# Patient Record
Sex: FEMALE | Race: WHITE | NOT HISPANIC OR LATINO | URBAN - METROPOLITAN AREA
[De-identification: names, ages, dates, MRNs, and addresses within clinical notes are randomized per-mention and may not be internally consistent; named-entity substitution may affect disease eponyms.]

---

## 2023-08-24 PROBLEM — I25.10 CORONARY ARTERIOSCLEROSIS: Status: ACTIVE | Noted: 2023-08-24

## 2023-08-24 PROBLEM — I63.9 CEREBROVASCULAR ACCIDENT (MULTI): Status: ACTIVE | Noted: 2023-03-16

## 2023-08-24 PROBLEM — I46.9 CARDIAC ARREST (MULTI): Status: ACTIVE | Noted: 2023-08-24

## 2023-08-24 PROBLEM — I50.9 CONGESTIVE HEART FAILURE (MULTI): Status: ACTIVE | Noted: 2022-05-18

## 2023-08-24 PROBLEM — I21.9 MYOCARDIAL INFARCTION (MULTI): Status: ACTIVE | Noted: 2023-08-24

## 2023-08-24 PROBLEM — I50.31 ACUTE DIASTOLIC HEART FAILURE (MULTI): Status: ACTIVE | Noted: 2023-08-24

## 2023-08-24 PROBLEM — R09.89 ALTERED CARDIOPULMONARY TISSUE PERFUSION: Status: ACTIVE | Noted: 2023-08-24

## 2023-08-24 PROBLEM — E78.5 DYSLIPIDEMIA: Status: ACTIVE | Noted: 2023-08-24

## 2023-08-24 PROBLEM — R48.0: Status: ACTIVE | Noted: 2023-08-24

## 2023-08-24 PROBLEM — N18.30 CHRONIC KIDNEY DISEASE, STAGE 3 (MULTI): Status: ACTIVE | Noted: 2023-08-24

## 2023-08-24 PROBLEM — I21.3 ACUTE ST ELEVATION MYOCARDIAL INFARCTION (STEMI) (MULTI): Status: ACTIVE | Noted: 2023-08-24

## 2023-08-24 PROBLEM — E78.00 HYPERCHOLESTEROLEMIA: Status: ACTIVE | Noted: 2018-05-09

## 2023-08-24 PROBLEM — N17.9 ACUTE RENAL FAILURE SYNDROME (CMS-HCC): Status: ACTIVE | Noted: 2023-03-16

## 2023-08-24 PROBLEM — Z95.5 STENTED CORONARY ARTERY: Status: ACTIVE | Noted: 2023-08-24

## 2023-08-24 PROBLEM — N39.3 SUI (STRESS URINARY INCONTINENCE, FEMALE): Status: ACTIVE | Noted: 2023-03-16

## 2023-08-24 PROBLEM — K21.9 GASTROESOPHAGEAL REFLUX DISEASE: Status: ACTIVE | Noted: 2023-08-24

## 2023-08-24 PROBLEM — E11.9 TYPE 2 DIABETES MELLITUS (MULTI): Status: ACTIVE | Noted: 2018-05-09

## 2023-08-24 PROBLEM — R73.09 BLOOD GLUCOSE ABNORMAL: Status: ACTIVE | Noted: 2023-08-24

## 2023-08-24 RX ORDER — POTASSIUM CHLORIDE 750 MG/1
TABLET, FILM COATED, EXTENDED RELEASE ORAL
COMMUNITY
Start: 2022-10-24 | End: 2023-10-16 | Stop reason: HOSPADM

## 2023-08-24 RX ORDER — FAMOTIDINE 20 MG/1
1 TABLET, FILM COATED ORAL NIGHTLY
COMMUNITY
Start: 2022-04-23 | End: 2023-10-10

## 2023-08-24 RX ORDER — INSULIN ASPART 100 [IU]/ML
INJECTION, SOLUTION INTRAVENOUS; SUBCUTANEOUS
Status: ON HOLD | COMMUNITY
End: 2023-10-16 | Stop reason: ENTERED-IN-ERROR

## 2023-08-24 RX ORDER — PEN NEEDLE, DIABETIC 30 GX3/16"
NEEDLE, DISPOSABLE MISCELLANEOUS
Status: ON HOLD | COMMUNITY
Start: 2022-05-19 | End: 2024-01-15

## 2023-08-24 RX ORDER — ATORVASTATIN CALCIUM 20 MG/1
1 TABLET, FILM COATED ORAL DAILY
Status: ON HOLD | COMMUNITY
End: 2023-10-12 | Stop reason: ALTCHOICE

## 2023-08-24 RX ORDER — BLOOD-GLUCOSE METER
KIT MISCELLANEOUS
COMMUNITY
Start: 2022-11-15

## 2023-08-24 RX ORDER — MENTHOL AND ZINC OXIDE .44; 20.625 G/100G; G/100G
OINTMENT TOPICAL
COMMUNITY

## 2023-08-24 RX ORDER — PILOCARPINE HYDROCHLORIDE 5 MG/1
TABLET, FILM COATED ORAL
Status: ON HOLD | COMMUNITY
Start: 2023-05-04 | End: 2023-10-16 | Stop reason: SDUPTHER

## 2023-08-24 RX ORDER — FLASH GLUCOSE SCANNING READER
EACH MISCELLANEOUS
COMMUNITY
Start: 2022-10-24

## 2023-08-24 RX ORDER — AMLODIPINE BESYLATE 5 MG/1
TABLET ORAL
Status: ON HOLD | COMMUNITY
Start: 2022-04-23 | End: 2023-10-11

## 2023-08-24 RX ORDER — INSULIN DETEMIR 100 [IU]/ML
INJECTION, SOLUTION SUBCUTANEOUS
Status: ON HOLD | COMMUNITY
End: 2023-10-16 | Stop reason: ENTERED-IN-ERROR

## 2023-08-24 RX ORDER — TALC
1 POWDER (GRAM) TOPICAL NIGHTLY PRN
COMMUNITY

## 2023-08-24 RX ORDER — PANTOPRAZOLE SODIUM 40 MG/1
1 TABLET, DELAYED RELEASE ORAL DAILY
COMMUNITY
End: 2023-11-30 | Stop reason: ALTCHOICE

## 2023-08-24 RX ORDER — FESOTERODINE FUMARATE 8 MG/1
1 TABLET, FILM COATED, EXTENDED RELEASE ORAL DAILY
Status: ON HOLD | COMMUNITY
Start: 2022-08-08 | End: 2023-10-16 | Stop reason: ENTERED-IN-ERROR

## 2023-08-24 RX ORDER — METOPROLOL TARTRATE 50 MG/1
1 TABLET ORAL 2 TIMES DAILY
COMMUNITY
Start: 2022-10-24 | End: 2023-10-19 | Stop reason: SINTOL

## 2023-08-24 RX ORDER — ATORVASTATIN CALCIUM 20 MG/1
20 TABLET, FILM COATED ORAL DAILY
COMMUNITY
Start: 2022-04-23 | End: 2024-01-23 | Stop reason: HOSPADM

## 2023-08-24 RX ORDER — FLUOXETINE HYDROCHLORIDE 40 MG/1
1 CAPSULE ORAL DAILY
COMMUNITY
Start: 2022-04-23 | End: 2023-10-10

## 2023-08-24 RX ORDER — FUROSEMIDE 20 MG/1
1 TABLET ORAL DAILY
COMMUNITY
Start: 2022-10-24 | End: 2024-01-08

## 2023-08-24 RX ORDER — DAPAGLIFLOZIN 10 MG/1
1 TABLET, FILM COATED ORAL EVERY MORNING
COMMUNITY
End: 2023-12-04 | Stop reason: SDUPTHER

## 2023-08-24 RX ORDER — ASPIRIN 81 MG/1
1 TABLET ORAL DAILY
COMMUNITY

## 2023-08-24 RX ORDER — ACETAMINOPHEN 325 MG/1
TABLET ORAL
COMMUNITY
End: 2023-10-16 | Stop reason: HOSPADM

## 2023-08-24 RX ORDER — FLASH GLUCOSE SENSOR
KIT MISCELLANEOUS
COMMUNITY
Start: 2022-10-24

## 2023-10-03 ENCOUNTER — PHARMACY VISIT (OUTPATIENT)
Dept: PHARMACY | Facility: CLINIC | Age: 83
End: 2023-10-03
Payer: MEDICARE

## 2023-10-03 PROCEDURE — RXMED WILLOW AMBULATORY MEDICATION CHARGE

## 2023-10-10 DIAGNOSIS — K21.9 GASTROESOPHAGEAL REFLUX DISEASE WITHOUT ESOPHAGITIS: Primary | ICD-10-CM

## 2023-10-10 DIAGNOSIS — F41.9 ANXIETY: ICD-10-CM

## 2023-10-10 RX ORDER — FAMOTIDINE 20 MG/1
20 TABLET, FILM COATED ORAL DAILY
Qty: 90 TABLET | Refills: 3 | Status: SHIPPED | OUTPATIENT
Start: 2023-10-10

## 2023-10-10 RX ORDER — FLUOXETINE HYDROCHLORIDE 40 MG/1
40 CAPSULE ORAL DAILY
Qty: 90 CAPSULE | Refills: 3 | Status: SHIPPED | OUTPATIENT
Start: 2023-10-10

## 2023-10-11 ENCOUNTER — APPOINTMENT (OUTPATIENT)
Dept: RADIOLOGY | Facility: HOSPITAL | Age: 83
DRG: 571 | End: 2023-10-11
Payer: MEDICARE

## 2023-10-11 ENCOUNTER — HOSPITAL ENCOUNTER (INPATIENT)
Facility: HOSPITAL | Age: 83
LOS: 5 days | Discharge: NURSING, REHAB OR LONG-TERM CARE | DRG: 571 | End: 2023-10-16
Attending: EMERGENCY MEDICINE | Admitting: INTERNAL MEDICINE
Payer: MEDICARE

## 2023-10-11 DIAGNOSIS — L03.119 CELLULITIS OF FOOT: ICD-10-CM

## 2023-10-11 DIAGNOSIS — R05.9 COUGH, UNSPECIFIED TYPE: ICD-10-CM
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
## 2023-10-11 DIAGNOSIS — I50.9 CHRONIC CONGESTIVE HEART FAILURE, UNSPECIFIED HEART FAILURE TYPE (MULTI): ICD-10-CM

## 2023-10-11 LAB
ALBUMIN SERPL-MCNC: 3.7 G/DL (ref 3.5–5)
ALP BLD-CCNC: 90 U/L (ref 35–125)
ALT SERPL-CCNC: 18 U/L (ref 5–40)
ANION GAP SERPL CALC-SCNC: 14 MMOL/L
APPEARANCE UR: ABNORMAL
AST SERPL-CCNC: 34 U/L (ref 5–40)
BACTERIA #/AREA URNS AUTO: ABNORMAL /HPF
BASOPHILS # BLD AUTO: 0.04 X10*3/UL (ref 0–0.1)
BASOPHILS NFR BLD AUTO: 0.5 %
BILIRUB SERPL-MCNC: 1.5 MG/DL (ref 0.1–1.2)
BILIRUB UR STRIP.AUTO-MCNC: NEGATIVE MG/DL
BUN SERPL-MCNC: 40 MG/DL (ref 8–25)
CALCIUM SERPL-MCNC: 10 MG/DL (ref 8.5–10.4)
CHLORIDE SERPL-SCNC: 99 MMOL/L (ref 97–107)
CO2 SERPL-SCNC: 25 MMOL/L (ref 24–31)
COLOR UR: ABNORMAL
CREAT SERPL-MCNC: 1.3 MG/DL (ref 0.4–1.6)
CRP SERPL-MCNC: 8.9 MG/DL (ref 0–2)
EOSINOPHIL # BLD AUTO: 0.08 X10*3/UL (ref 0–0.4)
EOSINOPHIL NFR BLD AUTO: 1 %
ERYTHROCYTE [DISTWIDTH] IN BLOOD BY AUTOMATED COUNT: 12.7 % (ref 11.5–14.5)
ERYTHROCYTE [SEDIMENTATION RATE] IN BLOOD BY WESTERGREN METHOD: 24 MM/H (ref 0–30)
EST. AVERAGE GLUCOSE BLD GHB EST-MCNC: 232 MG/DL
GFR SERPL CREATININE-BSD FRML MDRD: 41 ML/MIN/1.73M*2
GLUCOSE BLD MANUAL STRIP-MCNC: 164 MG/DL (ref 74–99)
GLUCOSE BLD MANUAL STRIP-MCNC: 246 MG/DL (ref 74–99)
GLUCOSE BLD MANUAL STRIP-MCNC: 265 MG/DL (ref 74–99)
GLUCOSE SERPL-MCNC: 57 MG/DL (ref 65–99)
GLUCOSE UR STRIP.AUTO-MCNC: ABNORMAL MG/DL
HBA1C MFR BLD: 9.7 %
HCT VFR BLD AUTO: 45.1 % (ref 36–46)
HGB BLD-MCNC: 15 G/DL (ref 12–16)
HOLD SPECIMEN: NORMAL
IMM GRANULOCYTES # BLD AUTO: 0.02 X10*3/UL (ref 0–0.5)
IMM GRANULOCYTES NFR BLD AUTO: 0.2 % (ref 0–0.9)
KETONES UR STRIP.AUTO-MCNC: NEGATIVE MG/DL
LACTATE BLDV-SCNC: 1.1 MMOL/L (ref 0.4–2)
LACTATE BLDV-SCNC: 3.8 MMOL/L (ref 0.4–2)
LEUKOCYTE ESTERASE UR QL STRIP.AUTO: NEGATIVE
LYMPHOCYTES # BLD AUTO: 0.53 X10*3/UL (ref 0.8–3)
LYMPHOCYTES NFR BLD AUTO: 6.6 %
MCH RBC QN AUTO: 31.5 PG (ref 26–34)
MCHC RBC AUTO-ENTMCNC: 33.3 G/DL (ref 32–36)
MCV RBC AUTO: 95 FL (ref 80–100)
MONOCYTES # BLD AUTO: 0.73 X10*3/UL (ref 0.05–0.8)
MONOCYTES NFR BLD AUTO: 9 %
NEUTROPHILS # BLD AUTO: 6.68 X10*3/UL (ref 1.6–5.5)
NEUTROPHILS NFR BLD AUTO: 82.7 %
NITRITE UR QL STRIP.AUTO: NEGATIVE
NRBC BLD-RTO: 0 /100 WBCS (ref 0–0)
PH UR STRIP.AUTO: 5 [PH]
PLATELET # BLD AUTO: 173 X10*3/UL (ref 150–450)
PMV BLD AUTO: 11.4 FL (ref 7.5–11.5)
POTASSIUM SERPL-SCNC: 4.4 MMOL/L (ref 3.4–5.1)
PROT SERPL-MCNC: 7.6 G/DL (ref 5.9–7.9)
PROT UR STRIP.AUTO-MCNC: ABNORMAL MG/DL
RBC # BLD AUTO: 4.76 X10*6/UL (ref 4–5.2)
RBC # UR STRIP.AUTO: ABNORMAL /UL
RBC #/AREA URNS AUTO: ABNORMAL /HPF
SARS-COV-2 RNA RESP QL NAA+PROBE: NOT DETECTED
SODIUM SERPL-SCNC: 138 MMOL/L (ref 133–145)
SP GR UR STRIP.AUTO: 1.01
SQUAMOUS #/AREA URNS AUTO: ABNORMAL /HPF
UROBILINOGEN UR STRIP.AUTO-MCNC: NORMAL MG/DL
WBC # BLD AUTO: 8.1 X10*3/UL (ref 4.4–11.3)
WBC #/AREA URNS AUTO: ABNORMAL /HPF

## 2023-10-11 PROCEDURE — 71046 X-RAY EXAM CHEST 2 VIEWS: CPT

## 2023-10-11 PROCEDURE — 2500000004 HC RX 250 GENERAL PHARMACY W/ HCPCS (ALT 636 FOR OP/ED): Performed by: NURSE PRACTITIONER

## 2023-10-11 PROCEDURE — 73700 CT LOWER EXTREMITY W/O DYE: CPT | Mod: RT,MG

## 2023-10-11 PROCEDURE — 85652 RBC SED RATE AUTOMATED: CPT | Performed by: PHYSICIAN ASSISTANT

## 2023-10-11 PROCEDURE — 87040 BLOOD CULTURE FOR BACTERIA: CPT | Mod: TRILAB | Performed by: PHYSICIAN ASSISTANT

## 2023-10-11 PROCEDURE — 96372 THER/PROPH/DIAG INJ SC/IM: CPT | Performed by: NURSE PRACTITIONER

## 2023-10-11 PROCEDURE — 93010 ELECTROCARDIOGRAM REPORT: CPT | Performed by: INTERNAL MEDICINE

## 2023-10-11 PROCEDURE — 81001 URINALYSIS AUTO W/SCOPE: CPT | Performed by: PHYSICIAN ASSISTANT

## 2023-10-11 PROCEDURE — 1200000002 HC GENERAL ROOM WITH TELEMETRY DAILY

## 2023-10-11 PROCEDURE — 83605 ASSAY OF LACTIC ACID: CPT | Performed by: PHYSICIAN ASSISTANT

## 2023-10-11 PROCEDURE — 2500000001 HC RX 250 WO HCPCS SELF ADMINISTERED DRUGS (ALT 637 FOR MEDICARE OP): Performed by: NURSE PRACTITIONER

## 2023-10-11 PROCEDURE — 83036 HEMOGLOBIN GLYCOSYLATED A1C: CPT | Performed by: NURSE PRACTITIONER

## 2023-10-11 PROCEDURE — 85025 COMPLETE CBC W/AUTO DIFF WBC: CPT | Performed by: PHYSICIAN ASSISTANT

## 2023-10-11 PROCEDURE — 36415 COLL VENOUS BLD VENIPUNCTURE: CPT | Performed by: PHYSICIAN ASSISTANT

## 2023-10-11 PROCEDURE — 86140 C-REACTIVE PROTEIN: CPT | Performed by: PHYSICIAN ASSISTANT

## 2023-10-11 PROCEDURE — 93005 ELECTROCARDIOGRAM TRACING: CPT

## 2023-10-11 PROCEDURE — 87635 SARS-COV-2 COVID-19 AMP PRB: CPT | Performed by: PHYSICIAN ASSISTANT

## 2023-10-11 PROCEDURE — 82947 ASSAY GLUCOSE BLOOD QUANT: CPT

## 2023-10-11 PROCEDURE — 99285 EMERGENCY DEPT VISIT HI MDM: CPT | Performed by: EMERGENCY MEDICINE

## 2023-10-11 PROCEDURE — 87086 URINE CULTURE/COLONY COUNT: CPT | Mod: TRILAB | Performed by: PHYSICIAN ASSISTANT

## 2023-10-11 PROCEDURE — G1004 CDSM NDSC: HCPCS

## 2023-10-11 PROCEDURE — 73630 X-RAY EXAM OF FOOT: CPT | Mod: RT,FY

## 2023-10-11 PROCEDURE — 2500000004 HC RX 250 GENERAL PHARMACY W/ HCPCS (ALT 636 FOR OP/ED): Performed by: PHYSICIAN ASSISTANT

## 2023-10-11 PROCEDURE — 2500000002 HC RX 250 W HCPCS SELF ADMINISTERED DRUGS (ALT 637 FOR MEDICARE OP, ALT 636 FOR OP/ED): Performed by: NURSE PRACTITIONER

## 2023-10-11 PROCEDURE — 80053 COMPREHEN METABOLIC PANEL: CPT | Performed by: PHYSICIAN ASSISTANT

## 2023-10-11 PROCEDURE — 36415 COLL VENOUS BLD VENIPUNCTURE: CPT | Performed by: NURSE PRACTITIONER

## 2023-10-11 RX ORDER — INSULIN LISPRO 100 [IU]/ML
0-10 INJECTION, SOLUTION INTRAVENOUS; SUBCUTANEOUS
Status: DISCONTINUED | OUTPATIENT
Start: 2023-10-11 | End: 2023-10-16 | Stop reason: HOSPADM

## 2023-10-11 RX ORDER — METOPROLOL TARTRATE 50 MG/1
50 TABLET ORAL 2 TIMES DAILY
Status: DISCONTINUED | OUTPATIENT
Start: 2023-10-11 | End: 2023-10-16 | Stop reason: HOSPADM

## 2023-10-11 RX ORDER — VANCOMYCIN 1 G/200ML
1000 INJECTION, SOLUTION INTRAVENOUS ONCE
Status: COMPLETED | OUTPATIENT
Start: 2023-10-11 | End: 2023-10-11

## 2023-10-11 RX ORDER — ONDANSETRON HYDROCHLORIDE 2 MG/ML
4 INJECTION, SOLUTION INTRAVENOUS EVERY 8 HOURS PRN
Status: DISCONTINUED | OUTPATIENT
Start: 2023-10-11 | End: 2023-10-16 | Stop reason: HOSPADM

## 2023-10-11 RX ORDER — VANCOMYCIN 1 G/200ML
1 INJECTION, SOLUTION INTRAVENOUS EVERY 24 HOURS
Status: DISCONTINUED | OUTPATIENT
Start: 2023-10-12 | End: 2023-10-12

## 2023-10-11 RX ORDER — MIRABEGRON 25 MG/1
25 TABLET, FILM COATED, EXTENDED RELEASE ORAL DAILY
COMMUNITY
End: 2023-12-04 | Stop reason: SDUPTHER

## 2023-10-11 RX ORDER — ACETAMINOPHEN 325 MG/1
650 TABLET ORAL EVERY 4 HOURS PRN
Status: DISCONTINUED | OUTPATIENT
Start: 2023-10-11 | End: 2023-10-16 | Stop reason: HOSPADM

## 2023-10-11 RX ORDER — MEROPENEM 1 G/1
1 INJECTION, POWDER, FOR SOLUTION INTRAVENOUS EVERY 12 HOURS
Status: DISCONTINUED | OUTPATIENT
Start: 2023-10-11 | End: 2023-10-16

## 2023-10-11 RX ORDER — DEXTROSE 50 % IN WATER (D50W) INTRAVENOUS SYRINGE
25
Status: DISCONTINUED | OUTPATIENT
Start: 2023-10-11 | End: 2023-10-16 | Stop reason: HOSPADM

## 2023-10-11 RX ORDER — DEXTROSE MONOHYDRATE 100 MG/ML
0.3 INJECTION, SOLUTION INTRAVENOUS ONCE AS NEEDED
Status: DISCONTINUED | OUTPATIENT
Start: 2023-10-11 | End: 2023-10-16 | Stop reason: HOSPADM

## 2023-10-11 RX ORDER — POLYETHYLENE GLYCOL 3350 17 G/17G
17 POWDER, FOR SOLUTION ORAL DAILY
Status: DISCONTINUED | OUTPATIENT
Start: 2023-10-12 | End: 2023-10-16 | Stop reason: HOSPADM

## 2023-10-11 RX ORDER — PANTOPRAZOLE SODIUM 40 MG/1
40 TABLET, DELAYED RELEASE ORAL DAILY
Status: DISCONTINUED | OUTPATIENT
Start: 2023-10-12 | End: 2023-10-16 | Stop reason: HOSPADM

## 2023-10-11 RX ORDER — HEPARIN SODIUM 5000 [USP'U]/ML
5000 INJECTION, SOLUTION INTRAVENOUS; SUBCUTANEOUS EVERY 8 HOURS
Status: DISCONTINUED | OUTPATIENT
Start: 2023-10-11 | End: 2023-10-16 | Stop reason: HOSPADM

## 2023-10-11 RX ORDER — FLUOXETINE HYDROCHLORIDE 20 MG/1
40 CAPSULE ORAL DAILY
Status: DISCONTINUED | OUTPATIENT
Start: 2023-10-11 | End: 2023-10-16 | Stop reason: HOSPADM

## 2023-10-11 RX ADMIN — SODIUM CHLORIDE 1000 ML: 900 INJECTION, SOLUTION INTRAVENOUS at 13:11

## 2023-10-11 RX ADMIN — CEFEPIME 1 G: 1 INJECTION, POWDER, FOR SOLUTION INTRAMUSCULAR; INTRAVENOUS at 11:07

## 2023-10-11 RX ADMIN — HEPARIN SODIUM 5000 UNITS: 5000 INJECTION, SOLUTION INTRAVENOUS; SUBCUTANEOUS at 21:02

## 2023-10-11 RX ADMIN — MEROPENEM 1 G: 1 INJECTION, POWDER, FOR SOLUTION INTRAVENOUS at 22:00

## 2023-10-11 RX ADMIN — METOPROLOL TARTRATE 50 MG: 50 TABLET, FILM COATED ORAL at 21:02

## 2023-10-11 RX ADMIN — INSULIN LISPRO 4 UNITS: 100 INJECTION, SOLUTION INTRAVENOUS; SUBCUTANEOUS at 17:17

## 2023-10-11 RX ADMIN — VANCOMYCIN 1000 MG: 1 INJECTION, SOLUTION INTRAVENOUS at 10:57

## 2023-10-11 SDOH — ECONOMIC STABILITY: INCOME INSECURITY: IN THE LAST 12 MONTHS, WAS THERE A TIME WHEN YOU WERE NOT ABLE TO PAY THE MORTGAGE OR RENT ON TIME?: NO

## 2023-10-11 SDOH — SOCIAL STABILITY: SOCIAL INSECURITY: ARE THERE ANY APPARENT SIGNS OF INJURIES/BEHAVIORS THAT COULD BE RELATED TO ABUSE/NEGLECT?: NO

## 2023-10-11 SDOH — SOCIAL STABILITY: SOCIAL NETWORK
IN A TYPICAL WEEK, HOW MANY TIMES DO YOU TALK ON THE PHONE WITH FAMILY, FRIENDS, OR NEIGHBORS?: MORE THAN THREE TIMES A WEEK

## 2023-10-11 SDOH — ECONOMIC STABILITY: FOOD INSECURITY: WITHIN THE PAST 12 MONTHS, THE FOOD YOU BOUGHT JUST DIDN'T LAST AND YOU DIDN'T HAVE MONEY TO GET MORE.: NEVER TRUE

## 2023-10-11 SDOH — ECONOMIC STABILITY: TRANSPORTATION INSECURITY
IN THE PAST 12 MONTHS, HAS LACK OF TRANSPORTATION KEPT YOU FROM MEETINGS, WORK, OR FROM GETTING THINGS NEEDED FOR DAILY LIVING?: NO

## 2023-10-11 SDOH — SOCIAL STABILITY: SOCIAL INSECURITY: ARE YOU OR HAVE YOU BEEN THREATENED OR ABUSED PHYSICALLY, EMOTIONALLY, OR SEXUALLY BY ANYONE?: NO

## 2023-10-11 SDOH — HEALTH STABILITY: MENTAL HEALTH
STRESS IS WHEN SOMEONE FEELS TENSE, NERVOUS, ANXIOUS, OR CAN'T SLEEP AT NIGHT BECAUSE THEIR MIND IS TROUBLED. HOW STRESSED ARE YOU?: NOT AT ALL

## 2023-10-11 SDOH — ECONOMIC STABILITY: INCOME INSECURITY: HOW HARD IS IT FOR YOU TO PAY FOR THE VERY BASICS LIKE FOOD, HOUSING, MEDICAL CARE, AND HEATING?: NOT HARD AT ALL

## 2023-10-11 SDOH — SOCIAL STABILITY: SOCIAL INSECURITY: DO YOU FEEL UNSAFE GOING BACK TO THE PLACE WHERE YOU ARE LIVING?: NO

## 2023-10-11 SDOH — SOCIAL STABILITY: SOCIAL INSECURITY: ABUSE: ADULT

## 2023-10-11 SDOH — ECONOMIC STABILITY: FOOD INSECURITY: WITHIN THE PAST 12 MONTHS, YOU WORRIED THAT YOUR FOOD WOULD RUN OUT BEFORE YOU GOT MONEY TO BUY MORE.: NEVER TRUE

## 2023-10-11 SDOH — SOCIAL STABILITY: SOCIAL INSECURITY: WERE YOU ABLE TO COMPLETE ALL THE BEHAVIORAL HEALTH SCREENINGS?: YES

## 2023-10-11 SDOH — ECONOMIC STABILITY: HOUSING INSECURITY
IN THE LAST 12 MONTHS, WAS THERE A TIME WHEN YOU DID NOT HAVE A STEADY PLACE TO SLEEP OR SLEPT IN A SHELTER (INCLUDING NOW)?: NO

## 2023-10-11 SDOH — SOCIAL STABILITY: SOCIAL INSECURITY: HAS ANYONE EVER THREATENED TO HURT YOUR FAMILY OR YOUR PETS?: NO

## 2023-10-11 SDOH — SOCIAL STABILITY: SOCIAL INSECURITY: DO YOU FEEL ANYONE HAS EXPLOITED OR TAKEN ADVANTAGE OF YOU FINANCIALLY OR OF YOUR PERSONAL PROPERTY?: NO

## 2023-10-11 SDOH — SOCIAL STABILITY: SOCIAL INSECURITY: DOES ANYONE TRY TO KEEP YOU FROM HAVING/CONTACTING OTHER FRIENDS OR DOING THINGS OUTSIDE YOUR HOME?: NO

## 2023-10-11 SDOH — SOCIAL STABILITY: SOCIAL INSECURITY: HAVE YOU HAD THOUGHTS OF HARMING ANYONE ELSE?: NO

## 2023-10-11 SDOH — ECONOMIC STABILITY: TRANSPORTATION INSECURITY
IN THE PAST 12 MONTHS, HAS THE LACK OF TRANSPORTATION KEPT YOU FROM MEDICAL APPOINTMENTS OR FROM GETTING MEDICATIONS?: NO

## 2023-10-11 ASSESSMENT — COGNITIVE AND FUNCTIONAL STATUS - GENERAL
TURNING FROM BACK TO SIDE WHILE IN FLAT BAD: A LITTLE
DRESSING REGULAR LOWER BODY CLOTHING: A LOT
STANDING UP FROM CHAIR USING ARMS: A LITTLE
MOBILITY SCORE: 14
PERSONAL GROOMING: A LOT
STANDING UP FROM CHAIR USING ARMS: A LITTLE
MOBILITY SCORE: 19
DAILY ACTIVITIY SCORE: 14
WALKING IN HOSPITAL ROOM: A LITTLE
STANDING UP FROM CHAIR USING ARMS: A LOT
DAILY ACTIVITIY SCORE: 24
TURNING FROM BACK TO SIDE WHILE IN FLAT BAD: A LITTLE
WALKING IN HOSPITAL ROOM: A LITTLE
MOVING TO AND FROM BED TO CHAIR: A LITTLE
HELP NEEDED FOR BATHING: A LOT
MOVING TO AND FROM BED TO CHAIR: A LOT
DRESSING REGULAR UPPER BODY CLOTHING: A LOT
CLIMB 3 TO 5 STEPS WITH RAILING: A LOT
MOBILITY SCORE: 19
MOVING FROM LYING ON BACK TO SITTING ON SIDE OF FLAT BED WITH BEDRAILS: A LITTLE
CLIMB 3 TO 5 STEPS WITH RAILING: A LITTLE
TOILETING: A LOT
TURNING FROM BACK TO SIDE WHILE IN FLAT BAD: A LITTLE
DAILY ACTIVITIY SCORE: 24
MOVING TO AND FROM BED TO CHAIR: A LITTLE
CLIMB 3 TO 5 STEPS WITH RAILING: A LITTLE
PATIENT BASELINE BEDBOUND: NO
WALKING IN HOSPITAL ROOM: A LOT

## 2023-10-11 ASSESSMENT — ENCOUNTER SYMPTOMS
APPETITE CHANGE: 1
GASTROINTESTINAL NEGATIVE: 1
FATIGUE: 1
RESPIRATORY NEGATIVE: 1
CARDIOVASCULAR NEGATIVE: 1
ACTIVITY CHANGE: 1

## 2023-10-11 ASSESSMENT — ACTIVITIES OF DAILY LIVING (ADL)
TOILETING: INDEPENDENT
HEARING - RIGHT EAR: HEARING AID
HEARING - LEFT EAR: HEARING AID
GROOMING: INDEPENDENT
BATHING: INDEPENDENT
ASSISTIVE_DEVICE: HEARING AID - RIGHT;HEARING AID - LEFT;WALKER;EYEGLASSES
PATIENT'S MEMORY ADEQUATE TO SAFELY COMPLETE DAILY ACTIVITIES?: YES
DRESSING YOURSELF: INDEPENDENT
ADEQUATE_TO_COMPLETE_ADL: YES
LACK_OF_TRANSPORTATION: NO
FEEDING YOURSELF: INDEPENDENT
JUDGMENT_ADEQUATE_SAFELY_COMPLETE_DAILY_ACTIVITIES: YES
WALKS IN HOME: INDEPENDENT
LACK_OF_TRANSPORTATION: NO

## 2023-10-11 ASSESSMENT — LIFESTYLE VARIABLES
PRESCIPTION_ABUSE_PAST_12_MONTHS: NO
HOW OFTEN DO YOU HAVE 6 OR MORE DRINKS ON ONE OCCASION: NEVER
AUDIT-C TOTAL SCORE: 0
HOW MANY STANDARD DRINKS CONTAINING ALCOHOL DO YOU HAVE ON A TYPICAL DAY: PATIENT DOES NOT DRINK
HOW OFTEN DO YOU HAVE A DRINK CONTAINING ALCOHOL: NEVER
AUDIT-C TOTAL SCORE: 0
SUBSTANCE_ABUSE_PAST_12_MONTHS: NO
SKIP TO QUESTIONS 9-10: 1

## 2023-10-11 ASSESSMENT — PAIN - FUNCTIONAL ASSESSMENT
PAIN_FUNCTIONAL_ASSESSMENT: 0-10
PAIN_FUNCTIONAL_ASSESSMENT: 0-10

## 2023-10-11 ASSESSMENT — PATIENT HEALTH QUESTIONNAIRE - PHQ9
SUM OF ALL RESPONSES TO PHQ9 QUESTIONS 1 & 2: 0
2. FEELING DOWN, DEPRESSED OR HOPELESS: NOT AT ALL
1. LITTLE INTEREST OR PLEASURE IN DOING THINGS: NOT AT ALL

## 2023-10-11 ASSESSMENT — PAIN SCALES - GENERAL
PAINLEVEL_OUTOF10: 0 - NO PAIN

## 2023-10-11 NOTE — ED PROVIDER NOTES
History and Exam by me shows:    82-year-old female presented with a week of generalized weakness and falls.  No headache or head injury.  No loss of consciousness.  No syncope.  No neck pain.  No chest pain.  No nausea vomiting diarrhea.  No abdominal pain.    Patient gets weak like this with infections.  Has had a lower extremity infections in the past.  Has diabetes.  History from patient and son-in-law.    Reviewed vital signs.  Constitutional: Well appearing and hydrated. Awake & alert. No acute distress.  Head: AtraumaticNo raccoon eyes or hernandez sign.  Eyes: Sclerae are anicteric and not injected.  ENT: Airway is patent.  Neck: Neck is supple and non-tender. No stridor.  Cardiovascular: Regular rate.  Pulmonary/Chest: Clear to auscultation bilaterally . No distress  GI: Soft and non-distended. There is no discomfort with palpation.   Extremities: Full range of motion in all extremities and no deformity.Patient has swelling and ecchymosis of the right foot to just above the ankle.  No crepitus.  No as black eschar.  Neurological: Alert, awake.  Moving all extremities  Skin: Skin is warm and dry.Erythema of the right foot.  Psychiatric: Cooperative.    EKG:  interpreted by me, Emergency Department Physician    1.  Normal sinus rhythm 85, no ST elevations, no prior      MDM:    Patient admitted for generalized weakness with frequent falls and failure to thrive.  Likely secondary to age and current UTI and lower extremity infection.  No signs of sepsis.  Patient given antibiotics appropriate for UTI and diabetic skin infection.    Clinical Impression:      1.  Lower extremity cellulitis  #2 UTI  #3 failure to thrive    Attending Emergency Physician     Asaf Chou MD  10/11/23 0279

## 2023-10-11 NOTE — PROGRESS NOTES
Physical Therapy                 Therapy Communication Note    Patient Name: Carrie Rosario  MRN: [id number]  Today's Date: 10/11/2023     Discipline: Physical Therapy    Missed Visit Reason: Missed Visit Reason: Cancel (Acute OM. Pending podiatry consult.)    Missed Time: Cancel    Comment:

## 2023-10-11 NOTE — ED PROVIDER NOTES
"HPI   Chief Complaint   Patient presents with    Fall     Multiple falls since Sunday, no reported injury.  Poor historian, daughter at bedside with better history.  Also C/O feeling \"congested in chest\".  Chronic wound to bilateral feet, has home healthcare, right foot currently concerning for cellulitis       82-year-old female presented emergency department with several complaints.  She first is complaining of frequent falls that are new with over the last 2 weeks.  She additionally states she is dealing with an infection of her right foot.  Was previously on doxycycline but not has not been on antibiotic over the last 7 days.  She denies any head injury or trauma with her fall.  She denies anticoagulant.  She does complain of a new cough over the last 1 to 2 days.                          Rickman Coma Scale Score: 14                  Patient History   No past medical history on file.  Past Surgical History:   Procedure Laterality Date    MR HEAD ANGIO WO IV CONTRAST  12/28/2021    MR HEAD ANGIO WO IV CONTRAST [facility]    MR NECK ANGIO WO IV CONTRAST  12/28/2021    MR NECK ANGIO WO IV CONTRAST [facility]    OTHER SURGICAL HISTORY  04/25/2022    Cataract surgery    OTHER SURGICAL HISTORY  04/25/2022    YAG laser capsulotomy    OTHER SURGICAL HISTORY  04/25/2022    Foot surgery    OTHER SURGICAL HISTORY  04/25/2022    Back surgery    OTHER SURGICAL HISTORY  04/25/2022    Arterial stent placement     Family History   Problem Relation Name Age of Onset    No Known Problems Daughter      No Known Problems Daughter       Social History     Tobacco Use    Smoking status: Not on file    Smokeless tobacco: Not on file   Substance Use Topics    Alcohol use: Not on file    Drug use: Not on file       Physical Exam   ED Triage Vitals [10/11/23 0907]   Temp Heart Rate Resp BP   36.6 °C (97.9 °F) 77 16 132/74      SpO2 Temp Source Heart Rate Source Patient Position   97 % Oral Monitor Sitting      BP " Location FiO2 (%)     Right arm --       Physical Exam  Vitals and nursing note reviewed.   Constitutional:       Appearance: Normal appearance. She is normal weight.   HENT:      Head: Normocephalic and atraumatic.      Nose: Nose normal.   Cardiovascular:      Rate and Rhythm: Normal rate and regular rhythm.   Pulmonary:      Effort: Pulmonary effort is normal.   Abdominal:      General: Abdomen is flat.   Musculoskeletal:         General: No signs of injury. Normal range of motion.      Cervical back: Normal range of motion.   Skin:     General: Skin is warm.      Comments: Right foot is diffusely erythematous, warm to touch, there is a foul odor, there is no ulceration there is a bunion on her right medial aspect of distal lower extremity   Neurological:      General: No focal deficit present.   Psychiatric:         Mood and Affect: Mood normal.         ED Course & MDM   Diagnoses as of 10/11/23 1410   Fall, initial encounter   Cellulitis of foot   Cough, unspecified type       Medical Decision Making  Labs Reviewed  CBC WITH AUTO DIFFERENTIAL - Abnormal     WBC                           8.1                    nRBC                          0.0                    RBC                           4.76                   Hemoglobin                    15.0                   Hematocrit                    45.1                   MCV                           95                     MCH                           31.5                   MCHC                          33.3                   RDW                           12.7                   Platelets                     173                    MPV                           11.4                   Neutrophils %                 82.7                   Immature Granulocytes %, Automated   0.2                    Lymphocytes %                 6.6                    Monocytes %                   9.0                    Eosinophils %                 1.0                    Basophils %                    0.5                    Neutrophils Absolute          6.68 (*)               Immature Granulocytes Absolute, Au*   0.02                   Lymphocytes Absolute          0.53 (*)               Monocytes Absolute            0.73                   Eosinophils Absolute          0.08                   Basophils Absolute            0.04                COMPREHENSIVE METABOLIC PANEL - Abnormal     Glucose                       57 (*)                 Sodium                        138                    Potassium                     4.4                    Chloride                      99                     Bicarbonate                   25                     Urea Nitrogen                 40 (*)                 Creatinine                    1.30                   eGFR                          41 (*)                 Calcium                       10.0                   Albumin                       3.7                    Alkaline Phosphatase          90                     Total Protein                 7.6                    AST                           34                     Bilirubin, Total              1.5 (*)                ALT                           18                     Anion Gap                     14                  BLOOD GAS LACTIC ACID, VENOUS - Abnormal     POCT Lactate, Venous          3.8 (*)             C-REACTIVE PROTEIN - Abnormal     C-Reactive Protein            8.90 (*)            URINALYSIS WITH REFLEX MICROSCOPIC AND CULTURE - Abnormal     Color, Urine                    (*)                  Appearance, Urine             Turbid (*)               Specific Gravity, Urine       1.011                  pH, Urine                     5.0                    Protein, Urine                50 (1+) (*)               Glucose, Urine                OVER (4+) (*)               Blood, Urine                  OVER (3+) (*)               Ketones, Urine                NEGATIVE                Bilirubin, Urine               NEGATIVE                Urobilinogen, Urine           Normal                 Nitrite, Urine                NEGATIVE                Leukocyte Esterase, Urine     NEGATIVE             URINALYSIS MICROSCOPIC WITH REFLEX CULTURE - Abnormal     WBC, Urine                    6-10 (*)               RBC, Urine                    11-20 (*)               Squamous Epithelial Cells, Urine                          Bacteria, Urine               1+ (*)              SEDIMENTATION RATE, AUTOMATED - Normal     Sedimentation Rate            24                  SARS-COV-2 PCR, SCREEN ASYMPTOMATIC - Normal     Coronavirus 2019, PCR                                    Narrative: This assay has received FDA Emergency Use Authorization (EUA) and is only authorized for the duration of time that circumstances exist to justify the authorization of the emergency use of in vitro diagnostic tests for the detection of SARS-CoV-2 virus and/or diagnosis of COVID-19 infection under section 564(b)(1) of the Act, 21 U.S.C. 360bbb-3(b)(1). This assay is an in vitro diagnostic nucleic acid amplification test for the qualitative detection of SARS-CoV-2 from nasopharyngeal specimens and has been validated for use at [facility]. Negative results do not preclude COVID-19 infections and should not be used as the sole basis for diagnosis, treatment, or other management decisions.                    BLOOD GAS LACTIC ACID, VENOUS - Normal     POCT Lactate, Venous          1.1                 BLOOD CULTURE  URINE CULTURE  URINALYSIS WITH REFLEX MICROSCOPIC AND CULTURE  CT head wo IV contrast   Final Result    No acute intracranial findings.          MACRO:    None          Signed by: Pushpa Bain 10/11/2023 12:26 PM    Dictation workstation:   [id number]     XR foot right 3+ views   Final Result    There areindistinctive medial cortical margins to the 1st distal    metatarsal and 1st proximal phalanx. Osteomyelitis involving  this    region can not be excluded.                MACRO:    None          Signed by: Pushpa Bain 10/11/2023 12:45 PM    Dictation workstation:   [id number]     XR chest 2 views   Final Result    No evidence of acute cardiopulmonary process.                MACRO:    None          Signed by: Pushpa Humairawagner 10/11/2023 12:38 PM    Dictation workstation:   [id number]     I have seen and evaluated this patient.  The attending physician has also seen and evaluated this patient.  Vital signs, laboratory testing and diagnostic images if applicable have been reviewed.  All laboratory and imaging is interpreted by myself unless otherwise stated.  Radiology studies are also formally interpreted by radiologist.    Patient without significant leukocytosis, lactic acid is 3.8, no significant anemia metabolic panel without anitra renal impairment electrolyte abnormality, sedimentation rate is 24, CRP elevated 8.9, on x-ray there is evidence of cortical demargination concerning for osteomyelitis.  Broad-spectrum antibiotics are initiated.  Patient does have palpable distal pulses.  Admitted for further treatment and management.  Was given 1 L of fluid.  Does not require 30 cc/kg bolus due to lactate being less than 4 without evidence of severe sepsis in addition I think that would be detrimental to the patient's overall condition because she is slightly volume overloaded.        Procedure  Procedures     Brian Deras PA-C  10/11/23 1300

## 2023-10-11 NOTE — H&P
History Of Present Illness  Carrie Rosario is a 82 y.o. female presenting with right foot pain, multiple falls, and weakness.  Patient is an 82-year-old female patient with past medical history of STEMI, cardiac arrest, CVA, type 2 diabetes, hypercholesteremia, hypertension, GERD, and multiple foot surgeries; presented at [facility] with progressively worsening right foot pain, weakness, multiple falls, poor appetite, and nausea.  She has a long history of right foot surgeries, follows with podiatry as outpatient on a regular basis, recently finished a course of doxycycline about 10-14 days ago.  Patient denies any chest pain or shortness of breath, denies any fevers or chills, denies any abdominal pain, reports some nausea, and very poor appetite and increased weakness.  Patient noted with right foot swelling, erythema, foul smell ,and pain.   ED course: right foot x-ray with indistinctive medial cortical margins to the 1st distal   metatarsal and 1st proximal phalanx. Osteomyelitis involving this   region can not be excluded.   Labs: CRP 8.9, lactic acid 3.9 -> 1.1 after IV fluid administration.  Patient is afebrile, no leukocytosis noted, blood pressure stable 132/74, heart rate 77.    Patient will be admitted for further evaluation for right foot osteomyelitis, will have infectious disease and podiatry consultation placed.     Past Medical History  Past Medical History:   Diagnosis Date    Arthritis     Cardiac arrest (CMS/[facility])     CVA (cerebral vascular accident) (CMS/[facility])     Diabetes mellitus (CMS/[facility])     GERD (gastroesophageal reflux disease)     Heart disease     Hypercholesterolemia     Hypertension     Right foot drop     STEMI (ST elevation myocardial infarction) (CMS/[facility])        Surgical History  Past Surgical History:   Procedure Laterality Date    CORONARY ANGIOPLASTY WITH STENT PLACEMENT  12/25/2021    FOOT SURGERY      multiple    MR HEAD ANGIO WO IV CONTRAST  12/28/2021    MR HEAD ANGIO WO IV  CONTRAST [facility]    MR NECK ANGIO WO IV CONTRAST  12/28/2021    MR NECK ANGIO WO IV CONTRAST [facility]    OTHER SURGICAL HISTORY  04/25/2022    Cataract surgery    OTHER SURGICAL HISTORY  04/25/2022    YAG laser capsulotomy    OTHER SURGICAL HISTORY  04/25/2022    Foot surgery    OTHER SURGICAL HISTORY  04/25/2022    Back surgery    OTHER SURGICAL HISTORY  04/25/2022    Arterial stent placement        Social History  She reports that she has never smoked. She has never used smokeless tobacco. She reports that she does not drink alcohol and does not use drugs.    Family History  Family History   Problem Relation Name Age of Onset    Diabetes Mother      Diabetes Father      No Known Problems Daughter      No Known Problems Daughter          Allergies  Nickel and Sulfa (sulfonamide antibiotics)    Review of Systems   Constitutional:  Positive for activity change, appetite change and fatigue.   HENT: Negative.     Respiratory: Negative.     Cardiovascular: Negative.    Gastrointestinal: Negative.    Musculoskeletal:         Right foot pain   All other systems reviewed and are negative.       Physical Exam  Constitutional:       Appearance: She is obese.   HENT:      Head: Normocephalic and atraumatic.   Eyes:      Extraocular Movements: Extraocular movements intact.   Cardiovascular:      Rate and Rhythm: Regular rhythm.   Pulmonary:      Effort: Pulmonary effort is normal.      Breath sounds: Normal breath sounds.   Abdominal:      General: Bowel sounds are normal.      Comments: Obese     Musculoskeletal:         General: Deformity present.      Cervical back: Neck supple.      Right lower leg: Edema present.   Skin:     General: Skin is warm and dry.   Neurological:      Mental Status: She is alert and oriented to person, place, and time.   Psychiatric:         Mood and Affect: Mood normal.          Last Recorded Vitals  Blood pressure 107/58, pulse 83, temperature 36.4 °C (97.5 °F),  "temperature source Oral, resp. rate 16, height 1.575 m (5' 2\"), weight 74.2 kg (163 lb 9.3 oz), SpO2 95 %.    Relevant Results    XR foot right 3+ views    Result Date: 10/11/2023  Interpreted By:  Pushpa Bain, STUDY: XR FOOT RIGHT 3+ VIEWS; ;  10/11/2023 12:09 pm   INDICATION: Signs/Symptoms:infxn.   COMPARISON: 08/18/2021.   ACCESSION NUMBER(S): [id number]   ORDERING CLINICIAN: THIERNO MARTINI   FINDINGS: Interval resection of distal 5th metatarsal. Postsurgical changes involving the 1st toe. There are indistinctive  medial cortical margins to the 1st distal metatarsal and 1st proximal phalanx. Diffuse bony demineralization. No acute fractures.       There areindistinctive medial cortical margins to the 1st distal metatarsal and 1st proximal phalanx. Osteomyelitis involving this region can not be excluded.     MACRO: None   Signed by: Pushpa Bain 10/11/2023 12:45 PM Dictation workstation:   [id number]    XR chest 2 views    Result Date: 10/11/2023  Interpreted By:  Pushpa Bain, STUDY: XR CHEST 2 VIEWS;  10/11/2023 12:07 pm   INDICATION: Signs/Symptoms:cough.   COMPARISON: 02/07/2023.   ACCESSION NUMBER(S): [id number]   ORDERING CLINICIAN: THIERNO MARTINI   FINDINGS:         CARDIOMEDIASTINAL SILHOUETTE: Cardiomediastinal silhouette is normal in size and configuration.   LUNGS: Lungs are clear.   ABDOMEN: No remarkable upper abdominal findings.   BONES: No acute osseous changes.       No evidence of acute cardiopulmonary process.     MACRO: None   Signed by: Pushpa Bain 10/11/2023 12:38 PM Dictation workstation:   [id number]    CT head wo IV contrast    Result Date: 10/11/2023  Interpreted By:  Pushpa Bain, STUDY: CT HEAD WO IV CONTRAST;  10/11/2023 12:20 pm   INDICATION: Signs/Symptoms:fall.   COMPARISON: 12/26/2021.   ACCESSION NUMBER(S): [id number]   ORDERING CLINICIAN: THIERNO MARTINI   TECHNIQUE: Noncontrast axial CT scan of head was performed. Angled reformats in brain and bone windows were generated. The images were " reviewed in bone, brain, blood and soft tissue windows.   FINDINGS: CSF Spaces: Ex vacuo dilation of the ventricles. The sulci and basal cisterns are within normal limits. There is no extraaxial fluid collection.   Parenchyma: Chronic bilateral white matter microvascular disease. There is no mass effect or midline shift.  There is no intracranial hemorrhage.   Calvarium: The calvarium is unremarkable.   Paranasal sinuses and mastoids: Visualized paranasal sinuses and mastoids are clear.       No acute intracranial findings.   MACRO: None   Signed by: Pushpa Bain 10/11/2023 12:26 PM Dictation workstation:   [id number]           Assessment/Plan   Principal Problem:    Fall, initial encounter  - CT head negative for acute change  -Denies any dizziness or lightheadedness, denies any chest pain or shortness of breath, fall with mechanical nature.  -Patient with poor p.o. intake for the past couple of days  -PT /OT/fall precaution    Osteomyelitis of right foot  -Consult podiatry and ID  -Meropenem and vancomycin ordered  -CT right foot ordered, without contrast due to chronic kidney disease  -Follow cultures    Diabetes, type II  -Hypoglycemic protocol  -ordered sliding scale, will hold Lantus as BGM was 57. Monitor and resume Lantus when appropriate.  -DM diet    Hypertension  -Blood pressure is stable, resume beta-blocker and monitor    GERD  -PPI    HX of cardiac stent and STEMI  -denies CP or SOB  -Patient follows with Dr. Gannon as outpatient  -ECHO with 50-54% on 3/22/2022    Hyperlipidemia  -We will hold home medication for now    DVT risk  - heparin subcutaneous  -SCD's    Discharge plan  -PT/OT eval,   -ID and podiatry c/s  -discharge once cleared by consultants, howevere may need longterm IV ATB.          I spent 45 minutes in the professional and overall care of this patient.      Marilee Hdz, APRN-CNP

## 2023-10-11 NOTE — PROGRESS NOTES
"Vancomycin Dosing by Pharmacy- INITIAL    Carrie Rosario is a 82 y.o. year old female who Pharmacy has been consulted for vancomycin dosing for osteomyelitis/septic arthritis. Based on the patient's indication and renal status this patient will be dosed based on a goal AUC of 500-600.     Renal function is currently stable.    Visit Vitals  /58 (BP Location: Right arm, Patient Position: Sitting)   Pulse 83   Temp 36.4 °C (97.5 °F) (Oral)   Resp 16        Lab Results   Component Value Date    CREATININE 1.30 10/11/2023    CREATININE 1.3 08/02/2023    CREATININE 1.1 04/27/2023    CREATININE 0.9 02/06/2023    CREATININE 1.1 10/17/2022        Patient weight is 74.2kg    No results found for: \"CULTURE\"     No intake/output data recorded.  [unfilled]    Lab Results   Component Value Date    PATIENTTEMP CANCELLED BY MD 12/25/2021    PATIENTTEMP 37.0 12/25/2021    PATIENTTEMP 37.0 12/25/2021          Assessment/Plan     Patient will not be given a loading dose. Patient did receive 1000 mg dose in ED 10/11/23 at 1057.  Will initiate vancomycin maintenance,  1000 mg every 24 hours.    This dosing regimen is predicted by [employer] to result in the following pharmacokinetic parameters:  <<<<<[employer] DATA >>>>>    Loading dose: N/A  Regimen: 1000 mg IV every 24 hours.  Start time: 22:57 on 10/11/2023  Exposure target: AUC24 (range)400-600 mg/L.hr   AUC24,ss: 521 mg/L.hr  Probability of AUC24 > 400: 78 %  Ctrough,ss: 16.8 mg/L  Probability of Ctrough,ss > 20: 33 %  Probability of nephrotoxicity (Lodise JOIE 2009): 12 %    Follow-up level will be ordered on 10/12/23 at 0500 with AM labs unless clinically indicated sooner.  Will continue to monitor renal function daily while on vancomycin and order serum creatinine at least every 48 hours if not already ordered.  Follow for continued vancomycin needs, clinical response, and signs/symptoms of toxicity.       MIGUEL GRUBBS, PharmD       "

## 2023-10-11 NOTE — NURSING NOTE
Received Patient alert and oriented, wounds on right planter area and buttock left and right, pictures taken see chart. Went down for CT

## 2023-10-11 NOTE — PROGRESS NOTES
"Vancomycin Dosing by Pharmacy- INITIAL    Carrie Rosario is a 82 y.o. year old female who Pharmacy has been consulted for vancomycin dosing (one time dose in ER)    Visit Vitals  /74 (BP Location: Right arm, Patient Position: Sitting)   Pulse 77   Temp 36.6 °C (97.9 °F) (Oral)   Resp 16        Lab Results   Component Value Date    CREATININE 1.3 08/02/2023    CREATININE 1.1 04/27/2023    CREATININE 0.9 02/06/2023    CREATININE 1.1 10/17/2022        Patient weight is No results found for: \"PTWEIGHT\"    No results found for: \"CULTURE\"     No intake/output data recorded.  [unfilled]    Lab Results   Component Value Date    PATIENTTEMP CANCELLED BY MD 12/25/2021    PATIENTTEMP 37.0 12/25/2021    PATIENTTEMP 37.0 12/25/2021          Assessment/Plan     Patient will be given a loading dose of 1000 mg.        Deidre Meléndez, PharmD       "

## 2023-10-12 LAB
ALBUMIN SERPL-MCNC: 3.1 G/DL (ref 3.5–5)
ALP BLD-CCNC: 79 U/L (ref 35–125)
ALT SERPL-CCNC: 15 U/L (ref 5–40)
ANION GAP SERPL CALC-SCNC: 12 MMOL/L
AST SERPL-CCNC: 27 U/L (ref 5–40)
BASOPHILS # BLD AUTO: 0.06 X10*3/UL (ref 0–0.1)
BASOPHILS NFR BLD AUTO: 0.9 %
BILIRUB SERPL-MCNC: 1 MG/DL (ref 0.1–1.2)
BUN SERPL-MCNC: 36 MG/DL (ref 8–25)
CALCIUM SERPL-MCNC: 9.3 MG/DL (ref 8.5–10.4)
CHLORIDE SERPL-SCNC: 104 MMOL/L (ref 97–107)
CO2 SERPL-SCNC: 21 MMOL/L (ref 24–31)
CREAT SERPL-MCNC: 1.1 MG/DL (ref 0.4–1.6)
EOSINOPHIL # BLD AUTO: 0.22 X10*3/UL (ref 0–0.4)
EOSINOPHIL NFR BLD AUTO: 3.4 %
ERYTHROCYTE [DISTWIDTH] IN BLOOD BY AUTOMATED COUNT: 12.7 % (ref 11.5–14.5)
GFR SERPL CREATININE-BSD FRML MDRD: 51 ML/MIN/1.73M*2
GLUCOSE BLD MANUAL STRIP-MCNC: 130 MG/DL (ref 74–99)
GLUCOSE BLD MANUAL STRIP-MCNC: 208 MG/DL (ref 74–99)
GLUCOSE BLD MANUAL STRIP-MCNC: 213 MG/DL (ref 74–99)
GLUCOSE SERPL-MCNC: 191 MG/DL (ref 65–99)
HCT VFR BLD AUTO: 43.3 % (ref 36–46)
HGB BLD-MCNC: 14 G/DL (ref 12–16)
IMM GRANULOCYTES # BLD AUTO: 0.02 X10*3/UL (ref 0–0.5)
IMM GRANULOCYTES NFR BLD AUTO: 0.3 % (ref 0–0.9)
LYMPHOCYTES # BLD AUTO: 1.15 X10*3/UL (ref 0.8–3)
LYMPHOCYTES NFR BLD AUTO: 17.8 %
MCH RBC QN AUTO: 31.7 PG (ref 26–34)
MCHC RBC AUTO-ENTMCNC: 32.3 G/DL (ref 32–36)
MCV RBC AUTO: 98 FL (ref 80–100)
MONOCYTES # BLD AUTO: 0.77 X10*3/UL (ref 0.05–0.8)
MONOCYTES NFR BLD AUTO: 11.9 %
NEUTROPHILS # BLD AUTO: 4.25 X10*3/UL (ref 1.6–5.5)
NEUTROPHILS NFR BLD AUTO: 65.7 %
NRBC BLD-RTO: 0 /100 WBCS (ref 0–0)
PLATELET # BLD AUTO: 186 X10*3/UL (ref 150–450)
PMV BLD AUTO: 10.6 FL (ref 7.5–11.5)
POTASSIUM SERPL-SCNC: 4.4 MMOL/L (ref 3.4–5.1)
PROT SERPL-MCNC: 6.9 G/DL (ref 5.9–7.9)
RBC # BLD AUTO: 4.42 X10*6/UL (ref 4–5.2)
SODIUM SERPL-SCNC: 137 MMOL/L (ref 133–145)
VANCOMYCIN SERPL-MCNC: 49 UG/ML (ref 10–20)
WBC # BLD AUTO: 6.5 X10*3/UL (ref 4.4–11.3)

## 2023-10-12 PROCEDURE — 0JBQ0ZZ EXCISION OF RIGHT FOOT SUBCUTANEOUS TISSUE AND FASCIA, OPEN APPROACH: ICD-10-PCS | Performed by: PODIATRIST

## 2023-10-12 PROCEDURE — 2500000001 HC RX 250 WO HCPCS SELF ADMINISTERED DRUGS (ALT 637 FOR MEDICARE OP): Performed by: NURSE PRACTITIONER

## 2023-10-12 PROCEDURE — 82947 ASSAY GLUCOSE BLOOD QUANT: CPT

## 2023-10-12 PROCEDURE — 1200000002 HC GENERAL ROOM WITH TELEMETRY DAILY

## 2023-10-12 PROCEDURE — 87075 CULTR BACTERIA EXCEPT BLOOD: CPT | Mod: CMCLAB,TRILAB | Performed by: NURSE PRACTITIONER

## 2023-10-12 PROCEDURE — 96372 THER/PROPH/DIAG INJ SC/IM: CPT | Performed by: NURSE PRACTITIONER

## 2023-10-12 PROCEDURE — 80202 ASSAY OF VANCOMYCIN: CPT | Performed by: NURSE PRACTITIONER

## 2023-10-12 PROCEDURE — 2500000002 HC RX 250 W HCPCS SELF ADMINISTERED DRUGS (ALT 637 FOR MEDICARE OP, ALT 636 FOR OP/ED): Performed by: NURSE PRACTITIONER

## 2023-10-12 PROCEDURE — 80053 COMPREHEN METABOLIC PANEL: CPT | Performed by: NURSE PRACTITIONER

## 2023-10-12 PROCEDURE — 2500000004 HC RX 250 GENERAL PHARMACY W/ HCPCS (ALT 636 FOR OP/ED): Performed by: NURSE PRACTITIONER

## 2023-10-12 PROCEDURE — 36415 COLL VENOUS BLD VENIPUNCTURE: CPT | Performed by: NURSE PRACTITIONER

## 2023-10-12 PROCEDURE — 87070 CULTURE OTHR SPECIMN AEROBIC: CPT | Mod: CMCLAB,TRILAB | Performed by: NURSE PRACTITIONER

## 2023-10-12 PROCEDURE — 85025 COMPLETE CBC W/AUTO DIFF WBC: CPT | Performed by: NURSE PRACTITIONER

## 2023-10-12 RX ORDER — DAPAGLIFLOZIN 10 MG/1
10 TABLET, FILM COATED ORAL EVERY MORNING
Status: DISCONTINUED | OUTPATIENT
Start: 2023-10-12 | End: 2023-10-16 | Stop reason: HOSPADM

## 2023-10-12 RX ORDER — POTASSIUM CHLORIDE 750 MG/1
10 TABLET, FILM COATED, EXTENDED RELEASE ORAL
Status: DISCONTINUED | OUTPATIENT
Start: 2023-10-12 | End: 2023-10-16 | Stop reason: HOSPADM

## 2023-10-12 RX ORDER — ATORVASTATIN CALCIUM 40 MG/1
40 TABLET, FILM COATED ORAL NIGHTLY
Status: DISCONTINUED | OUTPATIENT
Start: 2023-10-12 | End: 2023-10-16 | Stop reason: HOSPADM

## 2023-10-12 RX ORDER — OXYBUTYNIN CHLORIDE 5 MG/1
5 TABLET ORAL 3 TIMES DAILY
Status: DISCONTINUED | OUTPATIENT
Start: 2023-10-12 | End: 2023-10-16 | Stop reason: HOSPADM

## 2023-10-12 RX ORDER — TALC
3 POWDER (GRAM) TOPICAL NIGHTLY PRN
Status: DISCONTINUED | OUTPATIENT
Start: 2023-10-12 | End: 2023-10-16 | Stop reason: HOSPADM

## 2023-10-12 RX ORDER — FESOTERODINE FUMARATE 8 MG/1
1 TABLET, FILM COATED, EXTENDED RELEASE ORAL NIGHTLY
Status: DISCONTINUED | OUTPATIENT
Start: 2023-10-12 | End: 2023-10-12

## 2023-10-12 RX ORDER — FUROSEMIDE 20 MG/1
20 TABLET ORAL DAILY
Status: DISCONTINUED | OUTPATIENT
Start: 2023-10-12 | End: 2023-10-16 | Stop reason: HOSPADM

## 2023-10-12 RX ADMIN — METOPROLOL TARTRATE 50 MG: 50 TABLET, FILM COATED ORAL at 09:20

## 2023-10-12 RX ADMIN — POTASSIUM CHLORIDE 10 MEQ: 750 TABLET, EXTENDED RELEASE ORAL at 10:59

## 2023-10-12 RX ADMIN — PANTOPRAZOLE SODIUM 40 MG: 40 TABLET, DELAYED RELEASE ORAL at 09:20

## 2023-10-12 RX ADMIN — VANCOMYCIN 1 G: 1 INJECTION, SOLUTION INTRAVENOUS at 11:11

## 2023-10-12 RX ADMIN — HEPARIN SODIUM 5000 UNITS: 5000 INJECTION, SOLUTION INTRAVENOUS; SUBCUTANEOUS at 15:03

## 2023-10-12 RX ADMIN — OXYBUTYNIN CHLORIDE 5 MG: 5 TABLET ORAL at 15:03

## 2023-10-12 RX ADMIN — ATORVASTATIN CALCIUM 40 MG: 40 TABLET, FILM COATED ORAL at 21:02

## 2023-10-12 RX ADMIN — DAPAGLIFLOZIN 10 MG: 10 TABLET, FILM COATED ORAL at 10:59

## 2023-10-12 RX ADMIN — HEPARIN SODIUM 5000 UNITS: 5000 INJECTION, SOLUTION INTRAVENOUS; SUBCUTANEOUS at 05:43

## 2023-10-12 RX ADMIN — OXYBUTYNIN CHLORIDE 5 MG: 5 TABLET ORAL at 21:02

## 2023-10-12 RX ADMIN — FLUOXETINE 40 MG: 20 CAPSULE ORAL at 09:20

## 2023-10-12 RX ADMIN — METOPROLOL TARTRATE 50 MG: 50 TABLET, FILM COATED ORAL at 21:02

## 2023-10-12 RX ADMIN — HEPARIN SODIUM 5000 UNITS: 5000 INJECTION, SOLUTION INTRAVENOUS; SUBCUTANEOUS at 21:02

## 2023-10-12 RX ADMIN — FUROSEMIDE 20 MG: 20 TABLET ORAL at 10:59

## 2023-10-12 RX ADMIN — MEROPENEM 1 G: 1 INJECTION, POWDER, FOR SOLUTION INTRAVENOUS at 21:02

## 2023-10-12 RX ADMIN — INSULIN LISPRO 4 UNITS: 100 INJECTION, SOLUTION INTRAVENOUS; SUBCUTANEOUS at 12:19

## 2023-10-12 RX ADMIN — Medication 3 MG: at 21:02

## 2023-10-12 RX ADMIN — MEROPENEM 1 G: 1 INJECTION, POWDER, FOR SOLUTION INTRAVENOUS at 09:21

## 2023-10-12 ASSESSMENT — COGNITIVE AND FUNCTIONAL STATUS - GENERAL
STANDING UP FROM CHAIR USING ARMS: A LITTLE
MOBILITY SCORE: 19
MOBILITY SCORE: 19
MOVING TO AND FROM BED TO CHAIR: A LITTLE
CLIMB 3 TO 5 STEPS WITH RAILING: A LITTLE
DAILY ACTIVITIY SCORE: 24
MOVING TO AND FROM BED TO CHAIR: A LITTLE
DAILY ACTIVITIY SCORE: 24
CLIMB 3 TO 5 STEPS WITH RAILING: A LITTLE
TURNING FROM BACK TO SIDE WHILE IN FLAT BAD: A LITTLE
TURNING FROM BACK TO SIDE WHILE IN FLAT BAD: A LITTLE
WALKING IN HOSPITAL ROOM: A LITTLE
STANDING UP FROM CHAIR USING ARMS: A LITTLE
WALKING IN HOSPITAL ROOM: A LITTLE

## 2023-10-12 ASSESSMENT — ENCOUNTER SYMPTOMS
ACTIVITY CHANGE: 1
CARDIOVASCULAR NEGATIVE: 1
ENDOCRINE NEGATIVE: 1
PSYCHIATRIC NEGATIVE: 1
NEUROLOGICAL NEGATIVE: 1
FATIGUE: 1
RESPIRATORY NEGATIVE: 1
EYES NEGATIVE: 1
GASTROINTESTINAL NEGATIVE: 1
MUSCULOSKELETAL NEGATIVE: 1

## 2023-10-12 ASSESSMENT — PAIN SCALES - GENERAL
PAINLEVEL_OUTOF10: 0 - NO PAIN

## 2023-10-12 ASSESSMENT — PAIN - FUNCTIONAL ASSESSMENT
PAIN_FUNCTIONAL_ASSESSMENT: 0-10

## 2023-10-12 NOTE — PROGRESS NOTES
Physical Therapy                 Therapy Communication Note    Patient Name: Carrie Rosario  MRN: [id number]  Today's Date: 10/12/2023     Discipline: Physical Therapy    Missed Visit Reason: Missed Visit Reason: Cancel (Acute OM----pending podiatry consult)    Missed Time: Cancel    Comment: AM Cancel

## 2023-10-12 NOTE — CONSULTS
Wound Care Consult     Visit Date: 10/12/2023      Patient Name: Carrie Rosario         MRN: [id number]           YOB: 1940    A 82 y.o. female patient admitted for   Cellulitis of foot [L03.119]  Fall, initial encounter [W19.XXXA]  Cough, unspecified type [R05.9]     Podiatry is consulted to manage right foot osteomyelitis    Past Medical History:   Diagnosis Date    Arthritis     Cardiac arrest (CMS/[facility])     CVA (cerebral vascular accident) (CMS/[facility])     Diabetes mellitus (CMS/[facility])     GERD (gastroesophageal reflux disease)     Heart disease     Hypercholesterolemia     Hypertension     Right foot drop     STEMI (ST elevation myocardial infarction) (CMS/[facility])       Past Surgical History:   Procedure Laterality Date    CORONARY ANGIOPLASTY WITH STENT PLACEMENT  12/25/2021    FOOT SURGERY      multiple    MR HEAD ANGIO WO IV CONTRAST  12/28/2021    MR HEAD ANGIO WO IV CONTRAST [facility]    MR NECK ANGIO WO IV CONTRAST  12/28/2021    MR NECK ANGIO WO IV CONTRAST [facility]    OTHER SURGICAL HISTORY  04/25/2022    Cataract surgery    OTHER SURGICAL HISTORY  04/25/2022    YAG laser capsulotomy    OTHER SURGICAL HISTORY  04/25/2022    Foot surgery    OTHER SURGICAL HISTORY  04/25/2022    Back surgery    OTHER SURGICAL HISTORY  04/25/2022    Arterial stent placement      Scheduled medications  atorvastatin, 40 mg, oral, Nightly  dapagliflozin propanediol, 10 mg, oral, q AM  FLUoxetine, 40 mg, oral, Daily  furosemide, 20 mg, oral, Daily  heparin (porcine), 5,000 Units, subcutaneous, q8h  insulin lispro, 0-10 Units, subcutaneous, TID with meals  meropenem, 1 g, intravenous, q12h  metoprolol tartrate, 50 mg, oral, BID  oxybutynin, 5 mg, oral, TID  pantoprazole, 40 mg, oral, Daily  polyethylene glycol, 17 g, oral, Daily  potassium chloride CR, 10 mEq, oral, Daily with breakfast         PRN medications  PRN medications: acetaminophen, dextrose 10 % in water (D10W), dextrose, glucagon,  melatonin, ondansetron     Allergies   Allergen Reactions    Nickel Rash    Sulfa (Sulfonamide Antibiotics) Rash and Unknown                Wound History:      Pertinent Labs:   [place], Urine   Date Value Ref Range Status   04/27/2023 31 (H) 0 - 23 MG/L Final     Albumin   Date Value Ref Range Status   10/12/2023 3.1 (L) 3.5 - 5.0 g/dL Final       Wound Assessment:  Wound 10/11/23 Other (comment) Dorsal foot;Right (Active)   Wound Image   10/11/23 1548   Site Assessment Red 10/11/23 0927   Dressing Status Removed;Other (Comment) 10/11/23 0927       Wound 10/11/23 Buttocks Left (Active)   Wound Image   10/11/23 1550       Wound 10/11/23 Buttocks Right (Active)   Wound Image   10/11/23 1550       Wound 10/11/23 Heel Right (Active)   Wound Image   10/11/23 1549     Patient assisted to bedside commode with bedside nurse walker and one assist. Incontinent of urine in brief, brief removed while in bed, patient educated on pressure and moisture associated skin damage. Multiple falls were reported prior to admission. Bilateral 0.5 x 0.5 cm intact purple non blanchable areas on gluteal ridge noted, nursing photographed above, redness in photos above no longer present, and all areas covered with sacral border. Deferred foot to podiatry, (notified by floor of consult but no note as of this visit). Bilateral heels are not red and intact, offloading TruVue boots placed by nursing.     Patient is on a [facility] bed frame with [employer] Mattress, provided waffle cushion for bedside chair for therapy.  Dottie Stone RN, bedside nurse updated, continue pressure injury prevention interventions and nursing to continue to follow provider orders. Re consult wound RN PRN    Wound Team Summary Assessment: continue incontinence care, keep sacral border in place, use waffle cushion for bedside chair when sitting.     Jaye FERRARIN RN [facility] OMS  10/12/2023  3:16 PM

## 2023-10-12 NOTE — PROGRESS NOTES
Physical Therapy                 Therapy Communication Note    Patient Name: Carrie Rosario  MRN: [id number]  Today's Date: 10/12/2023     Discipline: Physical Therapy    Missed Visit Reason: Missed Visit Reason: Other (Comment) (still pending podiatry consult)    Missed Time: Cancel    Comment: Still pending podiatry consult for possible OM. Cancel eval for today

## 2023-10-12 NOTE — CONSULTS
Inpatient consult to Infectious Diseases  Consult performed by: CHELSEA Vera-CNP  Consult ordered by: CHELSEA Wilcox-CNP  Reason for consult: Right foot possible osteomyelitis          Primary MD: No Assigned PCP Generic Provider, MD      History Of Present Illness  Carrie Rosario is a 82 y.o. female past medical history of diabetes mellitus.  She presented to the emergency room with setting right foot swelling, erythema, pain.  She reports chronic ongoing right foot issues and multiple surgeries.  She reported progressive worsening of symptoms.  She is afebrile.  She denies fever, did have some chills related to change of weather.  She denies shortness of breath cough or chest pain.  Labs with no leukocytosis.   CT scan of right foot without IV contrast showed sever soft tissue swelling first digit, periarticular erosions, cannot exclude osteomyelitis.  Upon examination of right foot erythema extending to ankle, swelling, malodor. Plantar aspect with callus.  She is on empiric vancomycin, meropenem.      Past Medical History  She has a past medical history of Arthritis, Cardiac arrest (CMS/[facility]), CVA (cerebral vascular accident) (CMS/[facility]), Diabetes mellitus (CMS/[facility]), GERD (gastroesophageal reflux disease), Heart disease, Hypercholesterolemia, Hypertension, Right foot drop, and STEMI (ST elevation myocardial infarction) (CMS/[facility]).    Surgical History  She has a past surgical history that includes Other surgical history (04/25/2022); Other surgical history (04/25/2022); Other surgical history (04/25/2022); Other surgical history (04/25/2022); Other surgical history (04/25/2022); MR angio head wo IV contrast (12/28/2021); MR angio neck wo IV contrast (12/28/2021); Coronary angioplasty with stent (12/25/2021); and Foot surgery.     Social History     Occupational History    Not on file   Tobacco Use    Smoking status: Never    Smokeless tobacco: Never   Substance and Sexual Activity    Alcohol use:  Never    Drug use: Never    Sexual activity: Not on file     Travel History   Travel since 09/12/23    No documented travel since 09/12/23          Family History  Family History   Problem Relation Name Age of Onset    Diabetes Mother      Diabetes Father      No Known Problems Daughter      No Known Problems Daughter       Allergies  Nickel and Sulfa (sulfonamide antibiotics)     Immunization History   Administered Date(s) Administered    Flu vaccine, quadrivalent, high-dose, preservative free, age 65y+ (FLUZONE) 10/01/2020    Influenza, High Dose Seasonal, Preservative Free 09/29/2016, 10/04/2017, 09/30/2019    Influenza, Seasonal, Quadrivalent, Adjuvanted 11/04/2021    Influenza, Unspecified 10/24/2018, 10/10/2022    Influenza, seasonal, injectable 11/10/2015    Moderna SARS-CoV-2 Vaccination 02/01/2021, 03/01/2021, 11/04/2021    Pfizer COVID-19 vaccine, bivalent, age 12 years and older (30 mcg/0.3 mL) 10/10/2022    Pneumococcal conjugate vaccine, 13-valent (PREVNAR 13) 11/10/2015    Pneumococcal polysaccharide vaccine, 23-valent, age 2 years and older (PNEUMOVAX 23) 11/14/2006, 11/29/2018    Tdap vaccine, age 7 year and older (BOOSTRIX) 08/02/2013    Zoster, live 09/29/2016     Medications  Home medications:  Medications Prior to Admission   Medication Sig Dispense Refill Last Dose    acetaminophen (Tylenol) 325 mg tablet Take by mouth.   More than a month    aspirin 81 mg EC tablet Take 1 tablet (81 mg) by mouth once daily.   10/10/2023    atorvastatin (Lipitor) 40 mg tablet Take 1 tablet (40 mg) by mouth once daily.       cholecalciferol, vitamin D3, (VITAMIN D3 ORAL) daily, 0 Refill(s), Type: Maintenance   10/10/2023    dapagliflozin propanediol (Farxiga) 10 mg Take 1 tablet (10 mg) by mouth once daily in the morning.       famotidine (Pepcid) 20 mg tablet take 1 tablet by mouth once daily 90 tablet 3 10/10/2023    fesoterodine (Toviaz) 8 mg tablet extended release 24 hr Take 1 tablet (8 mg) by mouth once  daily.   10/10/2023    fesoterodine 8 mg tablet extended release 24 hr TAKE 1 TABLET BY MOUTH EVERY DAY AT BEDTIME 90 tablet 3     FLUoxetine (PROzac) 40 mg capsule take 1 capsule by mouth once daily 90 capsule 3 10/10/2023    FreeStyle Brandon reader (FreeStyle Brandon 2 [place]) [facility] FREE STYLE BRANDON METER, See Instructions, Instructions: USE AS DIRECTED TO CHECK BLOOD SUGAR  E11.9, Supply, # 1 EA, 0 Refill(s), Type: Maintenance, Pharmacy: [facility] #[id number], USE AS DIRECTED TO CHECK BLOOD SUGAR; E11.9, 61, in, 10/24/22 12:59:00 EDT, Height Measured, 147, lb, 10/24/22 12:59:00 EDT, Weight Measured   10/10/2023    FreeStyle Brandon sensor system (FreeStyle Brandon 2 Sensor) kit FREE STYLE BRANDON SENSORS, See Instructions, Instructions: USE AS DIRECTED TO CHECK BLOOD SUGARS RELPACE EVERY 14 DAYS  E11.9, Supply, # 2 EA, 11 Refill(s), Type: Maintenance, Pharmacy: [facility] #[id number], USE AS DIRECTED TO CHECK BLOOD SUGARS RELPACE EVERY 14 DAYS; E11.9, 61, in, 10/24/22 12:59:00 EDT, Height Measured, 147, lb, 10/24/22 12:59:00 EDT, Weight Measured   10/10/2023    FreeStyle Lite Strips strip free style brandon test strips, See Instructions, Instructions: use as directed to check blood sugar.  e11.9, Supply, # 100 EA, 1 Refill(s), Type: Maintenance, Pharmacy: [facility] #[id number], use as directed to check blood sugar. e11.9, 61, in, 10/24/22 12:59:00 EDT, Height Measured, 147, lb, 10/24/22 12:59:00 EDT, Weight Measured   10/10/2023    furosemide (Lasix) 20 mg tablet Take 1 tablet (20 mg) by mouth once daily.   10/10/2023    insulin aspart (NovoLOG FlexPen U-100 Insulin) 100 unit/mL (3 mL) pen Inject under the skin.       insulin aspart (NovoLOG) 100 unit/mL (3 mL) pen INJECT SUBCUTANEOUSLY 10 UNITS TWO TIMES A DAY BEFORE LUNCH AND DINNER 15 mL 3 10/10/2023    insulin degludec (Tresiba FlexTouch) 100 unit/mL (3 mL) injection INJECT 20 UNITS SUBCUTANEOUSLY ONE TIME DAILY AS DIRECTED. 15 mL 3 10/10/2023    insulin detemir (Levemir U-100 Insulin) 100  "unit/mL injection Inject under the skin.       melatonin 3 mg tablet Take 1 tablet (3 mg) by mouth as needed at bedtime.   10/10/2023    menthol-zinc oxide (Calmoseptine) 0.44-20.6 % ointment Calmoseptine 0.44-20.6 % External Ointment   Refills: 0       Active       metoprolol tartrate (Lopressor) 50 mg tablet Take 1 tablet by mouth 2 times a day.   10/10/2023    mirabegron (Myrbetriq) 25 mg tablet extended release 24 hr 24 hr tablet Take 1 tablet (25 mg) by mouth once daily.   10/10/2023 at 1800    pantoprazole (ProtoNix) 40 mg EC tablet Take 1 tablet (40 mg) by mouth once daily.       pen needle, diabetic 31 gauge x 5/16\" needle BD UF Pen needles short 31x8, See Instructions, Instructions: Use TID, Supply, # 100 EA, 3 Refill(s), Type: Maintenance, Pharmacy: [address], Use TID, 62, in, 05/18/22 15:18:00 EDT, Height Measured, 140, lb, 05/18/22 15:18:00 EDT, Weight Measured   10/10/2023    pilocarpine (Salagen) 5 mg tablet 0 Refill(s), Type: Maintenance   10/10/2023    potassium chloride CR 10 mEq ER tablet Take by mouth.   10/10/2023     Current medications:  Scheduled medications  atorvastatin, 40 mg, oral, Nightly  dapagliflozin propanediol, 10 mg, oral, q AM  fesoterodine, 1 tablet, oral, Nightly  FLUoxetine, 40 mg, oral, Daily  furosemide, 20 mg, oral, Daily  heparin (porcine), 5,000 Units, subcutaneous, q8h  insulin lispro, 0-10 Units, subcutaneous, TID with meals  meropenem, 1 g, intravenous, q12h  metoprolol tartrate, 50 mg, oral, BID  pantoprazole, 40 mg, oral, Daily  polyethylene glycol, 17 g, oral, Daily  potassium chloride CR, 10 mEq, oral, Daily with breakfast  vancomycin-diluent combo no.1, 1 g, intravenous, q24h      Continuous medications     PRN medications  PRN medications: acetaminophen, dextrose 10 % in water (D10W), dextrose, glucagon, melatonin, ondansetron    Review of Systems   Constitutional:  Positive for activity change and fatigue.   HENT: Negative.     Eyes: Negative.  "   Respiratory: Negative.     Cardiovascular: Negative.    Gastrointestinal: Negative.    Endocrine: Negative.    Genitourinary: Negative.    Musculoskeletal: Negative.         Right foot swelling, pain   Skin:         Right Foot erythema   Neurological: Negative.    Psychiatric/Behavioral: Negative.          Objective  Range of Vitals (last 24 hours)  Heart Rate:  [72-86]   Temp:  [36.3 °C (97.3 °F)-36.7 °C (98.1 °F)]   Resp:  [16-20]   BP: (105-138)/(50-86)   SpO2:  [92 %-100 %]   Daily Weight  10/11/23 : 74.2 kg (163 lb 9.3 oz)    Body mass index is 29.92 kg/m².     Physical Exam  Constitutional:       Appearance: Normal appearance.   HENT:      Head: Normocephalic and atraumatic.      Nose: Nose normal.      Mouth/Throat:      Mouth: Mucous membranes are moist.      Pharynx: Oropharynx is clear.   Eyes:      Extraocular Movements: Extraocular movements intact.      Conjunctiva/sclera: Conjunctivae normal.   Cardiovascular:      Rate and Rhythm: Normal rate and regular rhythm.   Pulmonary:      Effort: Pulmonary effort is normal.      Breath sounds: Normal breath sounds.   Abdominal:      General: Bowel sounds are normal.      Palpations: Abdomen is soft.   Musculoskeletal:      Cervical back: Normal range of motion and neck supple.      Comments: Right foot swelling, pain   Skin:     Comments: Right foot erythema extending to ankle,  malodor, right plantar aspect callus   Neurological:      General: No focal deficit present.      Mental Status: She is alert and oriented to person, place, and time. Mental status is at baseline.   Psychiatric:         Mood and Affect: Mood normal.         Behavior: Behavior normal.          Relevant Results  Outside Hospital Results  No  Labs  Results from last 72 hours   Lab Units 10/11/23  1057   WBC AUTO x10*3/uL 8.1   HEMOGLOBIN g/dL 15.0   HEMATOCRIT % 45.1   PLATELETS AUTO x10*3/uL 173   NEUTROS PCT AUTO % 82.7   LYMPHS PCT AUTO % 6.6   MONOS PCT AUTO % 9.0   EOS PCT AUTO %  "1.0     Results from last 72 hours   Lab Units 10/11/23  1057   SODIUM mmol/L 138   POTASSIUM mmol/L 4.4   CHLORIDE mmol/L 99   CO2 mmol/L 25   BUN mg/dL 40*   CREATININE mg/dL 1.30   GLUCOSE mg/dL 57*   CALCIUM mg/dL 10.0   ANION GAP mmol/L 14   EGFR mL/min/1.73m*2 41*     Results from last 72 hours   Lab Units 10/11/23  1057   ALK PHOS U/L 90   BILIRUBIN TOTAL mg/dL 1.5*   PROTEIN TOTAL g/dL 7.6   ALT U/L 18   AST U/L 34   ALBUMIN g/dL 3.7     Estimated Creatinine Clearance: 32 mL/min (by C-G formula based on SCr of 1.3 mg/dL).  C-Reactive Protein   Date Value Ref Range Status   10/11/2023 8.90 (H) 0.00 - 2.00 mg/dL Final     CRP   Date Value Ref Range Status   08/18/2021 3.1 (H) 0 - 2.0 MG/DL Final     Comment:     Performed at [address] [address]     Sedimentation Rate   Date Value Ref Range Status   10/11/2023 24 0 - 30 mm/h Final     No results found for: \"HIV1X2\", \"HIVCONF\", \"JQKKKD6CS\"  No results found for: \"HEPCABINIT\", \"HEPCAB\", \"HCVPCRQUANT\"  Microbiology  Urine culture pending  Blood culture pending    Imaging  CT foot right wo IV contrast    Result Date: 10/11/2023  Interpreted By:  Thanh Doll, STUDY: CT FOOT RIGHT WO IV CONTRAST; 10/11/2023 4:34 pm   INDICATION: Signs/Symptoms:Osteomyelitis; /wounds right plantar area/diabetes   COMPARISON: X-ray foot same day   ACCESSION NUMBER(S): [id number]   ORDERING CLINICIAN: CRISTY KURTZ   TECHNIQUE: Contiguous axial images through the right foot were obtained.   FINDINGS: Medial subluxation about the MTPs can be seen. There is a screw identified within the 1st proximal phalanx as well as the 1st metatarsal. Surgical resection of the 5th metatarsal head is noted.   Midfoot osteoarthritic or neuropathic changes are seen.   Severe 1st IP and 1st MTP osteoarthritic changes are noted.   Severe soft tissue swelling is noted about the 1st digit. Periarticular erosions involving the 1st IP and 1st MTP cannot be excluded. No definite " periosteal reaction is identified. Degenerative changes are seen within the sesamoids.   Diffuse atrophy of the forefoot is identified.       1. Soft tissue swelling of the 1st digit. This may be compatible with cellulitis. Erosions of the 1st IP and MTP cannot be excluded. If there is further concern for osteomyelitis, consider MRI for further evaluation. 2. Other chronic changes as above.       Signed by: Thanh Doll 10/11/2023 4:41 PM Dictation workstation:   [id number]    XR foot right 3+ views    Result Date: 10/11/2023  Interpreted By:  Pushpa Bain, STUDY: XR FOOT RIGHT 3+ VIEWS; ;  10/11/2023 12:09 pm   INDICATION: Signs/Symptoms:infxn.   COMPARISON: 08/18/2021.   ACCESSION NUMBER(S): [id number]   ORDERING CLINICIAN: THIERNO MARTINI   FINDINGS: Interval resection of distal 5th metatarsal. Postsurgical changes involving the 1st toe. There are indistinctive  medial cortical margins to the 1st distal metatarsal and 1st proximal phalanx. Diffuse bony demineralization. No acute fractures.       There areindistinctive medial cortical margins to the 1st distal metatarsal and 1st proximal phalanx. Osteomyelitis involving this region can not be excluded.     MACRO: None   Signed by: Pushpa Bain 10/11/2023 12:45 PM Dictation workstation:   [id number]    XR chest 2 views    Result Date: 10/11/2023  Interpreted By:  Pushpa Bain, STUDY: XR CHEST 2 VIEWS;  10/11/2023 12:07 pm   INDICATION: Signs/Symptoms:cough.   COMPARISON: 02/07/2023.   ACCESSION NUMBER(S): [id number]   ORDERING CLINICIAN: THIERNO MARTINI   FINDINGS:         CARDIOMEDIASTINAL SILHOUETTE: Cardiomediastinal silhouette is normal in size and configuration.   LUNGS: Lungs are clear.   ABDOMEN: No remarkable upper abdominal findings.   BONES: No acute osseous changes.       No evidence of acute cardiopulmonary process.     MACRO: None   Signed by: Pushpa Bain 10/11/2023 12:38 PM Dictation workstation:   [id number]    CT head wo IV contrast    Result Date:  10/11/2023  Interpreted By:  Pushpa Bain, STUDY: CT HEAD WO IV CONTRAST;  10/11/2023 12:20 pm   INDICATION: Signs/Symptoms:fall.   COMPARISON: 12/26/2021.   ACCESSION NUMBER(S): [id number]   ORDERING CLINICIAN: THIERNO MARTINI   TECHNIQUE: Noncontrast axial CT scan of head was performed. Angled reformats in brain and bone windows were generated. The images were reviewed in bone, brain, blood and soft tissue windows.   FINDINGS: CSF Spaces: Ex vacuo dilation of the ventricles. The sulci and basal cisterns are within normal limits. There is no extraaxial fluid collection.   Parenchyma: Chronic bilateral white matter microvascular disease. There is no mass effect or midline shift.  There is no intracranial hemorrhage.   Calvarium: The calvarium is unremarkable.   Paranasal sinuses and mastoids: Visualized paranasal sinuses and mastoids are clear.       No acute intracranial findings.   MACRO: None   Signed by: Pushpa Bain 10/11/2023 12:26 PM Dictation workstation:   [id number]      Assessment/Plan   Right foot cellulitis  Rule out right foot osteomyelitis  Retained hardware right foot  Diabetes mellitus      IV meropenem  IV vancomycin-management per pharmacy  Monitor temperature WBC  Follow-up urine culture  Follow blood culture  Right foot elevation  Glycemic control per primary  Monitor response to therapy  Podiatry consult  Defer further imaging to Podiatry for evaluation osteomyelitis  Further recommendations based on work-up    CHELSEA Vera-CNP

## 2023-10-12 NOTE — CONSULTS
Consults    Reason For Consult  Right foot ulceration    History Of Present Illness  Carrie Rosario is a 82 y.o. female presenting after multiple falls at home. She is well known to me for right foot ulcerations. She has a complicated history with her right foot undergoing multiple surgeries with infection/osteomyelitis in the past with Dr. Karimi. She has a chronic right plantar foot ulceration and there was concern for cellulitis and osteomyelitis.     Past Medical History  She has a past medical history of Arthritis, Cardiac arrest (CMS/[facility]), CVA (cerebral vascular accident) (CMS/[facility]), Diabetes mellitus (CMS/[facility]), GERD (gastroesophageal reflux disease), Heart disease, Hypercholesterolemia, Hypertension, Right foot drop, and STEMI (ST elevation myocardial infarction) (CMS/[facility]).    Surgical History  She has a past surgical history that includes Other surgical history (04/25/2022); Other surgical history (04/25/2022); Other surgical history (04/25/2022); Other surgical history (04/25/2022); Other surgical history (04/25/2022); MR angio head wo IV contrast (12/28/2021); MR angio neck wo IV contrast (12/28/2021); Coronary angioplasty with stent (12/25/2021); and Foot surgery.     Social History  She reports that she has never smoked. She has never used smokeless tobacco. She reports that she does not drink alcohol and does not use drugs.    Family History  Family History   Problem Relation Name Age of Onset    Diabetes Mother      Diabetes Father      No Known Problems Daughter      No Known Problems Daughter          Allergies  Nickel and Sulfa (sulfonamide antibiotics)    Review of Systems    REVIEW OF SYSTEMS  GENERAL:  Negative for malaise, significant weight loss, fever  HEENT:  No changes in hearing or vision, no nose bleeds or other nasal problems and Negative for frequent or significant headaches  NECK:  Negative for lumps, goiter, pain and significant neck swelling  RESPIRATORY:  Negative for cough, wheezing  "and shortness of breath  CARDIOVASCULAR:  Negative for chest pain, leg swelling and palpitations  GI:  Negative for abdominal discomfort, blood in stools or black stools and change in bowel habits  :  Negative for dysuria, frequency and incontinence  MUSCULOSKELETAL:  Negative for joint pain or swelling, back pain, and muscle pain.  SKIN:  Negative for lesions, rash, and itching  PSYCH:  Negative for sleep disturbance, mood disorder and recent psychosocial stressors  HEMATOLOGY/LYMPHOLOGY:  Negative for prolonged bleeding, bruising easily, and swollen nodes.  ENDOCRINE:  Negative for cold or heat intolerance, polyuria, polydipsia and goiter  NEURO: Negative, denies any burning, tingling or numbness     Objective:   Vasc: DP and PT pulses are palpable bilateral.  CFT is less than 3 seconds bilateral.  Skin temperature is warm to cool proximal to distal bilateral.      Neuro:  Light touch is intact to the foot bilateral.  Protective sensation is absent to the foot when tested with the 5.07 SWM bilateral.  There is no clonus noted.  The hallux is downgoing bilateral.      Derm: Nails 1-5 bilateral are intact.  Skin is supple with normal texture and turgor noted.  Webspaces are clean, dry and intact bilateral.  Full thickness ulceration with surrounding hyperkeratotic tissue.  There is no probe to bone or purulence noted.  There is slight erythema noted to the medial aspect of the foot.  Dorsally where there was a prior ulceration at the first intermetatarsal space this area is healed.    Ortho: Muscle strength is 5/5 for all pedal groups tested.  Ankle joint, subtalar joint, 1st MPJ and lesser MPJ ROM is full and without pain or crepitus.  The foot type is rectus bilateral off weight bearing.  There are no structural deformities noted.          Last Recorded Vitals  Blood pressure 119/62, pulse 71, temperature 36.6 °C (97.9 °F), temperature source Oral, resp. rate 17, height 1.575 m (5' 2\"), weight 74.2 kg (163 lb " 9.3 oz), SpO2 96 %.    Relevant Results  CT and lab imaging reviewed.     Assessment/Plan     Patient was examined and evaluated and all findings were discussed with the patient patient has a chronic right foot ulceration that I see her as an outpatient for.  The ulceration is stable.  I did debride the ulcer today.  Her imaging looks similar to prior imaging that I have taken in the office.  There is a slight erythema concerning for cellulitis to the foot.  I do think the bone is largely unchanged compared to prior evaluation.  I am not concerned for worsening infection or ulceration to the foot.  I am not concerned for osteomyelitis.  I do not think any surgical intervention is warranted at this time.  She needs to continue to have wound care and antibiotics.  -The right plantar foot ulceration was debrided excisionally with a #10 blade without incident.  All nonviable and hyperkeratotic tissue was removed revealing a full-thickness ulceration.  A dressing of Betadine with a dry was applied.  -Antibiotics per infectious disease-I believe she would need a 2-week course  -Weightbearing as tolerated  - Dressing changes to be done daily by nursing-Betadine wet-to-dry to right plantar foot ulceration  -Patient can follow-up in my office 1 week after discharge.  She is aware of this.  At this point time podiatry will sign off.  Please contact me with any questions or concerns  I spent 40 minutes in the professional and overall care of this patient.

## 2023-10-12 NOTE — NURSING NOTE
BSSR received  Patient needed assistance to use BSC, pivots from bed to BSC with 1 assist.   Denies any pain or discomfort  Call light within reach

## 2023-10-12 NOTE — PROGRESS NOTES
Carrie Rosario is a 82 y.o. female on day 1 of admission presenting with fall and right great toe infection.       Subjective   No overnight issues. Tolerating IV Abx. Awaiting Podiatry. She is afebrile.        Objective     Last Recorded Vitals  /86 (BP Location: Left arm, Patient Position: Lying)   Pulse 72   Temp 36.3 °C (97.3 °F) (Oral)   Resp 20   Wt 74.2 kg (163 lb 9.3 oz)   SpO2 99%   Intake/Output last 3 Shifts:    Intake/Output Summary (Last 24 hours) at 10/12/2023 0923  Last data filed at 10/12/2023 0920  Gross per 24 hour   Intake 100 ml   Output --   Net 100 ml       Results for orders placed or performed during the hospital encounter of 10/11/23 (from the past 24 hour(s))   CBC and Auto Differential   Result Value Ref Range    WBC 8.1 4.4 - 11.3 x10*3/uL    nRBC 0.0 0.0 - 0.0 /100 WBCs    RBC 4.76 4.00 - 5.20 x10*6/uL    Hemoglobin 15.0 12.0 - 16.0 g/dL    Hematocrit 45.1 36.0 - 46.0 %    MCV 95 80 - 100 fL    MCH 31.5 26.0 - 34.0 pg    MCHC 33.3 32.0 - 36.0 g/dL    RDW 12.7 11.5 - 14.5 %    Platelets 173 150 - 450 x10*3/uL    MPV 11.4 7.5 - 11.5 fL    Neutrophils % 82.7 40.0 - 80.0 %    Immature Granulocytes %, Automated 0.2 0.0 - 0.9 %    Lymphocytes % 6.6 13.0 - 44.0 %    Monocytes % 9.0 2.0 - 10.0 %    Eosinophils % 1.0 0.0 - 6.0 %    Basophils % 0.5 0.0 - 2.0 %    Neutrophils Absolute 6.68 (H) 1.60 - 5.50 x10*3/uL    Immature Granulocytes Absolute, Automated 0.02 0.00 - 0.50 x10*3/uL    Lymphocytes Absolute 0.53 (L) 0.80 - 3.00 x10*3/uL    Monocytes Absolute 0.73 0.05 - 0.80 x10*3/uL    Eosinophils Absolute 0.08 0.00 - 0.40 x10*3/uL    Basophils Absolute 0.04 0.00 - 0.10 x10*3/uL   Comprehensive Metabolic Panel   Result Value Ref Range    Glucose 57 (L) 65 - 99 mg/dL    Sodium 138 133 - 145 mmol/L    Potassium 4.4 3.4 - 5.1 mmol/L    Chloride 99 97 - 107 mmol/L    Bicarbonate 25 24 - 31 mmol/L    Urea Nitrogen 40 (H) 8 - 25 mg/dL    Creatinine 1.30 0.40 - 1.60 mg/dL    eGFR 41 (L) >60  mL/min/1.73m*2    Calcium 10.0 8.5 - 10.4 mg/dL    Albumin 3.7 3.5 - 5.0 g/dL    Alkaline Phosphatase 90 35 - 125 U/L    Total Protein 7.6 5.9 - 7.9 g/dL    AST 34 5 - 40 U/L    Bilirubin, Total 1.5 (H) 0.1 - 1.2 mg/dL    ALT 18 5 - 40 U/L    Anion Gap 14 <=19 mmol/L   Blood Culture    Specimen: Peripheral Venipuncture; Blood culture   Result Value Ref Range    Blood Culture Loaded on Instrument - Culture in progress    Blood Gas Lactic Acid, Venous   Result Value Ref Range    POCT Lactate, Venous 3.8 (H) 0.4 - 2.0 mmol/L   Sedimentation rate, automated   Result Value Ref Range    Sedimentation Rate 24 0 - 30 mm/h   C-reactive protein   Result Value Ref Range    C-Reactive Protein 8.90 (H) 0.00 - 2.00 mg/dL   Sars-CoV-2 PCR, Screen Asymptomatic   Result Value Ref Range    Coronavirus 2019, PCR Not Detected Not Detected   Urinalysis with Reflex Microscopic and Culture   Result Value Ref Range    Color, Urine Light-Orange (N) Light-Yellow, Yellow, Dark-Yellow    Appearance, Urine Turbid (N) Clear    Specific Gravity, Urine 1.011 1.005 - 1.035    pH, Urine 5.0 5.0, 5.5, 6.0, 6.5, 7.0, 7.5, 8.0    Protein, Urine 50 (1+) (A) NEGATIVE, 10 (TRACE), 20 (TRACE) mg/dL    Glucose, Urine OVER (4+) (A) Normal mg/dL    Blood, Urine OVER (3+) (A) NEGATIVE    Ketones, Urine NEGATIVE NEGATIVE mg/dL    Bilirubin, Urine NEGATIVE NEGATIVE    Urobilinogen, Urine Normal Normal mg/dL    Nitrite, Urine NEGATIVE NEGATIVE    Leukocyte Esterase, Urine NEGATIVE NEGATIVE   Urinalysis Microscopic   Result Value Ref Range    WBC, Urine 6-10 (A) 1-5, NONE /HPF    RBC, Urine 11-20 (A) NONE, 1-2, 3-5 /HPF    Squamous Epithelial Cells, Urine 1-9 (SPARSE) Reference range not established. /HPF    Bacteria, Urine 1+ (A) NONE SEEN /HPF   PST Top   Result Value Ref Range    Extra Tube Hold for add-ons.    Blood Gas Lactic Acid, Venous   Result Value Ref Range    POCT Lactate, Venous 1.1 0.4 - 2.0 mmol/L   POCT GLUCOSE   Result Value Ref Range    POCT  Glucose 265 (H) 74 - 99 mg/dL   POCT GLUCOSE   Result Value Ref Range    POCT Glucose 246 (H) 74 - 99 mg/dL   Hemoglobin A1C   Result Value Ref Range    Hemoglobin A1C 9.7 (H) See below %    Estimated Average Glucose 232 Not Established mg/dL   POCT GLUCOSE   Result Value Ref Range    POCT Glucose 164 (H) 74 - 99 mg/dL                 Physical Exam  HENT:      Head: Normocephalic and atraumatic.      Nose: Nose normal.      Mouth/Throat:      Mouth: Mucous membranes are moist.      Pharynx: Oropharynx is clear.   Eyes:      Extraocular Movements: Extraocular movements intact.      Pupils: Pupils are equal, round, and reactive to light.   Cardiovascular:      Rate and Rhythm: Normal rate and regular rhythm.      Pulses: Normal pulses.   Pulmonary:      Effort: Pulmonary effort is normal.      Breath sounds: Normal breath sounds.   Abdominal:      General: Abdomen is flat. Bowel sounds are normal.      Palpations: Abdomen is soft.   Musculoskeletal:         General: Normal range of motion.   Skin:     General: Skin is warm and dry.      Capillary Refill: Capillary refill takes less than 2 seconds.      Comments: Right great toe with erythema and edema. Wound noted in crease of great toe. Purulent drainage noted. Also with closed wound bottom of foot     Neurological:      General: No focal deficit present.      Mental Status: She is oriented to person, place, and time.   Psychiatric:         Mood and Affect: Mood normal.             Assessment/Plan     Fall, initial encounter  -CT head negative for acute change  -No apparent injuries and denies complaints  -Fall precautions  -PT/OT     Right great toes infection, r/o OM  -CT shows soft tissue swelling and cellulitis. Erosion not excluded however no mention of OM  -Continue IV Abx  -Wound culture  -Follow blood cultures  -Podiatry to evaluate  -ID to evaluate     Diabetes, type II  -Insulin SS  -Monitor blood glucose  -Hgb A1C 9.7     Hypertension  -Continue BB  -BP  stable, monitor      GERD  -PPI     HX of cardiac stent and STEMI  -Continue statin. Holding ASA for possible surgery    Hyperlipidemia  -Statin     DVT risk  -Heparin subcutaneous  -SCD's     Plan  Likely to SNF on discharge. May need long term IV Abx on discharge    Alessia Tellez, APRN-CNP

## 2023-10-12 NOTE — PROGRESS NOTES
"Carrie Rosario is a 82 y.o. female on day 1 of admission presenting with Fall, initial encounter.    Subjective   During nursing rounds, RN informed TCSW pt has consults with podiatry, and ID. Pt has PTOT orders. Pt has been unable to participate with PT, as podiatry must see pt first.         Objective     Physical Exam    Last Recorded Vitals  Blood pressure 122/54, pulse 74, temperature 36.5 °C (97.7 °F), temperature source Oral, resp. rate 19, height 1.575 m (5' 2\"), weight 74.2 kg (163 lb 9.3 oz), SpO2 97 %.  Intake/Output last 3 Shifts:  No intake/output data recorded.    Relevant Results           This patient currently has cardiac telemetry ordered; if you would like to modify or discontinue the telemetry order, click here to go to the orders activity to modify/discontinue the order.                 Assessment/Plan   Principal Problem:    Fall, initial encounter               Kenny Ramirez, LCSW      "

## 2023-10-12 NOTE — CARE PLAN
The patient's goals for the shift include  feel better   Problem: Diabetes  Goal: Achieve decreasing blood glucose levels by end of shift  10/12/2023 1438 by Dottie Stone RN  Outcome: Progressing  10/12/2023 1437 by Dottie Stone RN  Outcome: Progressing  Goal: Increase stability of blood glucose readings by end of shift  10/12/2023 1438 by Dottie Stone RN  Outcome: Progressing  10/12/2023 1437 by Dottie Stone RN  Outcome: Progressing  Goal: Decrease in ketones present in urine by end of shift  10/12/2023 1438 by Dottie Stone RN  Outcome: Progressing  10/12/2023 1437 by Dottie Stone RN  Outcome: Progressing  Goal: Maintain electrolyte levels within acceptable range throughout shift  10/12/2023 1438 by Dottie Stone RN  Outcome: Progressing  10/12/2023 1437 by Dottie Stone RN  Outcome: Progressing  Goal: Maintain glucose levels >70mg/dl to <250mg/dl throughout shift  10/12/2023 1438 by Dottie Stone RN  Outcome: Progressing  10/12/2023 1437 by Dottie Stone RN  Outcome: Progressing  Goal: No changes in neurological exam by end of shift  10/12/2023 1438 by Dottie Stone RN  Outcome: Progressing  10/12/2023 1437 by Dottie Stone RN  Outcome: Progressing  Goal: Learn about and adhere to nutrition recommendations by end of shift  10/12/2023 1438 by Dottie Stone RN  Outcome: Progressing  10/12/2023 1437 by Dottie Stone RN  Outcome: Progressing  Goal: Vital signs within normal range for age by end of shift  10/12/2023 1438 by Dottie Stone RN  Outcome: Progressing  10/12/2023 1437 by Dottie Stone RN  Outcome: Progressing  Goal: Increase self care and/or family involovement by end of shift  10/12/2023 1438 by Dottie Stone RN  Outcome: Progressing  10/12/2023 1437 by Dottie Stone RN  Outcome: Progressing  Goal: Receive DSME education by end of shift  10/12/2023 1438 by Dottie Stone RN  Outcome: Progressing  10/12/2023 1437 by Dottie Stone RN  Outcome: Progressing     Problem: Skin  Goal:  Decreased wound size/increased tissue granulation at next dressing change  Outcome: Progressing  Goal: Participates in plan/prevention/treatment measures  Outcome: Progressing  Goal: Prevent/manage excess moisture  Outcome: Progressing  Goal: Prevent/minimize sheer/friction injuries  Outcome: Progressing  Goal: Promote/optimize nutrition  Outcome: Progressing  Goal: Promote skin healing  Outcome: Progressing       The clinical goals for the shift include infection free

## 2023-10-12 NOTE — PROGRESS NOTES
Occupational Therapy                 Therapy Communication Note    Patient Name: Carrie Rosario  MRN: [id number]  Today's Date: 10/12/2023     Discipline: Occupational Therapy    Missed Visit Reason: Missed Visit Reason: Other (Comment)    Missed Time: Cancel    Comment: Podiatry consult still pending at this time, pt is r/o acute OM.  OT eval deferred at this time.

## 2023-10-12 NOTE — CONSULTS
Vancomycin Dosing by Pharmacy- Cessation of Therapy    Consult to pharmacy for vancomycin dosing has been discontinued by the prescriber, pharmacy will sign off at this time.    Please call pharmacy if there are further questions or re-enter a consult if vancomycin is resumed.     SHANNON JEFFERSON, PharmD

## 2023-10-13 LAB
ANION GAP SERPL CALC-SCNC: 12 MMOL/L
BASOPHILS # BLD AUTO: 0.05 X10*3/UL (ref 0–0.1)
BASOPHILS NFR BLD AUTO: 0.8 %
BUN SERPL-MCNC: 34 MG/DL (ref 8–25)
CALCIUM SERPL-MCNC: 9.3 MG/DL (ref 8.5–10.4)
CHLORIDE SERPL-SCNC: 101 MMOL/L (ref 97–107)
CO2 SERPL-SCNC: 21 MMOL/L (ref 24–31)
CREAT SERPL-MCNC: 1.2 MG/DL (ref 0.4–1.6)
EOSINOPHIL # BLD AUTO: 0.35 X10*3/UL (ref 0–0.4)
EOSINOPHIL NFR BLD AUTO: 5.3 %
ERYTHROCYTE [DISTWIDTH] IN BLOOD BY AUTOMATED COUNT: 12.7 % (ref 11.5–14.5)
GFR SERPL CREATININE-BSD FRML MDRD: 46 ML/MIN/1.73M*2
GLUCOSE BLD MANUAL STRIP-MCNC: 176 MG/DL (ref 74–99)
GLUCOSE BLD MANUAL STRIP-MCNC: 187 MG/DL (ref 74–99)
GLUCOSE BLD MANUAL STRIP-MCNC: 247 MG/DL (ref 74–99)
GLUCOSE BLD MANUAL STRIP-MCNC: 338 MG/DL (ref 74–99)
GLUCOSE SERPL-MCNC: 195 MG/DL (ref 65–99)
HCT VFR BLD AUTO: 40.8 % (ref 36–46)
HGB BLD-MCNC: 13.3 G/DL (ref 12–16)
IMM GRANULOCYTES # BLD AUTO: 0.03 X10*3/UL (ref 0–0.5)
IMM GRANULOCYTES NFR BLD AUTO: 0.5 % (ref 0–0.9)
LYMPHOCYTES # BLD AUTO: 1.17 X10*3/UL (ref 0.8–3)
LYMPHOCYTES NFR BLD AUTO: 17.8 %
MCH RBC QN AUTO: 31.3 PG (ref 26–34)
MCHC RBC AUTO-ENTMCNC: 32.6 G/DL (ref 32–36)
MCV RBC AUTO: 96 FL (ref 80–100)
MONOCYTES # BLD AUTO: 0.74 X10*3/UL (ref 0.05–0.8)
MONOCYTES NFR BLD AUTO: 11.3 %
NEUTROPHILS # BLD AUTO: 4.23 X10*3/UL (ref 1.6–5.5)
NEUTROPHILS NFR BLD AUTO: 64.3 %
NRBC BLD-RTO: 0 /100 WBCS (ref 0–0)
PLATELET # BLD AUTO: 184 X10*3/UL (ref 150–450)
PMV BLD AUTO: 11.1 FL (ref 7.5–11.5)
POTASSIUM SERPL-SCNC: 4.4 MMOL/L (ref 3.4–5.1)
RBC # BLD AUTO: 4.25 X10*6/UL (ref 4–5.2)
SODIUM SERPL-SCNC: 134 MMOL/L (ref 133–145)
WBC # BLD AUTO: 6.6 X10*3/UL (ref 4.4–11.3)

## 2023-10-13 PROCEDURE — 2500000002 HC RX 250 W HCPCS SELF ADMINISTERED DRUGS (ALT 637 FOR MEDICARE OP, ALT 636 FOR OP/ED): Performed by: NURSE PRACTITIONER

## 2023-10-13 PROCEDURE — 2500000004 HC RX 250 GENERAL PHARMACY W/ HCPCS (ALT 636 FOR OP/ED): Performed by: NURSE PRACTITIONER

## 2023-10-13 PROCEDURE — 36415 COLL VENOUS BLD VENIPUNCTURE: CPT | Performed by: NURSE PRACTITIONER

## 2023-10-13 PROCEDURE — 96372 THER/PROPH/DIAG INJ SC/IM: CPT | Performed by: NURSE PRACTITIONER

## 2023-10-13 PROCEDURE — 82947 ASSAY GLUCOSE BLOOD QUANT: CPT

## 2023-10-13 PROCEDURE — 2500000001 HC RX 250 WO HCPCS SELF ADMINISTERED DRUGS (ALT 637 FOR MEDICARE OP): Performed by: NURSE PRACTITIONER

## 2023-10-13 PROCEDURE — 85025 COMPLETE CBC W/AUTO DIFF WBC: CPT | Performed by: NURSE PRACTITIONER

## 2023-10-13 PROCEDURE — 97162 PT EVAL MOD COMPLEX 30 MIN: CPT | Mod: GP

## 2023-10-13 PROCEDURE — 97116 GAIT TRAINING THERAPY: CPT | Mod: GP

## 2023-10-13 PROCEDURE — 97166 OT EVAL MOD COMPLEX 45 MIN: CPT | Mod: GO

## 2023-10-13 PROCEDURE — 1200000002 HC GENERAL ROOM WITH TELEMETRY DAILY

## 2023-10-13 PROCEDURE — 82374 ASSAY BLOOD CARBON DIOXIDE: CPT | Performed by: NURSE PRACTITIONER

## 2023-10-13 RX ORDER — NAPROXEN SODIUM 220 MG/1
81 TABLET, FILM COATED ORAL DAILY
Status: DISCONTINUED | OUTPATIENT
Start: 2023-10-13 | End: 2023-10-16 | Stop reason: HOSPADM

## 2023-10-13 RX ORDER — VANCOMYCIN 1 G/200ML
1 INJECTION, SOLUTION INTRAVENOUS EVERY 24 HOURS
Status: DISCONTINUED | OUTPATIENT
Start: 2023-10-13 | End: 2023-10-15 | Stop reason: DRUGHIGH

## 2023-10-13 RX ADMIN — FUROSEMIDE 20 MG: 20 TABLET ORAL at 08:52

## 2023-10-13 RX ADMIN — HEPARIN SODIUM 5000 UNITS: 5000 INJECTION, SOLUTION INTRAVENOUS; SUBCUTANEOUS at 05:39

## 2023-10-13 RX ADMIN — POLYETHYLENE GLYCOL 3350 17 G: 17 POWDER, FOR SOLUTION ORAL at 08:52

## 2023-10-13 RX ADMIN — POTASSIUM CHLORIDE 10 MEQ: 750 TABLET, EXTENDED RELEASE ORAL at 08:52

## 2023-10-13 RX ADMIN — OXYBUTYNIN CHLORIDE 5 MG: 5 TABLET ORAL at 15:10

## 2023-10-13 RX ADMIN — MEROPENEM 1 G: 1 INJECTION, POWDER, FOR SOLUTION INTRAVENOUS at 10:10

## 2023-10-13 RX ADMIN — OXYBUTYNIN CHLORIDE 5 MG: 5 TABLET ORAL at 08:52

## 2023-10-13 RX ADMIN — DAPAGLIFLOZIN 10 MG: 10 TABLET, FILM COATED ORAL at 08:52

## 2023-10-13 RX ADMIN — VANCOMYCIN 1 G: 1 INJECTION, SOLUTION INTRAVENOUS at 15:10

## 2023-10-13 RX ADMIN — METOPROLOL TARTRATE 50 MG: 50 TABLET, FILM COATED ORAL at 21:10

## 2023-10-13 RX ADMIN — PANTOPRAZOLE SODIUM 40 MG: 40 TABLET, DELAYED RELEASE ORAL at 08:54

## 2023-10-13 RX ADMIN — INSULIN LISPRO 8 UNITS: 100 INJECTION, SOLUTION INTRAVENOUS; SUBCUTANEOUS at 12:53

## 2023-10-13 RX ADMIN — MEROPENEM 1 G: 1 INJECTION, POWDER, FOR SOLUTION INTRAVENOUS at 21:14

## 2023-10-13 RX ADMIN — INSULIN LISPRO 2 UNITS: 100 INJECTION, SOLUTION INTRAVENOUS; SUBCUTANEOUS at 16:45

## 2023-10-13 RX ADMIN — METOPROLOL TARTRATE 50 MG: 50 TABLET, FILM COATED ORAL at 08:52

## 2023-10-13 RX ADMIN — HEPARIN SODIUM 5000 UNITS: 5000 INJECTION, SOLUTION INTRAVENOUS; SUBCUTANEOUS at 21:10

## 2023-10-13 RX ADMIN — ASPIRIN 81 MG CHEWABLE TABLET 81 MG: 81 TABLET CHEWABLE at 15:07

## 2023-10-13 RX ADMIN — INSULIN LISPRO 2 UNITS: 100 INJECTION, SOLUTION INTRAVENOUS; SUBCUTANEOUS at 08:50

## 2023-10-13 RX ADMIN — OXYBUTYNIN CHLORIDE 5 MG: 5 TABLET ORAL at 21:10

## 2023-10-13 RX ADMIN — ATORVASTATIN CALCIUM 40 MG: 40 TABLET, FILM COATED ORAL at 21:10

## 2023-10-13 RX ADMIN — HEPARIN SODIUM 5000 UNITS: 5000 INJECTION, SOLUTION INTRAVENOUS; SUBCUTANEOUS at 15:07

## 2023-10-13 ASSESSMENT — COGNITIVE AND FUNCTIONAL STATUS - GENERAL
WALKING IN HOSPITAL ROOM: A LITTLE
DAILY ACTIVITIY SCORE: 17
TOILETING: A LITTLE
DRESSING REGULAR LOWER BODY CLOTHING: A LOT
MOBILITY SCORE: 18
HELP NEEDED FOR BATHING: A LITTLE
DRESSING REGULAR LOWER BODY CLOTHING: A LOT
DAILY ACTIVITIY SCORE: 18
PERSONAL GROOMING: A LITTLE
TURNING FROM BACK TO SIDE WHILE IN FLAT BAD: A LITTLE
CLIMB 3 TO 5 STEPS WITH RAILING: A LOT
EATING MEALS: A LITTLE
PERSONAL GROOMING: A LITTLE
CLIMB 3 TO 5 STEPS WITH RAILING: A LITTLE
STANDING UP FROM CHAIR USING ARMS: A LITTLE
WALKING IN HOSPITAL ROOM: A LITTLE
MOBILITY SCORE: 17
MOVING TO AND FROM BED TO CHAIR: A LITTLE
MOVING TO AND FROM BED TO CHAIR: A LITTLE
MOVING FROM LYING ON BACK TO SITTING ON SIDE OF FLAT BED WITH BEDRAILS: A LITTLE
DRESSING REGULAR UPPER BODY CLOTHING: A LITTLE
STANDING UP FROM CHAIR USING ARMS: A LITTLE
TURNING FROM BACK TO SIDE WHILE IN FLAT BAD: A LITTLE
DRESSING REGULAR UPPER BODY CLOTHING: A LITTLE
MOVING FROM LYING ON BACK TO SITTING ON SIDE OF FLAT BED WITH BEDRAILS: A LITTLE
TOILETING: A LITTLE
HELP NEEDED FOR BATHING: A LITTLE

## 2023-10-13 ASSESSMENT — PAIN - FUNCTIONAL ASSESSMENT
PAIN_FUNCTIONAL_ASSESSMENT: 0-10

## 2023-10-13 ASSESSMENT — ACTIVITIES OF DAILY LIVING (ADL)
ADL_ASSISTANCE: INDEPENDENT
BATHING_ASSISTANCE: MINIMAL
ADL_ASSISTANCE: INDEPENDENT

## 2023-10-13 ASSESSMENT — PAIN SCALES - GENERAL
PAINLEVEL_OUTOF10: 0 - NO PAIN

## 2023-10-13 NOTE — PROGRESS NOTES
"Carrie Rosario is a 82 y.o. female on day 2 of admission presenting with Fall, initial encounter.    Subjective   During nursing rounds, RN informed TCSW pt will require SNF at discharge. TCSW and unit nurse manager, engaged with pt at bedside to discuss SNF post discharge. Pt is agreeable and requests TCSW discuss DC plans with her daughter.   TCSW engaged with Karoline (daughter), via TC. Karoline provides TCSW with [facility] as F[facility].   [facility] [place] [facility] and [facility]. [facility] is able to accept pt and anticipates her arrival on Monday, pending final wound culture results.     ADOD: Monday       Objective     Physical Exam    Last Recorded Vitals  Blood pressure 104/53, pulse 75, temperature 36.5 °C (97.7 °F), temperature source Oral, resp. rate 17, height 1.575 m (5' 2\"), weight 74.2 kg (163 lb 9.3 oz), SpO2 95 %.  Intake/Output last 3 Shifts:  I/O last 3 completed shifts:  In: 500 (6.7 mL/kg) [IV Piggyback:500]  Out: - (0 mL/kg)   Weight: 74.2 kg     Relevant Results           This patient currently has cardiac telemetry ordered; if you would like to modify or discontinue the telemetry order, click here to go to the orders activity to modify/discontinue the order.                 Assessment/Plan   Principal Problem:    Fall, initial encounter               Kenny Ramirez, [place]      "

## 2023-10-13 NOTE — PROGRESS NOTES
Carrie Rosario is a 82 y.o. female on day 2 of admission presenting with Fall, initial encounter.    Subjective   Interval History:   Yesterday Debridement per Podiatry -note reviewed  Afebrile, no chills  Reports feeling better  Reports right foot pain improving        Review of Systems    Objective   Range of Vitals (last 24 hours)  Heart Rate:  [69-75]   Temp:  [36.4 °C (97.5 °F)-36.8 °C (98.2 °F)]   Resp:  [17]   BP: (104-129)/(53-68)   SpO2:  [95 %-96 %]   Daily Weight  10/11/23 : 74.2 kg (163 lb 9.3 oz)    Body mass index is 29.92 kg/m².    Physical Exam  Constitutional:       Appearance: Normal appearance.   HENT:      Head: Normocephalic and atraumatic.      Nose: Nose normal.      Mouth/Throat:      Mouth: Mucous membranes are moist.      Pharynx: Oropharynx is clear.   Eyes:      Extraocular Movements: Extraocular movements intact.      Conjunctiva/sclera: Conjunctivae normal.   Cardiovascular:      Rate and Rhythm: Normal rate and regular rhythm.   Pulmonary:      Effort: Pulmonary effort is normal.      Breath sounds: Normal breath sounds.   Abdominal:      General: Bowel sounds are normal.      Palpations: Abdomen is soft.   Musculoskeletal:      Cervical back: Normal range of motion and neck supple.      Comments: Right foot swelling, pain -improving  Skin:     Comments: Right foot erythema improving, right foot dressing intact  Neurological:      General: No focal deficit present.      Mental Status: She is alert and oriented to person, place, and time. Mental status is at baseline.   Psychiatric:         Mood and Affect: Mood normal.         Behavior: Behavior normal.     Antibiotics  mirabegron (Mybetriq) 24 hr tablet  cefepime (Maxipime) 1 g in dextrose 5 % 50 mL IV  vancomycin-diluent combo no.1 (Xellia) IVPB 1,000 mg  sodium chloride 0.9 % bolus 1,000 mL  meropenem (Merrem) in sodium chloride 0.9 % 100 mL IV 1 g  vancomycin-diluent combo no.1 (Xellia) IVPB 1 g  pantoprazole (ProtoNix) EC tablet  40 mg  FLUoxetine (PROzac) capsule 40 mg  metoprolol tartrate (Lopressor) tablet 50 mg  heparin (porcine) injection 5,000 Units  acetaminophen (Tylenol) tablet 650 mg  ondansetron (Zofran) injection 4 mg  polyethylene glycol (Glycolax, Miralax) packet 17 g  dextrose 50 % injection 25 g  glucagon (Glucagen) injection 1 mg  dextrose 10 % in water (D10W) infusion  insulin lispro (HumaLOG) injection 0-10 Units  atorvastatin (Lipitor) tablet 40 mg  dapagliflozin propanediol (Farxiga) tablet 10 mg  fesoterodine tablet extended release 24 hr 8 mg  melatonin tablet 3 mg  furosemide (Lasix) tablet 20 mg  potassium chloride CR (Klor-Con) ER tablet 10 mEq  oxybutynin (Ditropan) tablet 5 mg      Relevant Results  Labs  Results from last 72 hours   Lab Units 10/13/23  0409 10/12/23  1218 10/11/23  1057   WBC AUTO x10*3/uL 6.6 6.5 8.1   HEMOGLOBIN g/dL 13.3 14.0 15.0   HEMATOCRIT % 40.8 43.3 45.1   PLATELETS AUTO x10*3/uL 184 186 173   NEUTROS PCT AUTO % 64.3 65.7 82.7   LYMPHS PCT AUTO % 17.8 17.8 6.6   MONOS PCT AUTO % 11.3 11.9 9.0   EOS PCT AUTO % 5.3 3.4 1.0     Results from last 72 hours   Lab Units 10/13/23  0409 10/12/23  1218 10/11/23  1057   SODIUM mmol/L 134 137 138   POTASSIUM mmol/L 4.4 4.4 4.4   CHLORIDE mmol/L 101 104 99   CO2 mmol/L 21* 21* 25   BUN mg/dL 34* 36* 40*   CREATININE mg/dL 1.20 1.10 1.30   GLUCOSE mg/dL 195* 191* 57*   CALCIUM mg/dL 9.3 9.3 10.0   ANION GAP mmol/L 12 12 14   EGFR mL/min/1.73m*2 46* 51* 41*     Results from last 72 hours   Lab Units 10/12/23  1218 10/11/23  1057   ALK PHOS U/L 79 90   BILIRUBIN TOTAL mg/dL 1.0 1.5*   PROTEIN TOTAL g/dL 6.9 7.6   ALT U/L 15 18   AST U/L 27 34   ALBUMIN g/dL 3.1* 3.7     Estimated Creatinine Clearance: 34.7 mL/min (by C-G formula based on SCr of 1.2 mg/dL).  C-Reactive Protein   Date Value Ref Range Status   10/11/2023 8.90 (H) 0.00 - 2.00 mg/dL Final     CRP   Date Value Ref Range Status   08/18/2021 3.1 (H) 0 - 2.0 MG/DL Final     Comment:      Performed at [address] [address]     Microbiology  Susceptibility data from last 14 days.  Collected Specimen Info Organism   10/11/23 Urine from Clean Catch/Voided Enterococcus faecalis     10/11- urine culture with E. Faecalis-pending susceptibility  10/11-blood culture pending  10/12-wound culture gram positive cocci, pending  Imaging  Assessment/Plan   Right foot cellulitis  Rule out right foot osteomyelitis  Right foot ulcer-debrided per podiatry 10/12/2023  Retained hardware right foot  Diabetes mellitus  Urinary tract infection-E. Faecalis-susceptibility pending        IV meropenem  IV vancomycin-management per pharmacy  Monitor temperature WBC  Follow-up urine culture  Follow blood culture  Right foot elevation  Glycemic control per primary  Monitor response to therapy  Podiatry follow up  Further recommendations based on culture results       Lora Bustillos, APRN-CNP

## 2023-10-13 NOTE — PROGRESS NOTES
Occupational Therapy    Evaluation    Patient Name: Carrie Rosario  MRN: [id number]  Today's Date: 10/13/2023  Time Calculation  Start Time: 0907  Stop Time: 0932  Time Calculation (min): 25 min    Assessment  IP OT Assessment  End of Session Communication: Bedside nurse  End of Session Patient Position: Up in chair, Alarm on  Plan:  Treatment Interventions: ADL retraining, Functional transfer training, UE strengthening/ROM, Cognitive reorientation, Endurance training, Patient/family training, Compensatory technique education  OT Frequency: 2 times per week  OT Discharge Recommendations: Moderate intensity level of continued care (patient is not cognitively/functionally safe to return home alone at this time)    Subjective   Current Problem:  1. Fall, initial encounter        2. Cellulitis of foot        3. Cough, unspecified type          General:  General  Reason for Referral: decline in strength, balance, activity tolerance, resulting in an increased need for assistance for ADL tasks  Referred By: Dr. Anderson  Past Medical History Relevant to Rehab: Patient is an 82 year old female who presented to the ED after a fall. She underwent an ulcer debridement with podiatry and is WBAT R LE. PMH: DB, HTN, HLD, CVA  Family/Caregiver Present: Yes (family member present)  Co-Treatment: OT  Co-Treatment Reason: Fall Risk  Prior to Session Communication: Bedside nurse  Patient Position Received: Bed, 3 rail up  Preferred Learning Style: verbal  Precautions:  Medical Precautions: Fall precautions    Pain:  Pain Assessment  Pain Assessment: 0-10  Pain Score: 0 - No pain    Objective   Cognition:  Overall Cognitive Status: Impaired (increased cues for safety)  Memory: Exceptions to WFL  Short-Term Memory: Impaired (patient continues to repeat herself througout eval)  Insight: Moderate  Impulsive: Moderately     Home Living:  Type of Home: House  Lives With: Alone  Home Adaptive Equipment: Walker rolling or standard  Home  Layout: Multi-level  Home Access: Stairs to enter with rails  Entrance Stairs-Rails: Right  Entrance Stairs-Number of Steps: 2  Bathroom Shower/Tub: Tub only  Bathroom Toilet: Standard  Bathroom Equipment: Grab bars in shower, Tub transfer bench, Shower chair without back  Home Living Comments: stair lift to go upstairs to bathroom. Bed on 1st floor   Prior Function:  Level of [place]: Independent with ADLs and functional transfers, Independent with homemaking with ambulation (Numerous falls leading up to admission. Increased difficulty with ADLs recently)  ADL Assistance: Independent (increased difficulty with ADLs recently per patient report)  Ambulatory Assistance: Independent (but several falls prior to admission. Furniture walks at home with PRN rollator use)  Hand Dominance: Right  IADL History:  Homemaking Responsibilities: Yes  IADL Comments: increased time  ADL:  Eating Assistance: Stand by  Eating Deficit: Setup (seated with items within reach)  Grooming Assistance: Stand by  Grooming Deficit: Steadying, Verbal cueing, Supervision/safety (wash hands at sink)  Bathing Assistance: Minimal  Bathing Deficit: Buttocks, Right lower leg including foot, Left lower leg including foot (per clinical judgement)  UE Dressing Assistance: Minimal  UE Dressing Deficit: Setup, Supervision/safety, Increased time to complete (per clinical judgement)  LE Dressing Assistance: Maximal  LE Dressing Deficit: Thread RLE into pants, Thread LLE into pants, Don/doff R sock, Don/doff L sock, Steadying, Verbal cueing, Supervision/safety (per clinical judgement)  Toileting Assistance with Device: Minimal  Toileting Deficit: Steadying, Verbal cueing, Supervison/safety, Increased time to complete, Clothing management up, Clothing management down  Activity Tolerance:  Endurance: Decreased tolerance for upright activites  Bed Mobility/Transfers: Bed Mobility  Bed Mobility: Yes  Bed Mobility 1  Bed Mobility 1: Supine to sitting  Level  of Assistance 1: Close supervision  Bed Mobility Comments 1: head of bed slightly elevated   and Transfers  Transfer: Yes  Transfer 1  Transfer From 1: Bed to  Transfer to 1: Stand  Technique 1: Sit to stand  Transfer Device 1: Walker  Transfer Level of Assistance 1: Close supervision  Trials/Comments 1: EOB to walker, cues for safety  Transfers 2  Transfer From 2: Toilet to  Transfer to 2: Stand  Technique 2: Sit to stand, Stand to sit  Transfer Device 2: Walker  Transfer Level of Assistance 2: Minimum assistance  Trials/Comments 2: x2 sitting down with moderate cuing for safety. Attempts to sit several times in space and no where near toilet/goal seated surface    IADL's:   Homemaking Responsibilities: Yes  IADL Comments: increased time    Sensation:  Light Touch: No apparent deficits  Strength:  Strength Comments: B UE 3+/5 grossly    Extremities: RUE   RUE : Within Functional Limits and LUE   LUE: Within Functional Limits    Outcome Measures: [facility] Daily Activity  Putting on and taking off regular lower body clothing: A lot  Bathing (including washing, rinsing, drying): A little  Putting on and taking off regular upper body clothing: A little  Toileting, which includes using toilet, bedpan or urinal: A little  Taking care of personal grooming such as brushing teeth: A little  Eating Meals: None  Daily Activity - Total Score: 18      Education Documentation  Precautions, taught by Jaqueline Farris OT at 10/13/2023 10:19 AM.  Learner: Patient  Readiness: Acceptance  Method: Explanation, Demonstration  Response: Needs Reinforcement    Home Exercise Program, taught by Jaqueline Farris OT at 10/13/2023 10:19 AM.  Learner: Patient  Readiness: Acceptance  Method: Explanation, Demonstration  Response: Needs Reinforcement    ADL Training, taught by Jaqueline Farris OT at 10/13/2023 10:19 AM.  Learner: Patient  Readiness: Acceptance  Method: Explanation, Demonstration  Response: Needs Reinforcement    Education Comments  No  comments found.      Goals:     Problem: OT Goals  Goal: ADLs  Description: Patient will complete ADL tasks with Mod I, using AE as needed, in order to increase patient's safety and independence with self-care tasks.  Outcome: Progressing  Goal: Functional Transfers   Description: Patient will complete functional transfers with Mod I in order to increase patient's safety and independence with ADL tasks.  Outcome: Progressing  Goal: B UE Strength  Description: Patient will increase B UE strength to 4+/5 for functional transfers.   Outcome: Progressing  Goal: IADLs  Description: Patient will demonstrate the ability to complete item retrieval and functional mobility in order to increase independence with self-care tasks.  Outcome: Progressing

## 2023-10-13 NOTE — CARE PLAN
Problem: OT Goals  Goal: ADLs  Description: Patient will complete ADL tasks with Mod I, using AE as needed, in order to increase patient's safety and independence with self-care tasks.  Outcome: Progressing  Goal: Functional Transfers   Description: Patient will complete functional transfers with Mod I in order to increase patient's safety and independence with ADL tasks.  Outcome: Progressing  Goal: B UE Strength  Description: Patient will increase B UE strength to 4+/5 for functional transfers.   Outcome: Progressing  Goal: IADLs  Description: Patient will demonstrate the ability to complete item retrieval and functional mobility in order to increase independence with self-care tasks.  Outcome: Progressing

## 2023-10-13 NOTE — PROGRESS NOTES
Physical Therapy    Physical Therapy Evaluation & Treatment    Patient Name: Carrie Rosario  MRN: [id number]  Today's Date: 10/13/2023   Time Calculation  Start Time: 0903  Stop Time: 0934  Time Calculation (min): 31 min    82 year old female admits with several falls prior to admission, R great toe infection which was r/o OM, DB, HTN, GERD, and HLD. Per Podiatry consult, Pt may be WBAT at this time. On evaluation, Pt demonstrates weakness and decreased safe functional mobility. While the patient ambulates 150' CS with RW, PT is very concerned in regards to fall risk. During bathroom movement for hygiene, Pt attempts several times to sit no where near the toilet and requires assist for transfers. After this, she again attempts to sit no where near her bedside chair requiring guidance assist and cuing for safety. Pt absolutely would have ended up on the ground if PT was not present to assist for these transfers.     Pt would need 24/7 care initially at home and have someone with her at all times for fall risk management at acute care NH. Pt lives alone. Son in law and patient both state family could not stay with her at NH. Moderate intensity rehab is recommended at this time for fall risk management.     Assessment/Plan   PT Assessment  PT Assessment Results: Decreased range of motion, Decreased strength, Decreased endurance, Impaired balance, Decreased mobility, Decreased cognition, Decreased safety awareness, Impaired judgement  Rehab Prognosis: Good  Evaluation/Treatment Tolerance: Patient tolerated treatment well  Medical Staff Made Aware: Yes  End of Session Communication: Care Coordinator  End of Session Patient Position: Up in chair, Alarm on  IP OR SWING BED PT PLAN  Inpatient or Swing Bed: Inpatient  PT Plan  Treatment/Interventions: Bed mobility, Transfer training, Gait training, Stair training, Balance training, Strengthening, Endurance training, Range of motion, Therapeutic activity, Therapeutic exercise,  Home exercise program  PT Plan: Skilled PT  PT Frequency: 3 times per week  PT Discharge Recommendations: Moderate intensity level of continued care, 24 hr supervision due to cognition  Equipment Recommended upon Discharge: Wheeled walker  PT Recommended Transfer Status: Assist x1, Assistive device      Subjective     General Visit Information:  General  Reason for Referral: Impaired Mobility  Referred By: Dr. Anderson  Past Medical History Relevant to Rehab: DB, HN, HLD  Missed Visit: Yes  Missed Visit Reason: Other (Comment) (still pending podiatry consult)  Family/Caregiver Present: Yes (Son in law)  Co-Treatment: OT (co eval)  Co-Treatment Reason: Fall Risk  Prior to Session Communication: Bedside nurse  Patient Position Received: Bed, 3 rail up  Preferred Learning Style: verbal  Home Living:  Home Living  Type of Home: House  Lives With: Alone  Home Adaptive Equipment: Walker rolling or standard  Home Layout: Multi-level  Home Access: Stairs to enter with rails  Entrance Stairs-Rails: Right  Entrance Stairs-Number of Steps: 2  Bathroom Shower/Tub: Tub only  Bathroom Toilet: Standard  Bathroom Equipment: Grab bars in shower, Tub transfer bench, Shower chair without back  Home Living Comments: stair lift to go upstairs to bathroom. Bed on 1st floor  Prior Level of Function:  Prior Function Per Pt/Caregiver Report  Level of [place]: Independent with ADLs and functional transfers, Independent with homemaking with ambulation (Numerous falls leading up to admission)  ADL Assistance: Independent (but reports while self completed LE dressing has become more difficult recently)  Ambulatory Assistance: Independent (but several falls prior to admission. Furniture walks at home with PRN rollator use)  Hand Dominance: Right    Objective   Pain:  Pain Assessment  Pain Assessment: 0-10  Pain Score: 0 - No pain  Cognition:  Cognition  Overall Cognitive Status: Impaired  Memory: Exceptions to WFL  Short-Term Memory:  "Impaired  Insight: Moderate  Impulsive: Moderately    General Assessments:  Activity Tolerance  Endurance: Decreased tolerance for upright activites    Sensation  Light Touch: No apparent deficits  Functional Assessments:  Bed Mobility  Bed Mobility: Yes  Bed Mobility 1  Bed Mobility 1: Supine to sitting  Level of Assistance 1: Close supervision    Transfers  Transfer: Yes  Transfer 1  Transfer From 1: Bed to  Transfer to 1: Stand  Technique 1: Sit to stand  Transfer Device 1: Walker  Transfer Level of Assistance 1: Close supervision  Trials/Comments 1: from EOB---cues on technique for safety  Transfers 2  Transfer From 2: Toilet to  Transfer to 2: Stand  Technique 2: Sit to stand, Stand to sit  Transfer Device 2: Walker  Transfer Level of Assistance 2: Minimum assistance  Trials/Comments 2: x2 sitting down with moderate cuing for safety. Attempts to sit several times in space and no where near toilet/goal seated surface    Ambulation/Gait Training  Ambulation/Gait Training Performed: Yes  Ambulation/Gait Training 1  Surface 1: Level tile  Device 1: Rolling walker  Gait Support Devices:  (R foot drop---stop using AFO at home per Pt \"a while ago\" Compensates by excessive R hip/knee flexion during swing phase)  Assistance 1: Close supervision  Quality of Gait 1:  (Very slow, kyphotic, with Min unsteadiness largely corrected with AD)  Comments/Distance (ft) 1: 150  Extremity/Trunk Assessments:  RLE   RLE : Exceptions to WFL  Strength RLE  RLE Overall Strength: Deficits  R Hip Flexion: 3+/5  R Knee Flexion: 4-/5  R Knee Extension: 4-/5  R Ankle Dorsiflexion: 1/5  R Ankle Plantar Flexion: 1/5  LLE   LLE : Exceptions to WFL  Strength LLE  LLE Overall Strength: Deficits  L Hip Flexion: 4/5  L Knee Flexion: 4-/5  L Knee Extension: 4-/5  L Ankle Dorsiflexion: 4/5  L Ankle Plantar Flexion: 4/5  Treatments:  Ambulation/Gait Training  Ambulation/Gait Training Performed: Yes  Ambulation/Gait Training 1  Surface 1: Level " "tile  Device 1: Rolling walker  Gait Support Devices:  (R foot drop---stop using AFO at home per Pt \"a while ago\" Compensates by excessive R hip/knee flexion during swing phase)  Assistance 1: Close supervision  Quality of Gait 1:  (Very slow, kyphotic, with Min unsteadiness largely corrected with AD)  Comments/Distance (ft) 1: 150  Outcome Measures:  [facility] Basic Mobility  Turning from your back to your side while in a flat bed without using bedrails: A little  Moving from lying on your back to sitting on the side of a flat bed without using bedrails: A little  Moving to and from bed to chair (including a wheelchair): A little  Standing up from a chair using your arms (e.g. wheelchair or bedside chair): A little  To walk in hospital room: A little  Climbing 3-5 steps with railing: A lot  Basic Mobility - Total Score: 17    Encounter Problems       Encounter Problems (Active)       Balance       Standing Balance (Progressing)       Start:  10/13/23    Expected End:  10/27/23       Pt will demonstrate good static standing balance to promote safe participation with out of bed activity, transfers, and mobility              Mobility       Ambulation (Progressing)       Start:  10/13/23    Expected End:  10/27/23       Pt will ambulate 50' modified independent assist with walker to promote safe home mobility           Stair Negotiation (Entry) (Progressing)       Start:  10/13/23    Expected End:  10/27/23       Pt will ascend/descend 2 stairs with rail(s) on R and modified independent assist to promote safe entry and exit in home environment                Safety       Safety (Progressing)       Start:  10/13/23    Expected End:  10/27/23       Pt will correctly identify and demonstrate safe mobility techniques to reduce their risks for falls during their acute care stay            Fall Risk (Progressing)       Start:  10/13/23    Expected End:  10/27/23       Pt will demonstrate 9 stands from chair in 30s to achieve " their [facility] Steadi Goal and Reduce Risk for Falls    1.) May NOT use arms for the transfer  2.) Must achieve full standing position    Chair Stand Below Average Scores  Age-------Men----Women  (60-64)----(<14)----(<12)  (65-69)----(<12)----(<11)  (70-74)----(<12)----(<10)  (75-79)----(<11)----(<10)  (80-84)----(<10)----(<9 )  (85-89)----(<8 )----(<8 )  (90-94)----(<7 )----(<4 )    A below average score indicates a risk for falls  www.cdc.gov/steadi              Transfers       Supine to sit (Progressing)       Start:  10/13/23    Expected End:  10/27/23       Pt will transfer supine to sitting at edge of bed with independent assist to promote acute care out of bed activity           Sit to Stand (Progressing)       Start:  10/13/23    Expected End:  10/27/23       Pt will transfer sit to standing position with modified independent assist and walker to promote safe out of bed activity                  Education Documentation  Precautions, taught by Matt Crump, PT at 10/13/2023  9:54 AM.  Learner: Family, Patient  Readiness: Acceptance  Method: Explanation  Response: Verbalizes Understanding, Needs Reinforcement  Comment: Safety, Fall Risk Management, correct AD use, transfer techniques, WBAT recommendations from podiatry    Mobility Training, taught by Matt Crump, PT at 10/13/2023  9:54 AM.  Learner: Family, Patient  Readiness: Acceptance  Method: Explanation  Response: Verbalizes Understanding, Needs Reinforcement  Comment: Safety, Fall Risk Management, correct AD use, transfer techniques, WBAT recommendations from podiatry    Education Comments  No comments found.

## 2023-10-13 NOTE — NURSING NOTE
Assumed care of patient. Patient in bed resting comfortably. NO signs of distress. Call light and belongings within reach.

## 2023-10-13 NOTE — NURSING NOTE
Patient in chair eating lunch. NO change in previous assessment. Call light and belongings within reach.

## 2023-10-13 NOTE — NURSING NOTE
Patient in chair eating dinner. No change in previous assessment. Call light and belongings within reach. Tabs alarm on.

## 2023-10-13 NOTE — CARE PLAN
Problem: Balance  Goal: Standing Balance  Description: Pt will demonstrate good static standing balance to promote safe participation with out of bed activity, transfers, and mobility    10/13/2023 0953 by Matt Crump, PT  Outcome: Progressing  10/13/2023 0953 by Matt Crump, PT  Outcome: Progressing     Problem: Mobility  Goal: Ambulation  Description: Pt will ambulate 50' modified independent assist with walker to promote safe home mobility    10/13/2023 0953 by Matt Crump, PT  Outcome: Progressing  10/13/2023 0953 by Matt Crump, PT  Outcome: Progressing  Goal: Stair Negotiation (Entry)  Description: Pt will ascend/descend 2 stairs with rail(s) on R and modified independent assist to promote safe entry and exit in home environment      10/13/2023 0953 by Matt Crump, PT  Outcome: Progressing  10/13/2023 0953 by Matt Crump, PT  Outcome: Progressing     Problem: Safety  Goal: Safety  Description: Pt will correctly identify and demonstrate safe mobility techniques to reduce their risks for falls during their acute care stay     10/13/2023 0953 by Matt Crump, PT  Outcome: Progressing  10/13/2023 0953 by Matt Crump, PT  Outcome: Progressing  Goal: Fall Risk  Description: Pt will demonstrate 9 stands from chair in 30s to achieve their CDC Steadi Goal and Reduce Risk for Falls    1.) May NOT use arms for the transfer  2.) Must achieve full standing position    Chair Stand Below Average Scores  Age-------Men----Women  (60-64)----(<14)----(<12)  (65-69)----(<12)----(<11)  (70-74)----(<12)----(<10)  (75-79)----(<11)----(<10)  (80-84)----(<10)----(<9 )  (85-89)----(<8 )----(<8 )  (90-94)----(<7 )----(<4 )    A below average score indicates a risk for falls  www.cdc.gov/steadi    10/13/2023 0953 by Matt Crump, PT  Outcome: Progressing  10/13/2023 0953 by Matt Crump, PT  Outcome: Progressing     Problem: Transfers  Goal: Supine to sit  Description:  Pt will transfer supine to sitting at edge of bed with independent assist to promote acute care out of bed activity    10/13/2023 0953 by Matt Crump, PT  Outcome: Progressing  10/13/2023 0953 by Matt Crump, PT  Outcome: Progressing  Goal: Sit to Stand  Description: Pt will transfer sit to standing position with modified independent assist and walker to promote safe out of bed activity    10/13/2023 0953 by Matt Crump, PT  Outcome: Progressing  10/13/2023 0953 by Matt Crump, PT  Outcome: Progressing

## 2023-10-13 NOTE — PROGRESS NOTES
Carrie Rosario is a 82 y.o. female on day 2 of admission presenting with fall and right great toe infection.       Subjective   Pt up in chair. Feels good. Tolerating IV Abx. Afebrile. Wound culture pending.         Objective     Last Recorded Vitals  /53 (BP Location: Left arm, Patient Position: Sitting)   Pulse 75   Temp 36.5 °C (97.7 °F) (Oral)   Resp 17   Wt 74.2 kg (163 lb 9.3 oz)   SpO2 95%   Intake/Output last 3 Shifts:    Intake/Output Summary (Last 24 hours) at 10/13/2023 1411  Last data filed at 10/13/2023 1107  Gross per 24 hour   Intake 200 ml   Output 300 ml   Net -100 ml       Results for orders placed or performed during the hospital encounter of 10/11/23 (from the past 24 hour(s))   POCT GLUCOSE   Result Value Ref Range    POCT Glucose 130 (H) 74 - 99 mg/dL   POCT GLUCOSE   Result Value Ref Range    POCT Glucose 208 (H) 74 - 99 mg/dL   CBC and Auto Differential   Result Value Ref Range    WBC 6.6 4.4 - 11.3 x10*3/uL    nRBC 0.0 0.0 - 0.0 /100 WBCs    RBC 4.25 4.00 - 5.20 x10*6/uL    Hemoglobin 13.3 12.0 - 16.0 g/dL    Hematocrit 40.8 36.0 - 46.0 %    MCV 96 80 - 100 fL    MCH 31.3 26.0 - 34.0 pg    MCHC 32.6 32.0 - 36.0 g/dL    RDW 12.7 11.5 - 14.5 %    Platelets 184 150 - 450 x10*3/uL    MPV 11.1 7.5 - 11.5 fL    Neutrophils % 64.3 40.0 - 80.0 %    Immature Granulocytes %, Automated 0.5 0.0 - 0.9 %    Lymphocytes % 17.8 13.0 - 44.0 %    Monocytes % 11.3 2.0 - 10.0 %    Eosinophils % 5.3 0.0 - 6.0 %    Basophils % 0.8 0.0 - 2.0 %    Neutrophils Absolute 4.23 1.60 - 5.50 x10*3/uL    Immature Granulocytes Absolute, Automated 0.03 0.00 - 0.50 x10*3/uL    Lymphocytes Absolute 1.17 0.80 - 3.00 x10*3/uL    Monocytes Absolute 0.74 0.05 - 0.80 x10*3/uL    Eosinophils Absolute 0.35 0.00 - 0.40 x10*3/uL    Basophils Absolute 0.05 0.00 - 0.10 x10*3/uL   Basic metabolic panel   Result Value Ref Range    Glucose 195 (H) 65 - 99 mg/dL    Sodium 134 133 - 145 mmol/L    Potassium 4.4 3.4 - 5.1 mmol/L     Chloride 101 97 - 107 mmol/L    Bicarbonate 21 (L) 24 - 31 mmol/L    Urea Nitrogen 34 (H) 8 - 25 mg/dL    Creatinine 1.20 0.40 - 1.60 mg/dL    eGFR 46 (L) >60 mL/min/1.73m*2    Calcium 9.3 8.5 - 10.4 mg/dL    Anion Gap 12 <=19 mmol/L   POCT GLUCOSE   Result Value Ref Range    POCT Glucose 176 (H) 74 - 99 mg/dL   POCT GLUCOSE   Result Value Ref Range    POCT Glucose 338 (H) 74 - 99 mg/dL                 Physical Exam  HENT:      Head: Normocephalic and atraumatic.      Nose: Nose normal.      Mouth/Throat:      Mouth: Mucous membranes are moist.      Pharynx: Oropharynx is clear.   Eyes:      Extraocular Movements: Extraocular movements intact.      Pupils: Pupils are equal, round, and reactive to light.   Cardiovascular:      Rate and Rhythm: Normal rate and regular rhythm.      Pulses: Normal pulses.   Pulmonary:      Effort: Pulmonary effort is normal.      Breath sounds: Normal breath sounds.   Abdominal:      General: Abdomen is flat. Bowel sounds are normal.      Palpations: Abdomen is soft.   Musculoskeletal:         General: Normal range of motion.   Skin:     General: Skin is warm and dry.      Capillary Refill: Capillary refill takes less than 2 seconds.      Comments: DSD to right toe intact   Neurological:      General: No focal deficit present.      Mental Status: She is oriented to person, place, and time.   Psychiatric:         Mood and Affect: Mood normal.             Assessment/Plan     Fall, initial encounter  -CT head negative for acute change  -No apparent injuries and denies complaints  -Fall precautions  -PT/OT     Right great toes infection, r/o OM  -CT shows soft tissue swelling and cellulitis. Erosion not excluded however no mention of OM  -Continue IV Abx  -Wound culture pending  -Blood cultures NGTD  -Podiatry saw and signed off. Recommend 2 weeks of antibiotics  -ID follows     Diabetes, type II  -Insulin SS  -Monitor blood glucose  -Hgb A1C 9.7     Hypertension  -Continue BB  -BP stable,  monitor      GERD  -PPI     HX of cardiac stent and STEMI  -Continue ASA/statin    Hyperlipidemia  -Statin     DVT risk  -Heparin subcutaneous  -SCD's     Plan  To SNF on discharge. SW to make arrangements. Anticipate discharge once cultures finalized.    CHELSEA Ramsay-CNP

## 2023-10-13 NOTE — NURSING NOTE
Assumed care of patient. Patient is normal sinus on monitor @ 74 bpm. Call light in reach. Patient is resting in bed on room air.

## 2023-10-13 NOTE — PROGRESS NOTES
"Vancomycin Dosing by Pharmacy- RESTART    Carrie Rosario is a 82 y.o. year old female who Pharmacy has been consulted for vancomycin dosing for other UTI . Based on the patient's indication and renal status this patient will be dosed based on a goal AUC of 400-600.     Renal function is currently stable.    Visit Vitals  /53 (BP Location: Left arm, Patient Position: Sitting)   Pulse 75   Temp 36.5 °C (97.7 °F) (Oral)   Resp 17        Lab Results   Component Value Date    CREATININE 1.20 10/13/2023    CREATININE 1.10 10/12/2023    CREATININE 1.30 10/11/2023    CREATININE 1.3 08/02/2023    CREATININE 1.1 04/27/2023    CREATININE 0.9 02/06/2023    CREATININE 1.1 10/17/2022        Patient weight is No results found for: \"PTWEIGHT\"    No results found for: \"CULTURE\"     I/O last 3 completed shifts:  In: 500 (6.7 mL/kg) [IV Piggyback:500]  Out: - (0 mL/kg)   Weight: 74.2 kg   [unfilled]    Lab Results   Component Value Date    PATIENTTEMP CANCELLED BY MD 12/25/2021    PATIENTTEMP 37.0 12/25/2021    PATIENTTEMP 37.0 12/25/2021          Assessment/Plan     Patient has already been given a loading dose of 1000 mg x 2 doses (10/11 and 10/12).  Will initiate vancomycin maintenance,  1000mg mg every 24 hours.    This dosing regimen is predicted by [employer] to result in the following pharmacokinetic parameters:    Loading dose: N/A  Regimen: 1000 mg IV every 24 hours.  Start time: 12:58 on 10/13/2023  Exposure target: AUC24 (range)400-600 mg/L.hr   AUC24,ss: 531 mg/L.hr  Probability of AUC24 > 400: 87 %  Ctrough,ss: 14.1 mg/L  Probability of Ctrough,ss > 20: 15 %      Follow-up level will be ordered on 10/14 at AM labs unless clinically indicated sooner.  Will continue to monitor renal function daily while on vancomycin and order serum creatinine at least every 48 hours if not already ordered.  Follow for continued vancomycin needs, clinical response, and signs/symptoms of toxicity.       Gaurang Arthur, PharmD "

## 2023-10-14 ENCOUNTER — PHARMACY VISIT (OUTPATIENT)
Dept: PHARMACY | Facility: CLINIC | Age: 83
End: 2023-10-14
Payer: MEDICARE

## 2023-10-14 LAB
ANION GAP SERPL CALC-SCNC: 11 MMOL/L
BACTERIA UR CULT: ABNORMAL
BUN SERPL-MCNC: 33 MG/DL (ref 8–25)
CALCIUM SERPL-MCNC: 9.2 MG/DL (ref 8.5–10.4)
CHLORIDE SERPL-SCNC: 103 MMOL/L (ref 97–107)
CO2 SERPL-SCNC: 23 MMOL/L (ref 24–31)
CREAT SERPL-MCNC: 1.2 MG/DL (ref 0.4–1.6)
ERYTHROCYTE [DISTWIDTH] IN BLOOD BY AUTOMATED COUNT: 12.6 % (ref 11.5–14.5)
GFR SERPL CREATININE-BSD FRML MDRD: 46 ML/MIN/1.73M*2
GLUCOSE BLD MANUAL STRIP-MCNC: 188 MG/DL (ref 74–99)
GLUCOSE BLD MANUAL STRIP-MCNC: 220 MG/DL (ref 74–99)
GLUCOSE BLD MANUAL STRIP-MCNC: 226 MG/DL (ref 74–99)
GLUCOSE BLD MANUAL STRIP-MCNC: 253 MG/DL (ref 74–99)
GLUCOSE SERPL-MCNC: 209 MG/DL (ref 65–99)
HCT VFR BLD AUTO: 40.9 % (ref 36–46)
HGB BLD-MCNC: 13.5 G/DL (ref 12–16)
MCH RBC QN AUTO: 31.3 PG (ref 26–34)
MCHC RBC AUTO-ENTMCNC: 33 G/DL (ref 32–36)
MCV RBC AUTO: 95 FL (ref 80–100)
NRBC BLD-RTO: 0 /100 WBCS (ref 0–0)
PLATELET # BLD AUTO: 194 X10*3/UL (ref 150–450)
PMV BLD AUTO: 10.5 FL (ref 7.5–11.5)
POTASSIUM SERPL-SCNC: 4.4 MMOL/L (ref 3.4–5.1)
RBC # BLD AUTO: 4.31 X10*6/UL (ref 4–5.2)
SODIUM SERPL-SCNC: 137 MMOL/L (ref 133–145)
VANCOMYCIN SERPL-MCNC: 19 UG/ML (ref 10–20)
WBC # BLD AUTO: 7.6 X10*3/UL (ref 4.4–11.3)

## 2023-10-14 PROCEDURE — 85027 COMPLETE CBC AUTOMATED: CPT | Performed by: NURSE PRACTITIONER

## 2023-10-14 PROCEDURE — 96372 THER/PROPH/DIAG INJ SC/IM: CPT | Performed by: NURSE PRACTITIONER

## 2023-10-14 PROCEDURE — 2500000001 HC RX 250 WO HCPCS SELF ADMINISTERED DRUGS (ALT 637 FOR MEDICARE OP): Performed by: NURSE PRACTITIONER

## 2023-10-14 PROCEDURE — 80048 BASIC METABOLIC PNL TOTAL CA: CPT | Performed by: NURSE PRACTITIONER

## 2023-10-14 PROCEDURE — 2500000004 HC RX 250 GENERAL PHARMACY W/ HCPCS (ALT 636 FOR OP/ED): Performed by: NURSE PRACTITIONER

## 2023-10-14 PROCEDURE — 36415 COLL VENOUS BLD VENIPUNCTURE: CPT | Performed by: NURSE PRACTITIONER

## 2023-10-14 PROCEDURE — 80202 ASSAY OF VANCOMYCIN: CPT | Performed by: NURSE PRACTITIONER

## 2023-10-14 PROCEDURE — 1200000002 HC GENERAL ROOM WITH TELEMETRY DAILY

## 2023-10-14 PROCEDURE — 2500000002 HC RX 250 W HCPCS SELF ADMINISTERED DRUGS (ALT 637 FOR MEDICARE OP, ALT 636 FOR OP/ED): Performed by: NURSE PRACTITIONER

## 2023-10-14 PROCEDURE — 82947 ASSAY GLUCOSE BLOOD QUANT: CPT

## 2023-10-14 RX ADMIN — VANCOMYCIN 1 G: 1 INJECTION, SOLUTION INTRAVENOUS at 14:39

## 2023-10-14 RX ADMIN — INSULIN LISPRO 4 UNITS: 100 INJECTION, SOLUTION INTRAVENOUS; SUBCUTANEOUS at 09:32

## 2023-10-14 RX ADMIN — METOPROLOL TARTRATE 50 MG: 50 TABLET, FILM COATED ORAL at 09:35

## 2023-10-14 RX ADMIN — HEPARIN SODIUM 5000 UNITS: 5000 INJECTION, SOLUTION INTRAVENOUS; SUBCUTANEOUS at 14:39

## 2023-10-14 RX ADMIN — FUROSEMIDE 20 MG: 20 TABLET ORAL at 09:35

## 2023-10-14 RX ADMIN — ATORVASTATIN CALCIUM 40 MG: 40 TABLET, FILM COATED ORAL at 22:02

## 2023-10-14 RX ADMIN — OXYBUTYNIN CHLORIDE 5 MG: 5 TABLET ORAL at 09:36

## 2023-10-14 RX ADMIN — DAPAGLIFLOZIN 10 MG: 10 TABLET, FILM COATED ORAL at 09:36

## 2023-10-14 RX ADMIN — ASPIRIN 81 MG CHEWABLE TABLET 81 MG: 81 TABLET CHEWABLE at 09:35

## 2023-10-14 RX ADMIN — PANTOPRAZOLE SODIUM 40 MG: 40 TABLET, DELAYED RELEASE ORAL at 09:35

## 2023-10-14 RX ADMIN — MEROPENEM 1 G: 1 INJECTION, POWDER, FOR SOLUTION INTRAVENOUS at 22:04

## 2023-10-14 RX ADMIN — MEROPENEM 1 G: 1 INJECTION, POWDER, FOR SOLUTION INTRAVENOUS at 10:35

## 2023-10-14 RX ADMIN — METOPROLOL TARTRATE 50 MG: 50 TABLET, FILM COATED ORAL at 22:02

## 2023-10-14 RX ADMIN — HEPARIN SODIUM 5000 UNITS: 5000 INJECTION, SOLUTION INTRAVENOUS; SUBCUTANEOUS at 22:02

## 2023-10-14 RX ADMIN — INSULIN LISPRO 2 UNITS: 100 INJECTION, SOLUTION INTRAVENOUS; SUBCUTANEOUS at 16:58

## 2023-10-14 RX ADMIN — HEPARIN SODIUM 5000 UNITS: 5000 INJECTION, SOLUTION INTRAVENOUS; SUBCUTANEOUS at 06:17

## 2023-10-14 RX ADMIN — INSULIN LISPRO 6 UNITS: 100 INJECTION, SOLUTION INTRAVENOUS; SUBCUTANEOUS at 12:55

## 2023-10-14 RX ADMIN — OXYBUTYNIN CHLORIDE 5 MG: 5 TABLET ORAL at 22:02

## 2023-10-14 RX ADMIN — OXYBUTYNIN CHLORIDE 5 MG: 5 TABLET ORAL at 14:39

## 2023-10-14 RX ADMIN — POTASSIUM CHLORIDE 10 MEQ: 750 TABLET, EXTENDED RELEASE ORAL at 09:35

## 2023-10-14 RX ADMIN — POLYETHYLENE GLYCOL 3350 17 G: 17 POWDER, FOR SOLUTION ORAL at 09:35

## 2023-10-14 ASSESSMENT — COGNITIVE AND FUNCTIONAL STATUS - GENERAL
STANDING UP FROM CHAIR USING ARMS: A LITTLE
CLIMB 3 TO 5 STEPS WITH RAILING: A LOT
MOVING TO AND FROM BED TO CHAIR: A LOT
TOILETING: A LITTLE
DRESSING REGULAR LOWER BODY CLOTHING: A LITTLE
MOVING TO AND FROM BED TO CHAIR: A LOT
MOBILITY SCORE: 15
HELP NEEDED FOR BATHING: A LITTLE
TURNING FROM BACK TO SIDE WHILE IN FLAT BAD: A LITTLE
WALKING IN HOSPITAL ROOM: A LOT
STANDING UP FROM CHAIR USING ARMS: A LITTLE
MOVING FROM LYING ON BACK TO SITTING ON SIDE OF FLAT BED WITH BEDRAILS: A LITTLE
WALKING IN HOSPITAL ROOM: A LOT
DRESSING REGULAR UPPER BODY CLOTHING: A LITTLE
DRESSING REGULAR UPPER BODY CLOTHING: A LITTLE
TURNING FROM BACK TO SIDE WHILE IN FLAT BAD: A LITTLE
CLIMB 3 TO 5 STEPS WITH RAILING: A LOT
PERSONAL GROOMING: A LITTLE
MOVING FROM LYING ON BACK TO SITTING ON SIDE OF FLAT BED WITH BEDRAILS: A LITTLE
PERSONAL GROOMING: A LITTLE
HELP NEEDED FOR BATHING: A LITTLE
TOILETING: A LITTLE
DAILY ACTIVITIY SCORE: 19
MOBILITY SCORE: 15
DAILY ACTIVITIY SCORE: 19
DRESSING REGULAR LOWER BODY CLOTHING: A LITTLE

## 2023-10-14 ASSESSMENT — PAIN SCALES - GENERAL
PAINLEVEL_OUTOF10: 0 - NO PAIN

## 2023-10-14 ASSESSMENT — PAIN - FUNCTIONAL ASSESSMENT
PAIN_FUNCTIONAL_ASSESSMENT: 0-10

## 2023-10-14 NOTE — NURSING NOTE
Patient in bed watching TV. No change in previous assessment. Call light and belongings within reach. Bed alarm on.

## 2023-10-14 NOTE — NURSING NOTE
Patient in bed talking on phone. NO change in previous assessment. Call light and belongings within reach.bed alarm on

## 2023-10-14 NOTE — PROGRESS NOTES
Carrie Rosario is a 82 y.o. female on day 3 of admission presenting with fall and right great toe infection.       Subjective   No acute issues. Pt eating breakfast. Appetite is good. Afebrile.          Objective     Last Recorded Vitals  /76 (BP Location: Right arm, Patient Position: Lying)   Pulse 76   Temp 36.5 °C (97.7 °F) (Oral)   Resp 16   Wt 74.2 kg (163 lb 9.3 oz)   SpO2 95%   Intake/Output last 3 Shifts:    Intake/Output Summary (Last 24 hours) at 10/14/2023 1009  Last data filed at 10/14/2023 0932  Gross per 24 hour   Intake 350 ml   Output 800 ml   Net -450 ml       Results for orders placed or performed during the hospital encounter of 10/11/23 (from the past 24 hour(s))   POCT GLUCOSE   Result Value Ref Range    POCT Glucose 338 (H) 74 - 99 mg/dL   POCT GLUCOSE   Result Value Ref Range    POCT Glucose 187 (H) 74 - 99 mg/dL   POCT GLUCOSE   Result Value Ref Range    POCT Glucose 247 (H) 74 - 99 mg/dL   Basic metabolic panel   Result Value Ref Range    Glucose 209 (H) 65 - 99 mg/dL    Sodium 137 133 - 145 mmol/L    Potassium 4.4 3.4 - 5.1 mmol/L    Chloride 103 97 - 107 mmol/L    Bicarbonate 23 (L) 24 - 31 mmol/L    Urea Nitrogen 33 (H) 8 - 25 mg/dL    Creatinine 1.20 0.40 - 1.60 mg/dL    eGFR 46 (L) >60 mL/min/1.73m*2    Calcium 9.2 8.5 - 10.4 mg/dL    Anion Gap 11 <=19 mmol/L   CBC   Result Value Ref Range    WBC 7.6 4.4 - 11.3 x10*3/uL    nRBC 0.0 0.0 - 0.0 /100 WBCs    RBC 4.31 4.00 - 5.20 x10*6/uL    Hemoglobin 13.5 12.0 - 16.0 g/dL    Hematocrit 40.9 36.0 - 46.0 %    MCV 95 80 - 100 fL    MCH 31.3 26.0 - 34.0 pg    MCHC 33.0 32.0 - 36.0 g/dL    RDW 12.6 11.5 - 14.5 %    Platelets 194 150 - 450 x10*3/uL    MPV 10.5 7.5 - 11.5 fL   Vancomycin   Result Value Ref Range    Vancomycin 19.0 10.0 - 20.0 ug/mL   POCT GLUCOSE   Result Value Ref Range    POCT Glucose 220 (H) 74 - 99 mg/dL                 Physical Exam  HENT:      Head: Normocephalic and atraumatic.      Nose: Nose normal.       Mouth/Throat:      Mouth: Mucous membranes are moist.      Pharynx: Oropharynx is clear.   Eyes:      Extraocular Movements: Extraocular movements intact.      Pupils: Pupils are equal, round, and reactive to light.   Cardiovascular:      Rate and Rhythm: Normal rate and regular rhythm.      Pulses: Normal pulses.   Pulmonary:      Effort: Pulmonary effort is normal.      Breath sounds: Normal breath sounds.   Abdominal:      General: Abdomen is flat. Bowel sounds are normal.      Palpations: Abdomen is soft.   Musculoskeletal:         General: Normal range of motion.   Skin:     General: Skin is warm and dry.      Capillary Refill: Capillary refill takes less than 2 seconds.      Comments: DSD to right toe intact   Neurological:      General: No focal deficit present.      Mental Status: She is oriented to person, place, and time.   Psychiatric:         Mood and Affect: Mood normal.             Assessment/Plan     Fall, initial encounter  -CT head negative for acute change  -No apparent injuries and denies complaints  -Fall precautions  -PT/OT     Right great toes infection, r/o OM  -CT shows soft tissue swelling and cellulitis. Erosion not excluded however no mention of OM  -Continue IV Abx  -Wound culture pending  -Blood cultures NGTD  -Podiatry saw and signed off. Recommend 2 weeks of antibiotics  -ID follows  -Daughter is requesting pt not be discharged on Augmentin as pt has not tolerated this well in the past    UTI  -Urine culture so far with Enterococcus Faecalis  -Continue IV Abx per ID     Diabetes, type II  -Insulin SS  -Monitor blood glucose  -Hgb A1C 9.7     Hypertension  -Continue BB  -BP stable, monitor      GERD  -PPI     HX of cardiac stent and STEMI  -Continue ASA/statin    Hyperlipidemia  -Statin     DVT risk  -Heparin subcutaneous  -SCD's     Plan  To [facility] on discharge. Anticipate discharge Monday when cultures finalized     Alessia Tellez, APRN-CNP

## 2023-10-14 NOTE — NURSING NOTE
Assumed care of patient. Patient in bed sleeping. No signs of distress or pain. Call light and belongings within reach. Bed alarm on.

## 2023-10-14 NOTE — PROGRESS NOTES
Carrie Rosario is a 82 y.o. female on day 3 of admission presenting with Fall, initial encounter.    Subjective   Interval History:  Afebrile  Resting comfortably        Review of Systems   Unable to perform ROS: Patient nonverbal       Objective   Range of Vitals (last 24 hours)  Heart Rate:  [69-76]   Temp:  [36.3 °C (97.3 °F)-36.5 °C (97.7 °F)]   Resp:  [16]   BP: (113-137)/(61-76)   SpO2:  [95 %-97 %]   Daily Weight  10/11/23 : 74.2 kg (163 lb 9.3 oz)    Body mass index is 29.92 kg/m².    Physical Exam  Constitutional:       Appearance: Normal appearance.   HENT:      Head: Normocephalic and atraumatic.      Nose: Nose normal.      Mouth/Throat:      Mouth: Mucous membranes are moist.      Pharynx: Oropharynx is clear.   Eyes:      Extraocular Movements: Extraocular movements intact.      Conjunctiva/sclera: Conjunctivae normal.   Cardiovascular:      Rate and Rhythm: Normal rate and regular rhythm.   Pulmonary:      Effort: Pulmonary effort is normal.      Breath sounds: Normal breath sounds.   Abdominal:      General: Bowel sounds are normal.      Palpations: Abdomen is soft.   Musculoskeletal:      Cervical back: Normal range of motion and neck supple.      Comments: Right foot swelling, pain -improving  Skin:     Comments: Right foot erythema improving, right foot dressing intact  Neurological:      General: No focal deficit present.      Mental Status: She is alert and oriented to person, place, and time. Mental status is at baseline.   Psychiatric:         Mood and Affect: Mood normal.         Behavior: Behavior normal.     Antibiotics  mirabegron (Mybetriq) 24 hr tablet  cefepime (Maxipime) 1 g in dextrose 5 % 50 mL IV  vancomycin-diluent combo no.1 (Xellia) IVPB 1,000 mg  sodium chloride 0.9 % bolus 1,000 mL  meropenem (Merrem) in sodium chloride 0.9 % 100 mL IV 1 g  vancomycin-diluent combo no.1 (Xellia) IVPB 1 g  pantoprazole (ProtoNix) EC tablet 40 mg  FLUoxetine (PROzac) capsule 40 mg  metoprolol  tartrate (Lopressor) tablet 50 mg  heparin (porcine) injection 5,000 Units  acetaminophen (Tylenol) tablet 650 mg  ondansetron (Zofran) injection 4 mg  polyethylene glycol (Glycolax, Miralax) packet 17 g  dextrose 50 % injection 25 g  glucagon (Glucagen) injection 1 mg  dextrose 10 % in water (D10W) infusion  insulin lispro (HumaLOG) injection 0-10 Units  atorvastatin (Lipitor) tablet 40 mg  dapagliflozin propanediol (Farxiga) tablet 10 mg  fesoterodine tablet extended release 24 hr 8 mg  melatonin tablet 3 mg  furosemide (Lasix) tablet 20 mg  potassium chloride CR (Klor-Con) ER tablet 10 mEq  oxybutynin (Ditropan) tablet 5 mg  vancomycin-diluent combo no.1 (Xellia) IVPB 1 g  aspirin chewable tablet 81 mg      Relevant Results  Labs  Results from last 72 hours   Lab Units 10/14/23  0441 10/13/23  0409 10/12/23  1218   WBC AUTO x10*3/uL 7.6 6.6 6.5   HEMOGLOBIN g/dL 13.5 13.3 14.0   HEMATOCRIT % 40.9 40.8 43.3   PLATELETS AUTO x10*3/uL 194 184 186   NEUTROS PCT AUTO %  --  64.3 65.7   LYMPHS PCT AUTO %  --  17.8 17.8   MONOS PCT AUTO %  --  11.3 11.9   EOS PCT AUTO %  --  5.3 3.4     Results from last 72 hours   Lab Units 10/14/23  0441 10/13/23  0409 10/12/23  1218   SODIUM mmol/L 137 134 137   POTASSIUM mmol/L 4.4 4.4 4.4   CHLORIDE mmol/L 103 101 104   CO2 mmol/L 23* 21* 21*   BUN mg/dL 33* 34* 36*   CREATININE mg/dL 1.20 1.20 1.10   GLUCOSE mg/dL 209* 195* 191*   CALCIUM mg/dL 9.2 9.3 9.3   ANION GAP mmol/L 11 12 12   EGFR mL/min/1.73m*2 46* 46* 51*     Results from last 72 hours   Lab Units 10/12/23  1218   ALK PHOS U/L 79   BILIRUBIN TOTAL mg/dL 1.0   PROTEIN TOTAL g/dL 6.9   ALT U/L 15   AST U/L 27   ALBUMIN g/dL 3.1*     Estimated Creatinine Clearance: 34.7 mL/min (by C-G formula based on SCr of 1.2 mg/dL).  C-Reactive Protein   Date Value Ref Range Status   10/11/2023 8.90 (H) 0.00 - 2.00 mg/dL Final     CRP   Date Value Ref Range Status   08/18/2021 3.1 (H) 0 - 2.0 MG/DL Final     Comment:     Performed at  [facility] [address] [facility] [id number]     Microbiology  Susceptibility data from last 14 days.  Collected Specimen Info Organism   10/11/23 Urine from Clean Catch/Voided Enterococcus faecalis     Imaging  Reviewed  Assessment/Plan     Right foot cellulitis  Right foot ulcer-debrided per podiatry 10/12/2023  Retained hardware right foot  Diabetes mellitus  Urinary tract infection-E. Faecalis-susceptibility pending        IV meropenem  IV vancomycin-management per pharmacy  Monitor temperature WBC  Follow-up urine culture  Follow blood culture  Right foot elevation  Glycemic control per primary  Monitor response to therapy  Podiatry follow up  Further recommendations based on culture results      Nav Conway MD

## 2023-10-14 NOTE — PROGRESS NOTES
Vancomycin Dosing by Pharmacy- FOLLOW UP    Carrie Rosario is a 82 y.o. year old female who Pharmacy has been consulted for vancomycin dosing for other UTI . Based on the patient's indication and renal status this patient is being dosed based on a goal AUC of 400-600.     Renal function is currently stable.    Current vancomycin dose: 1000 mg given every 24 hours    Estimated vancomycin AUC on current dose: 582 mg/L.hr     Visit Vitals  /76 (BP Location: Right arm, Patient Position: Lying)   Pulse 76   Temp 36.5 °C (97.7 °F) (Oral)   Resp 16        Lab Results   Component Value Date    CREATININE 1.20 10/14/2023    CREATININE 1.20 10/13/2023    CREATININE 1.10 10/12/2023    CREATININE 1.30 10/11/2023        Patient weight is 72.4 kg             Urine Culture  Collected: 10/11/23 1143  Preliminary result  Specimen: Urine from Clean Catch/Voided     Urine Culture 20,000 - 80,000 Enterococcus faecalis Abnormal  P           I/O last 3 completed shifts:  In: 550 (7.4 mL/kg) [P.O.:150; IV Piggyback:400]  Out: 550 (7.4 mL/kg) [Urine:550 (0.2 mL/kg/hr)]  Weight: 74.2 kg   [unfilled]    Lab Results   Component Value Date    PATIENTTEMP CANCELLED BY MD 12/25/2021    PATIENTTEMP 37.0 12/25/2021    PATIENTTEMP 37.0 12/25/2021        Assessment/Plan    Within goal AUC range. Continue current vancomycin regimen.    This dosing regimen is predicted by [employer] to result in the following pharmacokinetic parameters:  <<<<<[employer] DATA>>>>>      Recent measured serum creatinine values:   10/14/2023 04:41 1.2 mg/dL   10/13/2023 04:09 1.2 mg/dL   10/12/2023 12:18 1.1 mg/dL      Assessment:    Analysis of the most recent level(s) using [employer] gives the following patient-specific pharmacokinetic parameters:    CL: 1.69 L/hr   V: 23.7 L   T1/2: 13.8 hours    Using these values, the current regimen of Vancomycin 1000 mg IV every 24 hours is predicted to result in a steady-state trough of 15.9 mg/L and AUC24 of 582  mg/L.hr.  At this time we recommend a regimen of 1000 mg IV every 24 hours, which is predicted to result in a steady-state trough of 15.9 mg/L and AUC24 of 582 mg/L.hr.    The next level will be obtained on 10/16 at with AM labs. May be obtained sooner if clinically indicated.   Will continue to monitor renal function daily while on vancomycin and order serum creatinine at least every 48 hours if not already ordered.  Follow for continued vancomycin needs, clinical response, and signs/symptoms of toxicity.       MIGUEL GRUBBS, PharmD

## 2023-10-15 LAB
ANION GAP SERPL CALC-SCNC: 10 MMOL/L
BACTERIA BLD CULT: NORMAL
BACTERIA SPEC CULT: ABNORMAL
BUN SERPL-MCNC: 33 MG/DL (ref 8–25)
CALCIUM SERPL-MCNC: 9.3 MG/DL (ref 8.5–10.4)
CHLORIDE SERPL-SCNC: 104 MMOL/L (ref 97–107)
CO2 SERPL-SCNC: 24 MMOL/L (ref 24–31)
CREAT SERPL-MCNC: 1.4 MG/DL (ref 0.4–1.6)
GFR SERPL CREATININE-BSD FRML MDRD: 38 ML/MIN/1.73M*2
GLUCOSE BLD MANUAL STRIP-MCNC: 102 MG/DL (ref 74–99)
GLUCOSE BLD MANUAL STRIP-MCNC: 201 MG/DL (ref 74–99)
GLUCOSE BLD MANUAL STRIP-MCNC: 266 MG/DL (ref 74–99)
GLUCOSE BLD MANUAL STRIP-MCNC: 275 MG/DL (ref 74–99)
GLUCOSE SERPL-MCNC: 192 MG/DL (ref 65–99)
GRAM STN SPEC: ABNORMAL
GRAM STN SPEC: ABNORMAL
POTASSIUM SERPL-SCNC: 4.7 MMOL/L (ref 3.4–5.1)
SODIUM SERPL-SCNC: 138 MMOL/L (ref 133–145)
VANCOMYCIN SERPL-MCNC: 24 UG/ML (ref 10–20)

## 2023-10-15 PROCEDURE — 2500000002 HC RX 250 W HCPCS SELF ADMINISTERED DRUGS (ALT 637 FOR MEDICARE OP, ALT 636 FOR OP/ED): Performed by: NURSE PRACTITIONER

## 2023-10-15 PROCEDURE — 1200000002 HC GENERAL ROOM WITH TELEMETRY DAILY

## 2023-10-15 PROCEDURE — 2500000004 HC RX 250 GENERAL PHARMACY W/ HCPCS (ALT 636 FOR OP/ED): Performed by: NURSE PRACTITIONER

## 2023-10-15 PROCEDURE — 80202 ASSAY OF VANCOMYCIN: CPT | Performed by: NURSE PRACTITIONER

## 2023-10-15 PROCEDURE — 82947 ASSAY GLUCOSE BLOOD QUANT: CPT

## 2023-10-15 PROCEDURE — 96372 THER/PROPH/DIAG INJ SC/IM: CPT | Performed by: NURSE PRACTITIONER

## 2023-10-15 PROCEDURE — 2500000001 HC RX 250 WO HCPCS SELF ADMINISTERED DRUGS (ALT 637 FOR MEDICARE OP): Performed by: NURSE PRACTITIONER

## 2023-10-15 PROCEDURE — 80048 BASIC METABOLIC PNL TOTAL CA: CPT | Performed by: NURSE PRACTITIONER

## 2023-10-15 PROCEDURE — 36415 COLL VENOUS BLD VENIPUNCTURE: CPT | Performed by: NURSE PRACTITIONER

## 2023-10-15 RX ORDER — VANCOMYCIN 750 MG/150ML
750 INJECTION, SOLUTION INTRAVENOUS EVERY 24 HOURS
Status: DISCONTINUED | OUTPATIENT
Start: 2023-10-15 | End: 2023-10-16

## 2023-10-15 RX ADMIN — MEROPENEM 1 G: 1 INJECTION, POWDER, FOR SOLUTION INTRAVENOUS at 22:00

## 2023-10-15 RX ADMIN — ATORVASTATIN CALCIUM 40 MG: 40 TABLET, FILM COATED ORAL at 21:05

## 2023-10-15 RX ADMIN — INSULIN LISPRO 4 UNITS: 100 INJECTION, SOLUTION INTRAVENOUS; SUBCUTANEOUS at 08:35

## 2023-10-15 RX ADMIN — OXYBUTYNIN CHLORIDE 5 MG: 5 TABLET ORAL at 21:05

## 2023-10-15 RX ADMIN — INSULIN LISPRO 6 UNITS: 100 INJECTION, SOLUTION INTRAVENOUS; SUBCUTANEOUS at 16:32

## 2023-10-15 RX ADMIN — ASPIRIN 81 MG CHEWABLE TABLET 81 MG: 81 TABLET CHEWABLE at 08:35

## 2023-10-15 RX ADMIN — FLUOXETINE 40 MG: 20 CAPSULE ORAL at 08:35

## 2023-10-15 RX ADMIN — METOPROLOL TARTRATE 50 MG: 50 TABLET, FILM COATED ORAL at 21:05

## 2023-10-15 RX ADMIN — METOPROLOL TARTRATE 50 MG: 50 TABLET, FILM COATED ORAL at 08:35

## 2023-10-15 RX ADMIN — DAPAGLIFLOZIN 10 MG: 10 TABLET, FILM COATED ORAL at 08:35

## 2023-10-15 RX ADMIN — OXYBUTYNIN CHLORIDE 5 MG: 5 TABLET ORAL at 08:35

## 2023-10-15 RX ADMIN — POLYETHYLENE GLYCOL 3350 17 G: 17 POWDER, FOR SOLUTION ORAL at 08:36

## 2023-10-15 RX ADMIN — HEPARIN SODIUM 5000 UNITS: 5000 INJECTION, SOLUTION INTRAVENOUS; SUBCUTANEOUS at 13:43

## 2023-10-15 RX ADMIN — HEPARIN SODIUM 5000 UNITS: 5000 INJECTION, SOLUTION INTRAVENOUS; SUBCUTANEOUS at 21:05

## 2023-10-15 RX ADMIN — PANTOPRAZOLE SODIUM 40 MG: 40 TABLET, DELAYED RELEASE ORAL at 08:35

## 2023-10-15 RX ADMIN — INSULIN LISPRO 6 UNITS: 100 INJECTION, SOLUTION INTRAVENOUS; SUBCUTANEOUS at 11:56

## 2023-10-15 RX ADMIN — OXYBUTYNIN CHLORIDE 5 MG: 5 TABLET ORAL at 15:43

## 2023-10-15 RX ADMIN — POTASSIUM CHLORIDE 10 MEQ: 750 TABLET, EXTENDED RELEASE ORAL at 08:55

## 2023-10-15 RX ADMIN — VANCOMYCIN 750 MG: 750 INJECTION, SOLUTION INTRAVENOUS at 15:42

## 2023-10-15 RX ADMIN — FUROSEMIDE 20 MG: 20 TABLET ORAL at 08:35

## 2023-10-15 RX ADMIN — HEPARIN SODIUM 5000 UNITS: 5000 INJECTION, SOLUTION INTRAVENOUS; SUBCUTANEOUS at 06:35

## 2023-10-15 RX ADMIN — MEROPENEM 1 G: 1 INJECTION, POWDER, FOR SOLUTION INTRAVENOUS at 09:47

## 2023-10-15 ASSESSMENT — COGNITIVE AND FUNCTIONAL STATUS - GENERAL
MOVING FROM LYING ON BACK TO SITTING ON SIDE OF FLAT BED WITH BEDRAILS: A LITTLE
DRESSING REGULAR LOWER BODY CLOTHING: A LITTLE
TURNING FROM BACK TO SIDE WHILE IN FLAT BAD: A LITTLE
STANDING UP FROM CHAIR USING ARMS: A LITTLE
WALKING IN HOSPITAL ROOM: A LOT
TOILETING: A LITTLE
HELP NEEDED FOR BATHING: A LITTLE
MOVING TO AND FROM BED TO CHAIR: A LOT
DRESSING REGULAR UPPER BODY CLOTHING: A LITTLE
DAILY ACTIVITIY SCORE: 19
CLIMB 3 TO 5 STEPS WITH RAILING: A LOT
PERSONAL GROOMING: A LITTLE
MOBILITY SCORE: 15

## 2023-10-15 ASSESSMENT — PAIN SCALES - GENERAL
PAINLEVEL_OUTOF10: 0 - NO PAIN

## 2023-10-15 ASSESSMENT — PAIN - FUNCTIONAL ASSESSMENT
PAIN_FUNCTIONAL_ASSESSMENT: 0-10

## 2023-10-15 NOTE — CARE PLAN
The patient's goals for the shift include no falls  Over the shift, the patient did not make progress toward the following goals. Barriers to progression include   Problem: Fall/Injury    Recommendations to address these barriers include.  Goal: Not fall by end of shift  Outcome: Progressing  Goal: Be free from injury by end of the shift  Outcome: Progressing  Goal: Verbalize understanding of personal risk factors for fall in the hospital  Outcome: Progressing  Goal: Verbalize understanding of risk factor reduction measures to prevent injury from fall in the home  Outcome: Progressing  Goal: Use assistive devices by end of the shift  Outcome: Progressing  Goal: Pace activities to prevent fatigue by end of the shift  Outcome: Progressing        The clinical goals for the shift include safety

## 2023-10-15 NOTE — NURSING NOTE
Patient in the bed not in any acute distress, eating dinner. Call light and possessions within reach

## 2023-10-15 NOTE — PROGRESS NOTES
Carrie Rosario is a 82 y.o. female on day 4 of admission presenting with Fall, initial encounter.    Subjective   Interval History:   Afebrile, no chills  Awake, alert  No foot pain  No chest pain or shortness of breath  No nausea vomiting or diarrhea        Review of Systems   All other systems reviewed and are negative.      Objective   Range of Vitals (last 24 hours)  Heart Rate:  [70-81]   Temp:  [36.5 °C (97.7 °F)-36.8 °C (98.2 °F)]   Resp:  [16-19]   BP: (110-132)/(59-66)   SpO2:  [94 %-97 %]   Daily Weight  10/11/23 : 74.2 kg (163 lb 9.3 oz)    Body mass index is 29.92 kg/m².    Physical Exam  Constitutional:       Appearance: Normal appearance.   HENT:      Head: Normocephalic and atraumatic.      Nose: Nose normal.      Mouth/Throat:      Mouth: Mucous membranes are moist.      Pharynx: Oropharynx is clear.   Eyes:      Extraocular Movements: Extraocular movements intact.      Conjunctiva/sclera: Conjunctivae normal.   Cardiovascular:      Rate and Rhythm: Normal rate and regular rhythm.   Pulmonary:      Effort: Pulmonary effort is normal.      Breath sounds: Normal breath sounds.   Abdominal:      General: Bowel sounds are normal.      Palpations: Abdomen is soft.   Musculoskeletal:      Cervical back: Normal range of motion and neck supple.      Comments: Right foot swelling, pain -improving  Skin:     Comments: Right foot erythema improving, right foot dressing intact  Neurological:      General: No focal deficit present.      Mental Status: She is alert and oriented to person, place, and time. Mental status is at baseline.   Psychiatric:         Mood and Affect: Mood normal.         Behavior: Behavior normal.     Antibiotics  mirabegron (Mybetriq) 24 hr tablet  cefepime (Maxipime) 1 g in dextrose 5 % 50 mL IV  vancomycin-diluent combo no.1 (Xellia) IVPB 1,000 mg  sodium chloride 0.9 % bolus 1,000 mL  meropenem (Merrem) in sodium chloride 0.9 % 100 mL IV 1 g  vancomycin-diluent combo no.1 (Xellia) IVPB  1 g  pantoprazole (ProtoNix) EC tablet 40 mg  FLUoxetine (PROzac) capsule 40 mg  metoprolol tartrate (Lopressor) tablet 50 mg  heparin (porcine) injection 5,000 Units  acetaminophen (Tylenol) tablet 650 mg  ondansetron (Zofran) injection 4 mg  polyethylene glycol (Glycolax, Miralax) packet 17 g  dextrose 50 % injection 25 g  glucagon (Glucagen) injection 1 mg  dextrose 10 % in water (D10W) infusion  insulin lispro (HumaLOG) injection 0-10 Units  atorvastatin (Lipitor) tablet 40 mg  dapagliflozin propanediol (Farxiga) tablet 10 mg  fesoterodine tablet extended release 24 hr 8 mg  melatonin tablet 3 mg  furosemide (Lasix) tablet 20 mg  potassium chloride CR (Klor-Con) ER tablet 10 mEq  oxybutynin (Ditropan) tablet 5 mg  vancomycin-diluent combo no.1 (Xellia) IVPB 1 g  aspirin chewable tablet 81 mg  vancomycin-diluent combo no.1 (Xellia) IVPB 750 mg      Relevant Results  Labs  Results from last 72 hours   Lab Units 10/14/23  0441 10/13/23  0409   WBC AUTO x10*3/uL 7.6 6.6   HEMOGLOBIN g/dL 13.5 13.3   HEMATOCRIT % 40.9 40.8   PLATELETS AUTO x10*3/uL 194 184   NEUTROS PCT AUTO %  --  64.3   LYMPHS PCT AUTO %  --  17.8   MONOS PCT AUTO %  --  11.3   EOS PCT AUTO %  --  5.3     Results from last 72 hours   Lab Units 10/15/23  0417 10/14/23  0441 10/13/23  0409   SODIUM mmol/L 138 137 134   POTASSIUM mmol/L 4.7 4.4 4.4   CHLORIDE mmol/L 104 103 101   CO2 mmol/L 24 23* 21*   BUN mg/dL 33* 33* 34*   CREATININE mg/dL 1.40 1.20 1.20   GLUCOSE mg/dL 192* 209* 195*   CALCIUM mg/dL 9.3 9.2 9.3   ANION GAP mmol/L 10 11 12   EGFR mL/min/1.73m*2 38* 46* 46*         Estimated Creatinine Clearance: 29.7 mL/min (by C-G formula based on SCr of 1.4 mg/dL).  C-Reactive Protein   Date Value Ref Range Status   10/11/2023 8.90 (H) 0.00 - 2.00 mg/dL Final     CRP   Date Value Ref Range Status   08/18/2021 3.1 (H) 0 - 2.0 MG/DL Final     Comment:     Performed at [facility] [address] [facility] [address]     Microbiology  Susceptibility data  from last 14 days.  Collected Specimen Info Organism Ampicillin Ciprofloxacin Levofloxacin Nitrofurantoin Penicillin Trimethoprim/Sulfamethoxazole Vancomycin   10/12/23 Tissue/Biopsy from Wound/Tissue Mixed Skin Microorganisms           10/11/23 Urine from Clean Catch/Voided Enterococcus faecalis S S S S S R S     Imaging  Reviewed  Assessment/Plan   Right foot cellulitis  Right foot ulcer-debrided per podiatry 10/12/2023  Retained hardware right foot  Diabetes mellitus  Urinary tract infection-E. Faecalis-susceptible to ampicillin        IV meropenem  IV vancomycin-management per pharmacy  Monitor temperature WBC  Follow-up wound culture  Glycemic control per primary  Monitor response to therapy  Further recommendations based on culture results      Nav Conway MD

## 2023-10-15 NOTE — PROGRESS NOTES
"Vancomycin Dosing by Pharmacy- FOLLOW UP    Carrie Rosario is a 82 y.o. year old female who Pharmacy has been consulted for vancomycin dosing for other UTI .  Of note patient also presenting with right great toe infection. Based on the patient's indication and renal status this patient is being dosed based on a goal AUC of 400-600.     Renal function is currently declining.    Current vancomycin dose: 1000 mg given every 24 hours    Estimated vancomycin AUC on current dose: 702 mg/L.hr     Visit Vitals  /66 (BP Location: Left arm, Patient Position: Lying)   Pulse 72   Temp 36.7 °C (98.1 °F) (Oral)   Resp 17        Lab Results   Component Value Date    CREATININE 1.40 10/15/2023    CREATININE 1.20 10/14/2023    CREATININE 1.20 10/13/2023    CREATININE 1.10 10/12/2023        Patient weight is No results found for: \"PTWEIGHT\"    No results found for: \"CULTURE\"     I/O last 3 completed shifts:  In: 390 (5.3 mL/kg) [P.O.:390]  Out: 801 (10.8 mL/kg) [Urine:800 (0.3 mL/kg/hr); Stool:1]  Weight: 74.2 kg   [unfilled]    Lab Results   Component Value Date    PATIENTTEMP CANCELLED BY MD 12/25/2021    PATIENTTEMP 37.0 12/25/2021    PATIENTTEMP 37.0 12/25/2021        Assessment/Plan    Above goal AUC. Orders placed for new vancomcyin regimen of 750mg every 24 hours to begin at 10/15 1500.    This dosing regimen is predicted by [employer] to result in the following pharmacokinetic parameters:  <<<<<[employer] DATA>>>>>      Assessment:    Analysis of the most recent level(s) using [employer] gives the following patient-specific pharmacokinetic parameters:    CL: 1.39 L/hr   V: 25.7 L   T1/2: 17 hours    Using these values, the current regimen of Vancomycin 1000 mg IV every 24 hours is predicted to result in a steady-state trough of 20.9 mg/L and AUC24 of 702 mg/L.hr.  At this time we recommend a regimen of 750 mg IV every 24 hours, which is predicted to result in a steady-state trough of 16.2 mg/L and AUC24 of 543 " mg/L.hr.    The next level will be obtained on 10/17 at 0500 with AM labs. May be obtained sooner if clinically indicated.   Will continue to monitor renal function daily while on vancomycin and order serum creatinine at least every 48 hours if not already ordered.  Follow for continued vancomycin needs, clinical response, and signs/symptoms of toxicity.       MIGUEL GRUBBS, ShanteD

## 2023-10-15 NOTE — PROGRESS NOTES
"Carrie Rosario is a 82 y.o. female on day 4 of admission presenting with fall and right great toe infection.       Subjective   No acute issues. Pleasant. States \"feels great\".           Objective     Last Recorded Vitals  /66 (BP Location: Left arm, Patient Position: Lying)   Pulse 72   Temp 36.7 °C (98.1 °F) (Oral)   Resp 17   Wt 74.2 kg (163 lb 9.3 oz)   SpO2 94%   Intake/Output last 3 Shifts:    Intake/Output Summary (Last 24 hours) at 10/15/2023 1046  Last data filed at 10/15/2023 0941  Gross per 24 hour   Intake 480 ml   Output 301 ml   Net 179 ml       Results for orders placed or performed during the hospital encounter of 10/11/23 (from the past 24 hour(s))   POCT GLUCOSE   Result Value Ref Range    POCT Glucose 253 (H) 74 - 99 mg/dL   POCT GLUCOSE   Result Value Ref Range    POCT Glucose 188 (H) 74 - 99 mg/dL   POCT GLUCOSE   Result Value Ref Range    POCT Glucose 226 (H) 74 - 99 mg/dL   Basic metabolic panel   Result Value Ref Range    Glucose 192 (H) 65 - 99 mg/dL    Sodium 138 133 - 145 mmol/L    Potassium 4.7 3.4 - 5.1 mmol/L    Chloride 104 97 - 107 mmol/L    Bicarbonate 24 24 - 31 mmol/L    Urea Nitrogen 33 (H) 8 - 25 mg/dL    Creatinine 1.40 0.40 - 1.60 mg/dL    eGFR 38 (L) >60 mL/min/1.73m*2    Calcium 9.3 8.5 - 10.4 mg/dL    Anion Gap 10 <=19 mmol/L   Vancomycin   Result Value Ref Range    Vancomycin 24.0 (H) 10.0 - 20.0 ug/mL   POCT GLUCOSE   Result Value Ref Range    POCT Glucose 201 (H) 74 - 99 mg/dL                 Physical Exam  HENT:      Head: Normocephalic and atraumatic.      Nose: Nose normal.      Mouth/Throat:      Mouth: Mucous membranes are moist.      Pharynx: Oropharynx is clear.   Eyes:      Extraocular Movements: Extraocular movements intact.      Pupils: Pupils are equal, round, and reactive to light.   Cardiovascular:      Rate and Rhythm: Normal rate and regular rhythm.      Pulses: Normal pulses.   Pulmonary:      Effort: Pulmonary effort is normal.      Breath " sounds: Normal breath sounds.   Abdominal:      General: Abdomen is flat. Bowel sounds are normal.      Palpations: Abdomen is soft.   Musculoskeletal:         General: Normal range of motion.   Skin:     General: Skin is warm and dry.      Capillary Refill: Capillary refill takes less than 2 seconds.      Comments: DSD to right toe intact   Neurological:      General: No focal deficit present.      Mental Status: She is oriented to person, place, and time.   Psychiatric:         Mood and Affect: Mood normal.             Assessment/Plan     Fall, initial encounter  -CT head negative for acute change  -No apparent injuries and denies complaints  -Fall precautions  -PT/OT     Right great toes infection, r/o OM  -CT shows soft tissue swelling and cellulitis. Erosion not excluded however no mention of OM  -Continue IV Abx  -Wound culture pending  -Blood cultures NGTD  -Podiatry saw and signed off. Recommend 2 weeks of antibiotics  -ID follows  -Daughter is requesting pt not be discharged on Augmentin as pt has not tolerated this well in the past    UTI  -Urine culture with Enterococcus Faecalis  -Continue IV Abx per ID     Diabetes, type II  -Insulin SS  -Monitor blood glucose  -Hgb A1C 9.7     Hypertension  -Continue BB  -BP stable, monitor      GERD  -PPI     HX of cardiac stent and STEMI  -Continue ASA/statin    Hyperlipidemia  -Statin     DVT risk  -Heparin subcutaneous  -SCD's     Plan  To [facility] on discharge. Anticipate discharge Monday when cultures finalized     CHELSEA Ramsay-CNP

## 2023-10-16 VITALS
TEMPERATURE: 97.5 F | HEART RATE: 73 BPM | RESPIRATION RATE: 16 BRPM | OXYGEN SATURATION: 95 % | HEIGHT: 62 IN | WEIGHT: 163.58 LBS | SYSTOLIC BLOOD PRESSURE: 148 MMHG | DIASTOLIC BLOOD PRESSURE: 74 MMHG | BODY MASS INDEX: 30.1 KG/M2

## 2023-10-16 LAB
ANION GAP SERPL CALC-SCNC: 12 MMOL/L
BUN SERPL-MCNC: 33 MG/DL (ref 8–25)
CALCIUM SERPL-MCNC: 9.2 MG/DL (ref 8.5–10.4)
CHLORIDE SERPL-SCNC: 101 MMOL/L (ref 97–107)
CO2 SERPL-SCNC: 23 MMOL/L (ref 24–31)
CREAT SERPL-MCNC: 1.2 MG/DL (ref 0.4–1.6)
ERYTHROCYTE [DISTWIDTH] IN BLOOD BY AUTOMATED COUNT: 12.4 % (ref 11.5–14.5)
GFR SERPL CREATININE-BSD FRML MDRD: 46 ML/MIN/1.73M*2
GLUCOSE BLD MANUAL STRIP-MCNC: 177 MG/DL (ref 74–99)
GLUCOSE BLD MANUAL STRIP-MCNC: 283 MG/DL (ref 74–99)
GLUCOSE SERPL-MCNC: 176 MG/DL (ref 65–99)
HCT VFR BLD AUTO: 42.5 % (ref 36–46)
HGB BLD-MCNC: 14 G/DL (ref 12–16)
MCH RBC QN AUTO: 31.5 PG (ref 26–34)
MCHC RBC AUTO-ENTMCNC: 32.9 G/DL (ref 32–36)
MCV RBC AUTO: 96 FL (ref 80–100)
NRBC BLD-RTO: 0 /100 WBCS (ref 0–0)
PLATELET # BLD AUTO: 253 X10*3/UL (ref 150–450)
PMV BLD AUTO: 10.6 FL (ref 7.5–11.5)
POTASSIUM SERPL-SCNC: 4.9 MMOL/L (ref 3.4–5.1)
RBC # BLD AUTO: 4.45 X10*6/UL (ref 4–5.2)
SODIUM SERPL-SCNC: 136 MMOL/L (ref 133–145)
WBC # BLD AUTO: 10.5 X10*3/UL (ref 4.4–11.3)

## 2023-10-16 PROCEDURE — 82947 ASSAY GLUCOSE BLOOD QUANT: CPT

## 2023-10-16 PROCEDURE — 97110 THERAPEUTIC EXERCISES: CPT | Mod: GP

## 2023-10-16 PROCEDURE — 96372 THER/PROPH/DIAG INJ SC/IM: CPT | Performed by: NURSE PRACTITIONER

## 2023-10-16 PROCEDURE — 36415 COLL VENOUS BLD VENIPUNCTURE: CPT | Performed by: NURSE PRACTITIONER

## 2023-10-16 PROCEDURE — 2500000001 HC RX 250 WO HCPCS SELF ADMINISTERED DRUGS (ALT 637 FOR MEDICARE OP): Performed by: NURSE PRACTITIONER

## 2023-10-16 PROCEDURE — 97530 THERAPEUTIC ACTIVITIES: CPT | Mod: GP

## 2023-10-16 PROCEDURE — 2500000002 HC RX 250 W HCPCS SELF ADMINISTERED DRUGS (ALT 637 FOR MEDICARE OP, ALT 636 FOR OP/ED): Performed by: NURSE PRACTITIONER

## 2023-10-16 PROCEDURE — 80048 BASIC METABOLIC PNL TOTAL CA: CPT | Performed by: NURSE PRACTITIONER

## 2023-10-16 PROCEDURE — 85027 COMPLETE CBC AUTOMATED: CPT | Performed by: NURSE PRACTITIONER

## 2023-10-16 PROCEDURE — 2500000004 HC RX 250 GENERAL PHARMACY W/ HCPCS (ALT 636 FOR OP/ED): Performed by: NURSE PRACTITIONER

## 2023-10-16 RX ORDER — CEFUROXIME AXETIL 500 MG/1
250 TABLET ORAL 2 TIMES DAILY
Status: DISCONTINUED | OUTPATIENT
Start: 2023-10-16 | End: 2023-10-16 | Stop reason: HOSPADM

## 2023-10-16 RX ORDER — POTASSIUM CHLORIDE 750 MG/1
10 TABLET, FILM COATED, EXTENDED RELEASE ORAL
Start: 2023-10-17 | End: 2024-01-08

## 2023-10-16 RX ORDER — DOXYCYCLINE 100 MG/1
100 CAPSULE ORAL EVERY 12 HOURS SCHEDULED
Qty: 18 CAPSULE | Refills: 0
Start: 2023-10-16 | End: 2023-10-25

## 2023-10-16 RX ORDER — CEFUROXIME AXETIL 250 MG/1
250 TABLET ORAL 2 TIMES DAILY
Qty: 18 TABLET | Refills: 0
Start: 2023-10-16 | End: 2023-10-25

## 2023-10-16 RX ORDER — DOXYCYCLINE 100 MG/1
100 CAPSULE ORAL EVERY 12 HOURS SCHEDULED
Status: DISCONTINUED | OUTPATIENT
Start: 2023-10-16 | End: 2023-10-16 | Stop reason: HOSPADM

## 2023-10-16 RX ORDER — ACETAMINOPHEN 325 MG/1
650 TABLET ORAL EVERY 4 HOURS PRN
Qty: 30 TABLET | Refills: 0 | Status: ON HOLD | OUTPATIENT
Start: 2023-10-16

## 2023-10-16 RX ORDER — PILOCARPINE HYDROCHLORIDE 5 MG/1
5 TABLET, FILM COATED ORAL 2 TIMES DAILY
Start: 2023-10-16 | End: 2024-01-31 | Stop reason: ALTCHOICE

## 2023-10-16 RX ADMIN — INSULIN LISPRO 2 UNITS: 100 INJECTION, SOLUTION INTRAVENOUS; SUBCUTANEOUS at 08:45

## 2023-10-16 RX ADMIN — OXYBUTYNIN CHLORIDE 5 MG: 5 TABLET ORAL at 09:18

## 2023-10-16 RX ADMIN — METOPROLOL TARTRATE 50 MG: 50 TABLET, FILM COATED ORAL at 09:19

## 2023-10-16 RX ADMIN — DOXYCYCLINE HYCLATE 100 MG: 100 CAPSULE ORAL at 12:06

## 2023-10-16 RX ADMIN — DAPAGLIFLOZIN 10 MG: 10 TABLET, FILM COATED ORAL at 09:18

## 2023-10-16 RX ADMIN — POLYETHYLENE GLYCOL 3350 17 G: 17 POWDER, FOR SOLUTION ORAL at 09:19

## 2023-10-16 RX ADMIN — HEPARIN SODIUM 5000 UNITS: 5000 INJECTION, SOLUTION INTRAVENOUS; SUBCUTANEOUS at 06:11

## 2023-10-16 RX ADMIN — POTASSIUM CHLORIDE 10 MEQ: 750 TABLET, EXTENDED RELEASE ORAL at 08:50

## 2023-10-16 RX ADMIN — ASPIRIN 81 MG CHEWABLE TABLET 81 MG: 81 TABLET CHEWABLE at 09:19

## 2023-10-16 RX ADMIN — MEROPENEM 1 G: 1 INJECTION, POWDER, FOR SOLUTION INTRAVENOUS at 09:18

## 2023-10-16 RX ADMIN — PANTOPRAZOLE SODIUM 40 MG: 40 TABLET, DELAYED RELEASE ORAL at 09:18

## 2023-10-16 RX ADMIN — CEFUROXIME AXETIL 250 MG: 500 TABLET ORAL at 12:06

## 2023-10-16 RX ADMIN — FUROSEMIDE 20 MG: 20 TABLET ORAL at 09:18

## 2023-10-16 RX ADMIN — FLUOXETINE 40 MG: 20 CAPSULE ORAL at 09:18

## 2023-10-16 RX ADMIN — INSULIN LISPRO 6 UNITS: 100 INJECTION, SOLUTION INTRAVENOUS; SUBCUTANEOUS at 12:22

## 2023-10-16 ASSESSMENT — COGNITIVE AND FUNCTIONAL STATUS - GENERAL
HELP NEEDED FOR BATHING: A LITTLE
STANDING UP FROM CHAIR USING ARMS: A LITTLE
TURNING FROM BACK TO SIDE WHILE IN FLAT BAD: A LITTLE
TOILETING: A LITTLE
WALKING IN HOSPITAL ROOM: A LOT
MOBILITY SCORE: 17
DRESSING REGULAR LOWER BODY CLOTHING: A LITTLE
MOVING TO AND FROM BED TO CHAIR: A LITTLE
MOVING FROM LYING ON BACK TO SITTING ON SIDE OF FLAT BED WITH BEDRAILS: A LITTLE
TURNING FROM BACK TO SIDE WHILE IN FLAT BAD: A LITTLE
MOVING FROM LYING ON BACK TO SITTING ON SIDE OF FLAT BED WITH BEDRAILS: A LITTLE
DAILY ACTIVITIY SCORE: 20
STANDING UP FROM CHAIR USING ARMS: A LITTLE
CLIMB 3 TO 5 STEPS WITH RAILING: A LOT
WALKING IN HOSPITAL ROOM: A LITTLE
DRESSING REGULAR UPPER BODY CLOTHING: A LITTLE
MOVING TO AND FROM BED TO CHAIR: A LITTLE

## 2023-10-16 ASSESSMENT — PAIN SCALES - GENERAL
PAINLEVEL_OUTOF10: 0 - NO PAIN
PAINLEVEL_OUTOF10: 0 - NO PAIN

## 2023-10-16 ASSESSMENT — PAIN SCALES - WONG BAKER: WONGBAKER_NUMERICALRESPONSE: NO HURT

## 2023-10-16 ASSESSMENT — PAIN - FUNCTIONAL ASSESSMENT: PAIN_FUNCTIONAL_ASSESSMENT: 0-10

## 2023-10-16 NOTE — NURSING NOTE
Dressing change per order on her right foot. Their are two areas on her buttock that is red with blisters a mepilex applied

## 2023-10-16 NOTE — PROGRESS NOTES
Physical Therapy    Physical Therapy Treatment    Patient Name: Carrie Rosario  MRN: [id number]  Today's Date: 10/16/2023  Time Calculation  Start Time: 1130  Stop Time: 1158  Time Calculation (min): 28 min       Assessment/Plan   PT Assessment  PT Assessment Results: Decreased range of motion, Decreased strength, Decreased endurance, Impaired balance, Decreased mobility, Decreased cognition, Decreased safety awareness, Impaired judgement  Rehab Prognosis: Good  Evaluation/Treatment Tolerance: Patient tolerated treatment well  Medical Staff Made Aware: Yes  End of Session Communication: Bedside nurse  End of Session Patient Position: Up in chair  PT Plan  Inpatient/Swing Bed or Outpatient: Inpatient  PT Plan  Treatment/Interventions: Bed mobility, Transfer training, Gait training, Stair training, Balance training, Strengthening, Endurance training, Range of motion, Therapeutic activity, Therapeutic exercise, Home exercise program  PT Plan: Skilled PT  PT Frequency: 3 times per week  PT Discharge Recommendations: Moderate intensity level of continued care  Equipment Recommended upon Discharge: Wheeled walker  PT Recommended Transfer Status: Assist x1, Assistive device      General Visit Information:   PT  Visit  PT Received On: 10/16/23  Response to Previous Treatment: Patient with no complaints from previous session.  General  Reason for Referral: Impaired Mobility  Referred By: Dr. Anderson  Past Medical History Relevant to Rehab: DB, HN, HLD  Missed Visit: Yes  Missed Visit Reason: Other (Comment) (still pending podiatry consult)  Family/Caregiver Present: Yes (DTR)  Co-Treatment: OT (co eval)  Co-Treatment Reason: Fall Risk  Prior to Session Communication: Bedside nurse  Patient Position Received: Bed, 3 rail up  Preferred Learning Style: verbal    Subjective     Objective   Pain:  Pain Assessment  Pain Assessment: 0-10  Pain Score: 0 - No pain  Cognition:  Cognition  Overall Cognitive Status: Within Functional  Limits  Orientation Level: Oriented X4  Memory: Exceptions to WFL  Short-Term Memory: Impaired  Insight: Moderate  Impulsive: Moderately    Activity Tolerance:  Activity Tolerance  Endurance: Decreased tolerance for upright activites  Rate of Perceived Exertion (RPE): 8/10 post gait today  Treatments:  Therapeutic Exercise  Therapeutic Exercise Performed: Yes  Therapeutic Exercise Activity 1: Sitting Exercise (Pt educated on and completes B ankle pumps x20, Seated Knee Kicks x15, Seated Marches x15, Resisted Hip abd x15, resisted hip add x15, glute sets x10)    Bed Mobility  Bed Mobility: Yes  Bed Mobility 1  Bed Mobility 1: Supine to sitting  Level of Assistance 1: Close supervision  Bed Mobility Comments 1: no cues needed but HOB elevated to ~45 degrees    Ambulation/Gait Training  Ambulation/Gait Training Performed: Yes  Ambulation/Gait Training 1  Surface 1: Level tile  Device 1: Rolling walker  Gait Support Devices:  (would benefit from R AFO---chronic R foot drop leading to hip hiking and increased knee flexion to avoid foot drag with swing through. As a result, increased lateral sway R>L increasing unsteadiness)  Assistance 1: Minimum assistance, Moderate verbal cues (to steady)  Quality of Gait 1:  (as above)  Comments/Distance (ft) 1: 50  Transfers  Transfer: Yes  Transfer 1  Transfer From 1: Bed to  Transfer to 1: Stand  Technique 1: Sit to stand  Transfer Device 1: Walker  Transfer Level of Assistance 1: Minimum assistance, Moderate tactile cues  Trials/Comments 1: Cues on technique. Very unsteady during attempt today with LOB posterior  Transfers 2  Transfer From 2: Toilet to  Transfer to 2: Stand  Technique 2: Sit to stand, Stand to sit  Transfer Device 2: Walker  Transfer Level of Assistance 2: Minimum assistance  Trials/Comments 2: x2 sitting down with moderate cuing for safety. Attempts to sit several times in space and no where near toilet/goal seated surface    Outcome Measures:  [facility] Basic  Mobility  Turning from your back to your side while in a flat bed without using bedrails: A little  Moving from lying on your back to sitting on the side of a flat bed without using bedrails: A little  Moving to and from bed to chair (including a wheelchair): A little  Standing up from a chair using your arms (e.g. wheelchair or bedside chair): A little  To walk in hospital room: A little  Climbing 3-5 steps with railing: A lot  Basic Mobility - Total Score: 17    Education Documentation  Mobility Training, taught by Matt Crump, PT at 10/16/2023 12:07 PM.  Learner: Patient  Readiness: Eager  Method: Explanation  Response: Verbalizes Understanding  Comment: safe transfer techniques, fall risk management, ad use    Education Comments  No comments found.        OP EDUCATION:  Education  Individual(s) Educated: Patient  Education Provided: Body Mechanics, Fall Risk, Home Exercise Program, Home Safety, POC, Posture  Risk and Benefits Discussed with Patient/Caregiver/Other: yes  Patient/Caregiver Demonstrated Understanding: yes  Plan of Care Discussed and Agreed Upon: yes  Patient Response to Education: Patient/Caregiver Verbalized Understanding of Information    Encounter Problems       Encounter Problems (Active)       Balance       Standing Balance (Progressing)       Start:  10/13/23    Expected End:  10/27/23       Pt will demonstrate good static standing balance to promote safe participation with out of bed activity, transfers, and mobility              Mobility       Ambulation (Progressing)       Start:  10/13/23    Expected End:  10/27/23       Pt will ambulate 50' modified independent assist with walker to promote safe home mobility           Stair Negotiation (Entry) (Progressing)       Start:  10/13/23    Expected End:  10/27/23       Pt will ascend/descend 2 stairs with rail(s) on R and modified independent assist to promote safe entry and exit in home environment                Safety       Safety  (Progressing)       Start:  10/13/23    Expected End:  10/27/23       Pt will correctly identify and demonstrate safe mobility techniques to reduce their risks for falls during their acute care stay            Fall Risk (Progressing)       Start:  10/13/23    Expected End:  10/27/23       Pt will demonstrate 9 stands from chair in 30s to achieve their CDC Steadi Goal and Reduce Risk for Falls    1.) May NOT use arms for the transfer  2.) Must achieve full standing position    Chair Stand Below Average Scores  Age-------Men----Women  (60-64)----(<14)----(<12)  (65-69)----(<12)----(<11)  (70-74)----(<12)----(<10)  (75-79)----(<11)----(<10)  (80-84)----(<10)----(<9 )  (85-89)----(<8 )----(<8 )  (90-94)----(<7 )----(<4 )    A below average score indicates a risk for falls  www.cdc.gov/steadi              Transfers       Supine to sit (Progressing)       Start:  10/13/23    Expected End:  10/27/23       Pt will transfer supine to sitting at edge of bed with independent assist to promote acute care out of bed activity           Sit to Stand (Progressing)       Start:  10/13/23    Expected End:  10/27/23       Pt will transfer sit to standing position with modified independent assist and walker to promote safe out of bed activity

## 2023-10-16 NOTE — PROGRESS NOTES
Carrie Rosario is a 82 y.o. female on day 5 of admission presenting with Fall, initial encounter.      Subjective   Patient seen and examined. Resting in bed. States she feels well. Denies any nausea, vomiting, diarrhea or pain. No fevers. Still feels unsteady on her feet.        Objective     Last Recorded Vitals  /73 (BP Location: Right arm, Patient Position: Lying)   Pulse 73   Temp 36.8 °C (98.2 °F) (Oral)   Resp 16   Wt 74.2 kg (163 lb 9.3 oz)   SpO2 96%   Intake/Output last 3 Shifts:    Intake/Output Summary (Last 24 hours) at 10/16/2023 0903  Last data filed at 10/16/2023 0713  Gross per 24 hour   Intake 1670 ml   Output 950 ml   Net 720 ml       Admission Weight  Weight: 74.2 kg (163 lb 9.3 oz) (10/11/23 0907)    Daily Weight  10/11/23 : 74.2 kg (163 lb 9.3 oz)    Image Results    No imaging to review in the past 24 hours.     Physical Exam    General: Alert, pleasant, in no acute distress.   Cardiac: Regular rate and rhythm, S1/S2 , no murmur.   Pulmonary: Clear to auscultation on room air.   Abdomen: Soft, round, nontender. BS +x4.   Extremities: No edema, no visible erythema.  Skin: No rashes or lesions. Dressing in place to the R foot without drainage.     Relevant Results    Current Facility-Administered Medications:     acetaminophen (Tylenol) tablet 650 mg, 650 mg, oral, q4h PRN, Orsolya Chaplin, APRN-CNP    aspirin chewable tablet 81 mg, 81 mg, oral, Daily, Alessia L Rosalinda, APRN-CNP, 81 mg at 10/15/23 0835    atorvastatin (Lipitor) tablet 40 mg, 40 mg, oral, Nightly, Alessia L Rosalinda, APRN-CNP, 40 mg at 10/15/23 2105    dapagliflozin propanediol (Farxiga) tablet 10 mg, 10 mg, oral, q AM, Alessia L Rosalinda, APRN-CNP, 10 mg at 10/15/23 0835    dextrose 10 % in water (D10W) infusion, 0.3 g/kg/hr, intravenous, Once PRN, Orsolya Chaplin, APRN-CNP    dextrose 50 % injection 25 g, 25 g, intravenous, q15 min PRN, Orsolya Andreina, APRN-CNP    FLUoxetine (PROzac) capsule 40 mg, 40 mg, oral, Daily, Orsmercedesa  Andreina, APRN-CNP, 40 mg at 10/15/23 0835    furosemide (Lasix) tablet 20 mg, 20 mg, oral, Daily, Alessia Owusuk, APRN-CNP, 20 mg at 10/15/23 0835    glucagon (Glucagen) injection 1 mg, 1 mg, intramuscular, q15 min PRN, Marilee Ruelasds, APRN-CNP    heparin (porcine) injection 5,000 Units, 5,000 Units, subcutaneous, q8h, Bertaolya Andreina, APRN-CNP, 5,000 Units at 10/16/23 0611    insulin lispro (HumaLOG) injection 0-10 Units, 0-10 Units, subcutaneous, TID with meals, Marilee Hdz, APRN-CNP, 2 Units at 10/16/23 0845    melatonin tablet 3 mg, 3 mg, oral, Nightly PRN, Alessia Tellez, APRN-CNP, 3 mg at 10/12/23 2102    meropenem (Merrem) in sodium chloride 0.9 % 100 mL IV 1 g, 1 g, intravenous, q12h, Marilee Ruelasds, APRN-CNP, Stopped at 10/15/23 2230    metoprolol tartrate (Lopressor) tablet 50 mg, 50 mg, oral, BID, Elizabetha Rodanthe, APRN-CNP, 50 mg at 10/15/23 2105    ondansetron (Zofran) injection 4 mg, 4 mg, intravenous, q8h PRN, Marilee Ruelasds, APRN-CNP    oxybutynin (Ditropan) tablet 5 mg, 5 mg, oral, TID, Alessia Tellez, APRN-CNP, 5 mg at 10/15/23 2105    pantoprazole (ProtoNix) EC tablet 40 mg, 40 mg, oral, Daily, Orsolya Rodanthe, APRN-CNP, 40 mg at 10/15/23 0835    polyethylene glycol (Glycolax, Miralax) packet 17 g, 17 g, oral, Daily, Orsolya Rodanthe, APRN-CNP, 17 g at 10/15/23 0836    potassium chloride CR (Klor-Con) ER tablet 10 mEq, 10 mEq, oral, Daily with breakfast, Alessia Tellez APRN-CNP, 10 mEq at 10/16/23 0850    vancomycin-diluent combo no.1 (Xellia) IVPB 750 mg, 750 mg, intravenous, q24h, CHELSEA Vera-CNP, Stopped at 10/15/23 1627     Results for orders placed or performed during the hospital encounter of 10/11/23 (from the past 24 hour(s))   POCT GLUCOSE   Result Value Ref Range    POCT Glucose 266 (H) 74 - 99 mg/dL   POCT GLUCOSE   Result Value Ref Range    POCT Glucose 275 (H) 74 - 99 mg/dL   POCT GLUCOSE   Result Value Ref Range    POCT Glucose 102 (H) 74 - 99 mg/dL   CBC   Result Value Ref Range    WBC  10.5 4.4 - 11.3 x10*3/uL    nRBC 0.0 0.0 - 0.0 /100 WBCs    RBC 4.45 4.00 - 5.20 x10*6/uL    Hemoglobin 14.0 12.0 - 16.0 g/dL    Hematocrit 42.5 36.0 - 46.0 %    MCV 96 80 - 100 fL    MCH 31.5 26.0 - 34.0 pg    MCHC 32.9 32.0 - 36.0 g/dL    RDW 12.4 11.5 - 14.5 %    Platelets 253 150 - 450 x10*3/uL    MPV 10.6 7.5 - 11.5 fL   Basic metabolic panel   Result Value Ref Range    Glucose 176 (H) 65 - 99 mg/dL    Sodium 136 133 - 145 mmol/L    Potassium 4.9 3.4 - 5.1 mmol/L    Chloride 101 97 - 107 mmol/L    Bicarbonate 23 (L) 24 - 31 mmol/L    Urea Nitrogen 33 (H) 8 - 25 mg/dL    Creatinine 1.20 0.40 - 1.60 mg/dL    eGFR 46 (L) >60 mL/min/1.73m*2    Calcium 9.2 8.5 - 10.4 mg/dL    Anion Gap 12 <=19 mmol/L   POCT GLUCOSE   Result Value Ref Range    POCT Glucose 177 (H) 74 - 99 mg/dL               Assessment/Plan     Fall, initial encounter  -CT head negative for acute change  -No apparent injuries and denies complaints  -Fall precautions  -PT/OT, plan for SNF     Right great toes infection, r/o OM  -CT shows soft tissue swelling and cellulitis. Erosion not excluded however no mention of OM  -Continue IV Abx for now  -Wound culture finalized with gram positive cocci  -Blood cultures final with no growth  -Podiatry saw and signed off. Recommend 2 weeks of antibiotics and follow up 1 week after discharge  -ID follows  -Daughter is requesting pt not be discharged on Augmentin as pt has not tolerated this well in the past     UTI  -Urine culture with Enterococcus Faecalis  -Continue IV Abx per ID, awaiting PO abx recommendations     Diabetes, type II  -Insulin SS  -Monitor blood glucose  -Hgb A1C 9.7     Hypertension  -Continue BB  -BP stable, monitor      GERD  -PPI     HX of cardiac stent and STEMI  -Continue ASA/statin     Hyperlipidemia  -Statin     DVT risk  -Heparin subcutaneous  -SCD's     Plan  ID follows. Cultures finalized. Awaiting abx for discharge.  PT/OT recommending moderate intensity therapy.   SS on board,  plan for [facility]. [place] today.            Georgia Jansen, APRN-CNP

## 2023-10-16 NOTE — PROGRESS NOTES
"Carrie Rosario is a 82 y.o. female on day 5 of admission presenting with Fall, initial encounter.    Subjective   During nursing rounds, RN informed TCSW pt has no new updates.   Pt's final wound cultures have resulted. Pt will discharge today to [facility], pending ID final recommendations.     TCSW placed TCT [employer] at approx 1215 to arrange transportation to [facility]. TCSW spoke with Sarah to confirm  at approx 1400 via stretcher. TCSW will inform pt's RN and pt's daughter.        Objective     Physical Exam    Last Recorded Vitals  Blood pressure 148/74, pulse 73, temperature 36.4 °C (97.5 °F), temperature source Oral, resp. rate 16, height 1.575 m (5' 2\"), weight 74.2 kg (163 lb 9.3 oz), SpO2 95 %.  Intake/Output last 3 Shifts:  I/O last 3 completed shifts:  In: 1270 (17.1 mL/kg) [P.O.:1120; IV Piggyback:150]  Out: 750 (10.1 mL/kg) [Urine:750 (0.3 mL/kg/hr)]  Weight: 74.2 kg     Relevant Results           This patient currently has cardiac telemetry ordered; if you would like to modify or discontinue the telemetry order, click here to go to the orders activity to modify/discontinue the order.                 Assessment/Plan   Principal Problem:    Fall, initial encounter  Active Problems:    Hypercholesterolemia    Gastroesophageal reflux disease    Dyslipidemia    Coronary arteriosclerosis    Congestive heart failure (CMS/HCC)    Chronic kidney disease, stage 3 (CMS/HCC)    Cerebrovascular accident (CMS/HCC)    Stented coronary artery               BASIL KeeneW      "

## 2023-10-16 NOTE — PROGRESS NOTES
Carrie Rosario is a 82 y.o. female on day 5 of admission presenting with Fall, initial encounter.    Subjective   Interval History:   Afebrile, no chills  Denies foot pain  Denies chest pain or shortness of breath  Denies nausea vomiting or diarrhea            Objective   Range of Vitals (last 24 hours)  Heart Rate:  [71-90]   Temp:  [36.2 °C (97.2 °F)-37.1 °C (98.7 °F)]   Resp:  [16-19]   BP: (110-145)/(59-86)   SpO2:  [93 %-96 %]   Daily Weight  10/11/23 : 74.2 kg (163 lb 9.3 oz)    Body mass index is 29.92 kg/m².    Physical Exam  Constitutional:       Appearance: Normal appearance.   HENT:      Head: Normocephalic and atraumatic.      Nose: Nose normal.      Mouth/Throat:      Mouth: Mucous membranes are moist.      Pharynx: Oropharynx is clear.   Eyes:      Extraocular Movements: Extraocular movements intact.      Conjunctiva/sclera: Conjunctivae normal.   Cardiovascular:      Rate and Rhythm: Normal rate and regular rhythm.   Pulmonary:      Effort: Pulmonary effort is normal.      Breath sounds: Normal breath sounds.   Abdominal:      General: Bowel sounds are normal.      Palpations: Abdomen is soft.   Musculoskeletal:      Cervical back: Normal range of motion and neck supple.      Comments: Right foot swelling, pain -improving  Skin:     Comments: Right foot erythema improving, right foot dressing intact  Neurological:      General: No focal deficit present.      Mental Status: She is alert and oriented to person, place, and time. Mental status is at baseline.   Psychiatric:         Mood and Affect: Mood normal.         Behavior: Behavior normal.     Antibiotics  mirabegron (Mybetriq) 24 hr tablet  cefepime (Maxipime) 1 g in dextrose 5 % 50 mL IV  vancomycin-diluent combo no.1 (Xellia) IVPB 1,000 mg  sodium chloride 0.9 % bolus 1,000 mL  meropenem (Merrem) in sodium chloride 0.9 % 100 mL IV 1 g  vancomycin-diluent combo no.1 (Xellia) IVPB 1 g  pantoprazole (ProtoNix) EC tablet 40 mg  FLUoxetine (PROzac)  capsule 40 mg  metoprolol tartrate (Lopressor) tablet 50 mg  heparin (porcine) injection 5,000 Units  acetaminophen (Tylenol) tablet 650 mg  ondansetron (Zofran) injection 4 mg  polyethylene glycol (Glycolax, Miralax) packet 17 g  dextrose 50 % injection 25 g  glucagon (Glucagen) injection 1 mg  dextrose 10 % in water (D10W) infusion  insulin lispro (HumaLOG) injection 0-10 Units  atorvastatin (Lipitor) tablet 40 mg  dapagliflozin propanediol (Farxiga) tablet 10 mg  fesoterodine tablet extended release 24 hr 8 mg  melatonin tablet 3 mg  furosemide (Lasix) tablet 20 mg  potassium chloride CR (Klor-Con) ER tablet 10 mEq  oxybutynin (Ditropan) tablet 5 mg  vancomycin-diluent combo no.1 (Xellia) IVPB 1 g  aspirin chewable tablet 81 mg  vancomycin-diluent combo no.1 (Xellia) IVPB 750 mg      Relevant Results  Labs  Results from last 72 hours   Lab Units 10/16/23  0415 10/14/23  0441   WBC AUTO x10*3/uL 10.5 7.6   HEMOGLOBIN g/dL 14.0 13.5   HEMATOCRIT % 42.5 40.9   PLATELETS AUTO x10*3/uL 253 194       Results from last 72 hours   Lab Units 10/16/23  0416 10/15/23  0417 10/14/23  0441   SODIUM mmol/L 136 138 137   POTASSIUM mmol/L 4.9 4.7 4.4   CHLORIDE mmol/L 101 104 103   CO2 mmol/L 23* 24 23*   BUN mg/dL 33* 33* 33*   CREATININE mg/dL 1.20 1.40 1.20   GLUCOSE mg/dL 176* 192* 209*   CALCIUM mg/dL 9.2 9.3 9.2   ANION GAP mmol/L 12 10 11   EGFR mL/min/1.73m*2 46* 38* 46*           Estimated Creatinine Clearance: 34.7 mL/min (by C-G formula based on SCr of 1.2 mg/dL).  C-Reactive Protein   Date Value Ref Range Status   10/11/2023 8.90 (H) 0.00 - 2.00 mg/dL Final     CRP   Date Value Ref Range Status   08/18/2021 3.1 (H) 0 - 2.0 MG/DL Final     Comment:     Performed at [place] [id number]     Microbiology  Susceptibility data from last 14 days.  Collected Specimen Info Organism Ampicillin Ciprofloxacin Levofloxacin Nitrofurantoin Penicillin Trimethoprim/Sulfamethoxazole Vancomycin   10/12/23  Tissue/Biopsy from Wound/Tissue Mixed Skin Microorganisms           10/11/23 Urine from Clean Catch/Voided Enterococcus faecalis S S S S S R S       Imaging  Reviewed  Assessment/Plan   Right foot cellulitis  Right foot ulcer-debrided per podiatry 10/12/2023  Retained hardware right foot  Diabetes mellitus  Urinary tract infection-E. Faecalis-susceptible to ampicillin-treated        Discontinue IV meropenem  Discontinue IV vancomycin-management per pharmacy  Start cefuroxime  Start doxycycline  Monitor temperature WBC  Glycemic control per primary  Monitor response to therapy  Long term plan cefuroxime 250 mg BID and doxycycline 100 mg BID for total 14 days till 10/25/2023      Lora Bustillos, APRN-CNP

## 2023-10-16 NOTE — DISCHARGE SUMMARY
Discharge Diagnosis  Fall, initial encounter, UTI, R foot wound/cellulitis    Issues Requiring Follow-Up    Discharge Meds     Your medication list        START taking these medications        Instructions Last Dose Given Next Dose Due   cefuroxime 250 mg tablet  Commonly known as: Ceftin      Take 1 tablet (250 mg) by mouth 2 times a day for 18 doses.       doxycycline 100 mg capsule  Commonly known as: Vibramycin      Take 1 capsule (100 mg) by mouth every 12 hours for 18 doses. Take with at least 8 ounces (large glass) of water, do not lie down for 30 minutes after              CHANGE how you take these medications        Instructions Last Dose Given Next Dose Due   acetaminophen 325 mg tablet  Commonly known as: Tylenol  What changed:   how much to take  when to take this  reasons to take this      Take 2 tablets (650 mg) by mouth every 4 hours if needed for fever (temp greater than 38.0 C) (greater than or equal to 38 C).              CONTINUE taking these medications        Instructions Last Dose Given Next Dose Due   aspirin 81 mg EC tablet           atorvastatin 40 mg tablet  Commonly known as: Lipitor           Calmoseptine 0.44-20.6 % ointment  Generic drug: menthol-zinc oxide           dapagliflozin propanediol 10 mg  Commonly known as: Farxiga           famotidine 20 mg tablet  Commonly known as: Pepcid      take 1 tablet by mouth once daily       fesoterodine 8 mg tablet extended release 24 hr      TAKE 1 TABLET BY MOUTH EVERY DAY AT BEDTIME       FLUoxetine 40 mg capsule  Commonly known as: PROzac      take 1 capsule by mouth once daily       FreeStyle Brandon 2 Herrick misc  Generic drug: FreeStyle Brandon reader           FreeStyle Brandon 2 Sensor kit  Generic drug: FreeStyle Brandon sensor system           FreeStyle Lite Strips strip  Generic drug: blood sugar diagnostic           furosemide 20 mg tablet  Commonly known as: Lasix           melatonin 3 mg tablet           metoprolol tartrate 50 mg  "tablet  Commonly known as: Lopressor           Myrbetriq 25 mg tablet extended release 24 hr 24 hr tablet  Generic drug: mirabegron           NovoLOG FlexPen U-100 Insulin 100 unit/mL (3 mL) pen  Generic drug: insulin aspart      INJECT SUBCUTANEOUSLY 10 UNITS TWO TIMES A DAY BEFORE LUNCH AND DINNER       pantoprazole 40 mg EC tablet  Commonly known as: ProtoNix           pen needle, diabetic 31 gauge x 5/16\" needle           pilocarpine 5 mg tablet  Commonly known as: Salagen           potassium chloride CR 10 mEq ER tablet  Commonly known as: Klor-Con           Tresiba FlexTouch U-100 100 unit/mL (3 mL) injection  Generic drug: insulin degludec      INJECT 20 UNITS SUBCUTANEOUSLY ONE TIME DAILY AS DIRECTED.       VITAMIN D3 ORAL                     Where to Get Your Medications        These medications were sent to [id number] - [address] [address]      Phone: [phone]   acetaminophen 325 mg tablet       Information about where to get these medications is not yet available    Ask your nurse or doctor about these medications  cefuroxime 250 mg tablet  doxycycline 100 mg capsule         Test Results Pending At Discharge  Pending Labs       No current pending labs.            Hospital Course   Admitted for further management after a fall. Found to have UTI as well as cellulitis of the R foot with chronic wound. Seen by ID and podiatry. Wound was debrided at the bedside with Dr. Kennedy. Wound culture and urine culture sent. ID managed abx, she was treated with IV merem and vanco which have been changed to cefuroxime and doxycycline for outpatient for total 14 days of treatment. PT/OT saw and recommending moderate intensity therapy, she is agreeable to SNF. Labs and VS are stable. DC to SNF today.     Case and plan was discussed with patient, she is agreeable.   Case and plan was also discussed with my collaborating physician.     Time spent " 35 minutes.     Pertinent Physical Exam At Time of Discharge  Physical Exam    General: Alert, pleasant, in no acute distress.   Cardiac: Regular rate and rhythm, S1/S2 , no murmur.   Pulmonary: Clear to auscultation on room air.   Abdomen: Soft, round, nontender. BS +x4.   Extremities: No edema, no visible erythema.  Skin: No rashes or lesions. Dressing in place to the R foot without drainage.    Outpatient Follow-Up  Future Appointments   Date Time Provider Department [place]   10/19/2023 10:30 AM Gato Gannon DO [id number] [facility]   2/15/2024  9:15 AM Brian Youngblood MD [id number] [facility]         Georgia Jansen, APRN-CNP

## 2023-10-16 NOTE — PROGRESS NOTES
Vancomycin Dosing by Pharmacy- Cessation of Therapy    Consult to pharmacy for vancomycin dosing has been discontinued by the prescriber, pharmacy will sign off at this time.    Please call pharmacy if there are further questions or re-enter a consult if vancomycin is resumed.     MIGUEL GRUBBS, PharmD

## 2023-10-19 ENCOUNTER — OFFICE VISIT (OUTPATIENT)
Dept: CARDIOLOGY | Facility: CLINIC | Age: 83
End: 2023-10-19
Payer: MEDICARE

## 2023-10-19 ENCOUNTER — NURSING HOME VISIT (OUTPATIENT)
Dept: POST ACUTE CARE | Facility: EXTERNAL LOCATION | Age: 83
End: 2023-10-19

## 2023-10-19 VITALS
BODY MASS INDEX: 27.98 KG/M2 | HEART RATE: 70 BPM | WEIGHT: 153 LBS | OXYGEN SATURATION: 95 % | SYSTOLIC BLOOD PRESSURE: 102 MMHG | DIASTOLIC BLOOD PRESSURE: 62 MMHG

## 2023-10-19 VITALS
BODY MASS INDEX: 28.83 KG/M2 | DIASTOLIC BLOOD PRESSURE: 66 MMHG | HEART RATE: 75 BPM | WEIGHT: 157.6 LBS | SYSTOLIC BLOOD PRESSURE: 105 MMHG | OXYGEN SATURATION: 93 % | TEMPERATURE: 98.6 F | RESPIRATION RATE: 18 BRPM

## 2023-10-19 DIAGNOSIS — I25.10 CORONARY ARTERIOSCLEROSIS: Primary | ICD-10-CM

## 2023-10-19 DIAGNOSIS — I25.10 CORONARY ARTERIOSCLEROSIS: ICD-10-CM

## 2023-10-19 DIAGNOSIS — I50.31 ACUTE DIASTOLIC HEART FAILURE (MULTI): ICD-10-CM

## 2023-10-19 DIAGNOSIS — I10 HTN (HYPERTENSION), BENIGN: ICD-10-CM

## 2023-10-19 DIAGNOSIS — I63.9 CEREBROVASCULAR ACCIDENT (CVA), UNSPECIFIED MECHANISM (MULTI): ICD-10-CM

## 2023-10-19 DIAGNOSIS — E11.621 TYPE 2 DIABETES MELLITUS WITH FOOT ULCER, WITHOUT LONG-TERM CURRENT USE OF INSULIN (MULTI): ICD-10-CM

## 2023-10-19 DIAGNOSIS — E78.00 HYPERCHOLESTEROLEMIA: ICD-10-CM

## 2023-10-19 DIAGNOSIS — L97.509 TYPE 2 DIABETES MELLITUS WITH FOOT ULCER, WITHOUT LONG-TERM CURRENT USE OF INSULIN (MULTI): ICD-10-CM

## 2023-10-19 DIAGNOSIS — L03.115 CELLULITIS OF RIGHT LOWER EXTREMITY: Primary | ICD-10-CM

## 2023-10-19 DIAGNOSIS — Z95.5 STENTED CORONARY ARTERY: ICD-10-CM

## 2023-10-19 PROBLEM — L03.119 CELLULITIS OF EXTREMITY: Status: ACTIVE | Noted: 2023-10-19

## 2023-10-19 PROCEDURE — 99214 OFFICE O/P EST MOD 30 MIN: CPT | Performed by: INTERNAL MEDICINE

## 2023-10-19 PROCEDURE — 99309 SBSQ NF CARE MODERATE MDM 30: CPT | Performed by: PHYSICIAN ASSISTANT

## 2023-10-19 PROCEDURE — 1126F AMNT PAIN NOTED NONE PRSNT: CPT | Performed by: INTERNAL MEDICINE

## 2023-10-19 PROCEDURE — 1036F TOBACCO NON-USER: CPT | Performed by: INTERNAL MEDICINE

## 2023-10-19 PROCEDURE — 1111F DSCHRG MED/CURRENT MED MERGE: CPT | Performed by: INTERNAL MEDICINE

## 2023-10-19 PROCEDURE — 1159F MED LIST DOCD IN RCRD: CPT | Performed by: INTERNAL MEDICINE

## 2023-10-19 RX ORDER — METOPROLOL TARTRATE 25 MG/1
25 TABLET, FILM COATED ORAL 2 TIMES DAILY
Qty: 60 TABLET | Refills: 11 | Status: SHIPPED | OUTPATIENT
Start: 2023-10-19 | End: 2024-01-23 | Stop reason: HOSPADM

## 2023-10-19 ASSESSMENT — ENCOUNTER SYMPTOMS
FREQUENCY: 0
VOMITING: 0
DIZZINESS: 0
ABDOMINAL PAIN: 0
WEAKNESS: 0
HEADACHES: 0
CHILLS: 0
HEMATURIA: 0
CONFUSION: 0
NERVOUS/ANXIOUS: 0
CONSTIPATION: 0
DYSURIA: 0
COUGH: 0
APPETITE CHANGE: 0
DIARRHEA: 0
TREMORS: 0
WHEEZING: 0
NAUSEA: 0
FEVER: 0
SHORTNESS OF BREATH: 0

## 2023-10-19 ASSESSMENT — PAIN SCALES - GENERAL: PAINLEVEL: 0-NO PAIN

## 2023-10-19 NOTE — PATIENT INSTRUCTIONS
Coronary arteriosclerosis  Continue with commitment to daily walking as tolerated and low carbohydrate and low sugar Mediterranean/[employer] dietary lifestyle for cardiac risk reduction.  Continue on her statin therapy with annual lipid profiles and metabolic profile being followed by her PCP    Hypercholesterolemia  As noted under her coronary disease.  Her lipid profile from April 2023 showed HDL 57 and LDL of 78    Stented coronary artery  Continue guideline directed medical therapy with aspirin, statin therapy and beta-blocker.    I will reduce her beta-blocker dose to metoprolol titrate 25 mg twice daily to avoid hypotension since her blood pressure is low normal

## 2023-10-19 NOTE — ASSESSMENT & PLAN NOTE
Complete course of antibiotics  cefuroxime and doxycycline.   Follow up with wound clinic.  Localized wound care with wound care nurse and physician in facility.

## 2023-10-19 NOTE — PROGRESS NOTES
Not applicable Subjective      Chief Complaint   Patient presents with    6 month f/u         HPI here to evaluate known coronary artery disease.  She has a stable functional capacity and angina free with no signs of heart failure and she continues on modest dose statin therapy and low-dose aspirin therapy.    Previous: Successful right coronary artery drug-eluting stent deployment December 2021 in the setting where she had ventricular fibrillation arrest with recurrent defibrillation shocks in the setting of an inferior wall myocardial infarction cardiac catheterization showed mild left main disease mild LAD disease and mild circumflex disease with total occlusion of the proximal right coronary stented with a 3/20 mm drug-eluting stent proximally and she had clot migration distally at that time.  Left ventricular ejection fraction 55% on cardiac catheterization and subsequent echocardiogram showed normalization of LV function with left ventricular action fraction 55 to 59% and she had modest pulmonary hypertension with PA pressure 49 mmHg.  She is on modest dose statin therapy because of her older age group.    Review of Systems   All other systems reviewed and are negative.       Objective   Physical Exam  Constitutional:       Appearance: Normal appearance.   HENT:      Head: Normocephalic and atraumatic.   Eyes:      Pupils: Pupils are equal, round, and reactive to light.   Cardiovascular:      Rate and Rhythm: Normal rate and regular rhythm.      Pulses: Normal pulses.      Heart sounds: Normal heart sounds.   Pulmonary:      Effort: Pulmonary effort is normal.      Breath sounds: Normal breath sounds.   Abdominal:      General: Abdomen is flat. Bowel sounds are normal.      Palpations: Abdomen is soft.   Musculoskeletal:         General: Normal range of motion.      Cervical back: Normal range of motion.   Skin:     General: Skin is warm and dry.   Neurological:      General: No focal deficit present.    Psychiatric:         Mood and Affect: Mood normal.         Judgment: Judgment normal.          Lab Review:   Not applicable    Coronary arteriosclerosis  Continue with commitment to daily walking as tolerated and low carbohydrate and low sugar Mediterranean/[place] dietary lifestyle for cardiac risk reduction.  Continue on her statin therapy with annual lipid profiles and metabolic profile being followed by her PCP    Hypercholesterolemia  As noted under her coronary disease.  Her lipid profile from April 2023 showed HDL 57 and LDL of 78    Stented coronary artery  Continue guideline directed medical therapy with aspirin, statin therapy and beta-blocker.    I will reduce her beta-blocker dose to metoprolol titrate 25 mg twice daily to avoid hypotension since her blood pressure is low normal

## 2023-10-19 NOTE — ASSESSMENT & PLAN NOTE
Continue lasix and farxiga.  Monitor weights and weekly labs.   Pt has follow up with cardiologist today.

## 2023-10-19 NOTE — ASSESSMENT & PLAN NOTE
Continue with commitment to daily walking as tolerated and low carbohydrate and low sugar Mediterranean/[place] dietary lifestyle for cardiac risk reduction.  Continue on her statin therapy with annual lipid profiles and metabolic profile being followed by her PCP

## 2023-10-19 NOTE — PROGRESS NOTES
No chief complaint on file.      Subjective   [id number] : Carrie Rosario is a 82 y.o. female admitted to [facility] for rehab.   HPI  Pt with h/o diabetes and chronic ulcer to the right heel admitted to the hospital with falls and cellulitis of the right lower extremity.  Imaging negative for osteomyelitis.   She follows with podiatry.   She reports that pain has improved.  She continues on course of cefuroxime and doxycycline.  She had routine labs today which were reviewed and are stable.  She offers no new complaints or concerns.   She lives alone.    Review of Systems   Constitutional:  Negative for appetite change, chills and fever.   Respiratory:  Negative for cough, shortness of breath and wheezing.    Cardiovascular:  Negative for chest pain and leg swelling.   Gastrointestinal:  Negative for abdominal pain, constipation, diarrhea, nausea and vomiting.   Genitourinary:  Negative for dysuria, frequency and hematuria.   Neurological:  Negative for dizziness, tremors, weakness and headaches.   Psychiatric/Behavioral:  Negative for confusion. The patient is not nervous/anxious.    All other systems reviewed and are negative.      Objective   /66   Pulse 75   Temp 37 °C (98.6 °F)   Resp 18   Wt 71.5 kg (157 lb 9.6 oz)   SpO2 93%   BMI 28.83 kg/m²    Physical Exam  Constitutional:       General: She is not in acute distress.  Eyes:      Conjunctiva/sclera: Conjunctivae normal.      Pupils: Pupils are equal, round, and reactive to light.   Cardiovascular:      Rate and Rhythm: Normal rate and regular rhythm.      Heart sounds: No murmur heard.  Pulmonary:      Effort: Pulmonary effort is normal.      Breath sounds: No wheezing, rhonchi or rales.   Abdominal:      General: Abdomen is flat. Bowel sounds are normal. There is no distension.      Palpations: Abdomen is soft. There is no mass.      Tenderness: There is no abdominal tenderness.   Musculoskeletal:         General: No swelling. Normal range  "of motion.   Skin:     General: Skin is warm and dry.      Findings: No rash.   Neurological:      General: No focal deficit present.      Mental Status: She is alert and oriented to person, place, and time. Mental status is at baseline.       No lab exists for component: \"CBC BMP\"  Assessment/Plan   Problem List Items Addressed This Visit             ICD-10-CM    Coronary arteriosclerosis I25.10    Cerebrovascular accident (CMS/[facility]) I63.9     Continue aspirin and statin.            Acute diastolic heart failure (CMS/[facility]) I50.31     Continue lasix and farxiga.  Monitor weights and weekly labs.   Pt has follow up with cardiologist today.          Type 2 diabetes mellitus (CMS/[facility]) E11.9     Continue home meds.  Novolog 10u BID and tresiba 20u daily.  Monitor blood sugars and adjust meds as needed.          Cellulitis of extremity - Primary L03.119     Complete course of antibiotics  cefuroxime and doxycycline.   Follow up with wound clinic.  Localized wound care with wound care nurse and physician in facility.           HTN (hypertension), benign I10     On metoprolol.   Monitor blood pressures and adjust meds as needed.               Time spent: 30 min in review of chart, labs and orders, consultation with pt and documentation.   "

## 2023-10-19 NOTE — ASSESSMENT & PLAN NOTE
As noted under her coronary disease.  Her lipid profile from April 2023 showed HDL 57 and LDL of 78

## 2023-10-19 NOTE — ASSESSMENT & PLAN NOTE
Continue home meds.  Novolog 10u BID and tresiba 20u daily.  Monitor blood sugars and adjust meds as needed.

## 2023-10-19 NOTE — LETTER
Patient: Carrie Rosario  : 1940    Encounter Date: 10/19/2023    No chief complaint on file.      Subjective  [id number] : Carrie Rosario is a 82 y.o. female admitted to [facility] for rehab.   HPI  Pt with h/o diabetes and chronic ulcer to the right heel admitted to the hospital with falls and cellulitis of the right lower extremity.  Imaging negative for osteomyelitis.   She follows with podiatry.   She reports that pain has improved.  She continues on course of cefuroxime and doxycycline.  She had routine labs today which were reviewed and are stable.  She offers no new complaints or concerns.   She lives alone.    Review of Systems   Constitutional:  Negative for appetite change, chills and fever.   Respiratory:  Negative for cough, shortness of breath and wheezing.    Cardiovascular:  Negative for chest pain and leg swelling.   Gastrointestinal:  Negative for abdominal pain, constipation, diarrhea, nausea and vomiting.   Genitourinary:  Negative for dysuria, frequency and hematuria.   Neurological:  Negative for dizziness, tremors, weakness and headaches.   Psychiatric/Behavioral:  Negative for confusion. The patient is not nervous/anxious.    All other systems reviewed and are negative.      Objective  /66   Pulse 75   Temp 37 °C (98.6 °F)   Resp 18   Wt 71.5 kg (157 lb 9.6 oz)   SpO2 93%   BMI 28.83 kg/m²    Physical Exam  Constitutional:       General: She is not in acute distress.  Eyes:      Conjunctiva/sclera: Conjunctivae normal.      Pupils: Pupils are equal, round, and reactive to light.   Cardiovascular:      Rate and Rhythm: Normal rate and regular rhythm.      Heart sounds: No murmur heard.  Pulmonary:      Effort: Pulmonary effort is normal.      Breath sounds: No wheezing, rhonchi or rales.   Abdominal:      General: Abdomen is flat. Bowel sounds are normal. There is no distension.      Palpations: Abdomen is soft. There is no mass.      Tenderness: There is no abdominal  "tenderness.   Musculoskeletal:         General: No swelling. Normal range of motion.   Skin:     General: Skin is warm and dry.      Findings: No rash.   Neurological:      General: No focal deficit present.      Mental Status: She is alert and oriented to person, place, and time. Mental status is at baseline.       No lab exists for component: \"CBC BMP\"  Assessment/Plan  Problem List Items Addressed This Visit             ICD-10-CM    Coronary arteriosclerosis I25.10    Cerebrovascular accident (CMS/[facility]) I63.9     Continue aspirin and statin.            Acute diastolic heart failure (CMS/[facility]) I50.31     Continue lasix and farxiga.  Monitor weights and weekly labs.   Pt has follow up with cardiologist today.          Type 2 diabetes mellitus (CMS/[facility]) E11.9     Continue home meds.  Novolog 10u BID and tresiba 20u daily.  Monitor blood sugars and adjust meds as needed.          Cellulitis of extremity - Primary L03.119     Complete course of antibiotics  cefuroxime and doxycycline.   Follow up with wound clinic.  Localized wound care with wound care nurse and physician in facility.           HTN (hypertension), benign I10     On metoprolol.   Monitor blood pressures and adjust meds as needed.               Time spent: 30 min in review of chart, labs and orders, consultation with pt and documentation.       Electronically Signed By: Pattie Edwards PA-C   10/19/23  4:48 PM  "

## 2023-10-23 ENCOUNTER — HOSPITAL ENCOUNTER (OUTPATIENT)
Dept: CARDIOLOGY | Facility: HOSPITAL | Age: 83
Discharge: HOME | End: 2023-10-23
Payer: MEDICARE

## 2023-10-23 LAB
ATRIAL RATE: 85 BPM
P AXIS: 41 DEGREES
P OFFSET: 185 MS
P ONSET: 138 MS
PR INTERVAL: 166 MS
Q ONSET: 221 MS
QRS COUNT: 14 BEATS
QRS DURATION: 76 MS
QT INTERVAL: 398 MS
QTC CALCULATION(BAZETT): 473 MS
QTC FREDERICIA: 447 MS
R AXIS: -35 DEGREES
T AXIS: 0 DEGREES
T OFFSET: 420 MS
VENTRICULAR RATE: 85 BPM

## 2023-10-26 ENCOUNTER — NURSING HOME VISIT (OUTPATIENT)
Dept: POST ACUTE CARE | Facility: EXTERNAL LOCATION | Age: 83
End: 2023-10-26
Payer: MEDICARE

## 2023-10-26 VITALS
BODY MASS INDEX: 28.79 KG/M2 | HEART RATE: 76 BPM | OXYGEN SATURATION: 94 % | TEMPERATURE: 98 F | DIASTOLIC BLOOD PRESSURE: 67 MMHG | WEIGHT: 157.4 LBS | SYSTOLIC BLOOD PRESSURE: 108 MMHG | RESPIRATION RATE: 18 BRPM

## 2023-10-26 DIAGNOSIS — E11.621 TYPE 2 DIABETES MELLITUS WITH FOOT ULCER, WITHOUT LONG-TERM CURRENT USE OF INSULIN (MULTI): ICD-10-CM

## 2023-10-26 DIAGNOSIS — I10 HTN (HYPERTENSION), BENIGN: ICD-10-CM

## 2023-10-26 DIAGNOSIS — L97.509 TYPE 2 DIABETES MELLITUS WITH FOOT ULCER, WITHOUT LONG-TERM CURRENT USE OF INSULIN (MULTI): ICD-10-CM

## 2023-10-26 DIAGNOSIS — L03.115 CELLULITIS OF RIGHT LOWER EXTREMITY: Primary | ICD-10-CM

## 2023-10-26 DIAGNOSIS — I50.31 ACUTE DIASTOLIC HEART FAILURE (MULTI): ICD-10-CM

## 2023-10-26 DIAGNOSIS — K21.9 GASTROESOPHAGEAL REFLUX DISEASE WITHOUT ESOPHAGITIS: ICD-10-CM

## 2023-10-26 DIAGNOSIS — I25.10 CORONARY ARTERIOSCLEROSIS: ICD-10-CM

## 2023-10-26 PROCEDURE — 99309 SBSQ NF CARE MODERATE MDM 30: CPT | Performed by: PHYSICIAN ASSISTANT

## 2023-10-26 ASSESSMENT — ENCOUNTER SYMPTOMS
VOMITING: 0
CONFUSION: 0
NERVOUS/ANXIOUS: 0
CONSTIPATION: 0
FEVER: 0
HEADACHES: 0
FREQUENCY: 0
APPETITE CHANGE: 0
TREMORS: 0
ABDOMINAL PAIN: 0
WHEEZING: 0
DIZZINESS: 0
DYSURIA: 0
SHORTNESS OF BREATH: 0
CHILLS: 0
WEAKNESS: 0
HEMATURIA: 0
COUGH: 0
NAUSEA: 0
DIARRHEA: 0

## 2023-10-26 NOTE — PROGRESS NOTES
No chief complaint on file.      Subjective   [id number] : Carrie Rosario is a 82 y.o. female admitted to [facility] for rehab.   HPI  Pt with h/o diabetes and chronic ulcer to the right heel admitted to the hospital with falls and cellulitis of the right lower extremity.   Pt reports no pain in lower extremities.  She has completed antibiotics.  She does reports indigestion and upset stomach this morning.  Denies any nausea or emesis.  No abdominal pain.  She is having regular bowel movements.  She is on famotidine and pantoprazole daily.  PO intake is variable.  She had routine labs today which were reviewed and are stable.  She lives alone.    Review of Systems   Constitutional:  Negative for appetite change, chills and fever.   Respiratory:  Negative for cough, shortness of breath and wheezing.    Cardiovascular:  Negative for chest pain and leg swelling.   Gastrointestinal:  Negative for abdominal pain, constipation, diarrhea, nausea and vomiting.   Genitourinary:  Negative for dysuria, frequency and hematuria.   Neurological:  Negative for dizziness, tremors, weakness and headaches.   Psychiatric/Behavioral:  Negative for confusion. The patient is not nervous/anxious.    All other systems reviewed and are negative.      Objective   /67   Pulse 76   Temp 36.7 °C (98 °F)   Resp 18   Wt 71.4 kg (157 lb 6.4 oz)   SpO2 94%   BMI 28.79 kg/m²    Physical Exam  Constitutional:       General: She is not in acute distress.  Eyes:      Conjunctiva/sclera: Conjunctivae normal.      Pupils: Pupils are equal, round, and reactive to light.   Cardiovascular:      Rate and Rhythm: Normal rate and regular rhythm.      Heart sounds: No murmur heard.  Pulmonary:      Effort: Pulmonary effort is normal.      Breath sounds: No wheezing, rhonchi or rales.   Abdominal:      General: Abdomen is flat. Bowel sounds are normal. There is no distension.      Palpations: Abdomen is soft. There is no mass.      Tenderness:  "There is no abdominal tenderness.   Musculoskeletal:         General: No swelling. Normal range of motion.   Skin:     General: Skin is warm and dry.      Findings: No rash.   Neurological:      General: No focal deficit present.      Mental Status: She is alert and oriented to person, place, and time. Mental status is at baseline.       No lab exists for component: \"CBC BMP\"  Assessment/Plan   Coronary arteriosclerosis I25.10      Cerebrovascular accident (CMS/[facility]) I63.9       Continue aspirin and statin.              Acute diastolic heart failure (CMS/[facility]) I50.31       Continue lasix and farxiga.  Monitor weights and weekly labs.   Pt has follow up with cardiologist today.            Type 2 diabetes mellitus (CMS/[facility]) E11.9       Continue home meds.  Novolog 10u BID and tresiba 20u daily.  Monitor blood sugars and adjust meds as needed.            Cellulitis of extremity - Primary L03.119       Completed course of antibiotics  cefuroxime and doxycycline.   Follow up with wound clinic.  Localized wound care with wound care nurse and physician in facility.             HTN (hypertension), benign I10       On metoprolol.   Monitor blood pressures and adjust meds as needed.        GERD   Continue protonix and ranitidine.  Possibly secondary to antibiotics.   Add probiotic.   Mylanta as needed.   Labs re stable.   Monitor.     Time spent: 30 min in review of chart, labs and orders, consultation with pt and documentation.   "

## 2023-10-26 NOTE — LETTER
Patient: Carrie Rosario  : 1940    Encounter Date: 10/26/2023    No chief complaint on file.      Subjective  [id number] : Carrie Rosario is a 82 y.o. female admitted to [facility] for rehab.   HPI  Pt with h/o diabetes and chronic ulcer to the right heel admitted to the hospital with falls and cellulitis of the right lower extremity.   Pt reports no pain in lower extremities.  She has completed antibiotics.  She does reports indigestion and upset stomach this morning.  Denies any nausea or emesis.  No abdominal pain.  She is having regular bowel movements.  She is on famotidine and pantoprazole daily.  PO intake is variable.  She had routine labs today which were reviewed and are stable.  She lives alone.    Review of Systems   Constitutional:  Negative for appetite change, chills and fever.   Respiratory:  Negative for cough, shortness of breath and wheezing.    Cardiovascular:  Negative for chest pain and leg swelling.   Gastrointestinal:  Negative for abdominal pain, constipation, diarrhea, nausea and vomiting.   Genitourinary:  Negative for dysuria, frequency and hematuria.   Neurological:  Negative for dizziness, tremors, weakness and headaches.   Psychiatric/Behavioral:  Negative for confusion. The patient is not nervous/anxious.    All other systems reviewed and are negative.      Objective  /67   Pulse 76   Temp 36.7 °C (98 °F)   Resp 18   Wt 71.4 kg (157 lb 6.4 oz)   SpO2 94%   BMI 28.79 kg/m²    Physical Exam  Constitutional:       General: She is not in acute distress.  Eyes:      Conjunctiva/sclera: Conjunctivae normal.      Pupils: Pupils are equal, round, and reactive to light.   Cardiovascular:      Rate and Rhythm: Normal rate and regular rhythm.      Heart sounds: No murmur heard.  Pulmonary:      Effort: Pulmonary effort is normal.      Breath sounds: No wheezing, rhonchi or rales.   Abdominal:      General: Abdomen is flat. Bowel sounds are normal. There is no distension.  "     Palpations: Abdomen is soft. There is no mass.      Tenderness: There is no abdominal tenderness.   Musculoskeletal:         General: No swelling. Normal range of motion.   Skin:     General: Skin is warm and dry.      Findings: No rash.   Neurological:      General: No focal deficit present.      Mental Status: She is alert and oriented to person, place, and time. Mental status is at baseline.       No lab exists for component: \"CBC BMP\"  Assessment/Plan  Coronary arteriosclerosis I25.10      Cerebrovascular accident (CMS/[facility]) I63.9       Continue aspirin and statin.              Acute diastolic heart failure (CMS/[facility]) I50.31       Continue lasix and farxiga.  Monitor weights and weekly labs.   Pt has follow up with cardiologist today.            Type 2 diabetes mellitus (CMS/[facility]) E11.9       Continue home meds.  Novolog 10u BID and tresiba 20u daily.  Monitor blood sugars and adjust meds as needed.            Cellulitis of extremity - Primary L03.119       Completed course of antibiotics  cefuroxime and doxycycline.   Follow up with wound clinic.  Localized wound care with wound care nurse and physician in facility.             HTN (hypertension), benign I10       On metoprolol.   Monitor blood pressures and adjust meds as needed.        GERD   Continue protonix and ranitidine.  Possibly secondary to antibiotics.   Add probiotic.   Mylanta as needed.   Labs re stable.   Monitor.     Time spent: 30 min in review of chart, labs and orders, consultation with pt and documentation.       Electronically Signed By: Pattie Edwards PA-C   10/26/23  4:48 PM  "

## 2023-10-30 ENCOUNTER — NURSING HOME VISIT (OUTPATIENT)
Dept: POST ACUTE CARE | Facility: EXTERNAL LOCATION | Age: 83
End: 2023-10-30
Payer: MEDICARE

## 2023-10-30 VITALS
BODY MASS INDEX: 28.79 KG/M2 | RESPIRATION RATE: 18 BRPM | SYSTOLIC BLOOD PRESSURE: 124 MMHG | DIASTOLIC BLOOD PRESSURE: 73 MMHG | OXYGEN SATURATION: 95 % | WEIGHT: 157.4 LBS | TEMPERATURE: 97.9 F | HEART RATE: 72 BPM

## 2023-10-30 DIAGNOSIS — K21.9 GASTROESOPHAGEAL REFLUX DISEASE WITHOUT ESOPHAGITIS: ICD-10-CM

## 2023-10-30 DIAGNOSIS — E11.621 TYPE 2 DIABETES MELLITUS WITH FOOT ULCER, WITHOUT LONG-TERM CURRENT USE OF INSULIN (MULTI): ICD-10-CM

## 2023-10-30 DIAGNOSIS — L97.509 TYPE 2 DIABETES MELLITUS WITH FOOT ULCER, WITHOUT LONG-TERM CURRENT USE OF INSULIN (MULTI): ICD-10-CM

## 2023-10-30 DIAGNOSIS — I10 HTN (HYPERTENSION), BENIGN: ICD-10-CM

## 2023-10-30 DIAGNOSIS — I50.31 ACUTE DIASTOLIC HEART FAILURE (MULTI): ICD-10-CM

## 2023-10-30 DIAGNOSIS — L03.115 CELLULITIS OF RIGHT LOWER EXTREMITY: Primary | ICD-10-CM

## 2023-10-30 DIAGNOSIS — I25.10 CORONARY ARTERIOSCLEROSIS: ICD-10-CM

## 2023-10-30 PROCEDURE — 99309 SBSQ NF CARE MODERATE MDM 30: CPT | Performed by: PHYSICIAN ASSISTANT

## 2023-10-30 ASSESSMENT — ENCOUNTER SYMPTOMS
HEADACHES: 0
CHILLS: 0
CONSTIPATION: 0
WHEEZING: 0
WEAKNESS: 0
DYSURIA: 0
HEMATURIA: 0
FEVER: 0
VOMITING: 0
COUGH: 0
FREQUENCY: 0
ABDOMINAL PAIN: 0
NAUSEA: 0
CONFUSION: 0
DIZZINESS: 0
DIARRHEA: 0
NERVOUS/ANXIOUS: 0
SHORTNESS OF BREATH: 0
TREMORS: 0
APPETITE CHANGE: 0

## 2023-10-30 NOTE — LETTER
Patient: Carrie Rosario  : 1940    Encounter Date: 10/30/2023    No chief complaint on file.      Subjective  [id number] : Carrie Rosario is a 82 y.o. female admitted to [facility] for rehab.   HPI  Pt with h/o diabetes and chronic ulcer to the right heel admitted to the hospital with falls and cellulitis of the right lower extremity.   Pt reports no pain in lower extremities.  She has completed antibiotics.   She is making progress with therapy and has chosen to discharge home this week.  She has been on nystatin for thrush for the last 14 days.   Symptom have resolved and she has started to refuse this medication.  She feels that it was contributing the her indigestion.   Overall indigestion is much better.   Denies any nausea or emesis.  No abdominal pain.  She is having regular bowel movements.  She lives alone.    Review of Systems   Constitutional:  Negative for appetite change, chills and fever.   Respiratory:  Negative for cough, shortness of breath and wheezing.    Cardiovascular:  Negative for chest pain and leg swelling.   Gastrointestinal:  Negative for abdominal pain, constipation, diarrhea, nausea and vomiting.   Genitourinary:  Negative for dysuria, frequency and hematuria.   Neurological:  Negative for dizziness, tremors, weakness and headaches.   Psychiatric/Behavioral:  Negative for confusion. The patient is not nervous/anxious.    All other systems reviewed and are negative.      Objective  /73   Pulse 72   Temp 36.6 °C (97.9 °F)   Resp 18   Wt 71.4 kg (157 lb 6.4 oz)   SpO2 95%   BMI 28.79 kg/m²    Physical Exam  Constitutional:       General: She is not in acute distress.  Eyes:      Conjunctiva/sclera: Conjunctivae normal.      Pupils: Pupils are equal, round, and reactive to light.   Cardiovascular:      Rate and Rhythm: Normal rate and regular rhythm.      Heart sounds: No murmur heard.  Pulmonary:      Effort: Pulmonary effort is normal.      Breath sounds: No  "wheezing, rhonchi or rales.   Abdominal:      General: Abdomen is flat. Bowel sounds are normal. There is no distension.      Palpations: Abdomen is soft. There is no mass.      Tenderness: There is no abdominal tenderness.   Musculoskeletal:         General: No swelling. Normal range of motion.   Skin:     General: Skin is warm and dry.      Findings: No rash.   Neurological:      General: No focal deficit present.      Mental Status: She is alert and oriented to person, place, and time. Mental status is at baseline.       No lab exists for component: \"CBC BMP\"  Assessment/Plan  Coronary arteriosclerosis I25.10      Cerebrovascular accident (CMS/[facility]) I63.9       Continue aspirin and statin.              Acute diastolic heart failure (CMS/[facility]) I50.31       Continue lasix and farxiga.  Monitor weights and weekly labs.   Follows with cardiologist           Type 2 diabetes mellitus (CMS/[facility]) E11.9       Continue home meds.  Novolog 10u BID and tresiba 20u daily.  Monitor blood sugars and adjust meds as needed.            Cellulitis of extremity - Primary L03.119       Completed course of antibiotics  cefuroxime and doxycycline.   Follow up with wound clinic.  Localized wound care with wound care nurse and physician in facility.   Discharge planning- to return home this week.  She will have home health for RN/PT/OT.           HTN (hypertension), benign I10       On metoprolol.   Monitor blood pressures and adjust meds as needed.        GERD  Improved. Continue protonix and ranitidine.  Mylanta as needed.        Thursh:   resolved. Discontinue nystatin.      Time spent: 30 min in review of chart, labs and orders, consultation with pt and documentation.       Electronically Signed By: Pattie Edwards PA-C   10/30/23 12:11 PM  "

## 2023-10-30 NOTE — PROGRESS NOTES
No chief complaint on file.      Subjective   [id number] : Carrie Rosario is a 82 y.o. female admitted to [facility] for rehab.   HPI  Pt with h/o diabetes and chronic ulcer to the right heel admitted to the hospital with falls and cellulitis of the right lower extremity.   Pt reports no pain in lower extremities.  She has completed antibiotics.   She is making progress with therapy and has chosen to discharge home this week.  She has been on nystatin for thrush for the last 14 days.   Symptom have resolved and she has started to refuse this medication.  She feels that it was contributing the her indigestion.   Overall indigestion is much better.   Denies any nausea or emesis.  No abdominal pain.  She is having regular bowel movements.  She lives alone.    Review of Systems   Constitutional:  Negative for appetite change, chills and fever.   Respiratory:  Negative for cough, shortness of breath and wheezing.    Cardiovascular:  Negative for chest pain and leg swelling.   Gastrointestinal:  Negative for abdominal pain, constipation, diarrhea, nausea and vomiting.   Genitourinary:  Negative for dysuria, frequency and hematuria.   Neurological:  Negative for dizziness, tremors, weakness and headaches.   Psychiatric/Behavioral:  Negative for confusion. The patient is not nervous/anxious.    All other systems reviewed and are negative.      Objective   /73   Pulse 72   Temp 36.6 °C (97.9 °F)   Resp 18   Wt 71.4 kg (157 lb 6.4 oz)   SpO2 95%   BMI 28.79 kg/m²    Physical Exam  Constitutional:       General: She is not in acute distress.  Eyes:      Conjunctiva/sclera: Conjunctivae normal.      Pupils: Pupils are equal, round, and reactive to light.   Cardiovascular:      Rate and Rhythm: Normal rate and regular rhythm.      Heart sounds: No murmur heard.  Pulmonary:      Effort: Pulmonary effort is normal.      Breath sounds: No wheezing, rhonchi or rales.   Abdominal:      General: Abdomen is flat. Bowel  "sounds are normal. There is no distension.      Palpations: Abdomen is soft. There is no mass.      Tenderness: There is no abdominal tenderness.   Musculoskeletal:         General: No swelling. Normal range of motion.   Skin:     General: Skin is warm and dry.      Findings: No rash.   Neurological:      General: No focal deficit present.      Mental Status: She is alert and oriented to person, place, and time. Mental status is at baseline.       No lab exists for component: \"CBC BMP\"  Assessment/Plan   Coronary arteriosclerosis I25.10      Cerebrovascular accident (CMS/[facility]) I63.9       Continue aspirin and statin.              Acute diastolic heart failure (CMS/[facility]) I50.31       Continue lasix and farxiga.  Monitor weights and weekly labs.   Follows with cardiologist           Type 2 diabetes mellitus (CMS/[facility]) E11.9       Continue home meds.  Novolog 10u BID and tresiba 20u daily.  Monitor blood sugars and adjust meds as needed.            Cellulitis of extremity - Primary L03.119       Completed course of antibiotics  cefuroxime and doxycycline.   Follow up with wound clinic.  Localized wound care with wound care nurse and physician in facility.   Discharge planning- to return home this week.  She will have home health for RN/PT/OT.           HTN (hypertension), benign I10       On metoprolol.   Monitor blood pressures and adjust meds as needed.        GERD  Improved. Continue protonix and ranitidine.  Mylanta as needed.        Thursh:   resolved. Discontinue nystatin.      Time spent: 30 min in review of chart, labs and orders, consultation with pt and documentation.   "

## 2023-11-08 DIAGNOSIS — E11.621 TYPE 2 DIABETES MELLITUS WITH FOOT ULCER, WITHOUT LONG-TERM CURRENT USE OF INSULIN (MULTI): ICD-10-CM

## 2023-11-08 DIAGNOSIS — L97.509 TYPE 2 DIABETES MELLITUS WITH FOOT ULCER, WITHOUT LONG-TERM CURRENT USE OF INSULIN (MULTI): ICD-10-CM

## 2023-11-08 RX ORDER — INSULIN DEGLUDEC 100 U/ML
INJECTION, SOLUTION SUBCUTANEOUS
Qty: 15 ML | Refills: 3 | Status: SHIPPED | OUTPATIENT
Start: 2023-11-08 | End: 2023-12-04 | Stop reason: SDUPTHER

## 2023-11-13 NOTE — DOCUMENTATION CLARIFICATION NOTE
"    PATIENT:               ZACH BULL  ACCT #:                  [id number]  MRN:                       [id number]  :                       1940  ADMIT DATE:       10/11/2023 8:55 AM  DISCH DATE:        10/16/2023 3:34 PM  RESPONDING PROVIDER #:        [id number]          PROVIDER RESPONSE TEXT:    Excisional debridement down to and including subcutaneous tissue and fascia    CDI QUERY TEXT:    [facility]_Debridement    Clinical Information: This query refers to the procedure performed on 10/12/23 and documented as debrided excisionally.    Instruction:    Based on your assessment of the patient and the clinical information, please provide the requested documentation by clicking on the appropriate radio button and enter any additional information if prompted.    Question: Please further clarify the depth of the debridement procedure as      When answering this query, please exercise your independent professional judgment. The fact that a question is being asked, does not imply that any particular answer is desired or expected.    The patient's clinical indicators include:  Clinical Information:  10/12 Podiatry \"ulceration was debrided excisionally with a #10 blade without incident. All nonviable and hyperkeratotic tissue was removed revealing a full-thickness ulceration. A dressing of Betadine with a dry was applied.\"  Options provided:  -- Excisional debridement down to and including skin  -- Excisional debridement down to and including subcutaneous tissue and fascia  -- Excisional debridement down to and including muscle  -- Other - I will add my own diagnosis  -- Refer to Clinical Documentation Reviewer    Query created by: Donna Nunez on 2023 11:51 AM      Electronically signed by:  TRISHA BAILEY DPM 2023 10:40 AM          "

## 2023-11-21 ENCOUNTER — PHARMACY VISIT (OUTPATIENT)
Dept: PHARMACY | Facility: CLINIC | Age: 83
End: 2023-11-21
Payer: MEDICARE

## 2023-11-21 PROCEDURE — RXMED WILLOW AMBULATORY MEDICATION CHARGE

## 2023-11-27 ENCOUNTER — PHARMACY VISIT (OUTPATIENT)
Dept: PHARMACY | Facility: CLINIC | Age: 83
End: 2023-11-27
Payer: MEDICARE

## 2023-11-27 PROCEDURE — RXMED WILLOW AMBULATORY MEDICATION CHARGE

## 2023-11-30 ENCOUNTER — OFFICE VISIT (OUTPATIENT)
Dept: PRIMARY CARE | Facility: CLINIC | Age: 83
End: 2023-11-30
Payer: MEDICARE

## 2023-11-30 VITALS
OXYGEN SATURATION: 100 % | TEMPERATURE: 98 F | WEIGHT: 154 LBS | HEART RATE: 55 BPM | RESPIRATION RATE: 18 BRPM | DIASTOLIC BLOOD PRESSURE: 64 MMHG | BODY MASS INDEX: 28.17 KG/M2 | SYSTOLIC BLOOD PRESSURE: 118 MMHG

## 2023-11-30 DIAGNOSIS — S31.809D BUTTOCK WOUND, UNSPECIFIED LATERALITY, SUBSEQUENT ENCOUNTER: ICD-10-CM

## 2023-11-30 DIAGNOSIS — L03.115 CELLULITIS OF RIGHT LOWER EXTREMITY: Primary | ICD-10-CM

## 2023-11-30 DIAGNOSIS — E11.621 TYPE 2 DIABETES MELLITUS WITH FOOT ULCER, WITHOUT LONG-TERM CURRENT USE OF INSULIN (MULTI): ICD-10-CM

## 2023-11-30 DIAGNOSIS — L97.509 TYPE 2 DIABETES MELLITUS WITH FOOT ULCER, WITHOUT LONG-TERM CURRENT USE OF INSULIN (MULTI): ICD-10-CM

## 2023-11-30 PROCEDURE — 1125F AMNT PAIN NOTED PAIN PRSNT: CPT | Performed by: FAMILY MEDICINE

## 2023-11-30 PROCEDURE — 1036F TOBACCO NON-USER: CPT | Performed by: FAMILY MEDICINE

## 2023-11-30 PROCEDURE — 3074F SYST BP LT 130 MM HG: CPT | Performed by: FAMILY MEDICINE

## 2023-11-30 PROCEDURE — 1159F MED LIST DOCD IN RCRD: CPT | Performed by: FAMILY MEDICINE

## 2023-11-30 PROCEDURE — 3078F DIAST BP <80 MM HG: CPT | Performed by: FAMILY MEDICINE

## 2023-11-30 PROCEDURE — 99213 OFFICE O/P EST LOW 20 MIN: CPT | Performed by: FAMILY MEDICINE

## 2023-11-30 RX ORDER — ATORVASTATIN CALCIUM 40 MG/1
40 TABLET, FILM COATED ORAL DAILY
COMMUNITY
Start: 2023-01-17 | End: 2023-11-30 | Stop reason: DRUGHIGH

## 2023-11-30 RX ORDER — CLOTRIMAZOLE AND BETAMETHASONE DIPROPIONATE 10; .64 MG/G; MG/G
CREAM TOPICAL
COMMUNITY
Start: 2023-03-27 | End: 2023-11-30 | Stop reason: ALTCHOICE

## 2023-11-30 RX ORDER — CEPHALEXIN 500 MG/1
500 CAPSULE ORAL 3 TIMES DAILY
Qty: 42 CAPSULE | Refills: 0 | Status: SHIPPED | OUTPATIENT
Start: 2023-11-30 | End: 2023-12-14

## 2023-11-30 RX ORDER — CEPHALEXIN 500 MG/1
500 CAPSULE ORAL 3 TIMES DAILY
COMMUNITY
Start: 2023-01-30 | End: 2023-11-30 | Stop reason: ALTCHOICE

## 2023-11-30 RX ORDER — AMLODIPINE BESYLATE 2.5 MG/1
2.5 TABLET ORAL DAILY
COMMUNITY
Start: 2023-03-09

## 2023-11-30 ASSESSMENT — PAIN SCALES - GENERAL: PAINLEVEL: 5

## 2023-11-30 ASSESSMENT — ENCOUNTER SYMPTOMS
OCCASIONAL FEELINGS OF UNSTEADINESS: 1
DEPRESSION: 0
LOSS OF SENSATION IN FEET: 0

## 2023-11-30 ASSESSMENT — PATIENT HEALTH QUESTIONNAIRE - PHQ9
1. LITTLE INTEREST OR PLEASURE IN DOING THINGS: NOT AT ALL
2. FEELING DOWN, DEPRESSED OR HOPELESS: NOT AT ALL
SUM OF ALL RESPONSES TO PHQ9 QUESTIONS 1 AND 2: 0

## 2023-11-30 NOTE — PROGRESS NOTES
Subjective   Patient ID: Carrie Rosario is a 83 y.o. female who presents for sore on buttocks.    HPI   Carrie is here for sores on her buttocks for an unknown period of time. Her daughter and her believe these are from her time spent in the hospital and SNF throughout the past two years. She has been unable to see the sores but can feel scabs over each sore. The sores burn and the pain is worse while sitting. She has tried an ointment with minimal relief.   Carrie is also here for a non-healing ulcer on her right foot between the first and second toe. The wound constantly drains, and the right lower extremity is usually swollen, however her daughter has noticed increased swelling and redness on the dorsum of her foot around the wound and is concerned for infection. The ulcer is not painful. She still has sensation of her right foot. She also has a healing ulcer on the sole of her foot. She follows Dr. Kennedy with podiatry and has an appointment 12/14.     Review of Systems  Denies fever, chills, myalgias, chest pain, shortness of breath, nausea, vomiting, change in bowel habits.    Objective   /64   Pulse 55   Temp 36.7 °C (98 °F)   Resp 18   Wt 69.9 kg (154 lb)   SpO2 100%   BMI 28.17 kg/m²     Physical Exam  Vitals and nurse's notes reviewed  General: No acute distress. Alert and oriented x3.   HEENT: Normal  Neck: Supple  Lungs: Clear to auscultation bilaterally.   Cardio: RRR w/o murmur.  Extremities: Edema bilateral lower extremities, R>L. Right sided foot drop.   Skin: Open wound on right foot between first and second toe. Erythematous and indurated. Purulent, foul smelling drainage from wound. Two sores on either side of gluteal fold. Slightly lichenified with no evidence of induration or acute infection.   Neuro: No focal deficits.      Assessment/Plan   Problem List Items Addressed This Visit             ICD-10-CM       Infectious Diseases    Cellulitis of extremity - Primary L03.119     Keflex  500 mg TID x 14 days. Keep wound clean and dry. Continue follow-up with podiatry.          Relevant Medications    cephalexin (Keflex) 500 mg capsule     Other Visit Diagnoses         Codes    Buttock wound, unspecified laterality, subsequent encounter      Keep wound . Continue use of barrier ointment and change positions frequently. Acetaminophen as needed for pain. Referral to wound clinic.  S31.809D    Relevant Orders    Referral to Wound Clinic

## 2023-12-04 ENCOUNTER — TELEMEDICINE (OUTPATIENT)
Dept: PRIMARY CARE | Facility: CLINIC | Age: 83
End: 2023-12-04
Payer: MEDICARE

## 2023-12-04 DIAGNOSIS — L97.509 TYPE 2 DIABETES MELLITUS WITH FOOT ULCER, WITHOUT LONG-TERM CURRENT USE OF INSULIN (MULTI): Primary | ICD-10-CM

## 2023-12-04 DIAGNOSIS — E11.621 TYPE 2 DIABETES MELLITUS WITH FOOT ULCER, WITHOUT LONG-TERM CURRENT USE OF INSULIN (MULTI): Primary | ICD-10-CM

## 2023-12-04 DIAGNOSIS — L97.509 TYPE 2 DIABETES MELLITUS WITH FOOT ULCER, WITHOUT LONG-TERM CURRENT USE OF INSULIN (MULTI): ICD-10-CM

## 2023-12-04 DIAGNOSIS — E11.621 TYPE 2 DIABETES MELLITUS WITH FOOT ULCER, WITHOUT LONG-TERM CURRENT USE OF INSULIN (MULTI): ICD-10-CM

## 2023-12-04 DIAGNOSIS — N39.3 SUI (STRESS URINARY INCONTINENCE, FEMALE): Primary | ICD-10-CM

## 2023-12-04 PROBLEM — Z95.5 STENTED CORONARY ARTERY: Status: RESOLVED | Noted: 2023-08-24 | Resolved: 2023-12-04

## 2023-12-04 RX ORDER — INSULIN ASPART 100 [IU]/ML
INJECTION, SOLUTION INTRAVENOUS; SUBCUTANEOUS
Qty: 15 ML | Refills: 3 | Status: SHIPPED | OUTPATIENT
Start: 2023-12-04 | End: 2024-01-23 | Stop reason: HOSPADM

## 2023-12-04 RX ORDER — INSULIN DEGLUDEC 100 U/ML
INJECTION, SOLUTION SUBCUTANEOUS
Qty: 15 ML | Refills: 3 | Status: SHIPPED | OUTPATIENT
Start: 2023-12-04 | End: 2024-12-03

## 2023-12-04 RX ORDER — MIRABEGRON 25 MG/1
25 TABLET, FILM COATED, EXTENDED RELEASE ORAL DAILY
Qty: 90 TABLET | Refills: 1 | Status: SHIPPED | OUTPATIENT
Start: 2023-12-04

## 2023-12-04 RX ORDER — DAPAGLIFLOZIN 10 MG/1
10 TABLET, FILM COATED ORAL EVERY MORNING
Qty: 90 TABLET | Refills: 1 | Status: SHIPPED | OUTPATIENT
Start: 2023-12-04 | End: 2024-01-04 | Stop reason: SDUPTHER

## 2023-12-04 NOTE — PROGRESS NOTES
Please review for internal Ventures Assistance Fund (VAF) eligibility. Prescriptions have been sent to Replaced by [facility].     Carrie Rosario  1940   [id number]  [phone]   [email or web address]  Delivery Preference (if known): MAIL  Priority:  Hold Medication until [facility] approved/denied  Filing Status: Patient does file taxes  Household size: 1  Financial Documents To Be Collected By: Documents attached to email  Medication(s):    Farxiga 10mg  Myrbetriq 25mg  Novolog Flexpen   Tresiba 100u/ml    *I have confirmed the above medications are listed on the current VAF formulary: [email or web address]    I acknowledge the [facility] staff will further reach out to the patient to confirm additional details as needed, including but not limited to collecting financial documentation, confirming delivery information, obtaining payment method for non-VAF medications.      Marilu Borjas, [facility]

## 2023-12-04 NOTE — PROGRESS NOTES
MEDICATION MANAGEMENT    Carrie Rosario is a 83 y.o. female who was referred by Brian Youngblood MD to complete medication reconciliation and review with the clinical pharmacist.  A comprehensive medication review was completed with the patient via telephone today.  Patient reports financial barrier with current medication.      MEDICATION HISTORY  Allergies   Allergen Reactions    Nickel Rash    Sulfa (Sulfonamide Antibiotics) Rash and Unknown     Current Outpatient Medications on File Prior to Visit   Medication Sig Dispense Refill    acetaminophen (Tylenol) 325 mg tablet Take 2 tablets (650 mg) by mouth every 4 hours if needed for fever (temp greater than 38.0 C) (greater than or equal to 38 C). 30 tablet 0    amLODIPine (Norvasc) 2.5 mg tablet Take 1 tablet (2.5 mg) by mouth once daily.      aspirin 81 mg EC tablet Take 1 tablet (81 mg) by mouth once daily.      atorvastatin (Lipitor) 20 mg tablet Take 1 tablet (20 mg) by mouth once daily.      cephalexin (Keflex) 500 mg capsule Take 1 capsule (500 mg) by mouth 3 times a day for 14 days. 42 capsule 0    cholecalciferol, vitamin D3, (VITAMIN D3 ORAL) daily, 0 Refill(s), Type: Maintenance      dapagliflozin propanediol (Farxiga) 10 mg Take 1 tablet (10 mg) by mouth once daily in the morning.      famotidine (Pepcid) 20 mg tablet take 1 tablet by mouth once daily 90 tablet 3    fesoterodine 8 mg tablet extended release 24 hr TAKE 1 TABLET BY MOUTH EVERY DAY AT BEDTIME 90 tablet 3    FLUoxetine (PROzac) 40 mg capsule take 1 capsule by mouth once daily 90 capsule 3    FreeStyle Brandon reader (FreeStyle Brandon 2 [place]) [facility] FREE STYLE BRANDON METER, See Instructions, Instructions: USE AS DIRECTED TO CHECK BLOOD SUGAR  E11.9, Supply, # 1 EA, 0 Refill(s), Type: Maintenance, Pharmacy: [facility] #[id number], USE AS DIRECTED TO CHECK BLOOD SUGAR; E11.9, 61, in, 10/24/22 12:59:00 EDT, Height Measured, 147, lb, 10/24/22 12:59:00 EDT, Weight Measured      FreeStyle Brandon sensor  "system (FreeStyle Brandon 2 Sensor) kit FREE STYLE BRANDON SENSORS, See Instructions, Instructions: USE AS DIRECTED TO CHECK BLOOD SUGARS RELPACE EVERY 14 DAYS  E11.9, Supply, # 2 EA, 11 Refill(s), Type: Maintenance, Pharmacy: [facility] #[id number], USE AS DIRECTED TO CHECK BLOOD SUGARS RELPACE EVERY 14 DAYS; E11.9, 61, in, 10/24/22 12:59:00 EDT, Height Measured, 147, lb, 10/24/22 12:59:00 EDT, Weight Measured      FreeStyle Lite Strips strip free style brandon test strips, See Instructions, Instructions: use as directed to check blood sugar.  e11.9, Supply, # 100 EA, 1 Refill(s), Type: Maintenance, Pharmacy: [facility] #[id number], use as directed to check blood sugar. e11.9, 61, in, 10/24/22 12:59:00 EDT, Height Measured, 147, lb, 10/24/22 12:59:00 EDT, Weight Measured      furosemide (Lasix) 20 mg tablet Take 1 tablet (20 mg) by mouth once daily.      insulin aspart (NovoLOG) 100 unit/mL (3 mL) pen INJECT SUBCUTANEOUSLY 10 UNITS TWO TIMES A DAY BEFORE LUNCH AND DINNER 15 mL 3    insulin degludec (Tresiba FlexTouch U-100) 100 unit/mL (3 mL) injection INJECT 20 UNITS SUBCUTANEOUSLY ONE TIME DAILY AS DIRECTED. 15 mL 3    melatonin 3 mg tablet Take 1 tablet (3 mg) by mouth as needed at bedtime.      menthol-zinc oxide (Calmoseptine) 0.44-20.6 % ointment Calmoseptine 0.44-20.6 % External Ointment   Refills: 0       Active      metoprolol tartrate (Lopressor) 25 mg tablet Take 1 tablet (25 mg) by mouth 2 times a day. 60 tablet 11    mirabegron (Myrbetriq) 25 mg tablet extended release 24 hr 24 hr tablet Take 1 tablet (25 mg) by mouth once daily.      pen needle, diabetic 31 gauge x 5/16\" needle BD UF Pen needles short 31x8, See Instructions, Instructions: Use TID, Supply, # 100 EA, 3 Refill(s), Type: Maintenance, Pharmacy: [facility] [place] [address], Use TID, 62, in, 05/18/22 15:18:00 EDT, Height Measured, 140, lb, 05/18/22 15:18:00 EDT, Weight Measured      pilocarpine (Salagen) 5 mg tablet Take 1 tablet (5 mg) by mouth 2 times a day. " (Patient taking differently: Take 1 tablet (5 mg) by mouth 3 times a day.)      potassium chloride CR 10 mEq ER tablet Take 1 tablet (10 mEq) by mouth once daily with a meal. Do not crush, chew, or split. Do not start before October 17, 2023.      [DISCONTINUED] atorvastatin (Lipitor) 40 mg tablet Take 1 tablet (40 mg) by mouth once daily.      [DISCONTINUED] cephalexin (Keflex) 500 mg capsule Take 1 capsule (500 mg) by mouth 3 times a day.      [DISCONTINUED] clotrimazole-betamethasone (Lotrisone) cream APPLY TO AFFECTED AREA TWICE DAILY FOR 7 DAYS      [DISCONTINUED] pantoprazole (ProtoNix) 40 mg EC tablet Take 1 tablet (40 mg) by mouth once daily.       No current facility-administered medications on file prior to visit.        Patient Assistance Screening (VAF)  Patient verbally reports monthly or yearly income which is less than 400% federal poverty level.  Application for program has been submitted for the following medications: Farxiga, Myrbetriq, Novolog, Tresiba  Patient has been informed that program team will be reaching out to them to discuss necessary documentation, instructed to answer phone/return voicemail.   Patient aware this process may take up to 6 weeks.   If approved medication must be filled through [facility] and may be picked up or mailed to patient.       LABS  There were no vitals taken for this visit.   Lab Results   Component Value Date    HGBA1C 9.7 (H) 10/11/2023    HGBA1C 9.7 (H) 08/02/2023    HGBA1C 9.9 (H) 04/27/2023     Lab Results   Component Value Date    BILITOT 1.0 10/12/2023    CALCIUM 9.7 11/02/2023    CO2 30 11/02/2023     11/02/2023    CREATININE 1.40 11/02/2023    GLUCOSE 78 11/02/2023    ALKPHOS 79 10/12/2023    K 4.6 11/02/2023    PROT 6.9 10/12/2023     11/02/2023    AST 27 10/12/2023    ALT 15 10/12/2023    BUN 34 (H) 11/02/2023    ANIONGAP 7 11/02/2023    MG 2.0 01/14/2022    ALBUMIN 3.1 (L) 10/12/2023     Lab Results   Component Value Date     TRIG 90 04/27/2023    CHOL 153 04/27/2023    LDLCALC 78 04/27/2023    HDL 57 04/27/2023         Assessment/Plan    Comments/Recommendations to PCP:  Reconciled medications with patient via telephone today.  Reviewed instructions, MOA, SE and dose for insulins.  Reviewed CGM report.   Patient verbalizes understanding regarding plan of care and all questions answered   4.   Pt will follow up with PCP as scheduled      Marilu Borjas [facility]    Verbal consent to manage patient's drug therapy was obtained from the patient and/or an individual authorized to act on behalf of a patient. They were informed they may decline to participate or withdraw from participation in pharmacy services at any time.

## 2023-12-11 ENCOUNTER — OFFICE VISIT (OUTPATIENT)
Dept: WOUND CARE | Facility: HOSPITAL | Age: 83
End: 2023-12-11
Payer: MEDICARE

## 2023-12-11 PROCEDURE — 99214 OFFICE O/P EST MOD 30 MIN: CPT | Mod: 25

## 2023-12-18 ENCOUNTER — APPOINTMENT (OUTPATIENT)
Dept: WOUND CARE | Facility: HOSPITAL | Age: 83
End: 2023-12-18
Payer: MEDICARE

## 2023-12-29 ENCOUNTER — HOSPITAL ENCOUNTER (INPATIENT)
Facility: HOSPITAL | Age: 83
End: 2023-12-29
Attending: PODIATRIST | Admitting: PODIATRIST
Payer: MEDICARE

## 2024-01-04 DIAGNOSIS — L97.509 TYPE 2 DIABETES MELLITUS WITH FOOT ULCER, WITHOUT LONG-TERM CURRENT USE OF INSULIN (MULTI): ICD-10-CM

## 2024-01-04 DIAGNOSIS — E11.621 TYPE 2 DIABETES MELLITUS WITH FOOT ULCER, WITHOUT LONG-TERM CURRENT USE OF INSULIN (MULTI): ICD-10-CM

## 2024-01-04 RX ORDER — DAPAGLIFLOZIN 10 MG/1
10 TABLET, FILM COATED ORAL EVERY MORNING
Qty: 90 TABLET | Refills: 3 | Status: SHIPPED
Start: 2024-01-04 | End: 2024-01-23 | Stop reason: HOSPADM

## 2024-01-06 DIAGNOSIS — R60.9 EDEMA, UNSPECIFIED TYPE: ICD-10-CM

## 2024-01-06 DIAGNOSIS — I10 HTN (HYPERTENSION), BENIGN: ICD-10-CM

## 2024-01-06 DIAGNOSIS — I50.9 CHRONIC CONGESTIVE HEART FAILURE, UNSPECIFIED HEART FAILURE TYPE (MULTI): ICD-10-CM

## 2024-01-08 RX ORDER — METOPROLOL TARTRATE 50 MG/1
50 TABLET ORAL 2 TIMES DAILY
Qty: 180 TABLET | Refills: 1 | Status: ON HOLD | OUTPATIENT
Start: 2024-01-08

## 2024-01-08 RX ORDER — POTASSIUM CHLORIDE 750 MG/1
10 TABLET, EXTENDED RELEASE ORAL DAILY
Qty: 90 TABLET | Refills: 1 | Status: ON HOLD | OUTPATIENT
Start: 2024-01-08

## 2024-01-08 RX ORDER — FUROSEMIDE 20 MG/1
20 TABLET ORAL DAILY
Qty: 90 TABLET | Refills: 3 | Status: ON HOLD | OUTPATIENT
Start: 2024-01-08

## 2024-01-09 ENCOUNTER — APPOINTMENT (OUTPATIENT)
Dept: PREADMISSION TESTING | Facility: HOSPITAL | Age: 84
End: 2024-01-09
Payer: MEDICARE

## 2024-01-11 ENCOUNTER — APPOINTMENT (OUTPATIENT)
Dept: PREADMISSION TESTING | Facility: HOSPITAL | Age: 84
End: 2024-01-11
Payer: MEDICARE

## 2024-01-11 ENCOUNTER — APPOINTMENT (OUTPATIENT)
Dept: RADIOLOGY | Facility: HOSPITAL | Age: 84
DRG: 463 | End: 2024-01-11
Payer: MEDICARE

## 2024-01-11 ENCOUNTER — HOSPITAL ENCOUNTER (INPATIENT)
Facility: HOSPITAL | Age: 84
LOS: 11 days | Discharge: NURSING, REHAB OR LONG-TERM CARE | DRG: 463 | End: 2024-01-23
Attending: INTERNAL MEDICINE | Admitting: INTERNAL MEDICINE
Payer: MEDICARE

## 2024-01-11 DIAGNOSIS — E11.621 TYPE 2 DIABETES MELLITUS WITH FOOT ULCER, WITHOUT LONG-TERM CURRENT USE OF INSULIN (MULTI): ICD-10-CM

## 2024-01-11 DIAGNOSIS — L97.509 TYPE 2 DIABETES MELLITUS WITH FOOT ULCER, WITHOUT LONG-TERM CURRENT USE OF INSULIN (MULTI): ICD-10-CM

## 2024-01-11 DIAGNOSIS — T84.293A: ICD-10-CM

## 2024-01-11 DIAGNOSIS — S32.010A COMPRESSION FRACTURE OF L1 VERTEBRA, INITIAL ENCOUNTER (MULTI): ICD-10-CM

## 2024-01-11 DIAGNOSIS — L97.515 ULCER OF RIGHT FOOT WITH MUSCLE INVOLVEMENT WITHOUT EVIDENCE OF NECROSIS (MULTI): ICD-10-CM

## 2024-01-11 DIAGNOSIS — K68.12 PSOAS ABSCESS, LEFT (MULTI): Primary | ICD-10-CM

## 2024-01-11 DIAGNOSIS — K59.00 CONSTIPATION, UNSPECIFIED CONSTIPATION TYPE: ICD-10-CM

## 2024-01-11 DIAGNOSIS — S32.010A COMPRESSION FRACTURE OF L1 LUMBAR VERTEBRA, CLOSED, INITIAL ENCOUNTER (MULTI): ICD-10-CM

## 2024-01-11 LAB
ANION GAP SERPL CALC-SCNC: 12 MMOL/L
BUN SERPL-MCNC: 39 MG/DL (ref 8–25)
CALCIUM SERPL-MCNC: 9.9 MG/DL (ref 8.5–10.4)
CHLORIDE SERPL-SCNC: 99 MMOL/L (ref 97–107)
CO2 SERPL-SCNC: 26 MMOL/L (ref 24–31)
CREAT SERPL-MCNC: 1.2 MG/DL (ref 0.4–1.6)
EGFRCR SERPLBLD CKD-EPI 2021: 45 ML/MIN/1.73M*2
ERYTHROCYTE [DISTWIDTH] IN BLOOD BY AUTOMATED COUNT: 12.4 % (ref 11.5–14.5)
EST. AVERAGE GLUCOSE BLD GHB EST-MCNC: 209 MG/DL
GLUCOSE SERPL-MCNC: 193 MG/DL (ref 65–99)
HBA1C MFR BLD: 8.9 %
HCT VFR BLD AUTO: 41.2 % (ref 36–46)
HGB BLD-MCNC: 13.4 G/DL (ref 12–16)
MCH RBC QN AUTO: 31.2 PG (ref 26–34)
MCHC RBC AUTO-ENTMCNC: 32.5 G/DL (ref 32–36)
MCV RBC AUTO: 96 FL (ref 80–100)
NRBC BLD-RTO: 0 /100 WBCS (ref 0–0)
PLATELET # BLD AUTO: 334 X10*3/UL (ref 150–450)
POTASSIUM SERPL-SCNC: 4.2 MMOL/L (ref 3.4–5.1)
RBC # BLD AUTO: 4.3 X10*6/UL (ref 4–5.2)
SODIUM SERPL-SCNC: 137 MMOL/L (ref 133–145)
WBC # BLD AUTO: 10.3 X10*3/UL (ref 4.4–11.3)

## 2024-01-11 PROCEDURE — 51798 US URINE CAPACITY MEASURE: CPT

## 2024-01-11 PROCEDURE — 80048 BASIC METABOLIC PNL TOTAL CA: CPT | Performed by: NURSE PRACTITIONER

## 2024-01-11 PROCEDURE — 2500000004 HC RX 250 GENERAL PHARMACY W/ HCPCS (ALT 636 FOR OP/ED): Performed by: NURSE PRACTITIONER

## 2024-01-11 PROCEDURE — 96372 THER/PROPH/DIAG INJ SC/IM: CPT

## 2024-01-11 PROCEDURE — G0378 HOSPITAL OBSERVATION PER HR: HCPCS

## 2024-01-11 PROCEDURE — 72131 CT LUMBAR SPINE W/O DYE: CPT

## 2024-01-11 PROCEDURE — 83036 HEMOGLOBIN GLYCOSYLATED A1C: CPT | Performed by: NURSE PRACTITIONER

## 2024-01-11 PROCEDURE — 99285 EMERGENCY DEPT VISIT HI MDM: CPT

## 2024-01-11 PROCEDURE — 2500000004 HC RX 250 GENERAL PHARMACY W/ HCPCS (ALT 636 FOR OP/ED)

## 2024-01-11 PROCEDURE — 85027 COMPLETE CBC AUTOMATED: CPT | Performed by: NURSE PRACTITIONER

## 2024-01-11 PROCEDURE — 36415 COLL VENOUS BLD VENIPUNCTURE: CPT | Performed by: NURSE PRACTITIONER

## 2024-01-11 RX ORDER — KETOROLAC TROMETHAMINE 30 MG/ML
7.5 INJECTION, SOLUTION INTRAMUSCULAR; INTRAVENOUS ONCE
Status: COMPLETED | OUTPATIENT
Start: 2024-01-11 | End: 2024-01-11

## 2024-01-11 RX ORDER — METHYLPREDNISOLONE 4 MG/1
20 TABLET ORAL ONCE
Status: COMPLETED | OUTPATIENT
Start: 2024-01-12 | End: 2024-01-12

## 2024-01-11 RX ORDER — METHYLPREDNISOLONE 4 MG/1
16 TABLET ORAL ONCE
Status: DISCONTINUED | OUTPATIENT
Start: 2024-01-13 | End: 2024-01-13

## 2024-01-11 RX ORDER — METHYLPREDNISOLONE 4 MG/1
8 TABLET ORAL ONCE
Status: DISCONTINUED | OUTPATIENT
Start: 2024-01-15 | End: 2024-01-13

## 2024-01-11 RX ORDER — METHYLPREDNISOLONE 4 MG/1
24 TABLET ORAL ONCE
Status: COMPLETED | OUTPATIENT
Start: 2024-01-11 | End: 2024-01-11

## 2024-01-11 RX ORDER — METHYLPREDNISOLONE 4 MG/1
4 TABLET ORAL ONCE
Status: DISCONTINUED | OUTPATIENT
Start: 2024-01-16 | End: 2024-01-13

## 2024-01-11 RX ORDER — TIZANIDINE 4 MG/1
2 TABLET ORAL ONCE
Status: COMPLETED | OUTPATIENT
Start: 2024-01-11 | End: 2024-01-11

## 2024-01-11 RX ORDER — METHYLPREDNISOLONE 4 MG/1
12 TABLET ORAL ONCE
Status: DISCONTINUED | OUTPATIENT
Start: 2024-01-14 | End: 2024-01-13

## 2024-01-11 RX ADMIN — METHYLPREDNISOLONE 24 MG: 4 TABLET ORAL at 23:58

## 2024-01-11 RX ADMIN — TIZANIDINE 2 MG: 4 TABLET ORAL at 13:35

## 2024-01-11 RX ADMIN — KETOROLAC TROMETHAMINE 7.5 MG: 30 INJECTION, SOLUTION INTRAMUSCULAR at 14:40

## 2024-01-11 SDOH — ECONOMIC STABILITY: FOOD INSECURITY: WITHIN THE PAST 12 MONTHS, YOU WORRIED THAT YOUR FOOD WOULD RUN OUT BEFORE YOU GOT MONEY TO BUY MORE.: NEVER TRUE

## 2024-01-11 SDOH — SOCIAL STABILITY: SOCIAL INSECURITY
WITHIN THE LAST YEAR, HAVE YOU BEEN KICKED, HIT, SLAPPED, OR OTHERWISE PHYSICALLY HURT BY YOUR PARTNER OR EX-PARTNER?: NO

## 2024-01-11 SDOH — SOCIAL STABILITY: SOCIAL INSECURITY
WITHIN THE LAST YEAR, HAVE TO BEEN RAPED OR FORCED TO HAVE ANY KIND OF SEXUAL ACTIVITY BY YOUR PARTNER OR EX-PARTNER?: NO

## 2024-01-11 SDOH — SOCIAL STABILITY: SOCIAL INSECURITY: WITHIN THE LAST YEAR, HAVE YOU BEEN HUMILIATED OR EMOTIONALLY ABUSED IN OTHER WAYS BY YOUR PARTNER OR EX-PARTNER?: NO

## 2024-01-11 SDOH — ECONOMIC STABILITY: INCOME INSECURITY: IN THE LAST 12 MONTHS, WAS THERE A TIME WHEN YOU WERE NOT ABLE TO PAY THE MORTGAGE OR RENT ON TIME?: NO

## 2024-01-11 SDOH — ECONOMIC STABILITY: INCOME INSECURITY: IN THE PAST 12 MONTHS, HAS THE ELECTRIC, GAS, OIL, OR WATER COMPANY THREATENED TO SHUT OFF SERVICE IN YOUR HOME?: NO

## 2024-01-11 SDOH — ECONOMIC STABILITY: FOOD INSECURITY: WITHIN THE PAST 12 MONTHS, THE FOOD YOU BOUGHT JUST DIDN'T LAST AND YOU DIDN'T HAVE MONEY TO GET MORE.: NEVER TRUE

## 2024-01-11 SDOH — SOCIAL STABILITY: SOCIAL INSECURITY: WITHIN THE LAST YEAR, HAVE YOU BEEN AFRAID OF YOUR PARTNER OR EX-PARTNER?: NO

## 2024-01-11 ASSESSMENT — COLUMBIA-SUICIDE SEVERITY RATING SCALE - C-SSRS
1. IN THE PAST MONTH, HAVE YOU WISHED YOU WERE DEAD OR WISHED YOU COULD GO TO SLEEP AND NOT WAKE UP?: NO
6. HAVE YOU EVER DONE ANYTHING, STARTED TO DO ANYTHING, OR PREPARED TO DO ANYTHING TO END YOUR LIFE?: NO
2. HAVE YOU ACTUALLY HAD ANY THOUGHTS OF KILLING YOURSELF?: NO

## 2024-01-11 ASSESSMENT — PAIN DESCRIPTION - LOCATION
LOCATION: BACK
LOCATION: BACK

## 2024-01-11 ASSESSMENT — PAIN DESCRIPTION - FREQUENCY: FREQUENCY: CONSTANT/CONTINUOUS

## 2024-01-11 ASSESSMENT — PAIN - FUNCTIONAL ASSESSMENT
PAIN_FUNCTIONAL_ASSESSMENT: 0-10
PAIN_FUNCTIONAL_ASSESSMENT: 0-10

## 2024-01-11 ASSESSMENT — PAIN SCALES - GENERAL
PAINLEVEL_OUTOF10: 0 - NO PAIN
PAINLEVEL_OUTOF10: 0 - NO PAIN
PAINLEVEL_OUTOF10: 9
PAINLEVEL_OUTOF10: 5 - MODERATE PAIN
PAINLEVEL_OUTOF10: 6

## 2024-01-11 ASSESSMENT — PAIN DESCRIPTION - DESCRIPTORS: DESCRIPTORS: SHARP

## 2024-01-11 NOTE — ED TRIAGE NOTES
Back Pain (Pt to ER via EMS for eval of lower back pain x3 dates states it is sharp in nature. Pt was unable to ambulate when EMS got there; pt was carried to ambulance cot. Pt arrives to ER AOX3, states she cannot remember why her back hurts but denies any falls/injuries. Pt has hx of spinal stenosis and other back issues. Pt denies any difficulty urinating, head or neck pain.

## 2024-01-11 NOTE — PROGRESS NOTES
"   01/11/24 1612   Housing Stability   In the last 12 months, was there a time when you were not able to pay the mortgage or rent on time? N   In the last 12 months, was there a time when you did not have a steady place to sleep or slept in a shelter (including now)? N   Transportation Needs   In the past 12 months, has lack of transportation kept you from medical appointments or from getting medications? no   In the past 12 months, has lack of transportation kept you from meetings, work, or from getting things needed for daily living? No   Food Insecurity   Within the past 12 months, you worried that your food would run out before you got the money to buy more. Never true   Within the past 12 months, the food you bought just didn't last and you didn't have money to get more. Never true   Intimate Partner Violence   Within the last year, have you been afraid of your partner or ex-partner? No   Within the last year, have you been humiliated or emotionally abused in other ways by your partner or ex-partner? No   Within the last year, have you been kicked, hit, slapped, or otherwise physically hurt by your partner or ex-partner? No   Within the last year, have you been raped or forced to have any kind of sexual activity by your partner or ex-partner? No   Utilities   In the past 12 months has the electric, gas, oil, or water company threatened to shut off services in your home? No           Carrie Rosario is a 83 y.o. female on day 0 of admission presenting with No Principal Problem: There is no principal problem currently on the Problem List. Please update the Problem List and refresh..    Subjective          Objective     Physical Exam    Last Recorded Vitals  Blood pressure 129/80, pulse 77, temperature 36.7 °C (98.1 °F), resp. rate 18, height 1.651 m (5' 5\"), weight 68 kg (150 lb), SpO2 99 %.  Intake/Output last 3 Shifts:  No intake/output data recorded.    Relevant Results                       "       Assessment/Plan   Active Problems:  There are no active Hospital Problems.               DON Hall

## 2024-01-11 NOTE — PROGRESS NOTES
TCSW met FTF with pt.  TCSW introduced self and discussed role.  Pt reports that she resides home alone in a 3 level home that has 3 steps prior to entering.  Pt reports that she has a chair lift she uses to get to the 2nd floor.  Pt states she is independent with her ADL's and states she has 2 cleaning ladies and her dtr does the grocery shopping.  Pt reports she utilizes a cane and a walker.  Pt reports that she was discharged from [facility] in November and states she would like to be referred back there if she meets criteria.  Pt pending PT/OT eval.  TC will continue to follow.

## 2024-01-11 NOTE — PROGRESS NOTES
"   01/11/24 1613   Discharge Planning   Living Arrangements Alone   Support Systems Children   Type of Residence Private residence   Number of Stairs to Enter Residence 3   Patient expects to be discharged to: SNF pt would like to be referred back to [facility].  Pending PT/OT leny           Carrie Rosario is a 83 y.o. female on day 0 of admission presenting with No Principal Problem: There is no principal problem currently on the Problem List. Please update the Problem List and refresh..    Subjective          Objective     Physical Exam    Last Recorded Vitals  Blood pressure 129/80, pulse 77, temperature 36.7 °C (98.1 °F), resp. rate 18, height 1.651 m (5' 5\"), weight 68 kg (150 lb), SpO2 99 %.  Intake/Output last 3 Shifts:  No intake/output data recorded.    Relevant Results                             Assessment/Plan   Active Problems:  There are no active Hospital Problems.               DON Hall      "

## 2024-01-11 NOTE — H&P
History Of Present Illness  Carrie Rosario is a 83 y.o. female presenting with low back pain, inability to ambulate. Has history of spinal stenosis s/p lumbar surgery many years ago with resultant R foot drop. States she was doing well at home, she is ambulatory at baseline, actually lives alone. She woke up about 3 days ago with low back pain, it has progressively worsened to the point where she can barely walk to the bathroom and came in for further eval. She denies any recent falls or known trauma. CT imaging of the lumbar spine demonstrated acute compression fracture L1 without significant height loss. She denies any loss of bowel or bladder (has urine incontinence at baseline) and no saddle anesthesia. They medicated her and attempted to ambulate and she could not so admission was requested. She denies any chest pain, SOB, abdominal pain, diarrhea, constipation, urinary symptoms.     ED: Toradol, tizanidine.   She will be admitted for further work up and management of the acute lumbar compression fracture and inability to ambulate.      Past Medical History  Past Medical History:   Diagnosis Date    Arthritis     Cardiac arrest (CMS/[facility])     CVA (cerebral vascular accident) (CMS/[facility])     Diabetes mellitus (CMS/[facility])     GERD (gastroesophageal reflux disease)     Heart disease     Hypercholesterolemia     Hypertension     Right foot drop     Right foot ulcer (CMS/[facility])     Spinal stenosis     STEMI (ST elevation myocardial infarction) (CMS/[facility])        Surgical History  Past Surgical History:   Procedure Laterality Date    CORONARY ANGIOPLASTY WITH STENT PLACEMENT  12/25/2021    FOOT SURGERY      multiple    MR HEAD ANGIO WO IV CONTRAST  12/28/2021    MR HEAD ANGIO WO IV CONTRAST [facility]    MR NECK ANGIO WO IV CONTRAST  12/28/2021    MR NECK ANGIO WO IV CONTRAST [facility]    OTHER SURGICAL HISTORY  04/25/2022    Cataract surgery    OTHER SURGICAL HISTORY  04/25/2022    YAG laser capsulotomy  "   OTHER SURGICAL HISTORY  04/25/2022    Foot surgery    OTHER SURGICAL HISTORY  04/25/2022    Back surgery    OTHER SURGICAL HISTORY  04/25/2022    Arterial stent placement        Social History  Lives home alone, independent and ambulatory. Denies tobacco use or ETOH abuse.     Family History  Family History   Problem Relation Name Age of Onset    Diabetes Mother      Diabetes Father      No Known Problems Daughter      No Known Problems Daughter          Allergies  Nickel and Sulfa (sulfonamide antibiotics)    Review of Systems     Please see pertinent positives in the HPI and past medical and surgical histories.     Physical Exam    General: Pleasant, awake, alert.   HEENT: PERRLA, no facial asymmetry noted. Normocephalic, mucous membranes moist.   Neck: No JVD, supple.  Cardiovascular: Regular rate and rhythm. Normal S1 and S2. No murmurs, rubs or gallops.   Respiratory: Clear to auscultation on room air.   Abdomen: Soft, round, non-tender to palpation. Bowel sounds present and normoactive.  Extremities: No peripheral cyanosis. Trace edema to the RLE. Equal strength in BLE. R foot drop present.   Neurologic: Alert and oriented to person, place and time. Normal sensation.   Dermatologic: No rash, ecchymosis, or jaundice. She has open ulceration to the ball of her R foot under her R great toe.   Psychological: Appropriate affect and behavior.      Last Recorded Vitals  Blood pressure 123/63, pulse 73, temperature 36.7 °C (98.1 °F), resp. rate 18, height 1.651 m (5' 5\"), weight 68 kg (150 lb), SpO2 96 %.    Relevant Results    CT lumbar spine wo IV contrast    Result Date: 1/11/2024  Interpreted By:  Adrián Marcum, STUDY: CT LUMBAR SPINE WO IV CONTRAST; 1/11/2024 3:47 pm   INDICATION: Signs/Symptoms:lower back pain;   COMPARISON: None   ACCESSION NUMBER(S): [id number]   ORDERING CLINICIAN: ALEX MORRISSEY   TECHNIQUE: Contiguous axial images of the lumbar spine were performed. Coronal and sagittal reformatted " images were also obtained. All CT examinations are performed with 1 or more of the following dose reduction techniques: Automated exposure control, adjustment of mA and/or kv according to patient's size, or use of iterative reconstruction techniques.   FINDINGS: There is an acute appearing compression fracture along the inferior half of the L1 vertebral body with mild retropulsion of the inferior half of L1. No significant height loss is noted at L1. The remainder of the vertebral bodies appear adequate in height. There is a normal appearance of a posterior fusion at L3 through S1 with bilateral pedicle screws and rods which appear intact. Facet degenerative changes are present.   There is grade 1 anterolisthesis of L4 on L5 measuring approximately 8 mm.   There is severe hypertrophic facet degenerative change at L2-3 which are moderate-severely narrowing the neural foramina as well as moderate-severe spinal canal stenosis. Diffuse disc bulging also contributes to spinal canal and neural foramina stenosis at this level.   There is also a large osteophyte from severe hypertrophic facet degenerative change at L3 which is also causing moderate to moderate-severe spinal canal stenosis posterior to L3. The remainder of the levels do not show evidence for high additional high-grade thecal sac stenosis.   Additional levels of neural foramina stenosis are noted. There is moderate-severe right neural foramina stenosis at L4-5 and at least moderate right neural foramina stenosis at L5-S1.   The SI joints show degenerative gas but are otherwise intact.       Acute compression fracture along the inferior aspect of L1 vertebral body with mild retropulsion of the inferior aspect of L1. No significant height loss is noted at L1.   No additional acute fractures are noted.   Stable appearance of posterior fusion with bilateral rods and pedicle screws at L3 through S1.   Chronic degenerative changes with high-grade spinal canal  stenosis noted at L2-3 and L3-4. There is high-grade bilateral neural foramina stenosis at L2-3. There is moderate-severe right neural foramina stenosis at L4-5.     Signed by: Adrián Marcum 1/11/2024 4:46 PM Dictation workstation:   [id number]       Assessment/Plan     Acute Compression Fracture L1 / Inability to Ambulate  -Ortho Spine consult.   -CT lumbar spine shows acute compression fracture L1 without significant height loss.   -Will start medrol dose pack.   -PRN pain medication and muscle relaxers.   -PT/OT evals. She may need SNF.   -OOB with assist.   -Fall precautions.     R Foot Ulceration / Buttocks Wound  -States she was scheduled to have surgery with Dr. Kennedy on 1/16.   -Wound RN to see.   -Wound does not appear to be infected at this time.     DM 2  -Check HgA1c.   -On tresiba at home, will sub lantus.   -SSI coverage.   -Monitor blood sugars.     HTN  -Will resume home meds when list obtained.   -BP currently stable, monitor.     CAD  -Continue core meds when med list obtained.     HLD  -Will resume home medication when med list obtained.     DVT Risk  -Heparin subcutaneous.   -SCDs.     Plan   Case and plan was discussed with patient, she is agreeable.   Case and plan was also discussed with my collaborating physician.     CODE STATUS was discussed with the patient, she wishes to be FULL CODE.     I spent 55 minutes in the professional and overall care of this patient.    Georgia Jansen, CHELSEA-CNP

## 2024-01-11 NOTE — ED PROVIDER NOTES
HPI   Chief Complaint   Patient presents with   • Back Pain     Pt to ER via EMS for eval of lower back pain x3 dates states it is sharp in nature. Pt was unable to ambulate when EMS got there; pt was carried to ambulance cot. Pt arrives to ER AOX3, states she cannot remember why her back hurts but denies any falls/injuries. Pt has hx of spinal stenosis and other back issues. Pt denies any difficulty urinating, head or neck pain.       HPI  Patient is an 83-year-old female who presents to ED for atraumatic lower back pain x 3 days.  Patient has a history of back surgery.  She denies any lower back pain red flags, no saddle anesthesia, recent cancer history, fever, trauma, IV drug use.  Patient states she is incontinent sometimes but that is not new.  She is ambulatory and lives at home alone.                  Patrick Coma Scale Score: 15                  Patient History   Past Medical History:   Diagnosis Date   • Arthritis    • Cardiac arrest (CMS/[facility])    • CVA (cerebral vascular accident) (CMS/[facility])    • Diabetes mellitus (CMS/[facility])    • GERD (gastroesophageal reflux disease)    • Heart disease    • Hypercholesterolemia    • Hypertension    • Right foot drop    • STEMI (ST elevation myocardial infarction) (CMS/[facility])      Past Surgical History:   Procedure Laterality Date   • CORONARY ANGIOPLASTY WITH STENT PLACEMENT  12/25/2021   • FOOT SURGERY      multiple   • MR HEAD ANGIO WO IV CONTRAST  12/28/2021    MR HEAD ANGIO WO IV CONTRAST [facility]PATIENT LEGAC[facility]   • MR NECK ANGIO WO IV CONTRAST  12/28/2021    MR NECK ANGIO WO IV CONTRAST [facility]   • OTHER SURGICAL HISTORY  04/25/2022    Cataract surgery   • OTHER SURGICAL HISTORY  04/25/2022    YAG laser capsulotomy   • OTHER SURGICAL HISTORY  04/25/2022    Foot surgery   • OTHER SURGICAL HISTORY  04/25/2022    Back surgery   • OTHER SURGICAL HISTORY  04/25/2022    Arterial stent placement     Family History   Problem Relation Name Age of Onset   • Diabetes  Mother     • Diabetes Father     • No Known Problems Daughter     • No Known Problems Daughter       Social History     Tobacco Use   • Smoking status: Never   • Smokeless tobacco: Never   Substance Use Topics   • Alcohol use: Never   • Drug use: Not Currently       Physical Exam   ED Triage Vitals [01/11/24 1251]   Temp Heart Rate Resp BP   36.7 °C (98.1 °F) 93 16 141/66      SpO2 Temp src Heart Rate Source Patient Position   99 % -- -- --      BP Location FiO2 (%)     -- --       Physical Exam  Vitals reviewed.   Constitutional:       Appearance: Normal appearance.   HENT:      Head: Normocephalic and atraumatic.   Eyes:      Extraocular Movements: Extraocular movements intact.   Cardiovascular:      Rate and Rhythm: Normal rate and regular rhythm.   Pulmonary:      Effort: Pulmonary effort is normal.      Breath sounds: Normal breath sounds.   Abdominal:      Palpations: Abdomen is soft.   Musculoskeletal:      Cervical back: Normal and neck supple.      Thoracic back: Normal.      Lumbar back: No deformity, signs of trauma, tenderness or bony tenderness.   Skin:     General: Skin is warm and dry.   Neurological:      Mental Status: She is alert and oriented to person, place, and time.   Psychiatric:         Mood and Affect: Mood normal.         Behavior: Behavior normal.         ED Course & MDM   Diagnoses as of 01/11/24 1808   Compression fracture of L1 vertebra, initial encounter (CMS/[facility])       Medical Decision Making  Parts of this chart have been completed using voice recognition software. Please excuse any errors of transcription.  My thought process and reason for plan has been formulated from the time that I saw the patient until the time of disposition and is not specific to one specific moment during their visit and furthermore my MDM encompasses this entire chart and not only this text box.      HPI: Detailed above.    Exam: A medically appropriate exam performed, outlined above, given the known  history and presentation.    History obtained from: Patient, daughter, EMR    EKG: None    Social Determinants of Health considered during this visit: Discussed    Medications given during visit:  Medications   ketorolac (Toradol) injection 7.5 mg (7.5 mg intramuscular Given 1/11/24 1440)   tiZANidine (Zanaflex) tablet 2 mg (2 mg oral Given 1/11/24 1335)        Diagnostic/tests  Labs Reviewed - No data to display   CT lumbar spine wo IV contrast   Final Result   Acute compression fracture along the inferior aspect of L1 vertebral   body with mild retropulsion of the inferior aspect of L1. No   significant height loss is noted at L1.        No additional acute fractures are noted.        Stable appearance of posterior fusion with bilateral rods and pedicle   screws at L3 through S1.        Chronic degenerative changes with high-grade spinal canal stenosis   noted at L2-3 and L3-4. There is high-grade bilateral neural foramina   stenosis at L2-3. There is moderate-severe right neural foramina   stenosis at L4-5.             Signed by: Adrián Marcum 1/11/2024 4:46 PM   Dictation workstation:   [id number]           Considerations/further MDM:    Patient is an 83-year-old female who presents to ED for atraumatic lower back pain x 3 days.  Patient has a history of back surgery.  She denies any lower back pain red flags, no saddle anesthesia, recent cancer history, fever, trauma, IV drug use.  Patient was medicated with ketorolac 7.5 mg and tizanidine 2 mg, patient reported some relief of pain.  Patient attempted ambulation trial but was unsuccessful.  CT scan of lumbar spine was ordered showing L1 compression fracture.  I spoke with Dr. Kraus from [facility], he recommends patient be admitted to the hospital where he can consult on the patient. I spoke with Dr. Samson. We thoroughly discussed the history, physical exam, laboratory and imaging studies, as well as, emergency department course. Based upon that discussion,  we've decided to admit for further observation and evaluation of their spinal compression fracture.    Procedure  Procedures     Elver Cartagena PA-C  01/11/24 3703

## 2024-01-12 ENCOUNTER — APPOINTMENT (OUTPATIENT)
Dept: RADIOLOGY | Facility: HOSPITAL | Age: 84
DRG: 463 | End: 2024-01-12
Payer: MEDICARE

## 2024-01-12 ENCOUNTER — APPOINTMENT (OUTPATIENT)
Dept: PREADMISSION TESTING | Facility: HOSPITAL | Age: 84
End: 2024-01-12
Payer: MEDICARE

## 2024-01-12 PROBLEM — S32.010A: Status: ACTIVE | Noted: 2024-01-12

## 2024-01-12 LAB
ANION GAP SERPL CALC-SCNC: 12 MMOL/L
BUN SERPL-MCNC: 42 MG/DL (ref 8–25)
CALCIUM SERPL-MCNC: 9.6 MG/DL (ref 8.5–10.4)
CHLORIDE SERPL-SCNC: 100 MMOL/L (ref 97–107)
CO2 SERPL-SCNC: 24 MMOL/L (ref 24–31)
CREAT SERPL-MCNC: 1.3 MG/DL (ref 0.4–1.6)
EGFRCR SERPLBLD CKD-EPI 2021: 41 ML/MIN/1.73M*2
ERYTHROCYTE [DISTWIDTH] IN BLOOD BY AUTOMATED COUNT: 12.3 % (ref 11.5–14.5)
GLUCOSE BLD MANUAL STRIP-MCNC: 192 MG/DL (ref 74–99)
GLUCOSE BLD MANUAL STRIP-MCNC: 195 MG/DL (ref 74–99)
GLUCOSE BLD MANUAL STRIP-MCNC: 272 MG/DL (ref 74–99)
GLUCOSE BLD MANUAL STRIP-MCNC: 332 MG/DL (ref 74–99)
GLUCOSE SERPL-MCNC: 270 MG/DL (ref 65–99)
HCT VFR BLD AUTO: 39.5 % (ref 36–46)
HGB BLD-MCNC: 12.9 G/DL (ref 12–16)
MCH RBC QN AUTO: 31.5 PG (ref 26–34)
MCHC RBC AUTO-ENTMCNC: 32.7 G/DL (ref 32–36)
MCV RBC AUTO: 97 FL (ref 80–100)
NRBC BLD-RTO: 0 /100 WBCS (ref 0–0)
PLATELET # BLD AUTO: 302 X10*3/UL (ref 150–450)
POTASSIUM SERPL-SCNC: 4.6 MMOL/L (ref 3.4–5.1)
RBC # BLD AUTO: 4.09 X10*6/UL (ref 4–5.2)
SODIUM SERPL-SCNC: 136 MMOL/L (ref 133–145)
WBC # BLD AUTO: 10.9 X10*3/UL (ref 4.4–11.3)

## 2024-01-12 PROCEDURE — 80048 BASIC METABOLIC PNL TOTAL CA: CPT | Performed by: NURSE PRACTITIONER

## 2024-01-12 PROCEDURE — 97166 OT EVAL MOD COMPLEX 45 MIN: CPT | Mod: GO

## 2024-01-12 PROCEDURE — 97535 SELF CARE MNGMENT TRAINING: CPT | Mod: GO

## 2024-01-12 PROCEDURE — 1100000001 HC PRIVATE ROOM DAILY

## 2024-01-12 PROCEDURE — 72148 MRI LUMBAR SPINE W/O DYE: CPT

## 2024-01-12 PROCEDURE — 2500000004 HC RX 250 GENERAL PHARMACY W/ HCPCS (ALT 636 FOR OP/ED): Performed by: NURSE PRACTITIONER

## 2024-01-12 PROCEDURE — 2500000002 HC RX 250 W HCPCS SELF ADMINISTERED DRUGS (ALT 637 FOR MEDICARE OP, ALT 636 FOR OP/ED): Performed by: NURSE PRACTITIONER

## 2024-01-12 PROCEDURE — 82947 ASSAY GLUCOSE BLOOD QUANT: CPT

## 2024-01-12 PROCEDURE — 36415 COLL VENOUS BLD VENIPUNCTURE: CPT | Performed by: NURSE PRACTITIONER

## 2024-01-12 PROCEDURE — 85027 COMPLETE CBC AUTOMATED: CPT | Performed by: NURSE PRACTITIONER

## 2024-01-12 PROCEDURE — 94760 N-INVAS EAR/PLS OXIMETRY 1: CPT

## 2024-01-12 PROCEDURE — 97162 PT EVAL MOD COMPLEX 30 MIN: CPT | Mod: GP

## 2024-01-12 PROCEDURE — 2500000001 HC RX 250 WO HCPCS SELF ADMINISTERED DRUGS (ALT 637 FOR MEDICARE OP): Performed by: NURSE PRACTITIONER

## 2024-01-12 RX ORDER — POLYETHYLENE GLYCOL 3350 17 G/17G
17 POWDER, FOR SOLUTION ORAL DAILY PRN
Status: DISCONTINUED | OUTPATIENT
Start: 2024-01-12 | End: 2024-01-23 | Stop reason: HOSPADM

## 2024-01-12 RX ORDER — INSULIN LISPRO 100 [IU]/ML
0-15 INJECTION, SOLUTION INTRAVENOUS; SUBCUTANEOUS
Status: DISCONTINUED | OUTPATIENT
Start: 2024-01-12 | End: 2024-01-23 | Stop reason: HOSPADM

## 2024-01-12 RX ORDER — ACETAMINOPHEN 325 MG/1
650 TABLET ORAL EVERY 4 HOURS PRN
Status: DISCONTINUED | OUTPATIENT
Start: 2024-01-12 | End: 2024-01-20

## 2024-01-12 RX ORDER — PILOCARPINE HYDROCHLORIDE 5 MG/1
5 TABLET, FILM COATED ORAL 3 TIMES DAILY
Status: DISCONTINUED | OUTPATIENT
Start: 2024-01-12 | End: 2024-01-23 | Stop reason: HOSPADM

## 2024-01-12 RX ORDER — TALC
3 POWDER (GRAM) TOPICAL NIGHTLY PRN
Status: DISCONTINUED | OUTPATIENT
Start: 2024-01-12 | End: 2024-01-23 | Stop reason: HOSPADM

## 2024-01-12 RX ORDER — FUROSEMIDE 20 MG/1
20 TABLET ORAL DAILY
Status: DISCONTINUED | OUTPATIENT
Start: 2024-01-12 | End: 2024-01-23 | Stop reason: HOSPADM

## 2024-01-12 RX ORDER — AMLODIPINE BESYLATE 2.5 MG/1
2.5 TABLET ORAL DAILY
Status: DISCONTINUED | OUTPATIENT
Start: 2024-01-12 | End: 2024-01-23 | Stop reason: HOSPADM

## 2024-01-12 RX ORDER — FAMOTIDINE 20 MG/1
20 TABLET, FILM COATED ORAL DAILY
Status: DISCONTINUED | OUTPATIENT
Start: 2024-01-12 | End: 2024-01-23 | Stop reason: HOSPADM

## 2024-01-12 RX ORDER — INSULIN LISPRO 100 [IU]/ML
0-10 INJECTION, SOLUTION INTRAVENOUS; SUBCUTANEOUS
Status: DISCONTINUED | OUTPATIENT
Start: 2024-01-12 | End: 2024-01-12

## 2024-01-12 RX ORDER — DAPAGLIFLOZIN 10 MG/1
10 TABLET, FILM COATED ORAL EVERY MORNING
Status: DISCONTINUED | OUTPATIENT
Start: 2024-01-12 | End: 2024-01-23

## 2024-01-12 RX ORDER — TRAMADOL HYDROCHLORIDE 50 MG/1
50 TABLET ORAL EVERY 6 HOURS PRN
Status: DISCONTINUED | OUTPATIENT
Start: 2024-01-12 | End: 2024-01-23 | Stop reason: HOSPADM

## 2024-01-12 RX ORDER — ATORVASTATIN CALCIUM 20 MG/1
20 TABLET, FILM COATED ORAL NIGHTLY
Status: DISCONTINUED | OUTPATIENT
Start: 2024-01-12 | End: 2024-01-23

## 2024-01-12 RX ORDER — METOPROLOL TARTRATE 25 MG/1
25 TABLET, FILM COATED ORAL 2 TIMES DAILY
Status: DISCONTINUED | OUTPATIENT
Start: 2024-01-12 | End: 2024-01-23 | Stop reason: HOSPADM

## 2024-01-12 RX ORDER — FLUOXETINE HYDROCHLORIDE 20 MG/1
40 CAPSULE ORAL DAILY
Status: DISCONTINUED | OUTPATIENT
Start: 2024-01-12 | End: 2024-01-23 | Stop reason: HOSPADM

## 2024-01-12 RX ORDER — DEXTROSE 50 % IN WATER (D50W) INTRAVENOUS SYRINGE
25
Status: DISCONTINUED | OUTPATIENT
Start: 2024-01-12 | End: 2024-01-23 | Stop reason: HOSPADM

## 2024-01-12 RX ORDER — INSULIN GLARGINE 100 [IU]/ML
15 INJECTION, SOLUTION SUBCUTANEOUS EVERY 24 HOURS
Status: DISCONTINUED | OUTPATIENT
Start: 2024-01-12 | End: 2024-01-12

## 2024-01-12 RX ORDER — INSULIN GLARGINE 100 [IU]/ML
20 INJECTION, SOLUTION SUBCUTANEOUS EVERY 24 HOURS
Status: DISCONTINUED | OUTPATIENT
Start: 2024-01-12 | End: 2024-01-23 | Stop reason: HOSPADM

## 2024-01-12 RX ORDER — TIZANIDINE 4 MG/1
2 TABLET ORAL EVERY 6 HOURS PRN
Status: DISCONTINUED | OUTPATIENT
Start: 2024-01-12 | End: 2024-01-23 | Stop reason: HOSPADM

## 2024-01-12 RX ORDER — POTASSIUM CHLORIDE 750 MG/1
10 TABLET, FILM COATED, EXTENDED RELEASE ORAL DAILY
Status: DISCONTINUED | OUTPATIENT
Start: 2024-01-12 | End: 2024-01-23 | Stop reason: HOSPADM

## 2024-01-12 RX ORDER — HEPARIN SODIUM 5000 [USP'U]/ML
5000 INJECTION, SOLUTION INTRAVENOUS; SUBCUTANEOUS EVERY 12 HOURS
Status: DISCONTINUED | OUTPATIENT
Start: 2024-01-12 | End: 2024-01-23 | Stop reason: HOSPADM

## 2024-01-12 RX ORDER — ASPIRIN 81 MG/1
81 TABLET ORAL DAILY
Status: DISCONTINUED | OUTPATIENT
Start: 2024-01-12 | End: 2024-01-23 | Stop reason: HOSPADM

## 2024-01-12 RX ORDER — DEXTROSE MONOHYDRATE 100 MG/ML
0.3 INJECTION, SOLUTION INTRAVENOUS ONCE AS NEEDED
Status: DISCONTINUED | OUTPATIENT
Start: 2024-01-12 | End: 2024-01-23 | Stop reason: HOSPADM

## 2024-01-12 RX ADMIN — POTASSIUM CHLORIDE 10 MEQ: 750 TABLET, EXTENDED RELEASE ORAL at 09:24

## 2024-01-12 RX ADMIN — FUROSEMIDE 20 MG: 20 TABLET ORAL at 09:21

## 2024-01-12 RX ADMIN — DAPAGLIFLOZIN 10 MG: 10 TABLET, FILM COATED ORAL at 09:21

## 2024-01-12 RX ADMIN — TRAMADOL HYDROCHLORIDE 50 MG: 50 TABLET ORAL at 13:32

## 2024-01-12 RX ADMIN — TRAMADOL HYDROCHLORIDE 50 MG: 50 TABLET ORAL at 19:46

## 2024-01-12 RX ADMIN — HEPARIN SODIUM 5000 UNITS: 5000 INJECTION, SOLUTION INTRAVENOUS; SUBCUTANEOUS at 14:30

## 2024-01-12 RX ADMIN — ATORVASTATIN CALCIUM 20 MG: 20 TABLET, FILM COATED ORAL at 21:42

## 2024-01-12 RX ADMIN — PILOCARPINE HYDROCHLORIDE 5 MG: 5 TABLET, FILM COATED ORAL at 21:42

## 2024-01-12 RX ADMIN — INSULIN LISPRO 3 UNITS: 100 INJECTION, SOLUTION INTRAVENOUS; SUBCUTANEOUS at 17:16

## 2024-01-12 RX ADMIN — INSULIN LISPRO 9 UNITS: 100 INJECTION, SOLUTION INTRAVENOUS; SUBCUTANEOUS at 09:24

## 2024-01-12 RX ADMIN — PILOCARPINE HYDROCHLORIDE 5 MG: 5 TABLET, FILM COATED ORAL at 09:31

## 2024-01-12 RX ADMIN — INSULIN LISPRO 3 UNITS: 100 INJECTION, SOLUTION INTRAVENOUS; SUBCUTANEOUS at 12:19

## 2024-01-12 RX ADMIN — TRAMADOL HYDROCHLORIDE 50 MG: 50 TABLET ORAL at 02:31

## 2024-01-12 RX ADMIN — FLUOXETINE HYDROCHLORIDE 40 MG: 20 CAPSULE ORAL at 09:21

## 2024-01-12 RX ADMIN — ASPIRIN 81 MG: 81 TABLET, COATED ORAL at 09:21

## 2024-01-12 RX ADMIN — FAMOTIDINE 20 MG: 20 TABLET, FILM COATED ORAL at 09:21

## 2024-01-12 RX ADMIN — METOPROLOL TARTRATE 25 MG: 25 TABLET, FILM COATED ORAL at 21:42

## 2024-01-12 RX ADMIN — INSULIN GLARGINE 20 UNITS: 100 INJECTION, SOLUTION SUBCUTANEOUS at 21:42

## 2024-01-12 RX ADMIN — PILOCARPINE HYDROCHLORIDE 5 MG: 5 TABLET, FILM COATED ORAL at 14:42

## 2024-01-12 RX ADMIN — AMLODIPINE BESYLATE 2.5 MG: 2.5 TABLET ORAL at 09:21

## 2024-01-12 RX ADMIN — INSULIN GLARGINE 15 UNITS: 100 INJECTION, SOLUTION SUBCUTANEOUS at 02:06

## 2024-01-12 RX ADMIN — METOPROLOL TARTRATE 25 MG: 25 TABLET, FILM COATED ORAL at 09:22

## 2024-01-12 RX ADMIN — METHYLPREDNISOLONE 20 MG: 4 TABLET ORAL at 14:30

## 2024-01-12 SDOH — SOCIAL STABILITY: SOCIAL INSECURITY: DO YOU FEEL UNSAFE GOING BACK TO THE PLACE WHERE YOU ARE LIVING?: NO

## 2024-01-12 SDOH — SOCIAL STABILITY: SOCIAL INSECURITY: WERE YOU ABLE TO COMPLETE ALL THE BEHAVIORAL HEALTH SCREENINGS?: YES

## 2024-01-12 SDOH — SOCIAL STABILITY: SOCIAL INSECURITY: ARE THERE ANY APPARENT SIGNS OF INJURIES/BEHAVIORS THAT COULD BE RELATED TO ABUSE/NEGLECT?: NO

## 2024-01-12 SDOH — SOCIAL STABILITY: SOCIAL INSECURITY: HAS ANYONE EVER THREATENED TO HURT YOUR FAMILY OR YOUR PETS?: NO

## 2024-01-12 SDOH — SOCIAL STABILITY: SOCIAL INSECURITY: ARE YOU OR HAVE YOU BEEN THREATENED OR ABUSED PHYSICALLY, EMOTIONALLY, OR SEXUALLY BY ANYONE?: NO

## 2024-01-12 SDOH — SOCIAL STABILITY: SOCIAL INSECURITY: ABUSE: ADULT

## 2024-01-12 SDOH — SOCIAL STABILITY: SOCIAL INSECURITY: HAVE YOU HAD THOUGHTS OF HARMING ANYONE ELSE?: NO

## 2024-01-12 SDOH — SOCIAL STABILITY: SOCIAL INSECURITY: DOES ANYONE TRY TO KEEP YOU FROM HAVING/CONTACTING OTHER FRIENDS OR DOING THINGS OUTSIDE YOUR HOME?: NO

## 2024-01-12 SDOH — SOCIAL STABILITY: SOCIAL INSECURITY: DO YOU FEEL ANYONE HAS EXPLOITED OR TAKEN ADVANTAGE OF YOU FINANCIALLY OR OF YOUR PERSONAL PROPERTY?: NO

## 2024-01-12 ASSESSMENT — ACTIVITIES OF DAILY LIVING (ADL)
FEEDING YOURSELF: INDEPENDENT
ADEQUATE_TO_COMPLETE_ADL: YES
HEARING - LEFT EAR: FUNCTIONAL
BATHING: NEEDS ASSISTANCE
PATIENT'S MEMORY ADEQUATE TO SAFELY COMPLETE DAILY ACTIVITIES?: YES
GROOMING: INDEPENDENT
WALKS IN HOME: INDEPENDENT
HEARING - RIGHT EAR: FUNCTIONAL
BATHING_ASSISTANCE: MODERATE
JUDGMENT_ADEQUATE_SAFELY_COMPLETE_DAILY_ACTIVITIES: YES
ADL_ASSISTANCE: INDEPENDENT
LACK_OF_TRANSPORTATION: NO
TOILETING: NEEDS ASSISTANCE
HOME_MANAGEMENT_TIME_ENTRY: 11
ADL_ASSISTANCE: INDEPENDENT
DRESSING YOURSELF: NEEDS ASSISTANCE

## 2024-01-12 ASSESSMENT — COGNITIVE AND FUNCTIONAL STATUS - GENERAL
HELP NEEDED FOR BATHING: A LITTLE
PATIENT BASELINE BEDBOUND: NO
STANDING UP FROM CHAIR USING ARMS: A LITTLE
MOVING TO AND FROM BED TO CHAIR: A LOT
PERSONAL GROOMING: A LITTLE
EATING MEALS: A LITTLE
DAILY ACTIVITIY SCORE: 14
WALKING IN HOSPITAL ROOM: A LOT
STANDING UP FROM CHAIR USING ARMS: A LOT
WALKING IN HOSPITAL ROOM: TOTAL
MOVING TO AND FROM BED TO CHAIR: A LITTLE
MOBILITY SCORE: 10
DRESSING REGULAR LOWER BODY CLOTHING: A LITTLE
MOBILITY SCORE: 10
DAILY ACTIVITIY SCORE: 18
TURNING FROM BACK TO SIDE WHILE IN FLAT BAD: A LITTLE
TOILETING: A LOT
DRESSING REGULAR LOWER BODY CLOTHING: TOTAL
EATING MEALS: A LITTLE
MOVING FROM LYING ON BACK TO SITTING ON SIDE OF FLAT BED WITH BEDRAILS: A LOT
EATING MEALS: A LITTLE
PERSONAL GROOMING: A LITTLE
DAILY ACTIVITIY SCORE: 13
TOILETING: A LITTLE
MOBILITY SCORE: 17
CLIMB 3 TO 5 STEPS WITH RAILING: A LOT
WALKING IN HOSPITAL ROOM: TOTAL
HELP NEEDED FOR BATHING: A LOT
DRESSING REGULAR UPPER BODY CLOTHING: A LITTLE
TOILETING: A LOT
DRESSING REGULAR UPPER BODY CLOTHING: A LOT
DRESSING REGULAR LOWER BODY CLOTHING: A LOT
TURNING FROM BACK TO SIDE WHILE IN FLAT BAD: A LOT
TURNING FROM BACK TO SIDE WHILE IN FLAT BAD: A LOT
MOVING FROM LYING ON BACK TO SITTING ON SIDE OF FLAT BED WITH BEDRAILS: A LOT
HELP NEEDED FOR BATHING: A LOT
PERSONAL GROOMING: A LITTLE
CLIMB 3 TO 5 STEPS WITH RAILING: TOTAL
CLIMB 3 TO 5 STEPS WITH RAILING: TOTAL
MOVING TO AND FROM BED TO CHAIR: A LOT
DRESSING REGULAR UPPER BODY CLOTHING: A LOT
STANDING UP FROM CHAIR USING ARMS: A LOT

## 2024-01-12 ASSESSMENT — PAIN - FUNCTIONAL ASSESSMENT
PAIN_FUNCTIONAL_ASSESSMENT: 0-10

## 2024-01-12 ASSESSMENT — PAIN SCALES - GENERAL
PAINLEVEL_OUTOF10: 5 - MODERATE PAIN
PAINLEVEL_OUTOF10: 2
PAINLEVEL_OUTOF10: 6
PAINLEVEL_OUTOF10: 7
PAINLEVEL_OUTOF10: 8
PAINLEVEL_OUTOF10: 8
PAINLEVEL_OUTOF10: 6
PAINLEVEL_OUTOF10: 0 - NO PAIN
PAINLEVEL_OUTOF10: 4

## 2024-01-12 ASSESSMENT — LIFESTYLE VARIABLES
AUDIT-C TOTAL SCORE: 0
HOW OFTEN DO YOU HAVE 6 OR MORE DRINKS ON ONE OCCASION: NEVER
SKIP TO QUESTIONS 9-10: 1
HOW OFTEN DO YOU HAVE A DRINK CONTAINING ALCOHOL: NEVER
HOW MANY STANDARD DRINKS CONTAINING ALCOHOL DO YOU HAVE ON A TYPICAL DAY: PATIENT DOES NOT DRINK
AUDIT-C TOTAL SCORE: 0

## 2024-01-12 ASSESSMENT — PAIN DESCRIPTION - DESCRIPTORS: DESCRIPTORS: SHARP

## 2024-01-12 ASSESSMENT — PAIN DESCRIPTION - ORIENTATION: ORIENTATION: MID

## 2024-01-12 ASSESSMENT — PATIENT HEALTH QUESTIONNAIRE - PHQ9
2. FEELING DOWN, DEPRESSED OR HOPELESS: NOT AT ALL
SUM OF ALL RESPONSES TO PHQ9 QUESTIONS 1 & 2: 0
1. LITTLE INTEREST OR PLEASURE IN DOING THINGS: NOT AT ALL

## 2024-01-12 NOTE — PROGRESS NOTES
Physical Therapy    Physical Therapy Evaluation    Patient Name: Carrie Rosario  MRN: [id number]  Today's Date: 1/12/2024   Time Calculation  Start Time: 1015  Stop Time: 1035  Time Calculation (min): 20 min    Assessment/Plan   PT Assessment  PT Assessment Results: Decreased range of motion, Decreased strength, Decreased endurance, Impaired balance, Decreased mobility, Decreased coordination, Decreased cognition, Impaired judgement, Decreased safety awareness, Orthopedic restrictions, Pain  Rehab Prognosis: Fair  Evaluation/Treatment Tolerance: Patient limited by pain  Medical Staff Made Aware: Yes  End of Session Communication: Bedside nurse  End of Session Patient Position: Bed, 3 rail up, Alarm on  IP OR SWING BED PT PLAN  Inpatient or Swing Bed: Inpatient  PT Plan  Treatment/Interventions: Bed mobility, Transfer training, Gait training, Balance training, Strengthening, Endurance training, Range of motion, Therapeutic exercise, Therapeutic activity, Home exercise program  PT Plan: Skilled PT  PT Frequency: 5 times per week  PT Discharge Recommendations: Moderate intensity level of continued care  PT Recommended Transfer Status: Assist x2, Assistive device  PT - OK to Discharge: Yes      Subjective   General Visit Information:  General  Reason for Referral: Impaired Mobility  Referred By: Dr. Smason  Past Medical History Relevant to Rehab: R foot ulcer, buttock wound, DB, HTN, CAD, HLD  Family/Caregiver Present: No  Prior to Session Communication: Bedside nurse  Patient Position Received: Bed, 3 rail up, Alarm on  Preferred Learning Style: verbal  General Comment: 83 year old female admits with Acute Compression Fx of L1, Inability to ambulate, R foot ulceration, Buttock Wound, DB, HTN, CAD, and HLD  Home Living:  Home Living  Type of Home: House  Lives With: Alone  Home Adaptive Equipment: Walker rolling or standard, Cane  Home Layout: Multi-level (Stair Lift between levels?)  Home Access: Stairs to enter with  rails  Entrance Stairs-Rails: Right  Entrance Stairs-Number of Steps: 4  Bathroom Shower/Tub: Tub/shower unit  Bathroom Toilet: Standard  Bathroom Equipment: Grab bars in shower, Shower chair with back, Grab bars around toilet  Home Living Comments: 2 falls/ last 6 months  Prior Level of Function:  Prior Function Per Pt/Caregiver Report  Level of [place]: Independent with ADLs and functional transfers, Independent with homemaking with ambulation  ADL Assistance: Independent  Homemaking Assistance: Independent  Ambulatory Assistance: Independent  Precautions:  Precautions  Medical Precautions: Fall precautions, Spinal precautions  Precautions Comment: Hx of R foot drop    Objective   Pain:  Pain Assessment  Pain Assessment: 0-10  Pain Score: 8  Pain Type: Acute pain  Pain Location: Back  Pain Orientation: Lower  Pain Interventions: Ambulation/increased activity (education on spine precautions)  Cognition:  Cognition  Overall Cognitive Status: Impaired (Pt is AxO3. Limited insight into current medical situation. Doesnt recall moving at all today including working with OT this AM)  Memory: Exceptions to WFL  Short-Term Memory: Impaired  Insight: Mild  Impulsive: Mildly    General Assessments:  Activity Tolerance  Endurance: Decreased tolerance for upright activites    Sensation  Light Touch: No apparent deficits  Functional Assessments:  Bed Mobility  Bed Mobility: Yes  Bed Mobility 1  Bed Mobility 1: Supine to sitting, Log roll  Level of Assistance 1: Moderate assistance  Bed Mobility Comments 1: cues on technique. assist for trunk and LEs  Bed Mobility 2  Bed Mobility  2: Sitting to supine, Log roll  Level of Assistance 2: Moderate assistance  Bed Mobility Comments 2: cues on technique. assist for BLEs and turnk    Transfers  Transfer: Yes  Transfer 1  Transfer From 1: Bed to  Transfer to 1: Stand  Technique 1: Sit to stand  Transfer Device 1: Walker  Transfer Level of Assistance 1: Maximum  assistance  Trials/Comments 1: x2 trials--failed first attempt. Second attempt successful with better BUE pushoff from bedsurface    Ambulation/Gait Training  Ambulation/Gait Training Performed: Yes  Ambulation/Gait Training 1  Surface 1: Level tile  Device 1: Rolling walker  Assistance 1: Maximum assistance  Quality of Gait 1:  (very unsteady, minimal ability to advance either LE)  Comments/Distance (ft) 1: 1' sideways along EOB  Extremity/Trunk Assessments:  RLE   RLE : Exceptions to WFL  Strength RLE  RLE Overall Strength: Deficits  R Hip Flexion: 2+/5  R Knee Flexion: 3+/5  R Knee Extension: 3+/5  R Ankle Dorsiflexion: 0/5 (chronic per Pt)  R Ankle Plantar Flexion: 1/5  LLE   LLE : Exceptions to WFL  Strength LLE  LLE Overall Strength: Deficits  L Hip Flexion: 3+/5  L Knee Flexion: 3+/5  L Knee Extension: 3+/5  L Ankle Dorsiflexion: 3+/5  L Ankle Plantar Flexion: 3+/5  Outcome Measures:  [facility] Basic Mobility  Turning from your back to your side while in a flat bed without using bedrails: A lot  Moving from lying on your back to sitting on the side of a flat bed without using bedrails: A lot  Moving to and from bed to chair (including a wheelchair): A lot  Standing up from a chair using your arms (e.g. wheelchair or bedside chair): A lot  To walk in hospital room: Total  Climbing 3-5 steps with railing: Total  Basic Mobility - Total Score: 10    Encounter Problems       Encounter Problems (Active)       Balance       Sitting Balance (Progressing)       Start:  01/12/24    Expected End:  01/26/24       Pt will demonstrate good sitting balance to promote safe engagement with out of bed activities           Standing Balance (Progressing)       Start:  01/12/24    Expected End:  01/26/24       Pt will demonstrate good static standing balance to promote safe participation with out of bed activity, transfers, and mobility              Mobility       Ambulation (Progressing)       Start:  01/12/24    Expected End:   01/26/24       Pt will ambulate 50' modified independent assist with walker to promote safe home mobility              Safety       Spine Precautions for Pain Management (Progressing)       Start:  01/12/24    Expected End:  01/26/24       Pt will be independent with both understanding and demonstration of post op spine restrictions to promote safe functional mobility at discharge           Safe Mobility Techniques (Progressing)       Start:  01/12/24    Expected End:  01/26/24       Pt will correctly identify and demonstrate safe mobility techniques to reduce their risks for falls during their acute care stay               Transfers       Supine to sit (Progressing)       Start:  01/12/24    Expected End:  01/26/24       Pt will transfer supine to sitting at edge of bed with modified independent assist to promote acute care out of bed activity           Sit to stand (Progressing)       Start:  01/12/24    Expected End:  01/26/24       Pt will transfer sit to standing position with modified independent assist and walker to promote safe out of bed activity           Bed to chair (Progressing)       Start:  01/12/24    Expected End:  01/26/24       Pt will transfer from sitting edge of bed to the chair with modified independent assist and walker to promote out of bed activity and reduce the risks of prolonged acute care bedrest                  Education Documentation  Mobility Training, taught by Matt Crump, PT at 1/12/2024 11:43 AM.  Learner: Patient  Readiness: Acceptance  Method: Explanation  Response: Verbalizes Understanding, Needs Reinforcement  Comment: safe mobility techniques, fall risk management, importance of OOB activity, spine precautions, log roll    Education Comments  No comments found.

## 2024-01-12 NOTE — CONSULTS
Wound Care Consult     Visit Date: 1/12/2024      Patient Name: Carrie Rosario         MRN: [id number]           YOB: 1940   Consulted for wound care. A 83 y.o. female patient admitted for   Compression fracture of L1 lumbar vertebra, closed, initial encounter (CMS/[facility]) [S32.010A]  Compression fracture of L1 vertebra, initial encounter (CMS/[facility]) [S32.010A]   Past Medical History:   Diagnosis Date    Arthritis     Cardiac arrest (CMS/[facility])     CVA (cerebral vascular accident) (CMS/[facility])     Diabetes mellitus (CMS/[facility])     GERD (gastroesophageal reflux disease)     Heart disease     Hypercholesterolemia     Hypertension     Right foot drop     Right foot ulcer (CMS/[facility])     Spinal stenosis     STEMI (ST elevation myocardial infarction) (CMS/[facility])       Past Surgical History:   Procedure Laterality Date    CORONARY ANGIOPLASTY WITH STENT PLACEMENT  12/25/2021    FOOT SURGERY      multiple    MR HEAD ANGIO WO IV CONTRAST  12/28/2021    MR HEAD ANGIO WO IV CONTRAST [facility]    MR NECK ANGIO WO IV CONTRAST  12/28/2021    MR NECK ANGIO WO IV CONTRAST [facility]    OTHER SURGICAL HISTORY  04/25/2022    Cataract surgery    OTHER SURGICAL HISTORY  04/25/2022    YAG laser capsulotomy    OTHER SURGICAL HISTORY  04/25/2022    Foot surgery    OTHER SURGICAL HISTORY  04/25/2022    Back surgery    OTHER SURGICAL HISTORY  04/25/2022    Arterial stent placement      Scheduled medications  amLODIPine, 2.5 mg, oral, Daily  aspirin, 81 mg, oral, Daily  atorvastatin, 20 mg, oral, Nightly  dapagliflozin propanediol, 10 mg, oral, q AM  famotidine, 20 mg, oral, Daily  FLUoxetine, 40 mg, oral, Daily  furosemide, 20 mg, oral, Daily  heparin (porcine), 5,000 Units, subcutaneous, q12h  insulin glargine, 20 Units, subcutaneous, q24h  insulin lispro, 0-15 Units, subcutaneous, TID with meals  methylPREDNISolone, 20 mg, oral, Once   Followed by  [START ON 1/13/2024] methylPREDNISolone, 16 mg, oral, Once    Followed by  [START ON 1/14/2024] methylPREDNISolone, 12 mg, oral, Once   Followed by  [START ON 1/15/2024] methylPREDNISolone, 8 mg, oral, Once   Followed by  [START ON 1/16/2024] methylPREDNISolone, 4 mg, oral, Once  metoprolol tartrate, 25 mg, oral, BID  pilocarpine, 5 mg, oral, TID  potassium chloride CR, 10 mEq, oral, Daily      Continuous medications     PRN medications  PRN medications: acetaminophen, dextrose 10 % in water (D10W), dextrose, glucagon, melatonin, polyethylene glycol, tiZANidine, traMADol     Allergies   Allergen Reactions    Nickel Rash    Sulfa (Sulfonamide Antibiotics) Rash and Unknown        No results found for the last 90 days.         Reason for Consult: nursing consulted for wound assessment       Wound History: Last wound care visit on 10/12/23 after fall, trauma and moisture associated skin damage to bilateral buttocks.      Pertinent Labs:   [place], Urine   Date Value Ref Range Status   04/27/2023 31 (H) 0 - 23 MG/L Final     Albumin   Date Value Ref Range Status   10/12/2023 3.1 (L) 3.5 - 5.0 g/dL Final       Wound Assessment:  Wound 10/11/23 Other (comment) Dorsal foot;Right (Active)   Site Assessment Pink;Red;Swelling 01/12/24 1056   State of Healing Closed wound edges 01/12/24 1056   Dressing Open to air;Gauze 01/12/24 1056       Wound 10/11/23 Buttocks Left (Active)       Wound 10/11/23 Buttocks Right (Active)       Wound Team Summary Assessment: Patient working with therapy, able to assist back to bed, patient agreeable to exam, turned to her left side independently for sacral exam. Sacrum is not red and intact, bilateral buttock have darkened areas that are slow to kit with 15 seconds of pressure, darkened areas are located same as 10/2024., no open areas and recommend to keep buttocks clean, and dry, use purewick if patient is incontinent. Right plantar 1st metatarsal phalangeal joint has intact callous, 2nd toe crosses hallux, patient states that Dr. Carroll was  scheduled to remove pin from previous surgery that is protruding on 01/16/24.  Recommended care is to keep area clean dry, washing daily and keeping gauze between toes. Educated on pressure injury prevention, interventions and successful teach back obtained with patient. Plan is to discharge to rehabilitation facility due to pain of compression fracture and decreased ability to do activities of daily living. Bilateral heels are red blanchable and intact.      Wound Team Plan: continue to wash feet leaving gauze in between Hallux and 2nd toe, continue pressure injury preventions as indicated by Dario scale.     Patient is on a Progressa Bed System. Angelic Avalos RN, bedside nurse updated, continue pressure injury prevention interventions and nursing to continue to follow provider orders. Re consult wound RN PRN.    Jaye Everett BSN RN [facility]  1/12/2024  11:15 AM

## 2024-01-12 NOTE — CONSULTS
Reason For Consult  Back pain, inability to ambulate.    History Of Present Illness  Carrie Rosario is a 83 y.o. female presenting with patient had spontaneous onset of severe low back pain 3 days before admission.  She was unable to ambulate.  She lives alone at home and was unable to manage.  No recent injuries or change in status.  She retains normal bowel and bladder function at baseline and no dysesthesias in the extremities.  In the remote past she had a successful L3-S1 laminectomy and instrumented fusion.  No febrile illness.  No constitutional symptoms..     Past Medical History  She has a past medical history of Arthritis, Cardiac arrest (CMS/[facility]), CVA (cerebral vascular accident) (CMS/[facility]), Diabetes mellitus (CMS/[facility]), GERD (gastroesophageal reflux disease), Heart disease, Hypercholesterolemia, Hypertension, Right foot drop, Right foot ulcer (CMS/[facility]), Spinal stenosis, and STEMI (ST elevation myocardial infarction) (CMS/[facility]).    Surgical History  She has a past surgical history that includes Other surgical history (04/25/2022); Other surgical history (04/25/2022); Other surgical history (04/25/2022); Other surgical history (04/25/2022); Other surgical history (04/25/2022); MR angio head wo IV contrast (12/28/2021); MR angio neck wo IV contrast (12/28/2021); Coronary angioplasty with stent (12/25/2021); and Foot surgery.     Social History  She reports that she has never smoked. She has never used smokeless tobacco. She reports that she does not currently use drugs. She reports that she does not drink alcohol.    Family History  Family History   Problem Relation Name Age of Onset    Diabetes Mother      Diabetes Father      No Known Problems Daughter      No Known Problems Daughter          Allergies  Nickel and Sulfa (sulfonamide antibiotics)    Review of Systems       Physical Exam  Patient seen in bed. Min pain, normal posture. No tenderness to palpation/percussion. Normal neuro in legs. Neg haider  "no clonusa     Last Recorded Vitals  Blood pressure 115/52, pulse 80, temperature 36.4 °C (97.5 °F), temperature source Oral, resp. rate 16, height 1.651 m (5' 5\"), weight 68 kg (150 lb), SpO2 97 %.    Relevant Results  I reviewed the images of the CT of the lumbar spine.  She has retained pedicle instrumentation and a solid fusion from L3-S1.  She has advanced facet arthropathy at L 2-3.  She has what appears to be lower vertebral body fracture of L1.  There appears to be extensive dissolution of the body, there is generalized osteopenia.  There is no canal compromise and no gross instability.      Scheduled medications  amLODIPine, 2.5 mg, oral, Daily  aspirin, 81 mg, oral, Daily  atorvastatin, 20 mg, oral, Nightly  dapagliflozin propanediol, 10 mg, oral, q AM  famotidine, 20 mg, oral, Daily  FLUoxetine, 40 mg, oral, Daily  furosemide, 20 mg, oral, Daily  heparin (porcine), 5,000 Units, subcutaneous, q12h  insulin glargine, 20 Units, subcutaneous, q24h  insulin lispro, 0-15 Units, subcutaneous, TID with meals  [START ON 1/13/2024] methylPREDNISolone, 16 mg, oral, Once   Followed by  [START ON 1/14/2024] methylPREDNISolone, 12 mg, oral, Once   Followed by  [START ON 1/15/2024] methylPREDNISolone, 8 mg, oral, Once   Followed by  [START ON 1/16/2024] methylPREDNISolone, 4 mg, oral, Once  metoprolol tartrate, 25 mg, oral, BID  pilocarpine, 5 mg, oral, TID  potassium chloride CR, 10 mEq, oral, Daily      Continuous medications     PRN medications  PRN medications: acetaminophen, dextrose 10 % in water (D10W), dextrose, glucagon, melatonin, polyethylene glycol, tiZANidine, traMADol  Results for orders placed or performed during the hospital encounter of 01/11/24 (from the past 24 hour(s))   CBC   Result Value Ref Range    WBC 10.3 4.4 - 11.3 x10*3/uL    nRBC 0.0 0.0 - 0.0 /100 WBCs    RBC 4.30 4.00 - 5.20 x10*6/uL    Hemoglobin 13.4 12.0 - 16.0 g/dL    Hematocrit 41.2 36.0 - 46.0 %    MCV 96 80 - 100 fL    MCH 31.2 26.0 " - 34.0 pg    MCHC 32.5 32.0 - 36.0 g/dL    RDW 12.4 11.5 - 14.5 %    Platelets 334 150 - 450 x10*3/uL   Basic metabolic panel   Result Value Ref Range    Glucose 193 (H) 65 - 99 mg/dL    Sodium 137 133 - 145 mmol/L    Potassium 4.2 3.4 - 5.1 mmol/L    Chloride 99 97 - 107 mmol/L    Bicarbonate 26 24 - 31 mmol/L    Urea Nitrogen 39 (H) 8 - 25 mg/dL    Creatinine 1.20 0.40 - 1.60 mg/dL    eGFR 45 (L) >60 mL/min/1.73m*2    Calcium 9.9 8.5 - 10.4 mg/dL    Anion Gap 12 <=19 mmol/L   Hemoglobin A1c   Result Value Ref Range    Hemoglobin A1C 8.9 (H) See below %    Estimated Average Glucose 209 Not Established mg/dL   POCT GLUCOSE   Result Value Ref Range    POCT Glucose 332 (H) 74 - 99 mg/dL   CBC   Result Value Ref Range    WBC 10.9 4.4 - 11.3 x10*3/uL    nRBC 0.0 0.0 - 0.0 /100 WBCs    RBC 4.09 4.00 - 5.20 x10*6/uL    Hemoglobin 12.9 12.0 - 16.0 g/dL    Hematocrit 39.5 36.0 - 46.0 %    MCV 97 80 - 100 fL    MCH 31.5 26.0 - 34.0 pg    MCHC 32.7 32.0 - 36.0 g/dL    RDW 12.3 11.5 - 14.5 %    Platelets 302 150 - 450 x10*3/uL   Basic metabolic panel   Result Value Ref Range    Glucose 270 (H) 65 - 99 mg/dL    Sodium 136 133 - 145 mmol/L    Potassium 4.6 3.4 - 5.1 mmol/L    Chloride 100 97 - 107 mmol/L    Bicarbonate 24 24 - 31 mmol/L    Urea Nitrogen 42 (H) 8 - 25 mg/dL    Creatinine 1.30 0.40 - 1.60 mg/dL    eGFR 41 (L) >60 mL/min/1.73m*2    Calcium 9.6 8.5 - 10.4 mg/dL    Anion Gap 12 <=19 mmol/L   POCT GLUCOSE   Result Value Ref Range    POCT Glucose 272 (H) 74 - 99 mg/dL   POCT GLUCOSE   Result Value Ref Range    POCT Glucose 192 (H) 74 - 99 mg/dL   POCT GLUCOSE   Result Value Ref Range    POCT Glucose 195 (H) 74 - 99 mg/dL        Assessment/Plan     I am concerned about the appearance of the fracture.  There is apparently dissolution of bone.  I have ordered an MRI of the lumbar spine to evaluate for that reason.    Jaylan Kraus MD

## 2024-01-12 NOTE — PROGRESS NOTES
"Carrie Rosario is a 83 y.o. female on day 0 of admission presenting with Compression fracture of L1 lumbar vertebra, closed, initial encounter (CMS/[facility]).    Subjective   Pt's PTOT recommendations are moderate intensity.   Pt requests [facility] as FOC. TCSW submitted SNF referral for review and acceptance.     Pt will not require a precert. However, she will require 3 midnights stay inpatient. Pt will be able to admit upon medical clearance and acceptance to SNF on Monday.        Objective     Physical Exam    Last Recorded Vitals  Blood pressure 115/52, pulse 80, temperature 36.4 °C (97.5 °F), temperature source Oral, resp. rate 16, height 1.651 m (5' 5\"), weight 68 kg (150 lb), SpO2 97 %.  Intake/Output last 3 Shifts:  No intake/output data recorded.    Relevant Results                             Assessment/Plan   Principal Problem:    Compression fracture of L1 lumbar vertebra, closed, initial encounter (CMS/[facility])  Active Problems:    Compression fracture of L1 vertebra, initial encounter (CMS/[facility])               DON Keene      "

## 2024-01-12 NOTE — PROGRESS NOTES
Carrie Rosario is a 83 y.o. female on day 0 of admission presenting with Compression fracture of L1 lumbar vertebra, closed, initial encounter (CMS/[facility]).      Subjective   Patient seen and examined. Resting in bed. States she slept pretty well last night. Still with significant back pain.        Objective     Last Recorded Vitals  /54 (BP Location: Right arm, Patient Position: Lying)   Pulse 80   Temp 36.7 °C (98.1 °F) (Oral)   Resp 16   Wt 68 kg (150 lb)   SpO2 95%   Intake/Output last 3 Shifts:  No intake or output data in the 24 hours ending 01/12/24 1319    Admission Weight  Weight: 68 kg (150 lb) (01/11/24 1251)    Daily Weight  01/11/24 : 68 kg (150 lb)    Image Results  CT lumbar spine wo IV contrast  Narrative: Interpreted By:  Adrián Marcum,   STUDY:  CT LUMBAR SPINE WO IV CONTRAST; 1/11/2024 3:47 pm      INDICATION:  Signs/Symptoms:lower back pain;      COMPARISON:  None      ACCESSION NUMBER(S):  [id number]      ORDERING CLINICIAN:  ALEX MORRISSEY      TECHNIQUE:  Contiguous axial images of the lumbar spine were performed. Coronal  and sagittal reformatted images were also obtained. All CT  examinations are performed with 1 or more of the following dose  reduction techniques: Automated exposure control, adjustment of mA  and/or kv according to patient's size, or use of iterative  reconstruction techniques.      FINDINGS:  There is an acute appearing compression fracture along the inferior  half of the L1 vertebral body with mild retropulsion of the inferior  half of L1. No significant height loss is noted at L1. The remainder  of the vertebral bodies appear adequate in height. There is a normal  appearance of a posterior fusion at L3 through S1 with bilateral  pedicle screws and rods which appear intact. Facet degenerative  changes are present.      There is grade 1 anterolisthesis of L4 on L5 measuring approximately  8 mm.      There is severe hypertrophic facet degenerative change at  L2-3 which  are moderate-severely narrowing the neural foramina as well as  moderate-severe spinal canal stenosis. Diffuse disc bulging also  contributes to spinal canal and neural foramina stenosis at this  level.      There is also a large osteophyte from severe hypertrophic facet  degenerative change at L3 which is also causing moderate to  moderate-severe spinal canal stenosis posterior to L3. The remainder  of the levels do not show evidence for high additional high-grade  thecal sac stenosis.      Additional levels of neural foramina stenosis are noted. There is  moderate-severe right neural foramina stenosis at L4-5 and at least  moderate right neural foramina stenosis at L5-S1.      The SI joints show degenerative gas but are otherwise intact.      Impression: Acute compression fracture along the inferior aspect of L1 vertebral  body with mild retropulsion of the inferior aspect of L1. No  significant height loss is noted at L1.      No additional acute fractures are noted.      Stable appearance of posterior fusion with bilateral rods and pedicle  screws at L3 through S1.      Chronic degenerative changes with high-grade spinal canal stenosis  noted at L2-3 and L3-4. There is high-grade bilateral neural foramina  stenosis at L2-3. There is moderate-severe right neural foramina  stenosis at L4-5.          Signed by: Adrián Marcum 1/11/2024 4:46 PM  Dictation workstation:   [id number]      Physical Exam    General: Alert and oriented x3, pleasant.   Cardiac: Regular rate and rhythm, S1/S2 , no murmur.   Pulmonary: Clear to auscultation on room air.   Abdomen: Soft, round, nontender. BS +x4.   Extremities: RLE with some trace edema. R foot drop present.   Skin: No rashes. Open ulceration to the ball of the R foot below the R great toe.       Relevant Results    Scheduled medications  amLODIPine, 2.5 mg, oral, Daily  aspirin, 81 mg, oral, Daily  atorvastatin, 20 mg, oral, Nightly  dapagliflozin  propanediol, 10 mg, oral, q AM  famotidine, 20 mg, oral, Daily  FLUoxetine, 40 mg, oral, Daily  furosemide, 20 mg, oral, Daily  heparin (porcine), 5,000 Units, subcutaneous, q12h  insulin glargine, 20 Units, subcutaneous, q24h  insulin lispro, 0-15 Units, subcutaneous, TID with meals  methylPREDNISolone, 20 mg, oral, Once   Followed by  [START ON 1/13/2024] methylPREDNISolone, 16 mg, oral, Once   Followed by  [START ON 1/14/2024] methylPREDNISolone, 12 mg, oral, Once   Followed by  [START ON 1/15/2024] methylPREDNISolone, 8 mg, oral, Once   Followed by  [START ON 1/16/2024] methylPREDNISolone, 4 mg, oral, Once  metoprolol tartrate, 25 mg, oral, BID  pilocarpine, 5 mg, oral, TID  potassium chloride CR, 10 mEq, oral, Daily      Continuous medications     PRN medications  PRN medications: acetaminophen, dextrose 10 % in water (D10W), dextrose, glucagon, melatonin, polyethylene glycol, tiZANidine, traMADol     Results for orders placed or performed during the hospital encounter of 01/11/24 (from the past 24 hour(s))   CBC   Result Value Ref Range    WBC 10.3 4.4 - 11.3 x10*3/uL    nRBC 0.0 0.0 - 0.0 /100 WBCs    RBC 4.30 4.00 - 5.20 x10*6/uL    Hemoglobin 13.4 12.0 - 16.0 g/dL    Hematocrit 41.2 36.0 - 46.0 %    MCV 96 80 - 100 fL    MCH 31.2 26.0 - 34.0 pg    MCHC 32.5 32.0 - 36.0 g/dL    RDW 12.4 11.5 - 14.5 %    Platelets 334 150 - 450 x10*3/uL   Basic metabolic panel   Result Value Ref Range    Glucose 193 (H) 65 - 99 mg/dL    Sodium 137 133 - 145 mmol/L    Potassium 4.2 3.4 - 5.1 mmol/L    Chloride 99 97 - 107 mmol/L    Bicarbonate 26 24 - 31 mmol/L    Urea Nitrogen 39 (H) 8 - 25 mg/dL    Creatinine 1.20 0.40 - 1.60 mg/dL    eGFR 45 (L) >60 mL/min/1.73m*2    Calcium 9.9 8.5 - 10.4 mg/dL    Anion Gap 12 <=19 mmol/L   Hemoglobin A1c   Result Value Ref Range    Hemoglobin A1C 8.9 (H) See below %    Estimated Average Glucose 209 Not Established mg/dL   POCT GLUCOSE   Result Value Ref Range    POCT Glucose 332 (H) 74 -  99 mg/dL   CBC   Result Value Ref Range    WBC 10.9 4.4 - 11.3 x10*3/uL    nRBC 0.0 0.0 - 0.0 /100 WBCs    RBC 4.09 4.00 - 5.20 x10*6/uL    Hemoglobin 12.9 12.0 - 16.0 g/dL    Hematocrit 39.5 36.0 - 46.0 %    MCV 97 80 - 100 fL    MCH 31.5 26.0 - 34.0 pg    MCHC 32.7 32.0 - 36.0 g/dL    RDW 12.3 11.5 - 14.5 %    Platelets 302 150 - 450 x10*3/uL   Basic metabolic panel   Result Value Ref Range    Glucose 270 (H) 65 - 99 mg/dL    Sodium 136 133 - 145 mmol/L    Potassium 4.6 3.4 - 5.1 mmol/L    Chloride 100 97 - 107 mmol/L    Bicarbonate 24 24 - 31 mmol/L    Urea Nitrogen 42 (H) 8 - 25 mg/dL    Creatinine 1.30 0.40 - 1.60 mg/dL    eGFR 41 (L) >60 mL/min/1.73m*2    Calcium 9.6 8.5 - 10.4 mg/dL    Anion Gap 12 <=19 mmol/L   POCT GLUCOSE   Result Value Ref Range    POCT Glucose 272 (H) 74 - 99 mg/dL   POCT GLUCOSE   Result Value Ref Range    POCT Glucose 192 (H) 74 - 99 mg/dL           Assessment/Plan      Acute Compression Fracture L1 / Inability to Ambulate  -Ortho Spine to see.   -CT lumbar spine shows acute compression fracture L1 without significant height loss.   -On medrol dose pack.   -PRN pain medication and muscle relaxers.   -PT/OT recommending moderate intensity therapy.    -OOB with assist.   -Fall precautions.      R Foot Ulceration   -States she was scheduled to have surgery with Dr. Kennedy on 1/16.   -Wound RN saw, buttocks wound healed. Continue to keep foot wound clean and dry, gauze between the toes.   -Wound does not appear to be infected at this time.      DM 2  -HgA1c 8.9.  -On tresiba at home, will sub lantus.   -SSI coverage. Increased this AM as her sugars were high.   -Monitor blood sugars. Starting to improve.      HTN  -Continue home meds.   -BP currently stable, monitor.      CAD  -Continue core meds.      HLD  -Continue statin.      DVT Risk  -Heparin subcutaneous.   -SCDs.      Plan   Continues to have significant pain, discussed with her PRN pain meds.   Continue steroids.   Insulin  adjusted, monitor sugars.   PT/OT recommending moderate intensity therapy, will likely need SNF.   Pain control.        Georgia Jansen, APRN-CNP

## 2024-01-12 NOTE — CARE PLAN
Problem: Pain  Goal: My pain/discomfort is manageable  Outcome: Progressing   The patient's goals for the shift include      The clinical goals for the shift include rest

## 2024-01-12 NOTE — PROGRESS NOTES
Occupational Therapy    Evaluation/Treatment    Patient Name: Carrie Rosario  MRN: [id number]  : 1940  Today's Date: 24  Time Calculation  Start Time: 733  Stop Time: 759  Time Calculation (min): 26 min     Assessment:  OT Assessment: Patient is an 83 year old female admitted with compression fx of L1. Patient is presenting with a decline in strength, balance, activity tolerance, and function, resulting in an increased need for assistance with daily tasks.  Prognosis: Good  Evaluation/Treatment Tolerance: Patient limited by pain  End of Session Communication: Bedside nurse  End of Session Patient Position: Bed, 3 rail up, Alarm on  OT Assessment Results: Decreased ADL status, Decreased upper extremity strength, Decreased safe judgment during ADL, Decreased sensation, Decreased endurance, Decreased functional mobility, Decreased gross motor control  Prognosis: Good  Evaluation/Treatment Tolerance: Patient limited by pain  Plan:  Treatment Interventions: ADL retraining, Functional transfer training, UE strengthening/ROM, Endurance training, Patient/family training, Neuromuscular reeducation, Compensatory technique education  OT Frequency: 3 times per week  OT Discharge Recommendations: Moderate intensity level of continued care  OT Recommended Transfer Status: Assist of 1  OT - OK to Discharge: Yes  Treatment Interventions: ADL retraining, Functional transfer training, UE strengthening/ROM, Endurance training, Patient/family training, Neuromuscular reeducation, Compensatory technique education    Subjective   Current Problem:  1. Compression fracture of L1 vertebra, initial encounter (CMS/[facility])          General:   OT Received On: 24  General  Reason for Referral: decline in ADLs  Referred By: Dr. Samson  Past Medical History Relevant to Rehab: Patient is an 83 year old female who presented to the ED with c/o low back pain. CT of the lumbar spine completed indicating acute compression fx of L1.  PMH: spinal stenosis s/p lumbar surgery, R foot drop, CKD stage 3, MI, HTN  Prior to Session Communication: Bedside nurse  Patient Position Received: Bed, 3 rail up, Alarm on  Preferred Learning Style: verbal  General Comment: Patient cleared by nursing for therapy. Patient in bed upon arrival and agreeable to participate  Precautions:  Medical Precautions: Fall precautions, Other (comment) (Spinal Precuations)  Precautions Comment: hx of R foot drop, does not wear AFO  Pain:  Pain Assessment  Pain Assessment: 0-10  Pain Score: 8  Pain Type: Acute pain  Pain Location: Back  Pain Orientation: Lower  Pain Interventions: Repositioned    Objective   Cognition:  Overall Cognitive Status: Within Functional Limits     Home Living:  Type of Home: House  Lives With: Alone  Home Adaptive Equipment: Walker rolling or standard, Cane (rollator, stair lift to second level)  Home Layout: Multi-level, 1/2 bath on main level, Bed/bath upstairs, Stairs to alternate level with rails  Alternate Level Stairs-Rails: None  Alternate Level Stairs-Number of Steps: stair lift to bed and bath  Home Access: Stairs to enter with rails  Entrance Stairs-Rails: Right  Entrance Stairs-Number of Steps: 3  Bathroom Shower/Tub: Tub/shower unit  Bathroom Toilet: Standard  Bathroom Equipment: Grab bars in shower, Shower chair with back, Grab bars around toilet  Home Living Comments: patient reports she has been sleeping in the recliner on the first level. full bath upstairs, 1/2 bath main level, laundry main level  Prior Function:  Level of [place]: Independent with ADLs and functional transfers, Needs assistance with homemaking  Receives Help From: Family (Cleaning lady every 2 weeks)  ADL Assistance: Independent  Homemaking Assistance: Needs assistance  Ambulatory Assistance: Independent (with rollator)  IADL History:  Homemaking Responsibilities: No  Mode of Transportation: Family  IADL Comments: cleaning lady every 2 weeks and daughter  assists  ADL:  Eating Assistance: Stand by  Eating Deficit: Setup (seated with items within reach)  Grooming Assistance: Stand by  Grooming Deficit: Setup (seated, items within reach)  Bathing Assistance: Moderate  Bathing Deficit: Right lower leg including foot, Left lower leg including foot, Buttocks, Supervision/safety, Increased time to complete , Steadying, Setup (per clinical judgement)  UE Dressing Assistance: Minimal  UE Dressing Deficit: Setup, Steadying, Increased time to complete, Pull over head (per clinical judgement)  LE Dressing Assistance: Total  LE Dressing Deficit: Don/doff R sock, Don/doff L sock  Toileting Assistance with Device: Maximal  Toileting Deficit: Steadying, Increased time to complete, Bedside commode, Perineal hygiene, Clothing management down, Clothing management up (per clinical judgement)  Activities of Daily Living: LE Dressing  LE Dressing: Yes  Sock Level of Assistance: Dependent  LE Dressing Where Assessed: Bed level  LE Dressing Comments: doff/don socks    Toileting  Toileting Level of Assistance: Maximum assistance  Where Assessed: Bedside commode  Toileting Comments: posterior hygiene and clothing management  Activity Tolerance:  Endurance: Decreased tolerance for upright activites  Functional Standing Tolerance:  Time: ~ 2 minutes  Activity: functional transfers  Functional Standing Tolerance Comments: Fair balance  Bed Mobility/Transfers: Bed Mobility  Bed Mobility: Yes  Bed Mobility 1  Bed Mobility 1: Supine to sitting  Level of Assistance 1: Moderate assistance  Bed Mobility Comments 1: head of bed elevated, via log roll  Bed Mobility 2  Bed Mobility  2: Sitting to supine  Level of Assistance 2: Moderate assistance  Bed Mobility Comments 2: via log roll, assist to support trunk and raise B LE    Transfers  Transfer: Yes  Transfer 1  Transfer From 1: Bed to  Transfer to 1: Stand  Technique 1: Sit to stand  Transfer Device 1: Walker  Transfer Level of Assistance 1: Moderate  assistance, Minimal verbal cues  Trials/Comments 1: x2 trials. second attempt, patient then turns to the C with Min A  Transfers 2  Transfer From 2: Commode-standard to  Transfer to 2: Stand  Technique 2: Sit to stand  Transfer Device 2: Walker  Transfer Level of Assistance 2: Moderate assistance, Minimal verbal cues  Trials/Comments 2: patient then turns at 2WW    Toilet Transfers  Toilet Transfer From: Bed  Toilet Transfer Type: To and from  Toilet Transfer to: Standard bedside commode  Toilet Transfer Technique:  (sit to stand and turn at 2WW)  Toilet Transfers: Moderate assistance    Therapy/Activity: Therapeutic Activity  Therapeutic Activity Performed: Yes  Therapeutic Activity 1: patient completes supine to EOB txfer with Mod A via log roll. She completes sit to stand txfer from EOB to 2WW with Mod A. Patient unable to take steps due to pain. She then txfers from EOB to 2WW with Mod A. Patient txfers from BSC to EOB with Mod A. Mod A to txfer from EOB to supine with education on log roll  Vision:Vision - Basic Assessment  Current Vision: Wears glasses all the time  Strength:  Strength Comments: B UE at least >/= 3/5 grossly. not tested formally due to pain  Coordination:  Movements are Fluid and Coordinated: Yes   Extremities: RUE   RUE : Within Functional Limits and LUE   LUE: Within Functional Limits    Outcome Measures: [facility] Daily Activity  Putting on and taking off regular lower body clothing: Total  Bathing (including washing, rinsing, drying): A lot  Putting on and taking off regular upper body clothing: A lot  Toileting, which includes using toilet, bedpan or urinal: A lot  Taking care of personal grooming such as brushing teeth: A little  Eating Meals: A little  Daily Activity - Total Score: 13    Education Documentation  Body Mechanics, taught by Jaqueline Farris OT at 1/12/2024  9:33 AM.  Learner: Patient  Readiness: Acceptance  Method: Explanation, Demonstration  Response: Needs  Reinforcement    Precautions, taught by Jaqueline Farris OT at 1/12/2024  9:33 AM.  Learner: Patient  Readiness: Acceptance  Method: Explanation, Demonstration  Response: Needs Reinforcement    ADL Training, taught by Jaqueline Farris OT at 1/12/2024  9:33 AM.  Learner: Patient  Readiness: Acceptance  Method: Explanation, Demonstration  Response: Needs Reinforcement    Education Comments  No comments found.      Goals:  Encounter Problems       Encounter Problems (Active)       OT Goals       ADLs (Progressing)       Start:  01/12/24    Expected End:  02/09/24       Patient will complete ADL tasks with Mod I, using AE as needed, in order to increase patient's safety and independence with self-care tasks.         Functional Transfers (Progressing)       Start:  01/12/24    Expected End:  02/09/24       Patient will complete functional transfers with Mod I in order to increase patient's safety and independence with daily tasks.         B UE Strengthening (Progressing)       Start:  01/12/24    Expected End:  02/09/24       Patient will increase B UE strengthening to 4+/5 for functional transfers.         Functional Transfers (Progressing)       Start:  01/12/24    Expected End:  02/09/24       Patient will demonstrate the ability to complete item retrieval and functional mobility with Mod I in order to increase patient's safety and independence with daily tasks.

## 2024-01-13 LAB
CRP SERPL-MCNC: 2.6 MG/DL (ref 0–2)
ERYTHROCYTE [SEDIMENTATION RATE] IN BLOOD BY WESTERGREN METHOD: 79 MM/H (ref 0–30)
GLUCOSE BLD MANUAL STRIP-MCNC: 100 MG/DL (ref 74–99)
GLUCOSE BLD MANUAL STRIP-MCNC: 156 MG/DL (ref 74–99)
GLUCOSE BLD MANUAL STRIP-MCNC: 190 MG/DL (ref 74–99)
GLUCOSE BLD MANUAL STRIP-MCNC: 208 MG/DL (ref 74–99)
GLUCOSE BLD MANUAL STRIP-MCNC: 222 MG/DL (ref 74–99)
GLUCOSE BLD MANUAL STRIP-MCNC: 268 MG/DL (ref 74–99)

## 2024-01-13 PROCEDURE — 82947 ASSAY GLUCOSE BLOOD QUANT: CPT

## 2024-01-13 PROCEDURE — 87040 BLOOD CULTURE FOR BACTERIA: CPT | Mod: TRILAB | Performed by: INTERNAL MEDICINE

## 2024-01-13 PROCEDURE — 2500000001 HC RX 250 WO HCPCS SELF ADMINISTERED DRUGS (ALT 637 FOR MEDICARE OP): Performed by: NURSE PRACTITIONER

## 2024-01-13 PROCEDURE — 36415 COLL VENOUS BLD VENIPUNCTURE: CPT | Performed by: INTERNAL MEDICINE

## 2024-01-13 PROCEDURE — 2500000004 HC RX 250 GENERAL PHARMACY W/ HCPCS (ALT 636 FOR OP/ED): Performed by: NURSE PRACTITIONER

## 2024-01-13 PROCEDURE — 36415 COLL VENOUS BLD VENIPUNCTURE: CPT | Performed by: NURSE PRACTITIONER

## 2024-01-13 PROCEDURE — 85652 RBC SED RATE AUTOMATED: CPT | Performed by: NURSE PRACTITIONER

## 2024-01-13 PROCEDURE — 1100000001 HC PRIVATE ROOM DAILY

## 2024-01-13 PROCEDURE — 87070 CULTURE OTHR SPECIMN AEROBIC: CPT | Mod: TRILAB,WESLAB | Performed by: NURSE PRACTITIONER

## 2024-01-13 PROCEDURE — 86140 C-REACTIVE PROTEIN: CPT | Performed by: NURSE PRACTITIONER

## 2024-01-13 PROCEDURE — 2500000002 HC RX 250 W HCPCS SELF ADMINISTERED DRUGS (ALT 637 FOR MEDICARE OP, ALT 636 FOR OP/ED): Performed by: NURSE PRACTITIONER

## 2024-01-13 RX ORDER — OXYCODONE AND ACETAMINOPHEN 5; 325 MG/1; MG/1
1 TABLET ORAL EVERY 6 HOURS PRN
Status: DISCONTINUED | OUTPATIENT
Start: 2024-01-13 | End: 2024-01-20

## 2024-01-13 RX ORDER — VANCOMYCIN 1 G/200ML
1 INJECTION, SOLUTION INTRAVENOUS EVERY 24 HOURS
Status: DISCONTINUED | OUTPATIENT
Start: 2024-01-13 | End: 2024-01-15

## 2024-01-13 RX ADMIN — PIPERACILLIN SODIUM AND TAZOBACTAM SODIUM 2.25 G: 2; .25 INJECTION, SOLUTION INTRAVENOUS at 23:12

## 2024-01-13 RX ADMIN — OXYCODONE AND ACETAMINOPHEN 1 TABLET: 5; 325 TABLET ORAL at 23:43

## 2024-01-13 RX ADMIN — METOPROLOL TARTRATE 25 MG: 25 TABLET, FILM COATED ORAL at 21:35

## 2024-01-13 RX ADMIN — AMLODIPINE BESYLATE 2.5 MG: 2.5 TABLET ORAL at 09:24

## 2024-01-13 RX ADMIN — FLUOXETINE HYDROCHLORIDE 40 MG: 20 CAPSULE ORAL at 09:25

## 2024-01-13 RX ADMIN — PIPERACILLIN SODIUM AND TAZOBACTAM SODIUM 2.25 G: 2; .25 INJECTION, SOLUTION INTRAVENOUS at 12:00

## 2024-01-13 RX ADMIN — DAPAGLIFLOZIN 10 MG: 10 TABLET, FILM COATED ORAL at 09:24

## 2024-01-13 RX ADMIN — PILOCARPINE HYDROCHLORIDE 5 MG: 5 TABLET, FILM COATED ORAL at 15:21

## 2024-01-13 RX ADMIN — VANCOMYCIN 1 G: 1 INJECTION, SOLUTION INTRAVENOUS at 13:36

## 2024-01-13 RX ADMIN — ASPIRIN 81 MG: 81 TABLET, COATED ORAL at 09:24

## 2024-01-13 RX ADMIN — PILOCARPINE HYDROCHLORIDE 5 MG: 5 TABLET, FILM COATED ORAL at 21:35

## 2024-01-13 RX ADMIN — FUROSEMIDE 20 MG: 20 TABLET ORAL at 09:24

## 2024-01-13 RX ADMIN — ACETAMINOPHEN 650 MG: 325 TABLET ORAL at 13:40

## 2024-01-13 RX ADMIN — OXYCODONE AND ACETAMINOPHEN 1 TABLET: 5; 325 TABLET ORAL at 17:39

## 2024-01-13 RX ADMIN — PILOCARPINE HYDROCHLORIDE 5 MG: 5 TABLET, FILM COATED ORAL at 09:25

## 2024-01-13 RX ADMIN — PIPERACILLIN SODIUM AND TAZOBACTAM SODIUM 2.25 G: 2; .25 INJECTION, SOLUTION INTRAVENOUS at 17:36

## 2024-01-13 RX ADMIN — TRAMADOL HYDROCHLORIDE 50 MG: 50 TABLET ORAL at 11:55

## 2024-01-13 RX ADMIN — HEPARIN SODIUM 5000 UNITS: 5000 INJECTION, SOLUTION INTRAVENOUS; SUBCUTANEOUS at 06:25

## 2024-01-13 RX ADMIN — HEPARIN SODIUM 5000 UNITS: 5000 INJECTION, SOLUTION INTRAVENOUS; SUBCUTANEOUS at 17:39

## 2024-01-13 RX ADMIN — ATORVASTATIN CALCIUM 20 MG: 20 TABLET, FILM COATED ORAL at 21:35

## 2024-01-13 RX ADMIN — FAMOTIDINE 20 MG: 20 TABLET, FILM COATED ORAL at 09:24

## 2024-01-13 RX ADMIN — INSULIN LISPRO 6 UNITS: 100 INJECTION, SOLUTION INTRAVENOUS; SUBCUTANEOUS at 13:29

## 2024-01-13 RX ADMIN — METOPROLOL TARTRATE 25 MG: 25 TABLET, FILM COATED ORAL at 09:24

## 2024-01-13 RX ADMIN — INSULIN GLARGINE 20 UNITS: 100 INJECTION, SOLUTION SUBCUTANEOUS at 21:35

## 2024-01-13 RX ADMIN — INSULIN LISPRO 3 UNITS: 100 INJECTION, SOLUTION INTRAVENOUS; SUBCUTANEOUS at 09:21

## 2024-01-13 ASSESSMENT — PAIN SCALES - GENERAL
PAINLEVEL_OUTOF10: 8
PAINLEVEL_OUTOF10: 0 - NO PAIN
PAINLEVEL_OUTOF10: 6
PAINLEVEL_OUTOF10: 8
PAINLEVEL_OUTOF10: 0 - NO PAIN
PAINLEVEL_OUTOF10: 8

## 2024-01-13 ASSESSMENT — COGNITIVE AND FUNCTIONAL STATUS - GENERAL
MOVING TO AND FROM BED TO CHAIR: A LOT
HELP NEEDED FOR BATHING: A LITTLE
HELP NEEDED FOR BATHING: A LITTLE
CLIMB 3 TO 5 STEPS WITH RAILING: TOTAL
MOBILITY SCORE: 13
WALKING IN HOSPITAL ROOM: TOTAL
TOILETING: A LITTLE
DRESSING REGULAR LOWER BODY CLOTHING: A LITTLE
STANDING UP FROM CHAIR USING ARMS: A LOT
PERSONAL GROOMING: A LITTLE
MOVING TO AND FROM BED TO CHAIR: A LOT
MOVING FROM LYING ON BACK TO SITTING ON SIDE OF FLAT BED WITH BEDRAILS: A LITTLE
STANDING UP FROM CHAIR USING ARMS: A LOT
DRESSING REGULAR UPPER BODY CLOTHING: A LITTLE
DRESSING REGULAR UPPER BODY CLOTHING: A LITTLE
TURNING FROM BACK TO SIDE WHILE IN FLAT BAD: A LOT
DRESSING REGULAR LOWER BODY CLOTHING: A LITTLE
PERSONAL GROOMING: A LITTLE
CLIMB 3 TO 5 STEPS WITH RAILING: TOTAL
WALKING IN HOSPITAL ROOM: TOTAL
DAILY ACTIVITIY SCORE: 19
MOBILITY SCORE: 11
TOILETING: A LITTLE
TURNING FROM BACK TO SIDE WHILE IN FLAT BAD: A LITTLE
DAILY ACTIVITIY SCORE: 19

## 2024-01-13 ASSESSMENT — PAIN - FUNCTIONAL ASSESSMENT
PAIN_FUNCTIONAL_ASSESSMENT: 0-10

## 2024-01-13 ASSESSMENT — PAIN SCALES - WONG BAKER: WONGBAKER_NUMERICALRESPONSE: HURTS LITTLE BIT

## 2024-01-13 ASSESSMENT — ENCOUNTER SYMPTOMS
FEVER: 0
BACK PAIN: 1
CHILLS: 0

## 2024-01-13 NOTE — PROGRESS NOTES
Carrie Rosario is a 83 y.o. female on day 1 of admission presenting with Compression fracture of L1 lumbar vertebra, closed, initial encounter (CMS/[facility]).      Subjective   Patient seen and examined. Resting in bed. Family at the bedside. States she has significant back pain, current medication not helping enough. Denies any fevers or chills.        Objective     Last Recorded Vitals  /58 (BP Location: Left arm, Patient Position: Lying)   Pulse 76   Temp 36.4 °C (97.5 °F) (Oral)   Resp 17   Wt 68 kg (150 lb)   SpO2 97%   Intake/Output last 3 Shifts:    Intake/Output Summary (Last 24 hours) at 1/13/2024 1429  Last data filed at 1/13/2024 1300  Gross per 24 hour   Intake --   Output 3 ml   Net -3 ml       Admission Weight  Weight: 68 kg (150 lb) (01/11/24 1251)    Daily Weight  01/11/24 : 68 kg (150 lb)    Image Results  MR lumbar spine wo IV contrast  Narrative: Interpreted By:  Pankaj Wisdom,   STUDY:  MR LUMBAR SPINE WO IV CONTRAST; ;  1/13/2024 7:34 am      INDICATION:  Signs/Symptoms:Spontaneous L1 fracture.      COMPARISON:  CT 01/11/2024      ACCESSION NUMBER(S):  [id number]      ORDERING CLINICIAN:  ROBBI PINEDA      TECHNIQUE:  Multiplanar multisequence MRI obtained of the lumbar spine          FINDINGS:  L1 vertebral body demonstrates diffuse marrow edema in particular  along the inferior endplate with more focal fluid signal intensity  with T2 weighted hyperintense appearance demonstrated with central  endplate depression along the inferior endplate of the L1 vertebral  body. Corresponding CT demonstrates resorption of the trabecular and  cortical margin along the inferior endplate as well as extending to  portions of the anterior and posteroinferior margin of the L1  vertebral body with mild retropulsion of the posterior margin of L1.  This is not the typical appearance or presentation of the fracture  line given the configuration. Also, more focal fluid signal intensity  demonstrated  within the disc space posteriorly is noted.  Corresponding superior endplate of the L2 vertebral body demonstrates  endplate reactive marrow edema with cortical erosion and  corresponding mild reactive marrow edema along the superior endplate  of L2. This constellation of the findings raises suspicion for  discitis osteomyelitis. While, component of the findings along the  inferior endplate of L1 may reflect sequela of fracture the  resorption within the vertebral body is atypical. There is diffuse  surrounding paravertebral edema tracking within the psoas musculature  bilaterally. There is more focal heterogeneous T2 weighted  hyperintense collection along the left lateral margin of the L1  vertebral body about the inferior endplate. This extends into the  adjacent psoas muscle with intramuscular well-marginated fluid  collection with T2 weighted hyperintense heterogeneous appearance.  This suggest component of intramuscular abscess formation. This  collection measures 1.8 x 1.3 x 1.9 cm in the AP, transverse and  craniocaudal dimension respectively. The edema tracks from the level  of the L1 vertebral body level caudally to the L3 vertebral body  level bilaterally. The edema tracks into the neural foramina  bilaterally.      Thecal sac L1/2 level demonstrates no focal narrowing. There is no  epidural fluid collection or definite epidural abscess formation.  However, the cortical irregularity along the posteroinferior margin  of the L1 vertebral body is of concern.      Postsurgical changes are demonstrated with spinal fusion from the L3  through S1 levels bilaterally with bilateral pedicle screws and rods  in place. There is laminectomy changes from L4 through S1.      Evaluation of spinal levels are as follows:      T12/L1 demonstrates no narrowing of the thecal sac or foramina      L1/2 demonstrates mild retropulsion of the posteroinferior margin of  the L1 vertebral body measuring 2 mm. There is no narrowing  thecal  sac. Ligamentum flavum hypertrophy demonstrated at this level. Neural  foramina demonstrate mild bilateral narrowing.      L2/3 demonstrates diffuse circumferential disc bulge seen in  conjunction with component of anterolisthesis measuring 3 mm. These  findings in conjunction with severe facet hypertrophic change  contributing to severe narrowing of the thecal sac at this level.  Foramina demonstrate moderate bilateral narrowing      There is severe narrowing of the thecal sac posterior to the L3  vertebral body extending from the superior endplate through to the  inferior aspect of the vertebral body.      L3/4 demonstrates thecal sac to be decompressed. Foramina demonstrate  moderate right and mild left foraminal narrowing      L4/5 demonstrates thecal sac to be decompressed there is  anterolisthesis at this level. Foramina demonstrate mild right  foraminal narrowing. Left foramina is unremarkable      L5/S1 demonstrates thecal sac to be decompressed. Foramina  demonstrate mild right foraminal narrowing. Left foramina unremarkable                      Impression: 1. Findings raising suspicion for discitis osteomyelitis at the L1/2  intervertebral disc level with erosive changes along the inferior  endplate of L1 and superior endplate of L2 as described above. There  is more focal fluid signal intensity in the disc space as well as  within the inferior endplate of the L1 vertebral body with  corresponding resorption of the cortical and trabecular margin as  seen on the CT. While, superimposed component of fracture along the  inferior endplate of the L1 is a possibility previously described  findings are likely secondary to postinfectious etiology given the  configuration appearance.      2. Generalized edema in the paravertebral location with more focal  paravertebral intramuscular abscess location within the left psoas  muscle about the inferior endplate of the L1 vertebral body measuring  1.8 x 1.3 x 1.9  cm.      3. No epidural abscess formation is demonstrated.      4. Severe narrowing of the thecal sac at L2/3 extending into the  inferior margin of the L3 vertebral body in relation to  anterolisthesis, disc, and facet hypertrophic change.      Findings phoned to patient's nurse at the time of interpretation on  01/13/2024 at 7:58 a.m..          MACRO:  Critical Finding:  See findings. Notification was initiated on  1/13/2024 at 7:58 am by  Pankaj Wisdom.  (**-OCF-**) Instructions:      Signed by: Pankaj Wisdom 1/13/2024 8:03 AM  Dictation workstation:   [id number]      Physical Exam    General: Alert and oriented x3, pleasant.   Cardiac: Regular rate and rhythm, S1/S2 , no murmur.   Pulmonary: Clear to auscultation on room air.   Abdomen: Soft, round, nontender. BS +x4.   Extremities: No edema. R foot drop present.   Skin: No rashes. Open ulceration to the ball of the R foot below the R great toe.    Relevant Results    Scheduled medications  amLODIPine, 2.5 mg, oral, Daily  aspirin, 81 mg, oral, Daily  atorvastatin, 20 mg, oral, Nightly  dapagliflozin propanediol, 10 mg, oral, q AM  famotidine, 20 mg, oral, Daily  FLUoxetine, 40 mg, oral, Daily  furosemide, 20 mg, oral, Daily  heparin (porcine), 5,000 Units, subcutaneous, q12h  insulin glargine, 20 Units, subcutaneous, q24h  insulin lispro, 0-15 Units, subcutaneous, TID with meals  metoprolol tartrate, 25 mg, oral, BID  pilocarpine, 5 mg, oral, TID  piperacillin-tazobactam, 2.25 g, intravenous, q6h  potassium chloride CR, 10 mEq, oral, Daily  vancomycin (Xellia) 1 g in 200 mL, 1 g, intravenous, q24h      Continuous medications     PRN medications  PRN medications: acetaminophen, dextrose 10 % in water (D10W), dextrose, glucagon, melatonin, oxyCODONE-acetaminophen, polyethylene glycol, tiZANidine, traMADol     Results for orders placed or performed during the hospital encounter of 01/11/24 (from the past 24 hour(s))   POCT GLUCOSE   Result Value Ref Range     POCT Glucose 195 (H) 74 - 99 mg/dL   POCT GLUCOSE   Result Value Ref Range    POCT Glucose 268 (H) 74 - 99 mg/dL   POCT GLUCOSE   Result Value Ref Range    POCT Glucose 156 (H) 74 - 99 mg/dL   Sedimentation rate, automated   Result Value Ref Range    Sedimentation Rate 79 (H) 0 - 30 mm/h   C-reactive protein   Result Value Ref Range    C-Reactive Protein 2.60 (H) 0.00 - 2.00 mg/dL   POCT GLUCOSE   Result Value Ref Range    POCT Glucose 208 (H) 74 - 99 mg/dL           Assessment/Plan      Discitis / OM / L Psoas Abscess / Acute Compression Fracture L1 / Inability to Ambulate  -Ortho Spine and ID on board.   -CT lumbar spine shows acute compression fracture L1 without significant height loss.   -MRI Lumbar spine showed discitis/OM L1-2 with intramuscular abscess of the L psoas muscle. No epidural abscess seen.   -Possible need for aspiration and wound culture? Will see input from consultants.   -Steroids stopped with new findings on the MRI.   -Broad spectrum IV abx. Started vanco and zosyn.   -PRN pain medication and muscle relaxers. Will add percocet to see if it helps her pain more.   -PT/OT recommending moderate intensity therapy.    -OOB with assist.   -Fall precautions.      R Foot Ulceration   -States she was scheduled to have surgery with Dr. Kennedy on 1/16.   -Wound RN saw, buttocks wound healed. Continue to keep foot wound clean and dry, gauze between the toes.   -Wound culture ordered from the R foot wound.   -Consulted podiatry.      DM 2  -HgA1c 8.9.  -On tresiba at home, will sub lantus.   -SSI coverage.  -Monitor blood sugars.      HTN  -Continue home meds.   -BP currently stable, monitor.      CAD  -Continue core meds.      HLD  -Continue statin.      DVT Risk  -Heparin subcutaneous.   -SCDs.      Plan   Ortho spine and ID following.   Started on Vanco and Zosyn.  Stop steroids.   Wound culture from the R foot wound to be obtained.   Blood cultures pending.    Pain control, add percocet.   PT/OT  recommending moderate intensity therapy, will likely need SNF.         Georgia Jansen, APRN-CNP

## 2024-01-13 NOTE — CONSULTS
Inpatient consult to Infectious Diseases  Consult performed by: Nav Conway MD  Consult ordered by: Georgia Jansen, APRN-CNP          Primary MD: Brian Youngblood MD    Reason For Consult  L1/L2 discitis/left psoas abscess    History Of Present Illness  Carrie Rosario is a 83 y.o. female presenting with low back pain.  Onset was about 3 days prior to presentation, gradual, constant, with interval worsening.  She has a background history of spine surgery many years ago.  She denies any fever or chills.  Her back pain was moderate to severe.  She came to the hospital for further evaluation and management.  She had an MRI of her spine which was remarkable for L1/L2 disc space infection with left psoas abscess.  She is vancomycin and Zosyn     Past Medical History  She has a past medical history of Arthritis, Cardiac arrest (CMS/[facility]), CVA (cerebral vascular accident) (CMS/[facility]), Diabetes mellitus (CMS/[facility]), GERD (gastroesophageal reflux disease), Heart disease, Hypercholesterolemia, Hypertension, Right foot drop, Right foot ulcer (CMS/[facility]), Spinal stenosis, and STEMI (ST elevation myocardial infarction) (CMS/[facility]).    Surgical History  She has a past surgical history that includes Other surgical history (04/25/2022); Other surgical history (04/25/2022); Other surgical history (04/25/2022); Other surgical history (04/25/2022); Other surgical history (04/25/2022); MR angio head wo IV contrast (12/28/2021); MR angio neck wo IV contrast (12/28/2021); Coronary angioplasty with stent (12/25/2021); and Foot surgery.     Social History     Occupational History    Not on file   Tobacco Use    Smoking status: Never    Smokeless tobacco: Never   Substance and Sexual Activity    Alcohol use: Never    Drug use: Not Currently    Sexual activity: Not on file     Travel History   Travel since 12/13/23    No documented travel since 12/13/23           Family History  Family History   Problem Relation Name Age of Onset     Diabetes Mother      Diabetes Father      No Known Problems Daughter      No Known Problems Daughter       Allergies  Nickel and Sulfa (sulfonamide antibiotics)     Immunization History   Administered Date(s) Administered    Flu vaccine, quadrivalent, high-dose, preservative free, age 65y+ (FLUZONE) 10/01/2020    Influenza, High Dose Seasonal, Preservative Free 09/29/2016, 10/04/2017, 09/30/2019    Influenza, Seasonal, Quadrivalent, Adjuvanted 11/04/2021, 10/02/2023    Influenza, Unspecified 10/24/2018, 10/10/2022    Influenza, seasonal, injectable 11/10/2015    Moderna COVID-19 vaccine, Fall 2023, 12 yeasrs and older (50mcg/0.5mL) 10/02/2023    Moderna SARS-CoV-2 Vaccination 02/01/2021, 03/01/2021, 11/04/2021    Pfizer COVID-19 vaccine, bivalent, age 12 years and older (30 mcg/0.3 mL) 10/10/2022    Pneumococcal conjugate vaccine, 13-valent (PREVNAR 13) 11/10/2015    Pneumococcal polysaccharide vaccine, 23-valent, age 2 years and older (PNEUMOVAX 23) 11/14/2006, 11/29/2018    Tdap vaccine, age 7 year and older (BOOSTRIX) 08/02/2013    Zoster, live 09/29/2016     Medications  Home medications:  Medications Prior to Admission   Medication Sig Dispense Refill Last Dose    acetaminophen (Tylenol) 325 mg tablet Take 2 tablets (650 mg) by mouth every 4 hours if needed for fever (temp greater than 38.0 C) (greater than or equal to 38 C). 30 tablet 0     amLODIPine (Norvasc) 2.5 mg tablet Take 1 tablet (2.5 mg) by mouth once daily.       aspirin 81 mg EC tablet Take 1 tablet (81 mg) by mouth once daily.       atorvastatin (Lipitor) 20 mg tablet Take 1 tablet (20 mg) by mouth once daily.       cholecalciferol, vitamin D3, (VITAMIN D3 ORAL) daily, 0 Refill(s), Type: Maintenance       dapagliflozin propanediol (Farxiga) 10 mg Take 1 tablet (10 mg) by mouth once daily in the morning. Hold if not eating or drinking normally. 90 tablet 3     famotidine (Pepcid) 20 mg tablet take 1 tablet by mouth once daily 90 tablet 3      FLUoxetine (PROzac) 40 mg capsule take 1 capsule by mouth once daily 90 capsule 3     FreeStyle Brandon reader (FreeStyle Brandon 2 Valentines) misc FREE STYLE BRANDON METER, See Instructions, Instructions: USE AS DIRECTED TO CHECK BLOOD SUGAR  E11.9, Supply, # 1 EA, 0 Refill(s), Type: Maintenance, Pharmacy: [facility] #[id number], USE AS DIRECTED TO CHECK BLOOD SUGAR; E11.9, 61, in, 10/24/22 12:59:00 EDT, Height Measured, 147, lb, 10/24/22 12:59:00 EDT, Weight Measured       FreeStyle Brandon sensor system (FreeStyle Brandon 2 Sensor) kit FREE STYLE BRANDON SENSORS, See Instructions, Instructions: USE AS DIRECTED TO CHECK BLOOD SUGARS RELPACE EVERY 14 DAYS  E11.9, Supply, # 2 EA, 11 Refill(s), Type: Maintenance, Pharmacy: [facility] #[id number], USE AS DIRECTED TO CHECK BLOOD SUGARS RELPACE EVERY 14 DAYS; E11.9, 61, in, 10/24/22 12:59:00 EDT, Height Measured, 147, lb, 10/24/22 12:59:00 EDT, Weight Measured       FreeStyle Lite Strips strip free style brandon test strips, See Instructions, Instructions: use as directed to check blood sugar.  e11.9, Supply, # 100 EA, 1 Refill(s), Type: Maintenance, Pharmacy: [facility] #[id number], use as directed to check blood sugar. e11.9, 61, in, 10/24/22 12:59:00 EDT, Height Measured, 147, lb, 10/24/22 12:59:00 EDT, Weight Measured       furosemide (Lasix) 20 mg tablet take 1 tablet by mouth once daily 90 tablet 3     insulin aspart (NovoLOG) 100 unit/mL (3 mL) pen Inject 6-10 units under the skin every night before dinner meal (Patient taking differently: 6 Units. Inject 6-10 units under the skin every night before dinner meal) 15 mL 3     insulin degludec (Tresiba FlexTouch U-100) 100 unit/mL (3 mL) injection INJECT 20 UNITS SUBCUTANEOUSLY ONE TIME DAILY AS DIRECTED. 15 mL 3     melatonin 3 mg tablet Take 1 tablet (3 mg) by mouth as needed at bedtime.       menthol-zinc oxide (Calmoseptine) 0.44-20.6 % ointment Calmoseptine 0.44-20.6 % External Ointment   Refills: 0       Active       metoprolol tartrate  "(Lopressor) 25 mg tablet Take 1 tablet (25 mg) by mouth 2 times a day. 60 tablet 11     metoprolol tartrate (Lopressor) 50 mg tablet take 1 tablet by mouth twice a day 180 tablet 1     mirabegron (Myrbetriq) 25 mg tablet extended release 24 hr 24 hr tablet Take 1 tablet (25 mg) by mouth once daily. 90 tablet 1     pen needle, diabetic 31 gauge x 5/16\" needle BD UF Pen needles short 31x8, See Instructions, Instructions: Use TID, Supply, # 100 EA, 3 Refill(s), Type: Maintenance, Pharmacy: [address], Use TID, 62, in, 05/18/22 15:18:00 EDT, Height Measured, 140, lb, 05/18/22 15:18:00 EDT, Weight Measured       pilocarpine (Salagen) 5 mg tablet Take 1 tablet (5 mg) by mouth 2 times a day. (Patient taking differently: Take 1 tablet (5 mg) by mouth 3 times a day.)       potassium chloride CR 10 mEq ER tablet take 1 tablet by mouth once daily 90 tablet 1      Current medications:  Scheduled medications  amLODIPine, 2.5 mg, oral, Daily  aspirin, 81 mg, oral, Daily  atorvastatin, 20 mg, oral, Nightly  dapagliflozin propanediol, 10 mg, oral, q AM  famotidine, 20 mg, oral, Daily  FLUoxetine, 40 mg, oral, Daily  furosemide, 20 mg, oral, Daily  heparin (porcine), 5,000 Units, subcutaneous, q12h  insulin glargine, 20 Units, subcutaneous, q24h  insulin lispro, 0-15 Units, subcutaneous, TID with meals  metoprolol tartrate, 25 mg, oral, BID  pilocarpine, 5 mg, oral, TID  piperacillin-tazobactam, 2.25 g, intravenous, q6h  potassium chloride CR, 10 mEq, oral, Daily  vancomycin (Xellia) 1 g in 200 mL, 1 g, intravenous, q24h      Continuous medications     PRN medications  PRN medications: acetaminophen, dextrose 10 % in water (D10W), dextrose, glucagon, melatonin, polyethylene glycol, tiZANidine, traMADol    Review of Systems   Constitutional:  Negative for chills and fever.   Musculoskeletal:  Positive for back pain.        Objective  Range of Vitals (last 24 hours)  Heart Rate:  [71-85]   Temp:  [36.4 °C (97.5 " °F)-36.7 °C (98.1 °F)]   Resp:  [16-18]   BP: (115-128)/(52-75)   SpO2:  [94 %-97 %]   Daily Weight  01/11/24 : 68 kg (150 lb)    Body mass index is 24.96 kg/m².     Physical Exam  Constitutional:       Appearance: Normal appearance.   HENT:      Head: Normocephalic and atraumatic.      Nose: Nose normal.   Eyes:      Extraocular Movements: Extraocular movements intact.      Conjunctiva/sclera: Conjunctivae normal.   Cardiovascular:      Rate and Rhythm: Regular rhythm.      Heart sounds: Normal heart sounds.   Pulmonary:      Breath sounds: Normal breath sounds.   Abdominal:      General: Bowel sounds are normal.      Palpations: Abdomen is soft.   Musculoskeletal:         General: Normal range of motion.      Cervical back: Normal range of motion and neck supple.   Skin:     General: Skin is warm and dry.   Neurological:      Mental Status: She is alert and oriented to person, place, and time.   Psychiatric:         Mood and Affect: Mood normal.         Behavior: Behavior normal.          Relevant Results  Outside Hospital Results    Labs  Results from last 72 hours   Lab Units 01/12/24  0415 01/11/24  1907   WBC AUTO x10*3/uL 10.9 10.3   HEMOGLOBIN g/dL 12.9 13.4   HEMATOCRIT % 39.5 41.2   PLATELETS AUTO x10*3/uL 302 334     Results from last 72 hours   Lab Units 01/12/24  0415 01/11/24  1907   SODIUM mmol/L 136 137   POTASSIUM mmol/L 4.6 4.2   CHLORIDE mmol/L 100 99   CO2 mmol/L 24 26   BUN mg/dL 42* 39*   CREATININE mg/dL 1.30 1.20   GLUCOSE mg/dL 270* 193*   CALCIUM mg/dL 9.6 9.9   ANION GAP mmol/L 12 12   EGFR mL/min/1.73m*2 41* 45*         Estimated Creatinine Clearance: 30 mL/min (by C-G formula based on SCr of 1.3 mg/dL).  C-Reactive Protein   Date Value Ref Range Status   01/13/2024 2.60 (H) 0.00 - 2.00 mg/dL Final   10/11/2023 8.90 (H) 0.00 - 2.00 mg/dL Final     CRP   Date Value Ref Range Status   08/18/2021 3.1 (H) 0 - 2.0 MG/DL Final     Comment:     Performed at [facility] [address] [facility]  "[id number]     Sedimentation Rate   Date Value Ref Range Status   01/13/2024 79 (H) 0 - 30 mm/h Final   10/11/2023 24 0 - 30 mm/h Final     No results found for: \"HIV1X2\", \"HIVCONF\", \"RTCEQA3PX\"  No results found for: \"HEPCABINIT\", \"HEPCAB\", \"HCVPCRQUANT\"  Microbiology  Reviewed  Imaging  MR lumbar spine wo IV contrast    Result Date: 1/13/2024  Interpreted By:  Pankaj Wisdom, STUDY: MR LUMBAR SPINE WO IV CONTRAST; ;  1/13/2024 7:34 am   INDICATION: Signs/Symptoms:Spontaneous L1 fracture.   COMPARISON: CT 01/11/2024   ACCESSION NUMBER(S): [id number]   ORDERING CLINICIAN: ROBBI PINEDA   TECHNIQUE: Multiplanar multisequence MRI obtained of the lumbar spine     FINDINGS: L1 vertebral body demonstrates diffuse marrow edema in particular along the inferior endplate with more focal fluid signal intensity with T2 weighted hyperintense appearance demonstrated with central endplate depression along the inferior endplate of the L1 vertebral body. Corresponding CT demonstrates resorption of the trabecular and cortical margin along the inferior endplate as well as extending to portions of the anterior and posteroinferior margin of the L1 vertebral body with mild retropulsion of the posterior margin of L1. This is not the typical appearance or presentation of the fracture line given the configuration. Also, more focal fluid signal intensity demonstrated within the disc space posteriorly is noted. Corresponding superior endplate of the L2 vertebral body demonstrates endplate reactive marrow edema with cortical erosion and corresponding mild reactive marrow edema along the superior endplate of L2. This constellation of the findings raises suspicion for discitis osteomyelitis. While, component of the findings along the inferior endplate of L1 may reflect sequela of fracture the resorption within the vertebral body is atypical. There is diffuse surrounding paravertebral edema tracking within the psoas musculature bilaterally. There " is more focal heterogeneous T2 weighted hyperintense collection along the left lateral margin of the L1 vertebral body about the inferior endplate. This extends into the adjacent psoas muscle with intramuscular well-marginated fluid collection with T2 weighted hyperintense heterogeneous appearance. This suggest component of intramuscular abscess formation. This collection measures 1.8 x 1.3 x 1.9 cm in the AP, transverse and craniocaudal dimension respectively. The edema tracks from the level of the L1 vertebral body level caudally to the L3 vertebral body level bilaterally. The edema tracks into the neural foramina bilaterally.   Thecal sac L1/2 level demonstrates no focal narrowing. There is no epidural fluid collection or definite epidural abscess formation. However, the cortical irregularity along the posteroinferior margin of the L1 vertebral body is of concern.   Postsurgical changes are demonstrated with spinal fusion from the L3 through S1 levels bilaterally with bilateral pedicle screws and rods in place. There is laminectomy changes from L4 through S1.   Evaluation of spinal levels are as follows:   T12/L1 demonstrates no narrowing of the thecal sac or foramina   L1/2 demonstrates mild retropulsion of the posteroinferior margin of the L1 vertebral body measuring 2 mm. There is no narrowing thecal sac. Ligamentum flavum hypertrophy demonstrated at this level. Neural foramina demonstrate mild bilateral narrowing.   L2/3 demonstrates diffuse circumferential disc bulge seen in conjunction with component of anterolisthesis measuring 3 mm. These findings in conjunction with severe facet hypertrophic change contributing to severe narrowing of the thecal sac at this level. Foramina demonstrate moderate bilateral narrowing   There is severe narrowing of the thecal sac posterior to the L3 vertebral body extending from the superior endplate through to the inferior aspect of the vertebral body.   L3/4 demonstrates  thecal sac to be decompressed. Foramina demonstrate moderate right and mild left foraminal narrowing   L4/5 demonstrates thecal sac to be decompressed there is anterolisthesis at this level. Foramina demonstrate mild right foraminal narrowing. Left foramina is unremarkable   L5/S1 demonstrates thecal sac to be decompressed. Foramina demonstrate mild right foraminal narrowing. Left foramina unremarkable               1. Findings raising suspicion for discitis osteomyelitis at the L1/2 intervertebral disc level with erosive changes along the inferior endplate of L1 and superior endplate of L2 as described above. There is more focal fluid signal intensity in the disc space as well as within the inferior endplate of the L1 vertebral body with corresponding resorption of the cortical and trabecular margin as seen on the CT. While, superimposed component of fracture along the inferior endplate of the L1 is a possibility previously described findings are likely secondary to postinfectious etiology given the configuration appearance.   2. Generalized edema in the paravertebral location with more focal paravertebral intramuscular abscess location within the left psoas muscle about the inferior endplate of the L1 vertebral body measuring 1.8 x 1.3 x 1.9 cm.   3. No epidural abscess formation is demonstrated.   4. Severe narrowing of the thecal sac at L2/3 extending into the inferior margin of the L3 vertebral body in relation to anterolisthesis, disc, and facet hypertrophic change.   Findings phoned to patient's nurse at the time of interpretation on 01/13/2024 at 7:58 a.m..     MACRO: Critical Finding:  See findings. Notification was initiated on 1/13/2024 at 7:58 am by  Pankaj Wisdom.  (**-OCF-**) Instructions:   Signed by: Pankaj Wisdom 1/13/2024 8:03 AM Dictation workstation:   [id number]    CT lumbar spine wo IV contrast    Result Date: 1/11/2024  Interpreted By:  Adrián Marcum, STUDY: CT LUMBAR SPINE WO IV  CONTRAST; 1/11/2024 3:47 pm   INDICATION: Signs/Symptoms:lower back pain;   COMPARISON: None   ACCESSION NUMBER(S): [id number]   ORDERING CLINICIAN: ALEX MORRISSEY   TECHNIQUE: Contiguous axial images of the lumbar spine were performed. Coronal and sagittal reformatted images were also obtained. All CT examinations are performed with 1 or more of the following dose reduction techniques: Automated exposure control, adjustment of mA and/or kv according to patient's size, or use of iterative reconstruction techniques.   FINDINGS: There is an acute appearing compression fracture along the inferior half of the L1 vertebral body with mild retropulsion of the inferior half of L1. No significant height loss is noted at L1. The remainder of the vertebral bodies appear adequate in height. There is a normal appearance of a posterior fusion at L3 through S1 with bilateral pedicle screws and rods which appear intact. Facet degenerative changes are present.   There is grade 1 anterolisthesis of L4 on L5 measuring approximately 8 mm.   There is severe hypertrophic facet degenerative change at L2-3 which are moderate-severely narrowing the neural foramina as well as moderate-severe spinal canal stenosis. Diffuse disc bulging also contributes to spinal canal and neural foramina stenosis at this level.   There is also a large osteophyte from severe hypertrophic facet degenerative change at L3 which is also causing moderate to moderate-severe spinal canal stenosis posterior to L3. The remainder of the levels do not show evidence for high additional high-grade thecal sac stenosis.   Additional levels of neural foramina stenosis are noted. There is moderate-severe right neural foramina stenosis at L4-5 and at least moderate right neural foramina stenosis at L5-S1.   The SI joints show degenerative gas but are otherwise intact.       Acute compression fracture along the inferior aspect of L1 vertebral body with mild retropulsion of the  inferior aspect of L1. No significant height loss is noted at L1.   No additional acute fractures are noted.   Stable appearance of posterior fusion with bilateral rods and pedicle screws at L3 through S1.   Chronic degenerative changes with high-grade spinal canal stenosis noted at L2-3 and L3-4. There is high-grade bilateral neural foramina stenosis at L2-3. There is moderate-severe right neural foramina stenosis at L4-5.     Signed by: Adrián Marcum 1/11/2024 4:46 PM Dictation workstation:   [id number]     Assessment/Plan   L1/L2 disc base infection  Left psoas abscess-most likely secondary to bacteremia    IV vancomycin  IV Zosyn  Blood cultures x 2  Interventional radiology consult for evaluation for possible aspiration  Pain management as needed  Supportive care  Monitor temperature and WBC      Nav Conway MD

## 2024-01-13 NOTE — PROGRESS NOTES
I reviewed the images of the MRI of the lumbar spine.  It demonstrates osteomyelitis and discitis at L1 L1-2 disc and L2 vertebrae.  There is a small left extension of infection as a psoas abscess.    There is also longstanding severe spinal stenosis at the proximal adjacent edge of her prior fusion, at L2-3.  There is no apparent involvement of the previous hardware L3-S1.  I have discussed these findings with KAYE Jansen.   The patient will need ID consult, and general antibiotic coverage.  Organism needs to be identified.  No surgical intervention is anticipated at this time

## 2024-01-13 NOTE — PROGRESS NOTES
"Vancomycin Dosing by Pharmacy- INITIAL    Carrie Rosario is a 83 y.o. year old female who Pharmacy has been consulted for vancomycin dosing for osteomyelitis/septic arthritis. Based on the patient's indication and renal status this patient will be dosed based on a goal AUC of 400-600.     Renal function is currently stable.    Visit Vitals  /75 (BP Location: Left arm, Patient Position: Lying)   Pulse 72   Temp 36.6 °C (97.9 °F) (Oral)   Resp 16        Lab Results   Component Value Date    CREATININE 1.30 01/12/2024    CREATININE 1.20 01/11/2024    CREATININE 1.40 11/02/2023    CREATININE 1.50 10/26/2023        Patient weight is 68kg    No results found for: \"CULTURE\"     No intake/output data recorded.  [unfilled]    Lab Results   Component Value Date    PATIENTTEMP CANCELLED BY MD 12/25/2021    PATIENTTEMP 37.0 12/25/2021    PATIENTTEMP 37.0 12/25/2021          Assessment/Plan     Patient will not be given a loading dose.  Will initiate vancomycin maintenance,  1000 mg every 24 hours.    This dosing regimen is predicted by [employer] to result in the following pharmacokinetic parameters:  Loading dose: N/A  Regimen: 1000 mg IV every 24 hours.  Start time: 10:24 on 01/13/2024  Exposure target: AUC24 (range)400-600 mg/L.hr   AUC24,ss: 539 mg/L.hr  Probability of AUC24 > 400: 82 %  Ctrough,ss: 17.4 mg/L  Probability of Ctrough,ss > 20: 35 %  Probability of nephrotoxicity (Lodise JOIE 2009): 13 %    Follow-up level will be ordered on 1/14 at 0500 unless clinically indicated sooner.  Will continue to monitor renal function daily while on vancomycin and order serum creatinine at least every 48 hours if not already ordered.  Follow for continued vancomycin needs, clinical response, and signs/symptoms of toxicity.       Telly Conti, PharmD       "

## 2024-01-13 NOTE — NURSING NOTE
Assumed care of patient, BSSR done, Asking for PRN pain med, will admin, no other needs at this time, call light within reach, will cont. To monitor.

## 2024-01-14 DIAGNOSIS — L97.509 TYPE 2 DIABETES MELLITUS WITH FOOT ULCER, WITHOUT LONG-TERM CURRENT USE OF INSULIN (MULTI): ICD-10-CM

## 2024-01-14 DIAGNOSIS — E11.621 TYPE 2 DIABETES MELLITUS WITH FOOT ULCER, WITHOUT LONG-TERM CURRENT USE OF INSULIN (MULTI): ICD-10-CM

## 2024-01-14 LAB
ANION GAP SERPL CALC-SCNC: 10 MMOL/L
BUN SERPL-MCNC: 38 MG/DL (ref 8–25)
CALCIUM SERPL-MCNC: 9.8 MG/DL (ref 8.5–10.4)
CHLORIDE SERPL-SCNC: 102 MMOL/L (ref 97–107)
CO2 SERPL-SCNC: 27 MMOL/L (ref 24–31)
CREAT SERPL-MCNC: 1.3 MG/DL (ref 0.4–1.6)
EGFRCR SERPLBLD CKD-EPI 2021: 41 ML/MIN/1.73M*2
ERYTHROCYTE [DISTWIDTH] IN BLOOD BY AUTOMATED COUNT: 12.2 % (ref 11.5–14.5)
GLUCOSE BLD MANUAL STRIP-MCNC: 138 MG/DL (ref 74–99)
GLUCOSE BLD MANUAL STRIP-MCNC: 147 MG/DL (ref 74–99)
GLUCOSE BLD MANUAL STRIP-MCNC: 202 MG/DL (ref 74–99)
GLUCOSE BLD MANUAL STRIP-MCNC: 89 MG/DL (ref 74–99)
GLUCOSE SERPL-MCNC: 86 MG/DL (ref 65–99)
HCT VFR BLD AUTO: 42.2 % (ref 36–46)
HGB BLD-MCNC: 13.7 G/DL (ref 12–16)
MCH RBC QN AUTO: 31.4 PG (ref 26–34)
MCHC RBC AUTO-ENTMCNC: 32.5 G/DL (ref 32–36)
MCV RBC AUTO: 97 FL (ref 80–100)
NRBC BLD-RTO: 0 /100 WBCS (ref 0–0)
PLATELET # BLD AUTO: 315 X10*3/UL (ref 150–450)
POTASSIUM SERPL-SCNC: 4.1 MMOL/L (ref 3.4–5.1)
RBC # BLD AUTO: 4.36 X10*6/UL (ref 4–5.2)
SODIUM SERPL-SCNC: 139 MMOL/L (ref 133–145)
VANCOMYCIN SERPL-MCNC: 10 UG/ML (ref 10–20)
WBC # BLD AUTO: 8.5 X10*3/UL (ref 4.4–11.3)

## 2024-01-14 PROCEDURE — 82947 ASSAY GLUCOSE BLOOD QUANT: CPT

## 2024-01-14 PROCEDURE — 1100000001 HC PRIVATE ROOM DAILY

## 2024-01-14 PROCEDURE — 36415 COLL VENOUS BLD VENIPUNCTURE: CPT | Performed by: NURSE PRACTITIONER

## 2024-01-14 PROCEDURE — 2500000004 HC RX 250 GENERAL PHARMACY W/ HCPCS (ALT 636 FOR OP/ED): Performed by: NURSE PRACTITIONER

## 2024-01-14 PROCEDURE — 94760 N-INVAS EAR/PLS OXIMETRY 1: CPT

## 2024-01-14 PROCEDURE — 85027 COMPLETE CBC AUTOMATED: CPT | Performed by: NURSE PRACTITIONER

## 2024-01-14 PROCEDURE — 2500000001 HC RX 250 WO HCPCS SELF ADMINISTERED DRUGS (ALT 637 FOR MEDICARE OP): Performed by: NURSE PRACTITIONER

## 2024-01-14 PROCEDURE — 82565 ASSAY OF CREATININE: CPT | Performed by: NURSE PRACTITIONER

## 2024-01-14 PROCEDURE — 2500000002 HC RX 250 W HCPCS SELF ADMINISTERED DRUGS (ALT 637 FOR MEDICARE OP, ALT 636 FOR OP/ED): Performed by: NURSE PRACTITIONER

## 2024-01-14 PROCEDURE — 80202 ASSAY OF VANCOMYCIN: CPT | Performed by: NURSE PRACTITIONER

## 2024-01-14 RX ADMIN — PIPERACILLIN SODIUM AND TAZOBACTAM SODIUM 2.25 G: 2; .25 INJECTION, SOLUTION INTRAVENOUS at 23:09

## 2024-01-14 RX ADMIN — FAMOTIDINE 20 MG: 20 TABLET, FILM COATED ORAL at 08:55

## 2024-01-14 RX ADMIN — PIPERACILLIN SODIUM AND TAZOBACTAM SODIUM 2.25 G: 2; .25 INJECTION, SOLUTION INTRAVENOUS at 05:20

## 2024-01-14 RX ADMIN — PILOCARPINE HYDROCHLORIDE 5 MG: 5 TABLET, FILM COATED ORAL at 15:20

## 2024-01-14 RX ADMIN — TRAMADOL HYDROCHLORIDE 50 MG: 50 TABLET ORAL at 22:55

## 2024-01-14 RX ADMIN — ASPIRIN 81 MG: 81 TABLET, COATED ORAL at 08:55

## 2024-01-14 RX ADMIN — PIPERACILLIN SODIUM AND TAZOBACTAM SODIUM 2.25 G: 2; .25 INJECTION, SOLUTION INTRAVENOUS at 17:15

## 2024-01-14 RX ADMIN — AMLODIPINE BESYLATE 2.5 MG: 2.5 TABLET ORAL at 08:55

## 2024-01-14 RX ADMIN — POTASSIUM CHLORIDE 10 MEQ: 750 TABLET, EXTENDED RELEASE ORAL at 08:55

## 2024-01-14 RX ADMIN — METOPROLOL TARTRATE 25 MG: 25 TABLET, FILM COATED ORAL at 08:55

## 2024-01-14 RX ADMIN — HEPARIN SODIUM 5000 UNITS: 5000 INJECTION, SOLUTION INTRAVENOUS; SUBCUTANEOUS at 05:20

## 2024-01-14 RX ADMIN — OXYCODONE AND ACETAMINOPHEN 1 TABLET: 5; 325 TABLET ORAL at 17:58

## 2024-01-14 RX ADMIN — ATORVASTATIN CALCIUM 20 MG: 20 TABLET, FILM COATED ORAL at 22:55

## 2024-01-14 RX ADMIN — FLUOXETINE HYDROCHLORIDE 40 MG: 20 CAPSULE ORAL at 08:55

## 2024-01-14 RX ADMIN — FUROSEMIDE 20 MG: 20 TABLET ORAL at 08:55

## 2024-01-14 RX ADMIN — VANCOMYCIN 1 G: 1 INJECTION, SOLUTION INTRAVENOUS at 12:31

## 2024-01-14 RX ADMIN — PIPERACILLIN SODIUM AND TAZOBACTAM SODIUM 2.25 G: 2; .25 INJECTION, SOLUTION INTRAVENOUS at 11:13

## 2024-01-14 RX ADMIN — METOPROLOL TARTRATE 25 MG: 25 TABLET, FILM COATED ORAL at 22:55

## 2024-01-14 RX ADMIN — PILOCARPINE HYDROCHLORIDE 5 MG: 5 TABLET, FILM COATED ORAL at 08:55

## 2024-01-14 RX ADMIN — HEPARIN SODIUM 5000 UNITS: 5000 INJECTION, SOLUTION INTRAVENOUS; SUBCUTANEOUS at 17:15

## 2024-01-14 RX ADMIN — INSULIN GLARGINE 20 UNITS: 100 INJECTION, SOLUTION SUBCUTANEOUS at 21:00

## 2024-01-14 RX ADMIN — PILOCARPINE HYDROCHLORIDE 5 MG: 5 TABLET, FILM COATED ORAL at 22:54

## 2024-01-14 RX ADMIN — OXYCODONE AND ACETAMINOPHEN 1 TABLET: 5; 325 TABLET ORAL at 05:20

## 2024-01-14 RX ADMIN — DAPAGLIFLOZIN 10 MG: 10 TABLET, FILM COATED ORAL at 08:55

## 2024-01-14 ASSESSMENT — COGNITIVE AND FUNCTIONAL STATUS - GENERAL
TURNING FROM BACK TO SIDE WHILE IN FLAT BAD: A LITTLE
PERSONAL GROOMING: A LITTLE
MOBILITY SCORE: 15
HELP NEEDED FOR BATHING: A LITTLE
DRESSING REGULAR LOWER BODY CLOTHING: A LITTLE
DRESSING REGULAR UPPER BODY CLOTHING: A LITTLE
DAILY ACTIVITIY SCORE: 19
WALKING IN HOSPITAL ROOM: A LOT
CLIMB 3 TO 5 STEPS WITH RAILING: A LOT
STANDING UP FROM CHAIR USING ARMS: A LOT
MOVING TO AND FROM BED TO CHAIR: A LOT
TOILETING: A LITTLE

## 2024-01-14 ASSESSMENT — PAIN SCALES - GENERAL
PAINLEVEL_OUTOF10: 1
PAINLEVEL_OUTOF10: 3
PAINLEVEL_OUTOF10: 5 - MODERATE PAIN

## 2024-01-14 ASSESSMENT — PAIN - FUNCTIONAL ASSESSMENT: PAIN_FUNCTIONAL_ASSESSMENT: 0-10

## 2024-01-14 NOTE — NURSING NOTE
Assumed care of patient, BSSR done, Family at bedside, no other needs at this time, call light within reach, will cont. To monitor.

## 2024-01-14 NOTE — PROGRESS NOTES
"Vancomycin Dosing by Pharmacy- FOLLOW UP    Carrie Rosario is a 83 y.o. year old female who Pharmacy has been consulted for vancomycin dosing for osteomyelitis/septic arthritis. Based on the patient's indication and renal status this patient is being dosed based on a goal AUC of 400-600.     Renal function is currently stable.    Current vancomycin dose: 1000 mg given every 24 hours    Estimated vancomycin AUC on current dose: 584 mg/L.hr     Visit Vitals  /62 (BP Location: Left arm, Patient Position: Lying)   Pulse 70   Temp 36.4 °C (97.5 °F) (Oral)   Resp 16        Lab Results   Component Value Date    CREATININE 1.30 01/14/2024    CREATININE 1.30 01/12/2024    CREATININE 1.20 01/11/2024    CREATININE 1.40 11/02/2023        Patient weight is No results found for: \"PTWEIGHT\"    No results found for: \"CULTURE\"     I/O last 3 completed shifts:  In: 200 (2.9 mL/kg) [IV Piggyback:200]  Out: 3 (0 mL/kg) [Urine:3 (0 mL/kg/hr)]  Weight: 68 kg   [unfilled]    Lab Results   Component Value Date    PATIENTTEMP CANCELLED BY MD 12/25/2021    PATIENTTEMP 37.0 12/25/2021    PATIENTTEMP 37.0 12/25/2021        Assessment/Plan    Within goal AUC range. Continue current vancomycin regimen.    This dosing regimen is predicted by [employer] to result in the following pharmacokinetic parameters:  Loading dose: N/A  Regimen: 1000 mg IV every 24 hours.  Start time: 12:31 on 01/15/2024  Exposure target: AUC24 (range)400-600 mg/L.hr   AUC24,ss: 584 mg/L.hr  Probability of AUC24 > 400: 96 %  Ctrough,ss: 18.5 mg/L  Probability of Ctrough,ss > 20: 39 %  Probability of nephrotoxicity (Lodise JOIE 2009): 15 %    The next level will be obtained on 1/17 at 0500. May be obtained sooner if clinically indicated.   Will continue to monitor renal function daily while on vancomycin and order serum creatinine at least every 48 hours if not already ordered.  Follow for continued vancomycin needs, clinical response, and signs/symptoms of toxicity. "       Telly Conti, PharmD

## 2024-01-14 NOTE — PROGRESS NOTES
Carrie Rosario is a 83 y.o. female on day 2 of admission presenting with Compression fracture of L1 lumbar vertebra, closed, initial encounter (CMS/[facility]).      Subjective   Patient seen and examined. Resting in bed. States pain with much better control today. Denies any fevers or chills.        Objective     Last Recorded Vitals  /63 (BP Location: Left arm, Patient Position: Lying)   Pulse 70   Temp 36.4 °C (97.5 °F) (Oral)   Resp 15   Wt 68 kg (150 lb)   SpO2 96%   Intake/Output last 3 Shifts:    Intake/Output Summary (Last 24 hours) at 1/14/2024 0835  Last data filed at 1/14/2024 0550  Gross per 24 hour   Intake 200 ml   Output 2 ml   Net 198 ml       Admission Weight  Weight: 68 kg (150 lb) (01/11/24 1251)    Daily Weight  01/11/24 : 68 kg (150 lb)    Image Results  MR lumbar spine wo IV contrast  Narrative: Interpreted By:  Pankaj Wisdom,   STUDY:  MR LUMBAR SPINE WO IV CONTRAST; ;  1/13/2024 7:34 am      INDICATION:  Signs/Symptoms:Spontaneous L1 fracture.      COMPARISON:  CT 01/11/2024      ACCESSION NUMBER(S):  [id number]      ORDERING CLINICIAN:  ROBBI PINEDA      TECHNIQUE:  Multiplanar multisequence MRI obtained of the lumbar spine          FINDINGS:  L1 vertebral body demonstrates diffuse marrow edema in particular  along the inferior endplate with more focal fluid signal intensity  with T2 weighted hyperintense appearance demonstrated with central  endplate depression along the inferior endplate of the L1 vertebral  body. Corresponding CT demonstrates resorption of the trabecular and  cortical margin along the inferior endplate as well as extending to  portions of the anterior and posteroinferior margin of the L1  vertebral body with mild retropulsion of the posterior margin of L1.  This is not the typical appearance or presentation of the fracture  line given the configuration. Also, more focal fluid signal intensity  demonstrated within the disc space posteriorly is  noted.  Corresponding superior endplate of the L2 vertebral body demonstrates  endplate reactive marrow edema with cortical erosion and  corresponding mild reactive marrow edema along the superior endplate  of L2. This constellation of the findings raises suspicion for  discitis osteomyelitis. While, component of the findings along the  inferior endplate of L1 may reflect sequela of fracture the  resorption within the vertebral body is atypical. There is diffuse  surrounding paravertebral edema tracking within the psoas musculature  bilaterally. There is more focal heterogeneous T2 weighted  hyperintense collection along the left lateral margin of the L1  vertebral body about the inferior endplate. This extends into the  adjacent psoas muscle with intramuscular well-marginated fluid  collection with T2 weighted hyperintense heterogeneous appearance.  This suggest component of intramuscular abscess formation. This  collection measures 1.8 x 1.3 x 1.9 cm in the AP, transverse and  craniocaudal dimension respectively. The edema tracks from the level  of the L1 vertebral body level caudally to the L3 vertebral body  level bilaterally. The edema tracks into the neural foramina  bilaterally.      Thecal sac L1/2 level demonstrates no focal narrowing. There is no  epidural fluid collection or definite epidural abscess formation.  However, the cortical irregularity along the posteroinferior margin  of the L1 vertebral body is of concern.      Postsurgical changes are demonstrated with spinal fusion from the L3  through S1 levels bilaterally with bilateral pedicle screws and rods  in place. There is laminectomy changes from L4 through S1.      Evaluation of spinal levels are as follows:      T12/L1 demonstrates no narrowing of the thecal sac or foramina      L1/2 demonstrates mild retropulsion of the posteroinferior margin of  the L1 vertebral body measuring 2 mm. There is no narrowing thecal  sac. Ligamentum flavum  hypertrophy demonstrated at this level. Neural  foramina demonstrate mild bilateral narrowing.      L2/3 demonstrates diffuse circumferential disc bulge seen in  conjunction with component of anterolisthesis measuring 3 mm. These  findings in conjunction with severe facet hypertrophic change  contributing to severe narrowing of the thecal sac at this level.  Foramina demonstrate moderate bilateral narrowing      There is severe narrowing of the thecal sac posterior to the L3  vertebral body extending from the superior endplate through to the  inferior aspect of the vertebral body.      L3/4 demonstrates thecal sac to be decompressed. Foramina demonstrate  moderate right and mild left foraminal narrowing      L4/5 demonstrates thecal sac to be decompressed there is  anterolisthesis at this level. Foramina demonstrate mild right  foraminal narrowing. Left foramina is unremarkable      L5/S1 demonstrates thecal sac to be decompressed. Foramina  demonstrate mild right foraminal narrowing. Left foramina unremarkable                      Impression: 1. Findings raising suspicion for discitis osteomyelitis at the L1/2  intervertebral disc level with erosive changes along the inferior  endplate of L1 and superior endplate of L2 as described above. There  is more focal fluid signal intensity in the disc space as well as  within the inferior endplate of the L1 vertebral body with  corresponding resorption of the cortical and trabecular margin as  seen on the CT. While, superimposed component of fracture along the  inferior endplate of the L1 is a possibility previously described  findings are likely secondary to postinfectious etiology given the  configuration appearance.      2. Generalized edema in the paravertebral location with more focal  paravertebral intramuscular abscess location within the left psoas  muscle about the inferior endplate of the L1 vertebral body measuring  1.8 x 1.3 x 1.9 cm.      3. No epidural  abscess formation is demonstrated.      4. Severe narrowing of the thecal sac at L2/3 extending into the  inferior margin of the L3 vertebral body in relation to  anterolisthesis, disc, and facet hypertrophic change.      Findings phoned to patient's nurse at the time of interpretation on  01/13/2024 at 7:58 a.m..          MACRO:  Critical Finding:  See findings. Notification was initiated on  1/13/2024 at 7:58 am by  Pankaj Wisdom.  (**-OCF-**) Instructions:      Signed by: Pankaj Wisdom 1/13/2024 8:03 AM  Dictation workstation:   [id number]      Physical Exam    General: Alert and oriented x3, pleasant.   Cardiac: Regular rate and rhythm, S1/S2 , no murmur.   Pulmonary: Clear to auscultation on room air.   Abdomen: Soft, round, nontender. BS +x4.   Extremities: No edema. R foot drop present.   Skin: No rashes. Open ulceration to the ball of the R foot below the R great toe.    Relevant Results    Scheduled medications  amLODIPine, 2.5 mg, oral, Daily  aspirin, 81 mg, oral, Daily  atorvastatin, 20 mg, oral, Nightly  dapagliflozin propanediol, 10 mg, oral, q AM  famotidine, 20 mg, oral, Daily  FLUoxetine, 40 mg, oral, Daily  furosemide, 20 mg, oral, Daily  heparin (porcine), 5,000 Units, subcutaneous, q12h  insulin glargine, 20 Units, subcutaneous, q24h  insulin lispro, 0-15 Units, subcutaneous, TID with meals  metoprolol tartrate, 25 mg, oral, BID  pilocarpine, 5 mg, oral, TID  piperacillin-tazobactam, 2.25 g, intravenous, q6h  potassium chloride CR, 10 mEq, oral, Daily  vancomycin (Xellia) 1 g in 200 mL, 1 g, intravenous, q24h      Continuous medications     PRN medications  PRN medications: acetaminophen, dextrose 10 % in water (D10W), dextrose, glucagon, melatonin, oxyCODONE-acetaminophen, polyethylene glycol, tiZANidine, traMADol     Results for orders placed or performed during the hospital encounter of 01/11/24 (from the past 24 hour(s))   Sedimentation rate, automated   Result Value Ref Range     Sedimentation Rate 79 (H) 0 - 30 mm/h   C-reactive protein   Result Value Ref Range    C-Reactive Protein 2.60 (H) 0.00 - 2.00 mg/dL   POCT GLUCOSE   Result Value Ref Range    POCT Glucose 208 (H) 74 - 99 mg/dL   Blood Culture    Specimen: Peripheral Venipuncture; Blood culture   Result Value Ref Range    Blood Culture Loaded on Instrument - Culture in progress    POCT GLUCOSE   Result Value Ref Range    POCT Glucose 222 (H) 74 - 99 mg/dL   POCT GLUCOSE   Result Value Ref Range    POCT Glucose 100 (H) 74 - 99 mg/dL   POCT GLUCOSE   Result Value Ref Range    POCT Glucose 190 (H) 74 - 99 mg/dL   Vancomycin   Result Value Ref Range    Vancomycin 10.0 10.0 - 20.0 ug/mL   CBC   Result Value Ref Range    WBC 8.5 4.4 - 11.3 x10*3/uL    nRBC 0.0 0.0 - 0.0 /100 WBCs    RBC 4.36 4.00 - 5.20 x10*6/uL    Hemoglobin 13.7 12.0 - 16.0 g/dL    Hematocrit 42.2 36.0 - 46.0 %    MCV 97 80 - 100 fL    MCH 31.4 26.0 - 34.0 pg    MCHC 32.5 32.0 - 36.0 g/dL    RDW 12.2 11.5 - 14.5 %    Platelets 315 150 - 450 x10*3/uL   Basic Metabolic Panel   Result Value Ref Range    Glucose 86 65 - 99 mg/dL    Sodium 139 133 - 145 mmol/L    Potassium 4.1 3.4 - 5.1 mmol/L    Chloride 102 97 - 107 mmol/L    Bicarbonate 27 24 - 31 mmol/L    Urea Nitrogen 38 (H) 8 - 25 mg/dL    Creatinine 1.30 0.40 - 1.60 mg/dL    eGFR 41 (L) >60 mL/min/1.73m*2    Calcium 9.8 8.5 - 10.4 mg/dL    Anion Gap 10 <=19 mmol/L           Assessment/Plan      Discitis / OM / L Psoas Abscess / Acute Compression Fracture L1 / Inability to Ambulate  -Ortho Spine and ID on board.   -CT lumbar spine shows acute compression fracture L1 without significant height loss.   -MRI Lumbar spine showed discitis/OM L1-2 with intramuscular abscess of the L psoas muscle. No epidural abscess seen.   -Possible need for aspiration and wound culture. ID to see if it can be done by IR. No surgical intervention needed per Dr. Kraus.   -Broad spectrum IV abx, vanco and zosyn.   -PRN pain medication and  muscle relaxers. Improved with current meds.    -PT/OT recommending moderate intensity therapy.    -OOB with assist.   -Fall precautions.      R Foot Ulceration   -States she was scheduled to have surgery with Dr. Kennedy on 1/16.   -Wound RN saw, buttocks wound healed. Continue to keep foot wound clean and dry, gauze between the toes.   -Wound culture R foot wound pending.    -Consulted podiatry.      DM 2  -HgA1c 8.9.  -On tresiba at home, sub lantus.   -SSI coverage.  -Monitor blood sugars.      HTN  -Continue home meds.   -BP currently stable, monitor.      CAD  -Continue core meds.      HLD  -Continue statin.      DVT Risk  -Heparin subcutaneous.   -SCDs.      Plan   Ortho spine and ID following. Possible IR drainage for culture?   Continue Vanco and Zosyn.  Wound culture from the R foot wound pending.   Blood cultures pending.    Pain control, improved with addition of percocet.  PT/OT recommending moderate intensity therapy, plan will be for SNF.         CHELSEA Raymond-CNP

## 2024-01-14 NOTE — CONSULTS
Consults    Reason For Consult  Right foot ulcer    History Of Present Illness  Carrie Rosario is a 83 y.o. female who is admitted with a compression fracture of her spine and an abscess in her psoas.  This patient is well-known to me for a right foot ulceration.  At a recent appointment she was noted to have hardware exposed in her first webspace so we will plan to remove the hardware on 1/16 in the operating room however this was canceled due to the patient's admission.  Patient states that her right foot has been less painful because she has not been walking on it recently.  She has been feeling well and has not noticed any redness drainage or swelling in her right foot.     Past Medical History  She has a past medical history of Arthritis, Cardiac arrest (CMS/[facility]), CVA (cerebral vascular accident) (CMS/[facility]), Diabetes mellitus (CMS/[facility]), GERD (gastroesophageal reflux disease), Heart disease, Hypercholesterolemia, Hypertension, Right foot drop, Right foot ulcer (CMS/[facility] [place]), Spinal stenosis, and STEMI (ST elevation myocardial infarction) (CMS/[facility]).    Surgical History  She has a past surgical history that includes Other surgical history (04/25/2022); Other surgical history (04/25/2022); Other surgical history (04/25/2022); Other surgical history (04/25/2022); Other surgical history (04/25/2022); MR angio head wo IV contrast (12/28/2021); MR angio neck wo IV contrast (12/28/2021); Coronary angioplasty with stent (12/25/2021); and Foot surgery.     Social History  She reports that she has never smoked. She has never used smokeless tobacco. She reports that she does not currently use drugs. She reports that she does not drink alcohol.    Family History  Family History   Problem Relation Name Age of Onset    Diabetes Mother      Diabetes Father      No Known Problems Daughter      No Known Problems Daughter          Allergies  Nickel and Sulfa (sulfonamide antibiotics)    Review of Systems    REVIEW OF SYSTEMS  GENERAL:   Negative for malaise, significant weight loss, fever  HEENT:  No changes in hearing or vision, no nose bleeds or other nasal problems and Negative for frequent or significant headaches  NECK:  Negative for lumps, goiter, pain and significant neck swelling  RESPIRATORY:  Negative for cough, wheezing and shortness of breath  CARDIOVASCULAR:  Negative for chest pain, leg swelling and palpitations  GI:  Negative for abdominal discomfort, blood in stools or black stools and change in bowel habits  :  Negative for dysuria, frequency and incontinence  MUSCULOSKELETAL:  Negative for joint pain or swelling, back pain, and muscle pain.  SKIN:  Negative for lesions, rash, and itching  PSYCH:  Negative for sleep disturbance, mood disorder and recent psychosocial stressors  HEMATOLOGY/LYMPHOLOGY:  Negative for prolonged bleeding, bruising easily, and swollen nodes.  ENDOCRINE:  Negative for cold or heat intolerance, polyuria, polydipsia and goiter  NEURO: Negative, denies any burning, tingling or numbness     Objective:   Vasc: DP and PT pulses are palpable plus 1 out of 4 bilateral.  CFT is less than 3 seconds bilateral.  Skin temperature is warm to cool proximal to distal bilateral.  All hair growth is absent    Neuro:  Light touch is intact to the foot bilateral.  Protective sensation is diminished to the foot when tested with the 5.07 SWM bilateral.  There is no clonus noted.  The hallux is downgoing bilateral.      Derm: Nails 1-5 bilateral are intact.  Skin is supple with normal texture and turgor noted.  Right foot with thick hyperkeratotic tissue with blood staining noted to the plantar aspect of the first metatarsal.  There is an underlying ulceration here with a fibrogranular base.  In the first webspace there is a small screw visible.  No surrounding erythema or drainage noted.  No malodor.  Ortho: Muscle strength testing deferred.  Bunion deformity noted to the right lower extremity with hallux overlapping the  "second toe.       Last Recorded Vitals  Blood pressure 106/62, pulse 70, temperature 36.4 °C (97.5 °F), temperature source Oral, resp. rate 16, height 1.651 m (5' 5\"), weight 68 kg (150 lb), SpO2 97 %.    Relevant Results  Results for orders placed or performed during the hospital encounter of 01/11/24 (from the past 24 hour(s))   POCT GLUCOSE   Result Value Ref Range    POCT Glucose 100 (H) 74 - 99 mg/dL   POCT GLUCOSE   Result Value Ref Range    POCT Glucose 190 (H) 74 - 99 mg/dL   Vancomycin   Result Value Ref Range    Vancomycin 10.0 10.0 - 20.0 ug/mL   CBC   Result Value Ref Range    WBC 8.5 4.4 - 11.3 x10*3/uL    nRBC 0.0 0.0 - 0.0 /100 WBCs    RBC 4.36 4.00 - 5.20 x10*6/uL    Hemoglobin 13.7 12.0 - 16.0 g/dL    Hematocrit 42.2 36.0 - 46.0 %    MCV 97 80 - 100 fL    MCH 31.4 26.0 - 34.0 pg    MCHC 32.5 32.0 - 36.0 g/dL    RDW 12.2 11.5 - 14.5 %    Platelets 315 150 - 450 x10*3/uL   Basic Metabolic Panel   Result Value Ref Range    Glucose 86 65 - 99 mg/dL    Sodium 139 133 - 145 mmol/L    Potassium 4.1 3.4 - 5.1 mmol/L    Chloride 102 97 - 107 mmol/L    Bicarbonate 27 24 - 31 mmol/L    Urea Nitrogen 38 (H) 8 - 25 mg/dL    Creatinine 1.30 0.40 - 1.60 mg/dL    eGFR 41 (L) >60 mL/min/1.73m*2    Calcium 9.8 8.5 - 10.4 mg/dL    Anion Gap 10 <=19 mmol/L   POCT GLUCOSE   Result Value Ref Range    POCT Glucose 89 74 - 99 mg/dL   POCT GLUCOSE   Result Value Ref Range    POCT Glucose 147 (H) 74 - 99 mg/dL         Assessment/Plan     This is an 83 female patient who is well-known to me with a right foot ulceration and exposed hardware in her right first webspace.  There are no signs of infection on exam today.  A dressing was placed.  I had a discussion with the patient about her operative intervention on the right foot.  It was initially planned for January 16 as an outpatient but due to her recent diagnosis her daughter had rescheduled the surgery.  If the patient is an inpatient, I could take the patient to the " operating room for hardware removal and ulcer excision and closure.  The patient states that she may be interested in this but would like me to speak with her daughter and the doctors.  I will reach out to her care team to determine if this is something we can do.  -Betadine wet-to-dry dressing placed  -Weightbearing as tolerated to the right lower extremity  -I will discuss with the care team about possibly taking the patient to the operating room on Tuesday for hardware removal and ulcer excision and closure.  -Dressing to be changed every 3 days with a Betadine wet-to-dry dressing    I spent 45 minutes in the professional and overall care of this patient.

## 2024-01-14 NOTE — PROGRESS NOTES
Physical Therapy                 Therapy Communication Note    Patient Name: Carrie Rosario  MRN: [id number]  Today's Date: 1/14/2024     Discipline: Physical Therapy    Missed Visit Reason:  Pt declined    Missed Time: Attempt    Comment: Upon arrival patient sleeping. Pt declined attempt at treatment at this time and would like to continue resting.

## 2024-01-15 LAB
ANION GAP SERPL CALC-SCNC: 11 MMOL/L
BUN SERPL-MCNC: 36 MG/DL (ref 8–25)
CALCIUM SERPL-MCNC: 9.9 MG/DL (ref 8.5–10.4)
CHLORIDE SERPL-SCNC: 99 MMOL/L (ref 97–107)
CO2 SERPL-SCNC: 27 MMOL/L (ref 24–31)
CREAT SERPL-MCNC: 1.4 MG/DL (ref 0.4–1.6)
EGFRCR SERPLBLD CKD-EPI 2021: 38 ML/MIN/1.73M*2
ERYTHROCYTE [DISTWIDTH] IN BLOOD BY AUTOMATED COUNT: 12.3 % (ref 11.5–14.5)
GLUCOSE BLD MANUAL STRIP-MCNC: 107 MG/DL (ref 74–99)
GLUCOSE BLD MANUAL STRIP-MCNC: 126 MG/DL (ref 74–99)
GLUCOSE BLD MANUAL STRIP-MCNC: 170 MG/DL (ref 74–99)
GLUCOSE BLD MANUAL STRIP-MCNC: 82 MG/DL (ref 74–99)
GLUCOSE SERPL-MCNC: 104 MG/DL (ref 65–99)
HCT VFR BLD AUTO: 46.7 % (ref 36–46)
HGB BLD-MCNC: 14.9 G/DL (ref 12–16)
MCH RBC QN AUTO: 31 PG (ref 26–34)
MCHC RBC AUTO-ENTMCNC: 31.9 G/DL (ref 32–36)
MCV RBC AUTO: 97 FL (ref 80–100)
NRBC BLD-RTO: 0 /100 WBCS (ref 0–0)
PLATELET # BLD AUTO: 320 X10*3/UL (ref 150–450)
POTASSIUM SERPL-SCNC: 4.2 MMOL/L (ref 3.4–5.1)
RBC # BLD AUTO: 4.81 X10*6/UL (ref 4–5.2)
SODIUM SERPL-SCNC: 137 MMOL/L (ref 133–145)
WBC # BLD AUTO: 8.6 X10*3/UL (ref 4.4–11.3)

## 2024-01-15 PROCEDURE — 97110 THERAPEUTIC EXERCISES: CPT | Mod: GP

## 2024-01-15 PROCEDURE — 2500000002 HC RX 250 W HCPCS SELF ADMINISTERED DRUGS (ALT 637 FOR MEDICARE OP, ALT 636 FOR OP/ED): Performed by: NURSE PRACTITIONER

## 2024-01-15 PROCEDURE — 36415 COLL VENOUS BLD VENIPUNCTURE: CPT | Performed by: NURSE PRACTITIONER

## 2024-01-15 PROCEDURE — 85027 COMPLETE CBC AUTOMATED: CPT | Performed by: NURSE PRACTITIONER

## 2024-01-15 PROCEDURE — 2500000001 HC RX 250 WO HCPCS SELF ADMINISTERED DRUGS (ALT 637 FOR MEDICARE OP): Performed by: NURSE PRACTITIONER

## 2024-01-15 PROCEDURE — 1100000001 HC PRIVATE ROOM DAILY

## 2024-01-15 PROCEDURE — 2500000004 HC RX 250 GENERAL PHARMACY W/ HCPCS (ALT 636 FOR OP/ED): Performed by: NURSE PRACTITIONER

## 2024-01-15 PROCEDURE — 82947 ASSAY GLUCOSE BLOOD QUANT: CPT

## 2024-01-15 PROCEDURE — 80048 BASIC METABOLIC PNL TOTAL CA: CPT | Performed by: NURSE PRACTITIONER

## 2024-01-15 PROCEDURE — 94760 N-INVAS EAR/PLS OXIMETRY 1: CPT

## 2024-01-15 PROCEDURE — 97530 THERAPEUTIC ACTIVITIES: CPT | Mod: GP

## 2024-01-15 RX ORDER — PEN NEEDLE, DIABETIC 31 GX5/16"
NEEDLE, DISPOSABLE MISCELLANEOUS
Qty: 100 EACH | Refills: 3 | Status: ON HOLD | OUTPATIENT
Start: 2024-01-15

## 2024-01-15 RX ORDER — VANCOMYCIN 750 MG/150ML
750 INJECTION, SOLUTION INTRAVENOUS EVERY 24 HOURS
Status: DISCONTINUED | OUTPATIENT
Start: 2024-01-15 | End: 2024-01-16

## 2024-01-15 RX ADMIN — METOPROLOL TARTRATE 25 MG: 25 TABLET, FILM COATED ORAL at 21:08

## 2024-01-15 RX ADMIN — VANCOMYCIN 750 MG: 750 INJECTION, SOLUTION INTRAVENOUS at 12:16

## 2024-01-15 RX ADMIN — OXYCODONE AND ACETAMINOPHEN 1 TABLET: 5; 325 TABLET ORAL at 12:18

## 2024-01-15 RX ADMIN — PILOCARPINE HYDROCHLORIDE 5 MG: 5 TABLET, FILM COATED ORAL at 21:08

## 2024-01-15 RX ADMIN — FUROSEMIDE 20 MG: 20 TABLET ORAL at 08:46

## 2024-01-15 RX ADMIN — OXYCODONE AND ACETAMINOPHEN 1 TABLET: 5; 325 TABLET ORAL at 21:08

## 2024-01-15 RX ADMIN — ACETAMINOPHEN 650 MG: 325 TABLET ORAL at 17:49

## 2024-01-15 RX ADMIN — HEPARIN SODIUM 5000 UNITS: 5000 INJECTION, SOLUTION INTRAVENOUS; SUBCUTANEOUS at 05:32

## 2024-01-15 RX ADMIN — PILOCARPINE HYDROCHLORIDE 5 MG: 5 TABLET, FILM COATED ORAL at 15:03

## 2024-01-15 RX ADMIN — PIPERACILLIN SODIUM AND TAZOBACTAM SODIUM 2.25 G: 2; .25 INJECTION, SOLUTION INTRAVENOUS at 11:38

## 2024-01-15 RX ADMIN — POTASSIUM CHLORIDE 10 MEQ: 750 TABLET, EXTENDED RELEASE ORAL at 08:45

## 2024-01-15 RX ADMIN — PIPERACILLIN SODIUM AND TAZOBACTAM SODIUM 2.25 G: 2; .25 INJECTION, SOLUTION INTRAVENOUS at 05:32

## 2024-01-15 RX ADMIN — HEPARIN SODIUM 5000 UNITS: 5000 INJECTION, SOLUTION INTRAVENOUS; SUBCUTANEOUS at 17:52

## 2024-01-15 RX ADMIN — PIPERACILLIN SODIUM AND TAZOBACTAM SODIUM 2.25 G: 2; .25 INJECTION, SOLUTION INTRAVENOUS at 22:33

## 2024-01-15 RX ADMIN — INSULIN GLARGINE 20 UNITS: 100 INJECTION, SOLUTION SUBCUTANEOUS at 21:00

## 2024-01-15 RX ADMIN — METOPROLOL TARTRATE 25 MG: 25 TABLET, FILM COATED ORAL at 08:45

## 2024-01-15 RX ADMIN — PILOCARPINE HYDROCHLORIDE 5 MG: 5 TABLET, FILM COATED ORAL at 08:45

## 2024-01-15 RX ADMIN — OXYCODONE AND ACETAMINOPHEN 1 TABLET: 5; 325 TABLET ORAL at 05:38

## 2024-01-15 RX ADMIN — FAMOTIDINE 20 MG: 20 TABLET, FILM COATED ORAL at 08:45

## 2024-01-15 RX ADMIN — ATORVASTATIN CALCIUM 20 MG: 20 TABLET, FILM COATED ORAL at 21:08

## 2024-01-15 RX ADMIN — ASPIRIN 81 MG: 81 TABLET, COATED ORAL at 08:44

## 2024-01-15 RX ADMIN — FLUOXETINE HYDROCHLORIDE 40 MG: 20 CAPSULE ORAL at 08:45

## 2024-01-15 RX ADMIN — PIPERACILLIN SODIUM AND TAZOBACTAM SODIUM 2.25 G: 2; .25 INJECTION, SOLUTION INTRAVENOUS at 16:42

## 2024-01-15 RX ADMIN — AMLODIPINE BESYLATE 2.5 MG: 2.5 TABLET ORAL at 08:44

## 2024-01-15 RX ADMIN — DAPAGLIFLOZIN 10 MG: 10 TABLET, FILM COATED ORAL at 08:44

## 2024-01-15 ASSESSMENT — COGNITIVE AND FUNCTIONAL STATUS - GENERAL
HELP NEEDED FOR BATHING: A LITTLE
WALKING IN HOSPITAL ROOM: A LOT
TURNING FROM BACK TO SIDE WHILE IN FLAT BAD: A LITTLE
TOILETING: A LITTLE
PERSONAL GROOMING: A LITTLE
MOVING FROM LYING ON BACK TO SITTING ON SIDE OF FLAT BED WITH BEDRAILS: A LITTLE
CLIMB 3 TO 5 STEPS WITH RAILING: A LOT
CLIMB 3 TO 5 STEPS WITH RAILING: A LOT
WALKING IN HOSPITAL ROOM: TOTAL
TURNING FROM BACK TO SIDE WHILE IN FLAT BAD: A LOT
DRESSING REGULAR UPPER BODY CLOTHING: A LITTLE
MOVING TO AND FROM BED TO CHAIR: A LOT
STANDING UP FROM CHAIR USING ARMS: A LOT
STANDING UP FROM CHAIR USING ARMS: A LOT
MOBILITY SCORE: 15
DRESSING REGULAR UPPER BODY CLOTHING: A LITTLE
MOVING TO AND FROM BED TO CHAIR: A LOT
DRESSING REGULAR LOWER BODY CLOTHING: A LITTLE
DAILY ACTIVITIY SCORE: 19
MOBILITY SCORE: 15
STANDING UP FROM CHAIR USING ARMS: A LOT
HELP NEEDED FOR BATHING: A LITTLE
WALKING IN HOSPITAL ROOM: A LOT
CLIMB 3 TO 5 STEPS WITH RAILING: TOTAL
DAILY ACTIVITIY SCORE: 19
TOILETING: A LITTLE
PERSONAL GROOMING: A LITTLE
DRESSING REGULAR LOWER BODY CLOTHING: A LITTLE
MOBILITY SCORE: 11
MOVING TO AND FROM BED TO CHAIR: A LOT
TURNING FROM BACK TO SIDE WHILE IN FLAT BAD: A LITTLE

## 2024-01-15 ASSESSMENT — PAIN - FUNCTIONAL ASSESSMENT
PAIN_FUNCTIONAL_ASSESSMENT: 0-10
PAIN_FUNCTIONAL_ASSESSMENT: 0-10
PAIN_FUNCTIONAL_ASSESSMENT: FLACC (FACE, LEGS, ACTIVITY, CRY, CONSOLABILITY)

## 2024-01-15 ASSESSMENT — PAIN SCALES - GENERAL
PAINLEVEL_OUTOF10: 0 - NO PAIN
PAINLEVEL_OUTOF10: 3
PAINLEVEL_OUTOF10: 8
PAINLEVEL_OUTOF10: 9
PAINLEVEL_OUTOF10: 0 - NO PAIN

## 2024-01-15 ASSESSMENT — PAIN DESCRIPTION - ORIENTATION
ORIENTATION: LOWER
ORIENTATION: LOWER

## 2024-01-15 ASSESSMENT — PAIN DESCRIPTION - LOCATION
LOCATION: BACK
LOCATION: BACK

## 2024-01-15 ASSESSMENT — PAIN SCALES - WONG BAKER: WONGBAKER_NUMERICALRESPONSE: NO HURT

## 2024-01-15 NOTE — PROGRESS NOTES
"Physical Therapy    Physical Therapy Treatment    Patient Name: Carrie Rosario  MRN: [id number]  Today's Date: 1/15/2024  Time Calculation  Start Time: 0847  Stop Time: 0938  Time Calculation (min): 51 min       Assessment/Plan   PT Assessment  PT Assessment Results: Decreased strength, Decreased endurance, Impaired balance, Decreased mobility, Decreased safety awareness  Rehab Prognosis: Good  Medical Staff Made Aware: Yes  Barriers to Participation:  (pain)  End of Session Communication: Bedside nurse  Assessment Comment: Pt tolerated sitting on EOB and LE ther ex. Pt appears limited primarily by increased back pain. Increased time spent at end of session to attempt to reposition pt for comfort.  End of Session Patient Position: Bed, 3 rail up, Alarm on     PT Plan  Treatment/Interventions: Bed mobility, Balance training, Strengthening, Endurance training, Therapeutic exercise, Therapeutic activity  PT Plan: Skilled PT  PT Frequency: 5 times per week  PT Discharge Recommendations: Moderate intensity level of continued care  PT Recommended Transfer Status: Assist x2, Assistive device  PT - OK to Discharge: Yes      General Visit Information:   PT  Visit  PT Received On: 01/15/24  General  Reason for Referral: impaired mobility  Prior to Session Communication: Bedside nurse  Patient Position Received: Bed, 3 rail up, Alarm on  Preferred Learning Style: verbal  General Comment: Pt supine in bed upon arrival. Agreeable to participate. LImited primarily by increased c/o back pain, \"I can try what I can.\"    Subjective   Precautions:  Precautions  Medical Precautions: Fall precautions, Spinal precautions  Vital Signs:       Objective   Pain:  Pain Assessment  Pain Assessment: FLACC (Face, Legs, Activity, Cry, Consolability)  Pain Score: 8  Pain Type: Acute pain, Chronic pain  Pain Location: Back  Pain Orientation: Lower  Cognition:  Cognition  Overall Cognitive Status: Within Functional Limits  Attention:  (able to " "follow commands)  Postural Control:  Postural Control  Posture Comment: flexed posture sitting on EOB  Static Sitting Balance  Static Sitting-Comment/Number of Minutes: Sat on EOB about 20 minutes with supervision to min assist. Initially required min/mod assist due to pain and increased R lateral lean and posterior lean. Positioned R forearm on pillow to allow for wt shifting and upright posture. Pt occasionally able to promote COG and wt shift into upright, however only able to tolerate about 1-2 minutes at a time, due to increased pain.  Extremity/Trunk Assessments:    Activity Tolerance:  Activity Tolerance  Endurance: Decreased tolerance for upright activites  Activity Tolerance Comments: Repeated instruction provided for purse-lip breathing to promote activity tolerance.  Treatments:  Therapeutic Exercise  Therapeutic Exercise Performed: Yes  Therapeutic Exercise Activity 1: x15-20 L ankle pumps (R ankle ROM (baseline drop foot)), B LAQs.    Therapeutic Activity  Therapeutic Activity 1: Educated pt on log roll technique to control and limit back pain    Bed Mobility 1  Bed Mobility 1: Supine to sitting, Sitting to supine  Level of Assistance 1: Moderate assistance  Bed Mobility Comments 1: HOB elevated, partial log roll technique. Pt utilzied bedrails. Increased time and pt effort to complete due to pain. Elevation of LEs required for sit to supine transfer.  Bed Mobility 2  Bed Mobility  2: Rolling right, Rolling left  Level of Assistance 2: Moderate assistance  Bed Mobility Comments 2: Rolled L/R to allow for linen change and repositioning.       Transfer 1  Transfer From 1:  (Pt declined attempt to stand this date due to increased pain. \"The last time I tried, it was excruciating. That was the worst I felt.\")       Outcome Measures:  [facility] Basic Mobility  Turning from your back to your side while in a flat bed without using bedrails: A little  Moving from lying on your back to sitting on the side of a flat " bed without using bedrails: A lot  Moving to and from bed to chair (including a wheelchair): A lot  Standing up from a chair using your arms (e.g. wheelchair or bedside chair): A lot  To walk in hospital room: Total  Climbing 3-5 steps with railing: Total  Basic Mobility - Total Score: 11    Education Documentation  Precautions, taught by Raven Lopez PT at 1/15/2024 12:10 PM.  Learner: Patient  Readiness: Acceptance  Method: Explanation  Response: Needs Reinforcement    Body Mechanics, taught by Raven Lopez PT at 1/15/2024 12:10 PM.  Learner: Patient  Readiness: Acceptance  Method: Explanation  Response: Needs Reinforcement    Mobility Training, taught by Raven Lopez PT at 1/15/2024 12:10 PM.  Learner: Patient  Readiness: Acceptance  Method: Explanation  Response: Needs Reinforcement    Education Comments  No comments found.        OP EDUCATION:       Encounter Problems       Encounter Problems (Active)       Balance       Sitting Balance (Progressing)       Start:  01/12/24    Expected End:  01/26/24       Pt will demonstrate good sitting balance to promote safe engagement with out of bed activities           Standing Balance (Progressing)       Start:  01/12/24    Expected End:  01/26/24       Pt will demonstrate good static standing balance to promote safe participation with out of bed activity, transfers, and mobility              Mobility       Ambulation (Progressing)       Start:  01/12/24    Expected End:  01/26/24       Pt will ambulate 50' modified independent assist with walker to promote safe home mobility              Pain - Adult          Safety       Spine Precautions for Pain Management (Progressing)       Start:  01/12/24    Expected End:  01/26/24       Pt will be independent with both understanding and demonstration of post op spine restrictions to promote safe functional mobility at discharge           Safe Mobility Techniques (Progressing)       Start:  01/12/24    Expected End:  01/26/24        Pt will correctly identify and demonstrate safe mobility techniques to reduce their risks for falls during their acute care stay               Transfers       Supine to sit (Progressing)       Start:  01/12/24    Expected End:  01/26/24       Pt will transfer supine to sitting at edge of bed with modified independent assist to promote acute care out of bed activity           Sit to stand (Progressing)       Start:  01/12/24    Expected End:  01/26/24       Pt will transfer sit to standing position with modified independent assist and walker to promote safe out of bed activity           Bed to chair (Progressing)       Start:  01/12/24    Expected End:  01/26/24       Pt will transfer from sitting edge of bed to the chair with modified independent assist and walker to promote out of bed activity and reduce the risks of prolonged acute care bedrest

## 2024-01-15 NOTE — PROGRESS NOTES
Carrie Rosario is a 83 y.o. female on day 3 of admission presenting with Compression fracture of L1 lumbar vertebra, closed, initial encounter (CMS/[facility]).      Subjective   Patient resting in bed comfortably, denies pain or sob.        Objective     Last Recorded Vitals  /69 (BP Location: Left arm, Patient Position: Lying)   Pulse 72   Temp 36.3 °C (97.3 °F) (Oral)   Resp 17   Wt 68 kg (150 lb)   SpO2 97%   Intake/Output last 3 Shifts:    Intake/Output Summary (Last 24 hours) at 1/15/2024 1323  Last data filed at 1/15/2024 0602  Gross per 24 hour   Intake 600 ml   Output 1000 ml   Net -400 ml       Admission Weight  Weight: 68 kg (150 lb) (01/11/24 1251)    Daily Weight  01/11/24 : 68 kg (150 lb)    Image Results  MR lumbar spine wo IV contrast  Narrative: Interpreted By:  Pankaj Wisdom,   STUDY:  MR LUMBAR SPINE WO IV CONTRAST; ;  1/13/2024 7:34 am      INDICATION:  Signs/Symptoms:Spontaneous L1 fracture.      COMPARISON:  CT 01/11/2024      ACCESSION NUMBER(S):  [id number]      ORDERING CLINICIAN:  ROBBI PINEDA      TECHNIQUE:  Multiplanar multisequence MRI obtained of the lumbar spine          FINDINGS:  L1 vertebral body demonstrates diffuse marrow edema in particular  along the inferior endplate with more focal fluid signal intensity  with T2 weighted hyperintense appearance demonstrated with central  endplate depression along the inferior endplate of the L1 vertebral  body. Corresponding CT demonstrates resorption of the trabecular and  cortical margin along the inferior endplate as well as extending to  portions of the anterior and posteroinferior margin of the L1  vertebral body with mild retropulsion of the posterior margin of L1.  This is not the typical appearance or presentation of the fracture  line given the configuration. Also, more focal fluid signal intensity  demonstrated within the disc space posteriorly is noted.  Corresponding superior endplate of the L2 vertebral body  demonstrates  endplate reactive marrow edema with cortical erosion and  corresponding mild reactive marrow edema along the superior endplate  of L2. This constellation of the findings raises suspicion for  discitis osteomyelitis. While, component of the findings along the  inferior endplate of L1 may reflect sequela of fracture the  resorption within the vertebral body is atypical. There is diffuse  surrounding paravertebral edema tracking within the psoas musculature  bilaterally. There is more focal heterogeneous T2 weighted  hyperintense collection along the left lateral margin of the L1  vertebral body about the inferior endplate. This extends into the  adjacent psoas muscle with intramuscular well-marginated fluid  collection with T2 weighted hyperintense heterogeneous appearance.  This suggest component of intramuscular abscess formation. This  collection measures 1.8 x 1.3 x 1.9 cm in the AP, transverse and  craniocaudal dimension respectively. The edema tracks from the level  of the L1 vertebral body level caudally to the L3 vertebral body  level bilaterally. The edema tracks into the neural foramina  bilaterally.      Thecal sac L1/2 level demonstrates no focal narrowing. There is no  epidural fluid collection or definite epidural abscess formation.  However, the cortical irregularity along the posteroinferior margin  of the L1 vertebral body is of concern.      Postsurgical changes are demonstrated with spinal fusion from the L3  through S1 levels bilaterally with bilateral pedicle screws and rods  in place. There is laminectomy changes from L4 through S1.      Evaluation of spinal levels are as follows:      T12/L1 demonstrates no narrowing of the thecal sac or foramina      L1/2 demonstrates mild retropulsion of the posteroinferior margin of  the L1 vertebral body measuring 2 mm. There is no narrowing thecal  sac. Ligamentum flavum hypertrophy demonstrated at this level. Neural  foramina demonstrate mild  bilateral narrowing.      L2/3 demonstrates diffuse circumferential disc bulge seen in  conjunction with component of anterolisthesis measuring 3 mm. These  findings in conjunction with severe facet hypertrophic change  contributing to severe narrowing of the thecal sac at this level.  Foramina demonstrate moderate bilateral narrowing      There is severe narrowing of the thecal sac posterior to the L3  vertebral body extending from the superior endplate through to the  inferior aspect of the vertebral body.      L3/4 demonstrates thecal sac to be decompressed. Foramina demonstrate  moderate right and mild left foraminal narrowing      L4/5 demonstrates thecal sac to be decompressed there is  anterolisthesis at this level. Foramina demonstrate mild right  foraminal narrowing. Left foramina is unremarkable      L5/S1 demonstrates thecal sac to be decompressed. Foramina  demonstrate mild right foraminal narrowing. Left foramina unremarkable                      Impression: 1. Findings raising suspicion for discitis osteomyelitis at the L1/2  intervertebral disc level with erosive changes along the inferior  endplate of L1 and superior endplate of L2 as described above. There  is more focal fluid signal intensity in the disc space as well as  within the inferior endplate of the L1 vertebral body with  corresponding resorption of the cortical and trabecular margin as  seen on the CT. While, superimposed component of fracture along the  inferior endplate of the L1 is a possibility previously described  findings are likely secondary to postinfectious etiology given the  configuration appearance.      2. Generalized edema in the paravertebral location with more focal  paravertebral intramuscular abscess location within the left psoas  muscle about the inferior endplate of the L1 vertebral body measuring  1.8 x 1.3 x 1.9 cm.      3. No epidural abscess formation is demonstrated.      4. Severe narrowing of the thecal sac at  L2/3 extending into the  inferior margin of the L3 vertebral body in relation to  anterolisthesis, disc, and facet hypertrophic change.      Findings phoned to patient's nurse at the time of interpretation on  01/13/2024 at 7:58 a.m..          MACRO:  Critical Finding:  See findings. Notification was initiated on  1/13/2024 at 7:58 am by  Pankaj Wisdom.  (**-OCF-**) Instructions:      Signed by: Pankaj Wisdom 1/13/2024 8:03 AM  Dictation workstation:   [id number]      Physical Exam  Constitutional:       Appearance: Normal appearance.   Cardiovascular:      Rate and Rhythm: Normal rate and regular rhythm.      Pulses: Normal pulses.      Heart sounds: Normal heart sounds.   Pulmonary:      Effort: Pulmonary effort is normal.      Breath sounds: Normal breath sounds.   Skin:     Comments: Right foot with dressing dry and intact   Neurological:      Mental Status: She is alert and oriented to person, place, and time.         Relevant Results             Results for orders placed or performed during the hospital encounter of 01/11/24 (from the past 24 hour(s))   POCT GLUCOSE   Result Value Ref Range    POCT Glucose 138 (H) 74 - 99 mg/dL   POCT GLUCOSE   Result Value Ref Range    POCT Glucose 202 (H) 74 - 99 mg/dL   CBC   Result Value Ref Range    WBC 8.6 4.4 - 11.3 x10*3/uL    nRBC 0.0 0.0 - 0.0 /100 WBCs    RBC 4.81 4.00 - 5.20 x10*6/uL    Hemoglobin 14.9 12.0 - 16.0 g/dL    Hematocrit 46.7 (H) 36.0 - 46.0 %    MCV 97 80 - 100 fL    MCH 31.0 26.0 - 34.0 pg    MCHC 31.9 (L) 32.0 - 36.0 g/dL    RDW 12.3 11.5 - 14.5 %    Platelets 320 150 - 450 x10*3/uL   Basic Metabolic Panel   Result Value Ref Range    Glucose 104 (H) 65 - 99 mg/dL    Sodium 137 133 - 145 mmol/L    Potassium 4.2 3.4 - 5.1 mmol/L    Chloride 99 97 - 107 mmol/L    Bicarbonate 27 24 - 31 mmol/L    Urea Nitrogen 36 (H) 8 - 25 mg/dL    Creatinine 1.40 0.40 - 1.60 mg/dL    eGFR 38 (L) >60 mL/min/1.73m*2    Calcium 9.9 8.5 - 10.4 mg/dL    Anion Gap 11  <=19 mmol/L   POCT GLUCOSE   Result Value Ref Range    POCT Glucose 107 (H) 74 - 99 mg/dL   POCT GLUCOSE   Result Value Ref Range    POCT Glucose 82 74 - 99 mg/dL         Assessment/Plan                  Principal Problem:    Compression fracture of L1 lumbar vertebra, closed, initial encounter (CMS/[facility])  Active Problems:    Compression fracture of L1 vertebra, initial encounter (CMS/[facility])    Discitis / OM / L Psoas Abscess / Acute Compression Fracture L1 / Inability to Ambulate  Ortho Spine and ID following  CT lumbar spine shows acute compression fracture L1 without significant height loss.   MRI Lumbar spine showed discitis/OM L1-2 with intramuscular abscess of the L psoas muscle. No epidural abscess seen.   Possible need for aspiration and wound culture. ID to see if it can be done by IR. No surgical intervention needed per Dr. Kraus.   Broad spectrum IV abx, vanco and zosyn.   PRN pain medication and muscle relaxers. Improved with current meds.    PT/OT recommending moderate intensity therapy.    OOB with assist.   Fall precautions.      R Foot Ulceration   Podiatry, Dr. Kennedy to possible take her to OR for removal of hardware in right foot on 1/16.   Wound RN saw, buttocks wound healed. Continue to keep foot wound clean and dry, gauze between the toes.   Wound culture R foot wound pending.       DM 2  HgA1c 8.9.  On tresiba at home, sub lantus.   SSI coverage.  Monitor blood sugars.      HTN  Continue home meds.   BP currently stable, monitor.      CAD  Continue core meds.      HLD  Continue statin.      DVT Risk  Heparin subcutaneous.   SCDs.      Plan   Patient will be discharged to SNF.   Plan of care discussed with patient and collaborating physician, Dr. Guanaco Davis, APRN-CNP

## 2024-01-15 NOTE — CARE PLAN
The patient's goals for the shift include      The clinical goals for the shift include safety      Problem: Pain  Goal: My pain/discomfort is manageable  Outcome: Progressing     Problem: Safety  Goal: Patient will be injury free during hospitalization  Outcome: Progressing  Goal: I will remain free of falls  Outcome: Progressing     Problem: Daily Care  Goal: Daily care needs are met  Outcome: Progressing     Problem: Psychosocial Needs  Goal: Demonstrates ability to cope with hospitalization/illness  Outcome: Progressing  Goal: Collaborate with me, my family, and caregiver to identify my specific goals  Outcome: Progressing     Problem: Discharge Barriers  Goal: My discharge needs are met  Outcome: Progressing     Problem: Skin  Goal: Decreased wound size/increased tissue granulation at next dressing change  1/15/2024 1852 by Kenna Woodruff RN  Outcome: Progressing  1/15/2024 1157 by Kenna Woodruff RN  Flowsheets (Taken 1/15/2024 1157)  Decreased wound size/increased tissue granulation at next dressing change: Promote sleep for wound healing  Goal: Participates in plan/prevention/treatment measures  1/15/2024 1852 by Kenna Woodruff RN  Outcome: Progressing  1/15/2024 1157 by Kenna Woodruff RN  Flowsheets (Taken 1/15/2024 1157)  Participates in plan/prevention/treatment measures: Elevate heels  Goal: Prevent/manage excess moisture  1/15/2024 1852 by Kenna Woodruff RN  Outcome: Progressing  1/15/2024 1157 by Kenna Woodruff RN  Flowsheets (Taken 1/15/2024 1157)  Prevent/manage excess moisture: Follow provider orders for dressing changes  Goal: Prevent/minimize sheer/friction injuries  1/15/2024 1852 by Kenna Woodruff RN  Outcome: Progressing  1/15/2024 1157 by Kenna Woodruff RN  Flowsheets (Taken 1/15/2024 1157)  Prevent/minimize sheer/friction injuries: Complete micro-shifts as needed if patient unable. Adjust patient position to relieve pressure points, not a full turn  Goal: Promote/optimize  nutrition  1/15/2024 1852 by Kenna Woodruff RN  Outcome: Progressing  1/15/2024 1157 by Kenna Woodruff RN  Flowsheets (Taken 1/15/2024 1157)  Promote/optimize nutrition: Monitor/record intake including meals  Goal: Promote skin healing  1/15/2024 1852 by Kenna Woodruff RN  Outcome: Progressing  1/15/2024 1157 by Kenna Woodruff RN  Flowsheets (Taken 1/15/2024 1157)  Promote skin healing: Assess skin/pad under line(s)/device(s)     Problem: Diabetes  Goal: Achieve decreasing blood glucose levels by end of shift  Outcome: Progressing  Goal: Increase stability of blood glucose readings by end of shift  Outcome: Progressing  Goal: Maintain electrolyte levels within acceptable range throughout shift  Outcome: Progressing  Goal: Maintain glucose levels >70mg/dl to <250mg/dl throughout shift  Outcome: Progressing  Goal: No changes in neurological exam by end of shift  Outcome: Progressing     Problem: Fall/Injury  Goal: Not fall by end of shift  Outcome: Progressing  Goal: Be free from injury by end of the shift  Outcome: Progressing  Goal: Verbalize understanding of personal risk factors for fall in the hospital  Outcome: Progressing     Problem: Pain - Adult  Goal: Verbalizes/displays adequate comfort level or baseline comfort level  Outcome: Progressing     Problem: Safety - Adult  Goal: Free from fall injury  Outcome: Progressing     Problem: Discharge Planning  Goal: Discharge to home or other facility with appropriate resources  Outcome: Progressing     Problem: Chronic Conditions and Co-morbidities  Goal: Patient's chronic conditions and co-morbidity symptoms are monitored and maintained or improved  Outcome: Progressing

## 2024-01-15 NOTE — PROGRESS NOTES
"Carrie Rosario is a 83 y.o. female on day 3 of admission presenting with Compression fracture of L1 lumbar vertebra, closed, initial encounter (CMS/[facility]).    Subjective   Pt has no new medical updates at this time[facility]'s secondary [facility] information. [facility] will make a determination on acceptance, after verifying pt's secondary insurance.        Objective     Physical Exam    Last Recorded Vitals  Blood pressure 111/65, pulse 73, temperature 36.3 °C (97.3 °F), temperature source Oral, resp. rate 18, height 1.651 m (5' 5\"), weight 68 kg (150 lb), SpO2 96 %.  Intake/Output last 3 Shifts:  I/O last 3 completed shifts:  In: 800 (11.8 mL/kg) [IV Piggyback:800]  Out: 1000 (14.7 mL/kg) [Urine:1000 (0.4 mL/kg/hr)]  Weight: 68 kg     Relevant Results                             Assessment/Plan   Principal Problem:    Compression fracture of L1 lumbar vertebra, closed, initial encounter (CMS/[facility])  Active Problems:    Compression fracture of L1 vertebra, initial encounter (CMS/[facility])               DON Keene      "

## 2024-01-15 NOTE — PROGRESS NOTES
"Vancomycin Dosing by Pharmacy- FOLLOW UP    Carrie Rosario is a 83 y.o. year old female who Pharmacy has been consulted for vancomycin dosing for osteomyelitis/septic arthritis. Based on the patient's indication and renal status this patient is being dosed based on a goal AUC of 400-600.     Renal function is currently declining.    Current vancomycin dose: 1000 mg given every 24 hours    Estimated vancomycin AUC on current dose: 624 mg/L.hr     Visit Vitals  /65 (BP Location: Left arm, Patient Position: Lying)   Pulse 73   Temp 36.3 °C (97.3 °F) (Oral)   Resp 18        Lab Results   Component Value Date    CREATININE 1.40 01/15/2024    CREATININE 1.30 01/14/2024    CREATININE 1.30 01/12/2024    CREATININE 1.20 01/11/2024        Patient weight is No results found for: \"PTWEIGHT\"    No results found for: \"CULTURE\"     I/O last 3 completed shifts:  In: 800 (11.8 mL/kg) [IV Piggyback:800]  Out: 1000 (14.7 mL/kg) [Urine:1000 (0.4 mL/kg/hr)]  Weight: 68 kg   [unfilled]    Lab Results   Component Value Date    PATIENTTEMP CANCELLED BY MD 12/25/2021    PATIENTTEMP 37.0 12/25/2021    PATIENTTEMP 37.0 12/25/2021        Assessment/Plan    Above goal AUC. Orders placed for new vancomcyin regimen of 750 every 24 hours to begin at 1200.    This dosing regimen is predicted by [employer] to result in the following pharmacokinetic parameters:  Loading dose: N/A  Regimen: 750 mg IV every 24 hours.  Start time: 12:31 on 01/15/2024  Exposure target: AUC24 (range)400-600 mg/L.hr   AUC24,ss: 475 mg/L.hr  Probability of AUC24 > 400: 77 %  Ctrough,ss: 15.3 mg/L  Probability of Ctrough,ss > 20: 18 %  Probability of nephrotoxicity (Lodise JOIE 2009): 11 %    The next level will be obtained on 1/16 at 0500. May be obtained sooner if clinically indicated.   Will continue to monitor renal function daily while on vancomycin and order serum creatinine at least every 48 hours if not already ordered.  Follow for continued vancomycin needs, " clinical response, and signs/symptoms of toxicity.       SHANNON JEFFERSON, PharmD

## 2024-01-15 NOTE — PROGRESS NOTES
Carrie Rosario is a 83 y.o. female on day 3 of admission presenting with Compression fracture of L1 lumbar vertebra, closed, initial encounter (CMS/[facility]).    Subjective   Interval History:   Reports intermittent back pain  Afebrile, no chills  Denies foot pain  Denies shortness of breath or chest pain  Denies nausea, vomiting or diarrhea              Objective   Range of Vitals (last 24 hours)  Heart Rate:  [72-76]   Temp:  [36.3 °C (97.3 °F)-36.7 °C (98.1 °F)]   Resp:  [17-19]   BP: (110-125)/(58-69)   SpO2:  [96 %-98 %]   Daily Weight  01/11/24 : 68 kg (150 lb)    Body mass index is 24.96 kg/m².    Physical Exam  Constitutional:       Appearance: Normal appearance.   HENT:      Head: Normocephalic and atraumatic.      Nose: Nose normal.      Mouth/Throat:      Mouth: Mucous membranes are moist.      Pharynx: Oropharynx is clear.   Eyes:      Extraocular Movements: Extraocular movements intact.      Conjunctiva/sclera: Conjunctivae normal.   Cardiovascular:      Rate and Rhythm: Normal rate and regular rhythm.   Pulmonary:      Effort: Pulmonary effort is normal.      Breath sounds: Normal breath sounds.   Abdominal:      General: Bowel sounds are normal.      Palpations: Abdomen is soft.   Musculoskeletal:         General: Normal range of motion.      Cervical back: Normal range of motion and neck supple.   Skin:     General: Skin is warm and dry.      Comments: Right foot dressing   Neurological:      General: No focal deficit present.      Mental Status: She is alert and oriented to person, place, and time. Mental status is at baseline.   Psychiatric:         Mood and Affect: Mood normal.         Behavior: Behavior normal.         Antibiotics  ketorolac (Toradol) injection 7.5 mg  tiZANidine (Zanaflex) tablet 2 mg  dextrose 50 % injection 25 g  glucagon (Glucagen) injection 1 mg  dextrose 10 % in water (D10W) infusion  insulin lispro (HumaLOG) injection 0-10 Units  insulin glargine (Lantus) injection 15  Units  methylPREDNISolone (Medrol) tablet 24 mg  methylPREDNISolone (Medrol) tablet 20 mg  methylPREDNISolone (Medrol) tablet 16 mg  methylPREDNISolone (Medrol) tablet 12 mg  methylPREDNISolone (Medrol) tablet 8 mg  methylPREDNISolone (Medrol) tablet 4 mg  tiZANidine (Zanaflex) tablet 2 mg  heparin (porcine) injection 5,000 Units  acetaminophen (Tylenol) tablet 650 mg  polyethylene glycol (Glycolax, Miralax) packet 17 g  traMADol (Ultram) tablet 50 mg  insulin lispro (HumaLOG) injection 0-15 Units  insulin glargine (Lantus) injection 20 Units  amLODIPine (Norvasc) tablet 2.5 mg  aspirin EC tablet 81 mg  atorvastatin (Lipitor) tablet 20 mg  dapagliflozin propanediol (Farxiga) tablet 10 mg  famotidine (Pepcid) tablet 20 mg  FLUoxetine (PROzac) capsule 40 mg  furosemide (Lasix) tablet 20 mg  melatonin tablet 3 mg  metoprolol tartrate (Lopressor) tablet 25 mg  pilocarpine (Salagen) tablet 5 mg  potassium chloride CR (Klor-Con) ER tablet 10 mEq  piperacillin-tazobactam-dextrose (Zosyn) IV 2.25 g  vancomycin (Xellia) 1 g in 200 mL (Xellia) IVPB 1 g  oxyCODONE-acetaminophen (Percocet) 5-325 mg per tablet 1 tablet  vancomycin-diluent combo no.1 (Xellia) IVPB 750 mg      Relevant Results  Labs  Results from last 72 hours   Lab Units 01/15/24  0415 01/14/24  0409   WBC AUTO x10*3/uL 8.6 8.5   HEMOGLOBIN g/dL 14.9 13.7   HEMATOCRIT % 46.7* 42.2   PLATELETS AUTO x10*3/uL 320 315     Results from last 72 hours   Lab Units 01/15/24  0416 01/14/24  0409   SODIUM mmol/L 137 139   POTASSIUM mmol/L 4.2 4.1   CHLORIDE mmol/L 99 102   CO2 mmol/L 27 27   BUN mg/dL 36* 38*   CREATININE mg/dL 1.40 1.30   GLUCOSE mg/dL 104* 86   CALCIUM mg/dL 9.9 9.8   ANION GAP mmol/L 11 10   EGFR mL/min/1.73m*2 38* 41*         Estimated Creatinine Clearance: 27.9 mL/min (by C-G formula based on SCr of 1.4 mg/dL).  C-Reactive Protein   Date Value Ref Range Status   01/13/2024 2.60 (H) 0.00 - 2.00 mg/dL Final   10/11/2023 8.90 (H) 0.00 - 2.00 mg/dL Final      CRP   Date Value Ref Range Status   08/18/2021 3.1 (H) 0 - 2.0 MG/DL Final     Comment:     Performed at [facility] [address] [place], [place] [id number]     Microbiology  reviewed      Imaging  MR lumbar spine wo IV contrast    Result Date: 1/13/2024  Interpreted By:  Pankaj Wisdom, STUDY: MR LUMBAR SPINE WO IV CONTRAST; ;  1/13/2024 7:34 am   INDICATION: Signs/Symptoms:Spontaneous L1 fracture.   COMPARISON: CT 01/11/2024   ACCESSION NUMBER(S): [id number]   ORDERING CLINICIAN: ROBBI PINEDA   TECHNIQUE: Multiplanar multisequence MRI obtained of the lumbar spine     FINDINGS: L1 vertebral body demonstrates diffuse marrow edema in particular along the inferior endplate with more focal fluid signal intensity with T2 weighted hyperintense appearance demonstrated with central endplate depression along the inferior endplate of the L1 vertebral body. Corresponding CT demonstrates resorption of the trabecular and cortical margin along the inferior endplate as well as extending to portions of the anterior and posteroinferior margin of the L1 vertebral body with mild retropulsion of the posterior margin of L1. This is not the typical appearance or presentation of the fracture line given the configuration. Also, more focal fluid signal intensity demonstrated within the disc space posteriorly is noted. Corresponding superior endplate of the L2 vertebral body demonstrates endplate reactive marrow edema with cortical erosion and corresponding mild reactive marrow edema along the superior endplate of L2. This constellation of the findings raises suspicion for discitis osteomyelitis. While, component of the findings along the inferior endplate of L1 may reflect sequela of fracture the resorption within the vertebral body is atypical. There is diffuse surrounding paravertebral edema tracking within the psoas musculature bilaterally. There is more focal heterogeneous T2 weighted hyperintense collection along the left lateral margin  of the L1 vertebral body about the inferior endplate. This extends into the adjacent psoas muscle with intramuscular well-marginated fluid collection with T2 weighted hyperintense heterogeneous appearance. This suggest component of intramuscular abscess formation. This collection measures 1.8 x 1.3 x 1.9 cm in the AP, transverse and craniocaudal dimension respectively. The edema tracks from the level of the L1 vertebral body level caudally to the L3 vertebral body level bilaterally. The edema tracks into the neural foramina bilaterally.   Thecal sac L1/2 level demonstrates no focal narrowing. There is no epidural fluid collection or definite epidural abscess formation. However, the cortical irregularity along the posteroinferior margin of the L1 vertebral body is of concern.   Postsurgical changes are demonstrated with spinal fusion from the L3 through S1 levels bilaterally with bilateral pedicle screws and rods in place. There is laminectomy changes from L4 through S1.   Evaluation of spinal levels are as follows:   T12/L1 demonstrates no narrowing of the thecal sac or foramina   L1/2 demonstrates mild retropulsion of the posteroinferior margin of the L1 vertebral body measuring 2 mm. There is no narrowing thecal sac. Ligamentum flavum hypertrophy demonstrated at this level. Neural foramina demonstrate mild bilateral narrowing.   L2/3 demonstrates diffuse circumferential disc bulge seen in conjunction with component of anterolisthesis measuring 3 mm. These findings in conjunction with severe facet hypertrophic change contributing to severe narrowing of the thecal sac at this level. Foramina demonstrate moderate bilateral narrowing   There is severe narrowing of the thecal sac posterior to the L3 vertebral body extending from the superior endplate through to the inferior aspect of the vertebral body.   L3/4 demonstrates thecal sac to be decompressed. Foramina demonstrate moderate right and mild left foraminal  narrowing   L4/5 demonstrates thecal sac to be decompressed there is anterolisthesis at this level. Foramina demonstrate mild right foraminal narrowing. Left foramina is unremarkable   L5/S1 demonstrates thecal sac to be decompressed. Foramina demonstrate mild right foraminal narrowing. Left foramina unremarkable               1. Findings raising suspicion for discitis osteomyelitis at the L1/2 intervertebral disc level with erosive changes along the inferior endplate of L1 and superior endplate of L2 as described above. There is more focal fluid signal intensity in the disc space as well as within the inferior endplate of the L1 vertebral body with corresponding resorption of the cortical and trabecular margin as seen on the CT. While, superimposed component of fracture along the inferior endplate of the L1 is a possibility previously described findings are likely secondary to postinfectious etiology given the configuration appearance.   2. Generalized edema in the paravertebral location with more focal paravertebral intramuscular abscess location within the left psoas muscle about the inferior endplate of the L1 vertebral body measuring 1.8 x 1.3 x 1.9 cm.   3. No epidural abscess formation is demonstrated.   4. Severe narrowing of the thecal sac at L2/3 extending into the inferior margin of the L3 vertebral body in relation to anterolisthesis, disc, and facet hypertrophic change.   Findings phoned to patient's nurse at the time of interpretation on 01/13/2024 at 7:58 a.m..     MACRO: Critical Finding:  See findings. Notification was initiated on 1/13/2024 at 7:58 am by  Pankaj Wisdom.  (**-OCF-**) Instructions:   Signed by: Pankaj Wisdom 1/13/2024 8:03 AM Dictation workstation:   [id number]    CT lumbar spine wo IV contrast    Result Date: 1/11/2024  Interpreted By:  Adrián Marcum, STUDY: CT LUMBAR SPINE WO IV CONTRAST; 1/11/2024 3:47 pm   INDICATION: Signs/Symptoms:lower back pain;   COMPARISON: None    ACCESSION NUMBER(S): [id number]   ORDERING CLINICIAN: ALEX MORRISSEY   TECHNIQUE: Contiguous axial images of the lumbar spine were performed. Coronal and sagittal reformatted images were also obtained. All CT examinations are performed with 1 or more of the following dose reduction techniques: Automated exposure control, adjustment of mA and/or kv according to patient's size, or use of iterative reconstruction techniques.   FINDINGS: There is an acute appearing compression fracture along the inferior half of the L1 vertebral body with mild retropulsion of the inferior half of L1. No significant height loss is noted at L1. The remainder of the vertebral bodies appear adequate in height. There is a normal appearance of a posterior fusion at L3 through S1 with bilateral pedicle screws and rods which appear intact. Facet degenerative changes are present.   There is grade 1 anterolisthesis of L4 on L5 measuring approximately 8 mm.   There is severe hypertrophic facet degenerative change at L2-3 which are moderate-severely narrowing the neural foramina as well as moderate-severe spinal canal stenosis. Diffuse disc bulging also contributes to spinal canal and neural foramina stenosis at this level.   There is also a large osteophyte from severe hypertrophic facet degenerative change at L3 which is also causing moderate to moderate-severe spinal canal stenosis posterior to L3. The remainder of the levels do not show evidence for high additional high-grade thecal sac stenosis.   Additional levels of neural foramina stenosis are noted. There is moderate-severe right neural foramina stenosis at L4-5 and at least moderate right neural foramina stenosis at L5-S1.   The SI joints show degenerative gas but are otherwise intact.       Acute compression fracture along the inferior aspect of L1 vertebral body with mild retropulsion of the inferior aspect of L1. No significant height loss is noted at L1.   No additional acute  fractures are noted.   Stable appearance of posterior fusion with bilateral rods and pedicle screws at L3 through S1.   Chronic degenerative changes with high-grade spinal canal stenosis noted at L2-3 and L3-4. There is high-grade bilateral neural foramina stenosis at L2-3. There is moderate-severe right neural foramina stenosis at L4-5.     Signed by: Adrián Marcum 1/11/2024 4:46 PM Dictation workstation:   [id number]           Assessment/Plan   L1/L2 disc base infection  Left psoas abscess-most likely secondary to bacteremia  Right foot ulcer with retained exposed hardware     IV vancomycin  IV Zosyn  Blood cultures x 2  Interventional radiology consult for evaluation for possible aspiration  Pain management as needed  Supportive care  Monitor temperature and WBC  Podiatry follow up-possible right foot hardware removal     Lora Bustillos, CHELSEA-CNP

## 2024-01-16 ENCOUNTER — ANESTHESIA (OUTPATIENT)
Dept: OPERATING ROOM | Facility: HOSPITAL | Age: 84
DRG: 463 | End: 2024-01-16
Payer: MEDICARE

## 2024-01-16 ENCOUNTER — APPOINTMENT (OUTPATIENT)
Dept: RADIOLOGY | Facility: HOSPITAL | Age: 84
DRG: 463 | End: 2024-01-16
Payer: MEDICARE

## 2024-01-16 ENCOUNTER — ANESTHESIA EVENT (OUTPATIENT)
Dept: OPERATING ROOM | Facility: HOSPITAL | Age: 84
DRG: 463 | End: 2024-01-16
Payer: MEDICARE

## 2024-01-16 LAB
ANION GAP SERPL CALC-SCNC: 16 MMOL/L
BACTERIA SPEC CULT: ABNORMAL
BUN SERPL-MCNC: 41 MG/DL (ref 8–25)
CALCIUM SERPL-MCNC: 10.3 MG/DL (ref 8.5–10.4)
CHLORIDE SERPL-SCNC: 97 MMOL/L (ref 97–107)
CO2 SERPL-SCNC: 22 MMOL/L (ref 24–31)
CREAT SERPL-MCNC: 1.6 MG/DL (ref 0.4–1.6)
EGFRCR SERPLBLD CKD-EPI 2021: 32 ML/MIN/1.73M*2
ERYTHROCYTE [DISTWIDTH] IN BLOOD BY AUTOMATED COUNT: 12.1 % (ref 11.5–14.5)
GLUCOSE BLD MANUAL STRIP-MCNC: 141 MG/DL (ref 74–99)
GLUCOSE BLD MANUAL STRIP-MCNC: 152 MG/DL (ref 74–99)
GLUCOSE BLD MANUAL STRIP-MCNC: 170 MG/DL (ref 74–99)
GLUCOSE BLD MANUAL STRIP-MCNC: 202 MG/DL (ref 74–99)
GLUCOSE SERPL-MCNC: 146 MG/DL (ref 65–99)
GRAM STN SPEC: ABNORMAL
GRAM STN SPEC: ABNORMAL
HCT VFR BLD AUTO: 45.1 % (ref 36–46)
HGB BLD-MCNC: 14.7 G/DL (ref 12–16)
INR PPP: 1.1 (ref 0.9–1.2)
MCH RBC QN AUTO: 30.6 PG (ref 26–34)
MCHC RBC AUTO-ENTMCNC: 32.6 G/DL (ref 32–36)
MCV RBC AUTO: 94 FL (ref 80–100)
NRBC BLD-RTO: 0 /100 WBCS (ref 0–0)
PLATELET # BLD AUTO: 345 X10*3/UL (ref 150–450)
POTASSIUM SERPL-SCNC: 3.6 MMOL/L (ref 3.4–5.1)
PROTHROMBIN TIME: 11.5 SECONDS (ref 9.3–12.7)
RBC # BLD AUTO: 4.8 X10*6/UL (ref 4–5.2)
SODIUM SERPL-SCNC: 135 MMOL/L (ref 133–145)
VANCOMYCIN SERPL-MCNC: 23 UG/ML (ref 10–20)
WBC # BLD AUTO: 11.6 X10*3/UL (ref 4.4–11.3)

## 2024-01-16 PROCEDURE — 2500000004 HC RX 250 GENERAL PHARMACY W/ HCPCS (ALT 636 FOR OP/ED): Performed by: NURSE PRACTITIONER

## 2024-01-16 PROCEDURE — 2500000001 HC RX 250 WO HCPCS SELF ADMINISTERED DRUGS (ALT 637 FOR MEDICARE OP): Performed by: PODIATRIST

## 2024-01-16 PROCEDURE — 2500000005 HC RX 250 GENERAL PHARMACY W/O HCPCS: Performed by: ANESTHESIOLOGIST ASSISTANT

## 2024-01-16 PROCEDURE — 3600000004 HC OR TIME - INITIAL BASE CHARGE - PROCEDURE LEVEL FOUR: Performed by: PODIATRIST

## 2024-01-16 PROCEDURE — 7100000002 HC RECOVERY ROOM TIME - EACH INCREMENTAL 1 MINUTE: Performed by: PODIATRIST

## 2024-01-16 PROCEDURE — 3700000001 HC GENERAL ANESTHESIA TIME - INITIAL BASE CHARGE: Performed by: PODIATRIST

## 2024-01-16 PROCEDURE — 80048 BASIC METABOLIC PNL TOTAL CA: CPT | Performed by: NURSE PRACTITIONER

## 2024-01-16 PROCEDURE — 1100000001 HC PRIVATE ROOM DAILY

## 2024-01-16 PROCEDURE — 85027 COMPLETE CBC AUTOMATED: CPT | Performed by: NURSE PRACTITIONER

## 2024-01-16 PROCEDURE — 3600000009 HC OR TIME - EACH INCREMENTAL 1 MINUTE - PROCEDURE LEVEL FOUR: Performed by: PODIATRIST

## 2024-01-16 PROCEDURE — 36415 COLL VENOUS BLD VENIPUNCTURE: CPT | Performed by: NURSE PRACTITIONER

## 2024-01-16 PROCEDURE — 85610 PROTHROMBIN TIME: CPT | Performed by: RADIOLOGY

## 2024-01-16 PROCEDURE — 7100000001 HC RECOVERY ROOM TIME - INITIAL BASE CHARGE: Performed by: PODIATRIST

## 2024-01-16 PROCEDURE — 0JBQ0ZZ EXCISION OF RIGHT FOOT SUBCUTANEOUS TISSUE AND FASCIA, OPEN APPROACH: ICD-10-PCS | Performed by: PODIATRIST

## 2024-01-16 PROCEDURE — 2500000004 HC RX 250 GENERAL PHARMACY W/ HCPCS (ALT 636 FOR OP/ED): Performed by: PODIATRIST

## 2024-01-16 PROCEDURE — 3700000002 HC GENERAL ANESTHESIA TIME - EACH INCREMENTAL 1 MINUTE: Performed by: PODIATRIST

## 2024-01-16 PROCEDURE — 99100 ANES PT EXTEME AGE<1 YR&>70: CPT | Performed by: ANESTHESIOLOGY

## 2024-01-16 PROCEDURE — 2500000005 HC RX 250 GENERAL PHARMACY W/O HCPCS: Performed by: PODIATRIST

## 2024-01-16 PROCEDURE — 2780000003 HC OR 278 NO HCPCS: Performed by: PODIATRIST

## 2024-01-16 PROCEDURE — A20680 PR REMOVAL DEEP IMPLANT: Performed by: ANESTHESIOLOGIST ASSISTANT

## 2024-01-16 PROCEDURE — A20680 PR REMOVAL DEEP IMPLANT: Performed by: ANESTHESIOLOGY

## 2024-01-16 PROCEDURE — 87070 CULTURE OTHR SPECIMN AEROBIC: CPT | Mod: TRILAB,WESLAB | Performed by: PODIATRIST

## 2024-01-16 PROCEDURE — 36415 COLL VENOUS BLD VENIPUNCTURE: CPT | Performed by: RADIOLOGY

## 2024-01-16 PROCEDURE — 82947 ASSAY GLUCOSE BLOOD QUANT: CPT

## 2024-01-16 PROCEDURE — 2500000002 HC RX 250 W HCPCS SELF ADMINISTERED DRUGS (ALT 637 FOR MEDICARE OP, ALT 636 FOR OP/ED): Performed by: PODIATRIST

## 2024-01-16 PROCEDURE — 3E0102A INTRODUCTION OF ANTI-INFECTIVE ENVELOPE INTO SUBCUTANEOUS TISSUE, OPEN APPROACH: ICD-10-PCS | Performed by: PODIATRIST

## 2024-01-16 PROCEDURE — 0HXMXZZ TRANSFER RIGHT FOOT SKIN, EXTERNAL APPROACH: ICD-10-PCS | Performed by: PODIATRIST

## 2024-01-16 PROCEDURE — 76000 FLUOROSCOPY <1 HR PHYS/QHP: CPT

## 2024-01-16 PROCEDURE — 2500000004 HC RX 250 GENERAL PHARMACY W/ HCPCS (ALT 636 FOR OP/ED): Performed by: ANESTHESIOLOGIST ASSISTANT

## 2024-01-16 PROCEDURE — 0QPN04Z REMOVAL OF INTERNAL FIXATION DEVICE FROM RIGHT METATARSAL, OPEN APPROACH: ICD-10-PCS | Performed by: PODIATRIST

## 2024-01-16 PROCEDURE — 80202 ASSAY OF VANCOMYCIN: CPT | Performed by: NURSE PRACTITIONER

## 2024-01-16 DEVICE — STIMULAN® RAPID CURE PROVIDED STERILE FOR SINGLE PATIENT USE. STIMULAN® RAPID CURE CONTAINS CALCIUM SULFATE POWDER AND MIXING SOLUTION IN PRE-MEASURED QUANTITIES SO THAT WHEN MIXED TOGETHER IN A STERILE MIXING BOWL, THE RESULTANT PASTE IS TO BE DIGITALLY PACKED INTO OPEN BONE VOID/GAP TO SET INSITU OR PLACED INTO THE MOULD PROVIDED, THE MIXTURE SETS TO FORM BEADS. THE BIODEGRADABLE, RADIOPAQUE BEADS ARE RESORBED IN APPROXIMATELY 30 – 60 DAYS WHEN USED IN ACCORDANCE WITH THE DEVICE LABELLING. STIMULAN® RAPID CURE IS MANUFACTURED FROM SYNTHETIC IMPLANT GRADE CALCIUM SULFATE DIHYDRATE(CASO4.2H2O) THAT RESORBS AND IS REPLACED WITH BONE DURING THE HEALING PROCESS. ALSO, AS THE BONE VOID FILLER BEADS ARE BIODEGRADABLE AND BIOCOMPATIBLE, THEY MAY BE USED AT AN INFECTED SITE.
Type: IMPLANTABLE DEVICE | Site: FOOT | Status: FUNCTIONAL
Brand: STIMULAN® RAPID CURE

## 2024-01-16 RX ORDER — SODIUM CHLORIDE, SODIUM LACTATE, POTASSIUM CHLORIDE, CALCIUM CHLORIDE 600; 310; 30; 20 MG/100ML; MG/100ML; MG/100ML; MG/100ML
100 INJECTION, SOLUTION INTRAVENOUS CONTINUOUS
Status: DISCONTINUED | OUTPATIENT
Start: 2024-01-16 | End: 2024-01-16

## 2024-01-16 RX ORDER — PHENYLEPHRINE HYDROCHLORIDE 10 MG/ML
INJECTION INTRAVENOUS AS NEEDED
Status: DISCONTINUED | OUTPATIENT
Start: 2024-01-16 | End: 2024-01-16

## 2024-01-16 RX ORDER — LIDOCAINE HYDROCHLORIDE 10 MG/ML
0.1 INJECTION INFILTRATION; PERINEURAL ONCE
Status: DISCONTINUED | OUTPATIENT
Start: 2024-01-16 | End: 2024-01-23 | Stop reason: HOSPADM

## 2024-01-16 RX ORDER — VANCOMYCIN HYDROCHLORIDE 1 G/20ML
INJECTION, POWDER, LYOPHILIZED, FOR SOLUTION INTRAVENOUS AS NEEDED
Status: DISCONTINUED | OUTPATIENT
Start: 2024-01-16 | End: 2024-01-16 | Stop reason: HOSPADM

## 2024-01-16 RX ORDER — ONDANSETRON HYDROCHLORIDE 2 MG/ML
4 INJECTION, SOLUTION INTRAVENOUS EVERY 8 HOURS PRN
Status: DISCONTINUED | OUTPATIENT
Start: 2024-01-16 | End: 2024-01-16 | Stop reason: HOSPADM

## 2024-01-16 RX ORDER — SODIUM CHLORIDE, SODIUM LACTATE, POTASSIUM CHLORIDE, CALCIUM CHLORIDE 600; 310; 30; 20 MG/100ML; MG/100ML; MG/100ML; MG/100ML
INJECTION, SOLUTION INTRAVENOUS CONTINUOUS PRN
Status: DISCONTINUED | OUTPATIENT
Start: 2024-01-16 | End: 2024-01-16

## 2024-01-16 RX ORDER — ONDANSETRON HYDROCHLORIDE 2 MG/ML
4 INJECTION, SOLUTION INTRAVENOUS ONCE AS NEEDED
Status: DISCONTINUED | OUTPATIENT
Start: 2024-01-16 | End: 2024-01-23 | Stop reason: HOSPADM

## 2024-01-16 RX ORDER — PROPOFOL 10 MG/ML
INJECTION, EMULSION INTRAVENOUS AS NEEDED
Status: DISCONTINUED | OUTPATIENT
Start: 2024-01-16 | End: 2024-01-16

## 2024-01-16 RX ORDER — PROPOFOL 10 MG/ML
INJECTION, EMULSION INTRAVENOUS CONTINUOUS PRN
Status: DISCONTINUED | OUTPATIENT
Start: 2024-01-16 | End: 2024-01-16

## 2024-01-16 RX ORDER — FENTANYL CITRATE 50 UG/ML
INJECTION, SOLUTION INTRAMUSCULAR; INTRAVENOUS AS NEEDED
Status: DISCONTINUED | OUTPATIENT
Start: 2024-01-16 | End: 2024-01-16

## 2024-01-16 RX ORDER — LIDOCAINE HYDROCHLORIDE 10 MG/ML
INJECTION INFILTRATION; PERINEURAL AS NEEDED
Status: DISCONTINUED | OUTPATIENT
Start: 2024-01-16 | End: 2024-01-16

## 2024-01-16 RX ORDER — BUPIVACAINE HYDROCHLORIDE 5 MG/ML
INJECTION, SOLUTION PERINEURAL AS NEEDED
Status: DISCONTINUED | OUTPATIENT
Start: 2024-01-16 | End: 2024-01-16 | Stop reason: HOSPADM

## 2024-01-16 RX ORDER — HYDRALAZINE HYDROCHLORIDE 20 MG/ML
5 INJECTION INTRAMUSCULAR; INTRAVENOUS EVERY 30 MIN PRN
Status: DISCONTINUED | OUTPATIENT
Start: 2024-01-16 | End: 2024-01-23 | Stop reason: HOSPADM

## 2024-01-16 RX ADMIN — DAPAGLIFLOZIN 10 MG: 10 TABLET, FILM COATED ORAL at 15:28

## 2024-01-16 RX ADMIN — POLYETHYLENE GLYCOL 3350 17 G: 17 POWDER, FOR SOLUTION ORAL at 00:54

## 2024-01-16 RX ADMIN — PIPERACILLIN SODIUM AND TAZOBACTAM SODIUM 2.25 G: 2; .25 INJECTION, SOLUTION INTRAVENOUS at 05:04

## 2024-01-16 RX ADMIN — ATORVASTATIN CALCIUM 20 MG: 20 TABLET, FILM COATED ORAL at 20:25

## 2024-01-16 RX ADMIN — PHENYLEPHRINE HYDROCHLORIDE 100 MCG: 10 INJECTION, SOLUTION INTRAMUSCULAR; INTRAVENOUS; SUBCUTANEOUS at 11:16

## 2024-01-16 RX ADMIN — POTASSIUM CHLORIDE 10 MEQ: 750 TABLET, EXTENDED RELEASE ORAL at 15:29

## 2024-01-16 RX ADMIN — FENTANYL CITRATE 25 MCG: 0.05 INJECTION, SOLUTION INTRAMUSCULAR; INTRAVENOUS at 10:50

## 2024-01-16 RX ADMIN — METOPROLOL TARTRATE 25 MG: 25 TABLET, FILM COATED ORAL at 20:25

## 2024-01-16 RX ADMIN — FLUOXETINE HYDROCHLORIDE 40 MG: 20 CAPSULE ORAL at 15:29

## 2024-01-16 RX ADMIN — LIDOCAINE HYDROCHLORIDE 5 ML: 10 INJECTION, SOLUTION INFILTRATION; PERINEURAL at 10:50

## 2024-01-16 RX ADMIN — FUROSEMIDE 20 MG: 20 TABLET ORAL at 09:00

## 2024-01-16 RX ADMIN — PHENYLEPHRINE HYDROCHLORIDE 100 MCG: 10 INJECTION, SOLUTION INTRAMUSCULAR; INTRAVENOUS; SUBCUTANEOUS at 11:04

## 2024-01-16 RX ADMIN — PHENYLEPHRINE HYDROCHLORIDE 100 MCG: 10 INJECTION, SOLUTION INTRAMUSCULAR; INTRAVENOUS; SUBCUTANEOUS at 11:10

## 2024-01-16 RX ADMIN — TRAMADOL HYDROCHLORIDE 50 MG: 50 TABLET ORAL at 20:24

## 2024-01-16 RX ADMIN — SODIUM CHLORIDE, POTASSIUM CHLORIDE, SODIUM LACTATE AND CALCIUM CHLORIDE: 600; 310; 30; 20 INJECTION, SOLUTION INTRAVENOUS at 10:42

## 2024-01-16 RX ADMIN — PROPOFOL 100 MCG/KG/MIN: 10 INJECTION, EMULSION INTRAVENOUS at 10:50

## 2024-01-16 RX ADMIN — AMLODIPINE BESYLATE 2.5 MG: 2.5 TABLET ORAL at 15:29

## 2024-01-16 RX ADMIN — HEPARIN SODIUM 5000 UNITS: 5000 INJECTION, SOLUTION INTRAVENOUS; SUBCUTANEOUS at 05:04

## 2024-01-16 RX ADMIN — PROPOFOL 50 MG: 10 INJECTION, EMULSION INTRAVENOUS at 10:50

## 2024-01-16 RX ADMIN — ONDANSETRON 4 MG: 2 INJECTION INTRAMUSCULAR; INTRAVENOUS at 10:39

## 2024-01-16 RX ADMIN — PILOCARPINE HYDROCHLORIDE 5 MG: 5 TABLET, FILM COATED ORAL at 20:25

## 2024-01-16 RX ADMIN — INSULIN GLARGINE 20 UNITS: 100 INJECTION, SOLUTION SUBCUTANEOUS at 21:00

## 2024-01-16 RX ADMIN — PILOCARPINE HYDROCHLORIDE 5 MG: 5 TABLET, FILM COATED ORAL at 15:28

## 2024-01-16 RX ADMIN — OXYCODONE AND ACETAMINOPHEN 1 TABLET: 5; 325 TABLET ORAL at 16:17

## 2024-01-16 RX ADMIN — DEXTROSE MONOHYDRATE 500 MG: 50 INJECTION, SOLUTION INTRAVENOUS at 14:58

## 2024-01-16 RX ADMIN — PIPERACILLIN SODIUM AND TAZOBACTAM SODIUM 2.25 G: 2; .25 INJECTION, SOLUTION INTRAVENOUS at 20:25

## 2024-01-16 RX ADMIN — PIPERACILLIN SODIUM AND TAZOBACTAM SODIUM 2.25 G: 2; .25 INJECTION, SOLUTION INTRAVENOUS at 13:26

## 2024-01-16 RX ADMIN — FAMOTIDINE 20 MG: 20 TABLET, FILM COATED ORAL at 15:29

## 2024-01-16 RX ADMIN — PHENYLEPHRINE HYDROCHLORIDE 100 MCG: 10 INJECTION, SOLUTION INTRAMUSCULAR; INTRAVENOUS; SUBCUTANEOUS at 10:57

## 2024-01-16 SDOH — HEALTH STABILITY: MENTAL HEALTH: CURRENT SMOKER: 0

## 2024-01-16 ASSESSMENT — COGNITIVE AND FUNCTIONAL STATUS - GENERAL
CLIMB 3 TO 5 STEPS WITH RAILING: TOTAL
DRESSING REGULAR UPPER BODY CLOTHING: A LOT
HELP NEEDED FOR BATHING: A LOT
TOILETING: A LOT
MOVING FROM LYING ON BACK TO SITTING ON SIDE OF FLAT BED WITH BEDRAILS: A LOT
TURNING FROM BACK TO SIDE WHILE IN FLAT BAD: A LOT
WALKING IN HOSPITAL ROOM: TOTAL
MOVING TO AND FROM BED TO CHAIR: A LOT
DAILY ACTIVITIY SCORE: 15
DRESSING REGULAR LOWER BODY CLOTHING: A LOT
HELP NEEDED FOR BATHING: A LITTLE
DAILY ACTIVITIY SCORE: 15
WALKING IN HOSPITAL ROOM: TOTAL
TOILETING: A LOT
DRESSING REGULAR UPPER BODY CLOTHING: A LOT
CLIMB 3 TO 5 STEPS WITH RAILING: TOTAL
DRESSING REGULAR UPPER BODY CLOTHING: A LITTLE
MOVING TO AND FROM BED TO CHAIR: A LOT
TURNING FROM BACK TO SIDE WHILE IN FLAT BAD: A LOT
WALKING IN HOSPITAL ROOM: TOTAL
STANDING UP FROM CHAIR USING ARMS: A LOT
MOBILITY SCORE: 10
PERSONAL GROOMING: A LITTLE
STANDING UP FROM CHAIR USING ARMS: A LOT
MOBILITY SCORE: 10
DRESSING REGULAR LOWER BODY CLOTHING: A LOT
TOILETING: A LOT
PERSONAL GROOMING: A LITTLE
MOVING FROM LYING ON BACK TO SITTING ON SIDE OF FLAT BED WITH BEDRAILS: A LOT
TURNING FROM BACK TO SIDE WHILE IN FLAT BAD: A LOT
HELP NEEDED FOR BATHING: A LOT
MOVING TO AND FROM BED TO CHAIR: A LOT
DRESSING REGULAR LOWER BODY CLOTHING: A LITTLE
MOVING FROM LYING ON BACK TO SITTING ON SIDE OF FLAT BED WITH BEDRAILS: A LOT
MOBILITY SCORE: 10
CLIMB 3 TO 5 STEPS WITH RAILING: TOTAL
DAILY ACTIVITIY SCORE: 18
PERSONAL GROOMING: A LITTLE
STANDING UP FROM CHAIR USING ARMS: A LOT

## 2024-01-16 ASSESSMENT — PAIN SCALES - GENERAL
PAINLEVEL_OUTOF10: 5 - MODERATE PAIN
PAINLEVEL_OUTOF10: 3
PAINLEVEL_OUTOF10: 0 - NO PAIN
PAINLEVEL_OUTOF10: 6

## 2024-01-16 ASSESSMENT — PAIN DESCRIPTION - ORIENTATION: ORIENTATION: LOWER

## 2024-01-16 ASSESSMENT — PAIN - FUNCTIONAL ASSESSMENT
PAIN_FUNCTIONAL_ASSESSMENT: 0-10

## 2024-01-16 ASSESSMENT — COLUMBIA-SUICIDE SEVERITY RATING SCALE - C-SSRS
2. HAVE YOU ACTUALLY HAD ANY THOUGHTS OF KILLING YOURSELF?: NO
6. HAVE YOU EVER DONE ANYTHING, STARTED TO DO ANYTHING, OR PREPARED TO DO ANYTHING TO END YOUR LIFE?: NO
1. IN THE PAST MONTH, HAVE YOU WISHED YOU WERE DEAD OR WISHED YOU COULD GO TO SLEEP AND NOT WAKE UP?: NO

## 2024-01-16 ASSESSMENT — PAIN DESCRIPTION - LOCATION: LOCATION: BACK

## 2024-01-16 NOTE — PROGRESS NOTES
"Vancomycin Dosing by Pharmacy- FOLLOW UP    Carrie Rosario is a 83 y.o. year old female who Pharmacy has been consulted for vancomycin dosing for osteomyelitis/septic arthritis. Based on the patient's indication and renal status this patient is being dosed based on a goal AUC of 400-600.     Renal function is currently declining.    Current vancomycin dose: 750 mg given every 24 hours    Estimated vancomycin AUC on current dose: 661 mg/L.hr     Visit Vitals  /79 (BP Location: Left arm, Patient Position: Lying)   Pulse 91   Temp 36.4 °C (97.5 °F) (Oral)   Resp 20        Lab Results   Component Value Date    CREATININE 1.60 01/16/2024    CREATININE 1.40 01/15/2024    CREATININE 1.30 01/14/2024    CREATININE 1.30 01/12/2024        Patient weight is No results found for: \"PTWEIGHT\"    No results found for: \"CULTURE\"     I/O last 3 completed shifts:  In: 450 (6.6 mL/kg) [IV Piggyback:450]  Out: 1900 (27.9 mL/kg) [Urine:1900 (0.8 mL/kg/hr)]  Weight: 68 kg   [unfilled]    Lab Results   Component Value Date    PATIENTTEMP CANCELLED BY MD 12/25/2021    PATIENTTEMP 37.0 12/25/2021    PATIENTTEMP 37.0 12/25/2021        Assessment/Plan    Above goal AUC. Orders placed for new vancomcyin regimen of 500 every 24 hours to begin at 1200.    This dosing regimen is predicted by [employer] to result in the following pharmacokinetic parameters:  Loading dose: N/A  Regimen: 500 mg IV every 24 hours.  Start time: 12:16 on 01/16/2024  Exposure target: AUC24 (range)400-600 mg/L.hr   AUC24,ss: 455 mg/L.hr  Probability of AUC24 > 400: 76 %  Ctrough,ss: 15.4 mg/L  Probability of Ctrough,ss > 20: 12 %  Probability of nephrotoxicity (Lodise JOIE 2009): 11 %    The next level will be obtained on 1/17 at 0500. May be obtained sooner if clinically indicated.   Will continue to monitor renal function daily while on vancomycin and order serum creatinine at least every 48 hours if not already ordered.  Follow for continued vancomycin needs, " clinical response, and signs/symptoms of toxicity.       SHANNON JEFFERSON, PharmD

## 2024-01-16 NOTE — CARE PLAN
The patient's goals for the shift include  IV ATB    The clinical goals for the shift include safety    Over the shift, the patient did not make progress toward the following goals. Barriers to progression include . Recommendations to address these barriers include .

## 2024-01-16 NOTE — ANESTHESIA PREPROCEDURE EVALUATION
Patient: Carrie Rosario    Procedure Information       Date/Time: 01/16/24 1045    Procedures:       Removal Hardware Lower Extremity (Right: Foot)      Debridement Foot (Right: Foot)      Excision Lesion Skin Lower Extremity (Right: Foot)      Insertion Therapeutic Agent Delivery Device (Right: Foot)      Application Skin Graft Lower Extremity (Right: Foot) - MINI C-ARM, HARDWARE REMOVAL SET - ARTHREX 2.5 SCREWS, INTEGRA FLOWABLE    Location: [facility]    Surgeons: Akanksha Kennedy DPM          Past Medical History:   Diagnosis Date    Arthritis     Cardiac arrest (CMS/HCC)     CVA (cerebral vascular accident) (CMS/HCC)     Diabetes mellitus (CMS/HCC)     GERD (gastroesophageal reflux disease)     Heart disease     Hypercholesterolemia     Hypertension     Right foot drop     Right foot ulcer (CMS/HCC)     Spinal stenosis     STEMI (ST elevation myocardial infarction) (CMS/HCC)       Relevant Problems   Cardiovascular   (+) Acute ST elevation myocardial infarction (STEMI) (CMS/HCC)   (+) Acute diastolic heart failure (CMS/HCC)   (+) Cardiac arrest (CMS/HCC)   (+) Congestive heart failure (CMS/HCC)   (+) Coronary arteriosclerosis   (+) HTN (hypertension), benign   (+) Hypercholesterolemia   (+) Myocardial infarction (CMS/HCC)      Endocrine   (+) Type 2 diabetes mellitus (CMS/HCC)      GI   (+) Gastroesophageal reflux disease      /Renal   (+) Acute renal failure syndrome (CMS/HCC)   (+) Chronic kidney disease, stage 3 (CMS/HCC)      Neuro/Psych   (+) Cerebrovascular accident (CMS/HCC)     Past Surgical History:   Procedure Laterality Date    CORONARY ANGIOPLASTY WITH STENT PLACEMENT  12/25/2021    FOOT SURGERY      multiple    MR HEAD ANGIO WO IV CONTRAST  12/28/2021    MR HEAD ANGIO WO IV CONTRAST [facility]    MR NECK ANGIO WO IV CONTRAST  12/28/2021    MR NECK ANGIO WO IV CONTRAST [facility]    OTHER SURGICAL HISTORY  04/25/2022    Cataract surgery    OTHER SURGICAL HISTORY   04/25/2022    YAG laser capsulotomy    OTHER SURGICAL HISTORY  04/25/2022    Foot surgery    OTHER SURGICAL HISTORY  04/25/2022    Back surgery    OTHER SURGICAL HISTORY  04/25/2022    Arterial stent placement      Clinical information reviewed:   Tobacco  Allergies  Meds  Problems  Med Hx  Surg Hx   Fam Hx  Soc   Hx        NPO Detail:  No data recorded     Physical Exam    Airway  Mallampati: I  TM distance: >3 FB  Neck ROM: full     Cardiovascular    Dental    Pulmonary    Abdominal            Anesthesia Plan    History of general anesthesia?: yes  History of complications of general anesthesia?: no    ASA 4     general     The patient is not a current smoker.  Patient was not previously instructed to abstain from smoking on day of procedure.  Patient did not smoke on day of procedure.    intravenous induction   Anesthetic plan and risks discussed with patient.    Plan discussed with attending.

## 2024-01-16 NOTE — PROGRESS NOTES
Carrie Rosario is a 83 y.o. female on day 4 of admission presenting with Compression fracture of L1 lumbar vertebra, closed, initial encounter (CMS/[facility]).      Subjective   Patient resting in bed comfortably, awaiting surgery with podiatry today.       Objective     Last Recorded Vitals  /79 (BP Location: Left arm, Patient Position: Lying)   Pulse 91   Temp 36.4 °C (97.5 °F) (Oral)   Resp 20   Wt 68 kg (150 lb)   SpO2 93%   Intake/Output last 3 Shifts:    Intake/Output Summary (Last 24 hours) at 1/16/2024 1017  Last data filed at 1/16/2024 0504  Gross per 24 hour   Intake 350 ml   Output 900 ml   Net -550 ml       Admission Weight  Weight: 68 kg (150 lb) (01/11/24 1251)    Daily Weight  01/11/24 : 68 kg (150 lb)    Image Results  MR lumbar spine wo IV contrast  Narrative: Interpreted By:  Pankaj Wisdom,   STUDY:  MR LUMBAR SPINE WO IV CONTRAST; ;  1/13/2024 7:34 am      INDICATION:  Signs/Symptoms:Spontaneous L1 fracture.      COMPARISON:  CT 01/11/2024      ACCESSION NUMBER(S):  [id number]      ORDERING CLINICIAN:  ROBBI PINEDA      TECHNIQUE:  Multiplanar multisequence MRI obtained of the lumbar spine          FINDINGS:  L1 vertebral body demonstrates diffuse marrow edema in particular  along the inferior endplate with more focal fluid signal intensity  with T2 weighted hyperintense appearance demonstrated with central  endplate depression along the inferior endplate of the L1 vertebral  body. Corresponding CT demonstrates resorption of the trabecular and  cortical margin along the inferior endplate as well as extending to  portions of the anterior and posteroinferior margin of the L1  vertebral body with mild retropulsion of the posterior margin of L1.  This is not the typical appearance or presentation of the fracture  line given the configuration. Also, more focal fluid signal intensity  demonstrated within the disc space posteriorly is noted.  Corresponding superior endplate of the L2  vertebral body demonstrates  endplate reactive marrow edema with cortical erosion and  corresponding mild reactive marrow edema along the superior endplate  of L2. This constellation of the findings raises suspicion for  discitis osteomyelitis. While, component of the findings along the  inferior endplate of L1 may reflect sequela of fracture the  resorption within the vertebral body is atypical. There is diffuse  surrounding paravertebral edema tracking within the psoas musculature  bilaterally. There is more focal heterogeneous T2 weighted  hyperintense collection along the left lateral margin of the L1  vertebral body about the inferior endplate. This extends into the  adjacent psoas muscle with intramuscular well-marginated fluid  collection with T2 weighted hyperintense heterogeneous appearance.  This suggest component of intramuscular abscess formation. This  collection measures 1.8 x 1.3 x 1.9 cm in the AP, transverse and  craniocaudal dimension respectively. The edema tracks from the level  of the L1 vertebral body level caudally to the L3 vertebral body  level bilaterally. The edema tracks into the neural foramina  bilaterally.      Thecal sac L1/2 level demonstrates no focal narrowing. There is no  epidural fluid collection or definite epidural abscess formation.  However, the cortical irregularity along the posteroinferior margin  of the L1 vertebral body is of concern.      Postsurgical changes are demonstrated with spinal fusion from the L3  through S1 levels bilaterally with bilateral pedicle screws and rods  in place. There is laminectomy changes from L4 through S1.      Evaluation of spinal levels are as follows:      T12/L1 demonstrates no narrowing of the thecal sac or foramina      L1/2 demonstrates mild retropulsion of the posteroinferior margin of  the L1 vertebral body measuring 2 mm. There is no narrowing thecal  sac. Ligamentum flavum hypertrophy demonstrated at this level. Neural  foramina  demonstrate mild bilateral narrowing.      L2/3 demonstrates diffuse circumferential disc bulge seen in  conjunction with component of anterolisthesis measuring 3 mm. These  findings in conjunction with severe facet hypertrophic change  contributing to severe narrowing of the thecal sac at this level.  Foramina demonstrate moderate bilateral narrowing      There is severe narrowing of the thecal sac posterior to the L3  vertebral body extending from the superior endplate through to the  inferior aspect of the vertebral body.      L3/4 demonstrates thecal sac to be decompressed. Foramina demonstrate  moderate right and mild left foraminal narrowing      L4/5 demonstrates thecal sac to be decompressed there is  anterolisthesis at this level. Foramina demonstrate mild right  foraminal narrowing. Left foramina is unremarkable      L5/S1 demonstrates thecal sac to be decompressed. Foramina  demonstrate mild right foraminal narrowing. Left foramina unremarkable                      Impression: 1. Findings raising suspicion for discitis osteomyelitis at the L1/2  intervertebral disc level with erosive changes along the inferior  endplate of L1 and superior endplate of L2 as described above. There  is more focal fluid signal intensity in the disc space as well as  within the inferior endplate of the L1 vertebral body with  corresponding resorption of the cortical and trabecular margin as  seen on the CT. While, superimposed component of fracture along the  inferior endplate of the L1 is a possibility previously described  findings are likely secondary to postinfectious etiology given the  configuration appearance.      2. Generalized edema in the paravertebral location with more focal  paravertebral intramuscular abscess location within the left psoas  muscle about the inferior endplate of the L1 vertebral body measuring  1.8 x 1.3 x 1.9 cm.      3. No epidural abscess formation is demonstrated.      4. Severe narrowing of  the thecal sac at L2/3 extending into the  inferior margin of the L3 vertebral body in relation to  anterolisthesis, disc, and facet hypertrophic change.      Findings phoned to patient's nurse at the time of interpretation on  01/13/2024 at 7:58 a.m..          MACRO:  Critical Finding:  See findings. Notification was initiated on  1/13/2024 at 7:58 am by  Pankaj Wisdom.  (**-OCF-**) Instructions:      Signed by: Pankaj Wisdom 1/13/2024 8:03 AM  Dictation workstation:   [id number]      Physical Exam  Constitutional:       Appearance: Normal appearance.   Cardiovascular:      Rate and Rhythm: Normal rate and regular rhythm.      Pulses: Normal pulses.      Heart sounds: Normal heart sounds.   Pulmonary:      Effort: Pulmonary effort is normal.      Breath sounds: Normal breath sounds.   Abdominal:      General: Bowel sounds are normal.      Palpations: Abdomen is soft.   Skin:     General: Skin is warm and dry.      Comments: Right foot with dressing dry and intact   Neurological:      Mental Status: She is alert and oriented to person, place, and time.       Relevant Results             Results for orders placed or performed during the hospital encounter of 01/11/24 (from the past 24 hour(s))   POCT GLUCOSE   Result Value Ref Range    POCT Glucose 82 74 - 99 mg/dL   POCT GLUCOSE   Result Value Ref Range    POCT Glucose 126 (H) 74 - 99 mg/dL   POCT GLUCOSE   Result Value Ref Range    POCT Glucose 170 (H) 74 - 99 mg/dL   CBC   Result Value Ref Range    WBC 11.6 (H) 4.4 - 11.3 x10*3/uL    nRBC 0.0 0.0 - 0.0 /100 WBCs    RBC 4.80 4.00 - 5.20 x10*6/uL    Hemoglobin 14.7 12.0 - 16.0 g/dL    Hematocrit 45.1 36.0 - 46.0 %    MCV 94 80 - 100 fL    MCH 30.6 26.0 - 34.0 pg    MCHC 32.6 32.0 - 36.0 g/dL    RDW 12.1 11.5 - 14.5 %    Platelets 345 150 - 450 x10*3/uL   Basic Metabolic Panel   Result Value Ref Range    Glucose 146 (H) 65 - 99 mg/dL    Sodium 135 133 - 145 mmol/L    Potassium 3.6 3.4 - 5.1 mmol/L    Chloride  97 97 - 107 mmol/L    Bicarbonate 22 (L) 24 - 31 mmol/L    Urea Nitrogen 41 (H) 8 - 25 mg/dL    Creatinine 1.60 0.40 - 1.60 mg/dL    eGFR 32 (L) >60 mL/min/1.73m*2    Calcium 10.3 8.5 - 10.4 mg/dL    Anion Gap 16 <=19 mmol/L   Vancomycin   Result Value Ref Range    Vancomycin 23.0 (H) 10.0 - 20.0 ug/mL   POCT GLUCOSE   Result Value Ref Range    POCT Glucose 170 (H) 74 - 99 mg/dL         Assessment/Plan                  Principal Problem:    Compression fracture of L1 lumbar vertebra, closed, initial encounter (CMS/[facility])  Active Problems:    Compression fracture of L1 vertebra, initial encounter (CMS/[facility])      Discitis / OM / L Psoas Abscess / Acute Compression Fracture L1 / Inability to Ambulate  Ortho Spine and ID following  CT lumbar spine shows acute compression fracture L1 without significant height loss.   MRI Lumbar spine showed discitis/OM L1-2 with intramuscular abscess of the L psoas muscle. No epidural abscess seen.   Possible need for aspiration and wound culture. ID to see if it can be done by IR. No surgical intervention needed per Dr. Kraus.   Broad spectrum IV abx, vanco and zosyn.   PRN pain medication and muscle relaxers. Improved with current meds.    PT/OT recommending moderate intensity therapy.    OOB with assist.   Fall precautions.      R Foot Ulceration   Podiatry, Dr. Kennedy to  take her to OR for removal of hardware in right foot on   Wound RN saw, buttocks wound healed. Continue to keep foot wound clean and dry, gauze between the toes.   Wound culture R foot wound pending.       DM 2  HgA1c 8.9.  On tresiba at home, sub lantus.   SSI coverage.  Monitor blood sugars.      HTN  Continue home meds.   BP currently stable, monitor.      CAD  Continue core meds.      HLD  Continue statin.      DVT Risk  Heparin subcutaneous.   SCDs.      Plan   Patient will be discharged to SNF.   Plan of care discussed with patient and collaborating physician, Dr. Stephen Davis,  APRN-CNP

## 2024-01-16 NOTE — ANESTHESIA POSTPROCEDURE EVALUATION
Patient: Carrie Rosario    Procedure Summary       Date: 01/16/24 Room / Location: [facility] OR 03 / [facility]    Anesthesia Start: 1044 Anesthesia Stop: 1136    Procedures:       Removal Hardware Lower Extremity (Right: Foot)      Debridement Foot (Right: Foot)      Excision Lesion Skin Lower Extremity (Right: Foot)      Insertion Therapeutic Agent Delivery Device (Right: Foot) Diagnosis:       Other mechanical complication of internal fixation device of bone of toe, initial encounter (CMS/HCC)      Ulcer of right foot with muscle involvement without evidence of necrosis (CMS/HCC)      (Right foot exposed hardware and chronic hecreation.)    Surgeons: Akanksha Kennedy DPM Responsible Provider: Jose Wiggins MD    Anesthesia Type: general ASA Status: 4            Anesthesia Type: general    Vitals Value Taken Time   /75 01/16/24 1142   Temp  01/16/24 1142   Pulse 80 01/16/24 1142   Resp 14 01/16/24 1142   SpO2 99 01/16/24 1142       Anesthesia Post Evaluation    Patient location during evaluation: PACU  Patient participation: complete - patient participated  Level of consciousness: awake and alert  Pain management: adequate  Airway patency: patent  Cardiovascular status: acceptable  Respiratory status: acceptable  Hydration status: acceptable  Postoperative Nausea and Vomiting: none        There were no known notable events for this encounter.

## 2024-01-16 NOTE — BRIEF OP NOTE
Date: 2024  OR Location: [facility]    Name: Carrie Rosario, : 1940, Age: 83 y.o., MRN: [id number], Sex: female    Diagnosis  Pre-op Diagnosis     * Other mechanical complication of internal fixation device of bone of toe, initial encounter (CMS/[facility]) [T84.293A]     * Ulcer of right foot with muscle involvement without evidence of necrosis (CMS/[facility]) [L97.515] Post-op Diagnosis     * Other mechanical complication of internal fixation device of bone of toe, initial encounter (CMS/[facility]) [T84.293A]     * Ulcer of right foot with muscle involvement without evidence of necrosis (CMS/[facility]) [L97.515]     Procedures    Hardware removal  Plantar Ulcer excision with local rearrangement of skin and soft tissue (hurricane flap) closure  Antibiotic bead placement  Debridement down to and including soft tissue of dorsal ulcer with primary closure      Surgeons      * Akanksha Kennedy - Primary    Resident/Fellow/Other Assistant:  Surgeon(s) and Role:    Procedure Summary  Anesthesia: Monitor Anesthesia Care  ASA: IV  Anesthesia Staff: Anesthesiologist: Jose Wiggins MD  C-AA: LILA Epps  Estimated Blood Loss: 5mL  Intra-op Medications:   Medication Name Total Dose   BUPivacaine HCl (Marcaine) 0.5 % (5 mg/mL) injection 16 mL   vancomycin (Vancocin) vial for injection 1 g              Anesthesia Record               Intraprocedure I/O Totals          Intake    Propofol Drip 0.00 mL    The total shown is the total volume documented since Anesthesia Start was filed.    LR infusion 400.00 mL    Total Intake 400 mL          Specimen:   ID Type Source Tests Collected by Time   A : RIGHT FOOT SOFT TISSUE Tissue SOFT TISSUE BIOPSY TISSUE/WOUND CULTURE/SMEAR Akanksha Kennedy DPM 2024 1109        Staff:   Circulator: Haresh Barnes RN  Scrub Person: Maryam Matos          Findings: No purulence    Complications:  None; patient tolerated the procedure well.     Disposition: PACU - hemodynamically  stable.  Condition: stable  Specimens Collected:   ID Type Source Tests Collected by Time   A : RIGHT FOOT SOFT TISSUE Tissue SOFT TISSUE BIOPSY TISSUE/WOUND CULTURE/SMEAR Akanksha Kennedy DPM 1/16/2024 1950     Attending Attestation: I was present and scrubbed for the entire procedure.    Akanksha Kennedy  Phone Number: [phone]

## 2024-01-16 NOTE — PROGRESS NOTES
"Carrie Rosario is a 83 y.o. female on day 4 of admission presenting with Compression fracture of L1 lumbar vertebra, closed, initial encounter (CMS/[facility]).    Subjective   Pt will require IV ATBX upon discharge. Pt is currently receiving IV zosyn and Vancomycin.    TCSW will inform CV, that pt will require IV ATBX.     Objective     Physical Exam    Last Recorded Vitals  Blood pressure 132/67, pulse 85, temperature 36.4 °C (97.5 °F), temperature source Oral, resp. rate 19, height 1.651 m (5' 5\"), weight 68 kg (150 lb), SpO2 96 %.  Intake/Output last 3 Shifts:  I/O last 3 completed shifts:  In: 450 (6.6 mL/kg) [IV Piggyback:450]  Out: 1900 (27.9 mL/kg) [Urine:1900 (0.8 mL/kg/hr)]  Weight: 68 kg     Relevant Results                             Assessment/Plan   Principal Problem:    Compression fracture of L1 lumbar vertebra, closed, initial encounter (CMS/[facility])  Active Problems:    Compression fracture of L1 vertebra, initial encounter (CMS/[facility])               DON Keene      "

## 2024-01-16 NOTE — PRE-PROCEDURE NOTE
Patient brought down from the 3rd floor via bed and transport. Report from RN. Patient alert and oriented. Patient complains of nausea at this time. Dr. Kennedy at bedside, will put orders in for that. Dr. Kennedy aware that patient has not stopped heparin and aspirin. No new orders for that. Patient denies pain at this time other than her stomach. Vitals stable. Will monitor.

## 2024-01-16 NOTE — ANESTHESIA PROCEDURE NOTES
Airway  Date/Time: 1/16/2024 10:51 AM  Urgency: elective    Airway not difficult    Staffing  Performed: CAA   Authorized by: Jose Wiggins MD    Performed by: LILA Epps  Patient location during procedure: OR    Indications and Patient Condition  Indications for airway management: anesthesia  Spontaneous ventilation: present  Preoxygenated: yes      Final Airway Details  Final airway type: mask          Additional Comments  O2 via nrb with oral airway yellow

## 2024-01-16 NOTE — OP NOTE
Removal Hardware Lower Extremity (R), Debridement Foot (R), Excision Lesion Skin Lower Extremity (R), Insertion Therapeutic Agent Delivery Device (R) Operative Note     Date: 2024  OR Location: [facility]    Name: Carrie Rosario, : 1940, Age: 83 y.o., MRN: [id number], Sex: female    Diagnosis  Pre-op Diagnosis     * Other mechanical complication of internal fixation device of bone of toe, initial encounter (CMS/[facility]) [T84.293A]     * Ulcer of right foot with muscle involvement without evidence of necrosis (CMS/[facility]) [L97.515] Post-op Diagnosis     * Other mechanical complication of internal fixation device of bone of toe, initial encounter (CMS/[facility]) [T84.293A]     * Ulcer of right foot with muscle involvement without evidence of necrosis (CMS/[facility]) [L97.515]     Procedures  Hardware removal  Plantar Ulcer excision with local rearrangement of skin and soft tissue (hurricane flap) closure  Antibiotic bead placement  Debridement down to and including soft tissue of dorsal ulcer with primary closure      Surgeons      * Akanksha Kennedy - Primary    Resident/Fellow/Other Assistant:  Surgeon(s) and Role:    Procedure Summary  Anesthesia: Monitor Anesthesia Care  ASA: IV  Anesthesia Staff: Anesthesiologist: Jose Wiggins MD  C-AA: LILA Epps  Estimated Blood Loss: 5mL  Intra-op Medications:   Medication Name Total Dose   BUPivacaine HCl (Marcaine) 0.5 % (5 mg/mL) injection 16 mL   vancomycin (Vancocin) vial for injection 1 g   insulin lispro (HumaLOG) injection 0-15 Units Cannot be calculated              Anesthesia Record               Intraprocedure I/O Totals          Intake    Propofol Drip 0.00 mL    The total shown is the total volume documented since Anesthesia Start was filed.    LR infusion 400.00 mL    Total Intake 400 mL          Specimen:   ID Type Source Tests Collected by Time   A : RIGHT FOOT SOFT TISSUE Tissue SOFT TISSUE BIOPSY TISSUE/WOUND CULTURE/SMEAR Akanksha Kennedy, VIN 2024 9593         Staff:   Circulator: Haresh Barnes RN  Scrub Person: Maryam Matos         Drains and/or Catheters: * None in log *    Tourniquet Times: none        Implants:  Implants       Type Name Action Serial No.      Graft STIMULAN KIT, RAPID CURE, 5CC - [id number] Implanted               Findings: See op report    Indications: Carrie Rosario is an 83 y.o. female who is having surgery for right foot hardware removal, ulcer excision with flap closure and debridement.  This is an 83-year-old female patient well-known to me for a chronic right foot ulceration.  At a recent appointment she was noted to have hardware sticking out in her first webspace.  She has a chronic right foot plantar hallux ulceration as well as a draining sinus to the dorsal aspect of the hallux.  At her last appointment we discussed surgical intervention for debridement, hardware removal and ulcer excision and closure and she was in agreement.  In the interim she was subsequently admitted to the hospital and was found to have a spinal compression fracture as well as an abscess in her psoas.  Given that she was an inpatient during her planned procedure time I discussed with the medical and infectious disease team about taking the patient to the operating room and they were in agreement with proceeding to the operating room    The patient was seen in the preoperative area. The risks, benefits, complications, treatment options, non-operative alternatives, expected recovery and outcomes were discussed with the patient. The possibilities of reaction to medication, pulmonary aspiration, injury to surrounding structures, bleeding, recurrent infection, the need for additional procedures, failure to diagnose a condition, and creating a complication requiring transfusion or operation were discussed with the patient. The patient concurred with the proposed plan, giving informed consent.  The site of surgery was properly noted/marked if  necessary per policy. The patient has been actively warmed in preoperative area. Preoperative antibiotics are not indicated.patient is receiving IV antibiotics on the floor.  Venous thrombosis prophylaxis are not indicated.    Procedure Details: After the patient was identified and the surgical site was marked the patient was brought to the operating room and placed on the operating room bed in the supine position.  A member of the anesthesia team administered a MAC anesthesia.  I then administered a preoperative local block about the right foot.  The right lower extremity was then scrubbed prepped and draped in the usual sterile fashion.  A timeout was then performed proper identifying the patient and the surgical site.  After this attention was then directed to the exposed hardware where a K wire was placed in the cannulated distal hole of the hardware and inserted across the hardware and its entry point was noted medially.  A small incision was made in this area and a screwdriver was then used to remove the screw that was in this place.  After this attention was then directed to the dorsal sinus where the sinus tract was then ellipsed out and the underlying tissue was noted to have some bogginess and fibrosis so this area was then debrided down to and including the subcutaneous tissue with a rongeur.  No bone was exposed.  No purulence expressed.  This area does track to the plantar ulcer through the metatarsal space.  The area was copiously irrigated with normal saline  Next attention was directed to the plantar first metatarsal head ulceration.  The ulcer was excised using a 15 blade and this was then sent off the field to be sent for microbiology culture.  The area was then irrigated with normal saline.  Stimulan antibiotic beads were then prepped and placed in all open wounds. The skin was then remodeled into hurricane type fashion with 2 limbs 1 extending distally and 1 extending proximally.  After this the  skin and subcutaneous tissue were then rotated and rearranged for closure.  2-0 Prolene was utilized to close the plantar skin flap.  3-0 Prolene was then utilized to close the dorsal and interspace incisions.  A dressing of Betadine soaked Adaptic gauze Kerlix and an Ace wrap was applied.  The patient tolerated the procedure well.  The patient was then transferred to the postanesthesia care unit with all vital signs stable and neurovascular status intact as it was preoperatively.  Complications:  None; patient tolerated the procedure well.    Disposition: PACU - hemodynamically stable.  Condition: stable         Additional Details: none    Attending Attestation: I was present and scrubbed for the entire procedure.    Akanksha Kennedy  Phone Number: [phone]

## 2024-01-16 NOTE — PROGRESS NOTES
Carrie Rosario is a 83 y.o. female on day 4 of admission presenting with Compression fracture of L1 lumbar vertebra, closed, initial encounter (CMS/[facility]).    Subjective   Interval History:   S/p removal right foot hardware  Afebrile, no chills  Denies foot pain  Denies shortness of breath or chest pain  Denies nausea, vomiting or diarrhea              Objective   Range of Vitals (last 24 hours)  Heart Rate:  [78-93]   Temp:  [36.1 °C (97 °F)-36.9 °C (98.4 °F)]   Resp:  [11-20]   BP: (111-151)/(61-79)   SpO2:  [93 %-100 %]   Daily Weight  01/11/24 : 68 kg (150 lb)    Body mass index is 24.96 kg/m².    Physical Exam  Constitutional:       Appearance: Normal appearance.   HENT:      Head: Normocephalic and atraumatic.      Nose: Nose normal.      Mouth/Throat:      Mouth: Mucous membranes are moist.      Pharynx: Oropharynx is clear.   Eyes:      Extraocular Movements: Extraocular movements intact.      Conjunctiva/sclera: Conjunctivae normal.   Cardiovascular:      Rate and Rhythm: Normal rate and regular rhythm.   Pulmonary:      Effort: Pulmonary effort is normal.      Breath sounds: Normal breath sounds.   Abdominal:      General: Bowel sounds are normal.      Palpations: Abdomen is soft.   Musculoskeletal:         General: Normal range of motion.      Cervical back: Normal range of motion and neck supple.   Skin:     General: Skin is warm and dry.      Comments: Right foot dressing   Neurological:      General: No focal deficit present.      Mental Status: She is alert and oriented to person, place, and time. Mental status is at baseline.   Psychiatric:         Mood and Affect: Mood normal.         Behavior: Behavior normal.         Antibiotics  ketorolac (Toradol) injection 7.5 mg  tiZANidine (Zanaflex) tablet 2 mg  dextrose 50 % injection 25 g  glucagon (Glucagen) injection 1 mg  dextrose 10 % in water (D10W) infusion  insulin lispro (HumaLOG) injection 0-10 Units  insulin glargine (Lantus) injection 15  Units  methylPREDNISolone (Medrol) tablet 24 mg  methylPREDNISolone (Medrol) tablet 20 mg  methylPREDNISolone (Medrol) tablet 16 mg  methylPREDNISolone (Medrol) tablet 12 mg  methylPREDNISolone (Medrol) tablet 8 mg  methylPREDNISolone (Medrol) tablet 4 mg  tiZANidine (Zanaflex) tablet 2 mg  heparin (porcine) injection 5,000 Units  acetaminophen (Tylenol) tablet 650 mg  polyethylene glycol (Glycolax, Miralax) packet 17 g  traMADol (Ultram) tablet 50 mg  insulin lispro (HumaLOG) injection 0-15 Units  insulin glargine (Lantus) injection 20 Units  amLODIPine (Norvasc) tablet 2.5 mg  aspirin EC tablet 81 mg  atorvastatin (Lipitor) tablet 20 mg  dapagliflozin propanediol (Farxiga) tablet 10 mg  famotidine (Pepcid) tablet 20 mg  FLUoxetine (PROzac) capsule 40 mg  furosemide (Lasix) tablet 20 mg  melatonin tablet 3 mg  metoprolol tartrate (Lopressor) tablet 25 mg  pilocarpine (Salagen) tablet 5 mg  potassium chloride CR (Klor-Con) ER tablet 10 mEq  piperacillin-tazobactam-dextrose (Zosyn) IV 2.25 g  vancomycin (Xellia) 1 g in 200 mL (Xellia) IVPB 1 g  oxyCODONE-acetaminophen (Percocet) 5-325 mg per tablet 1 tablet  vancomycin-diluent combo no.1 (Xellia) IVPB 750 mg      Relevant Results  Labs  Results from last 72 hours   Lab Units 01/16/24  0406 01/15/24  0415 01/14/24  0409   WBC AUTO x10*3/uL 11.6* 8.6 8.5   HEMOGLOBIN g/dL 14.7 14.9 13.7   HEMATOCRIT % 45.1 46.7* 42.2   PLATELETS AUTO x10*3/uL 345 320 315       Results from last 72 hours   Lab Units 01/16/24  0406 01/15/24  0416 01/14/24  0409   SODIUM mmol/L 135 137 139   POTASSIUM mmol/L 3.6 4.2 4.1   CHLORIDE mmol/L 97 99 102   CO2 mmol/L 22* 27 27   BUN mg/dL 41* 36* 38*   CREATININE mg/dL 1.60 1.40 1.30   GLUCOSE mg/dL 146* 104* 86   CALCIUM mg/dL 10.3 9.9 9.8   ANION GAP mmol/L 16 11 10   EGFR mL/min/1.73m*2 32* 38* 41*           Estimated Creatinine Clearance: 24.4 mL/min (by C-G formula based on SCr of 1.6 mg/dL).  C-Reactive Protein   Date Value Ref Range  Status   01/13/2024 2.60 (H) 0.00 - 2.00 mg/dL Final   10/11/2023 8.90 (H) 0.00 - 2.00 mg/dL Final     CRP   Date Value Ref Range Status   08/18/2021 3.1 (H) 0 - 2.0 MG/DL Final     Comment:     Performed at [place] [id number]     Microbiology  reviewed      Imaging  MR lumbar spine wo IV contrast    Result Date: 1/13/2024  Interpreted By:  Pankaj Wisdom, STUDY: MR LUMBAR SPINE WO IV CONTRAST; ;  1/13/2024 7:34 am   INDICATION: Signs/Symptoms:Spontaneous L1 fracture.   COMPARISON: CT 01/11/2024   ACCESSION NUMBER(S): [id number]   ORDERING CLINICIAN: ROBBI PINEDA   TECHNIQUE: Multiplanar multisequence MRI obtained of the lumbar spine     FINDINGS: L1 vertebral body demonstrates diffuse marrow edema in particular along the inferior endplate with more focal fluid signal intensity with T2 weighted hyperintense appearance demonstrated with central endplate depression along the inferior endplate of the L1 vertebral body. Corresponding CT demonstrates resorption of the trabecular and cortical margin along the inferior endplate as well as extending to portions of the anterior and posteroinferior margin of the L1 vertebral body with mild retropulsion of the posterior margin of L1. This is not the typical appearance or presentation of the fracture line given the configuration. Also, more focal fluid signal intensity demonstrated within the disc space posteriorly is noted. Corresponding superior endplate of the L2 vertebral body demonstrates endplate reactive marrow edema with cortical erosion and corresponding mild reactive marrow edema along the superior endplate of L2. This constellation of the findings raises suspicion for discitis osteomyelitis. While, component of the findings along the inferior endplate of L1 may reflect sequela of fracture the resorption within the vertebral body is atypical. There is diffuse surrounding paravertebral edema tracking within the psoas musculature bilaterally.  There is more focal heterogeneous T2 weighted hyperintense collection along the left lateral margin of the L1 vertebral body about the inferior endplate. This extends into the adjacent psoas muscle with intramuscular well-marginated fluid collection with T2 weighted hyperintense heterogeneous appearance. This suggest component of intramuscular abscess formation. This collection measures 1.8 x 1.3 x 1.9 cm in the AP, transverse and craniocaudal dimension respectively. The edema tracks from the level of the L1 vertebral body level caudally to the L3 vertebral body level bilaterally. The edema tracks into the neural foramina bilaterally.   Thecal sac L1/2 level demonstrates no focal narrowing. There is no epidural fluid collection or definite epidural abscess formation. However, the cortical irregularity along the posteroinferior margin of the L1 vertebral body is of concern.   Postsurgical changes are demonstrated with spinal fusion from the L3 through S1 levels bilaterally with bilateral pedicle screws and rods in place. There is laminectomy changes from L4 through S1.   Evaluation of spinal levels are as follows:   T12/L1 demonstrates no narrowing of the thecal sac or foramina   L1/2 demonstrates mild retropulsion of the posteroinferior margin of the L1 vertebral body measuring 2 mm. There is no narrowing thecal sac. Ligamentum flavum hypertrophy demonstrated at this level. Neural foramina demonstrate mild bilateral narrowing.   L2/3 demonstrates diffuse circumferential disc bulge seen in conjunction with component of anterolisthesis measuring 3 mm. These findings in conjunction with severe facet hypertrophic change contributing to severe narrowing of the thecal sac at this level. Foramina demonstrate moderate bilateral narrowing   There is severe narrowing of the thecal sac posterior to the L3 vertebral body extending from the superior endplate through to the inferior aspect of the vertebral body.   L3/4  demonstrates thecal sac to be decompressed. Foramina demonstrate moderate right and mild left foraminal narrowing   L4/5 demonstrates thecal sac to be decompressed there is anterolisthesis at this level. Foramina demonstrate mild right foraminal narrowing. Left foramina is unremarkable   L5/S1 demonstrates thecal sac to be decompressed. Foramina demonstrate mild right foraminal narrowing. Left foramina unremarkable               1. Findings raising suspicion for discitis osteomyelitis at the L1/2 intervertebral disc level with erosive changes along the inferior endplate of L1 and superior endplate of L2 as described above. There is more focal fluid signal intensity in the disc space as well as within the inferior endplate of the L1 vertebral body with corresponding resorption of the cortical and trabecular margin as seen on the CT. While, superimposed component of fracture along the inferior endplate of the L1 is a possibility previously described findings are likely secondary to postinfectious etiology given the configuration appearance.   2. Generalized edema in the paravertebral location with more focal paravertebral intramuscular abscess location within the left psoas muscle about the inferior endplate of the L1 vertebral body measuring 1.8 x 1.3 x 1.9 cm.   3. No epidural abscess formation is demonstrated.   4. Severe narrowing of the thecal sac at L2/3 extending into the inferior margin of the L3 vertebral body in relation to anterolisthesis, disc, and facet hypertrophic change.   Findings phoned to patient's nurse at the time of interpretation on 01/13/2024 at 7:58 a.m..     MACRO: Critical Finding:  See findings. Notification was initiated on 1/13/2024 at 7:58 am by  Pankaj Wisdom.  (**-OCF-**) Instructions:   Signed by: Pankaj Wisdom 1/13/2024 8:03 AM Dictation workstation:   [id number]    CT lumbar spine wo IV contrast    Result Date: 1/11/2024  Interpreted By:  Adrián Marcum, STUDY: CT LUMBAR  SPINE WO IV CONTRAST; 1/11/2024 3:47 pm   INDICATION: Signs/Symptoms:lower back pain;   COMPARISON: None   ACCESSION NUMBER(S): [id number]   ORDERING CLINICIAN: ALEX MORRISSEY   TECHNIQUE: Contiguous axial images of the lumbar spine were performed. Coronal and sagittal reformatted images were also obtained. All CT examinations are performed with 1 or more of the following dose reduction techniques: Automated exposure control, adjustment of mA and/or kv according to patient's size, or use of iterative reconstruction techniques.   FINDINGS: There is an acute appearing compression fracture along the inferior half of the L1 vertebral body with mild retropulsion of the inferior half of L1. No significant height loss is noted at L1. The remainder of the vertebral bodies appear adequate in height. There is a normal appearance of a posterior fusion at L3 through S1 with bilateral pedicle screws and rods which appear intact. Facet degenerative changes are present.   There is grade 1 anterolisthesis of L4 on L5 measuring approximately 8 mm.   There is severe hypertrophic facet degenerative change at L2-3 which are moderate-severely narrowing the neural foramina as well as moderate-severe spinal canal stenosis. Diffuse disc bulging also contributes to spinal canal and neural foramina stenosis at this level.   There is also a large osteophyte from severe hypertrophic facet degenerative change at L3 which is also causing moderate to moderate-severe spinal canal stenosis posterior to L3. The remainder of the levels do not show evidence for high additional high-grade thecal sac stenosis.   Additional levels of neural foramina stenosis are noted. There is moderate-severe right neural foramina stenosis at L4-5 and at least moderate right neural foramina stenosis at L5-S1.   The SI joints show degenerative gas but are otherwise intact.       Acute compression fracture along the inferior aspect of L1 vertebral body with mild  retropulsion of the inferior aspect of L1. No significant height loss is noted at L1.   No additional acute fractures are noted.   Stable appearance of posterior fusion with bilateral rods and pedicle screws at L3 through S1.   Chronic degenerative changes with high-grade spinal canal stenosis noted at L2-3 and L3-4. There is high-grade bilateral neural foramina stenosis at L2-3. There is moderate-severe right neural foramina stenosis at L4-5.     Signed by: Adrián Marcum 1/11/2024 4:46 PM Dictation workstation:   [id number]           Assessment/Plan   L1/L2 disc base infection  Left psoas abscess-most likely secondary to bacteremia  Right foot ulcer with retained exposed hardware, s/p right foot hardware removal 1/16     IV vancomycin  IV Zosyn  Follow up Blood cultures   Interventional radiology consult for evaluation for possible aspiration  Pain management as needed  Supportive care  Monitor temperature and WBC  Podiatry follow up     Lora Bustillos, CHELSEA-CNP

## 2024-01-17 ENCOUNTER — APPOINTMENT (OUTPATIENT)
Dept: RADIOLOGY | Facility: HOSPITAL | Age: 84
DRG: 463 | End: 2024-01-17
Payer: MEDICARE

## 2024-01-17 ENCOUNTER — PREP FOR PROCEDURE (OUTPATIENT)
Dept: RADIOLOGY | Facility: HOSPITAL | Age: 84
End: 2024-01-17

## 2024-01-17 LAB
ANION GAP SERPL CALC-SCNC: 10 MMOL/L
APPEARANCE UR: CLEAR
BACTERIA BLD CULT: NORMAL
BILIRUB UR STRIP.AUTO-MCNC: NEGATIVE MG/DL
BUN SERPL-MCNC: 40 MG/DL (ref 8–25)
CALCIUM SERPL-MCNC: 9.9 MG/DL (ref 8.5–10.4)
CHLORIDE SERPL-SCNC: 100 MMOL/L (ref 97–107)
CK SERPL-CCNC: 39 U/L (ref 24–195)
CO2 SERPL-SCNC: 28 MMOL/L (ref 24–31)
COLOR UR: ABNORMAL
CREAT SERPL-MCNC: 1.7 MG/DL (ref 0.4–1.6)
EGFRCR SERPLBLD CKD-EPI 2021: 30 ML/MIN/1.73M*2
ERYTHROCYTE [DISTWIDTH] IN BLOOD BY AUTOMATED COUNT: 12.4 % (ref 11.5–14.5)
GLUCOSE BLD MANUAL STRIP-MCNC: 209 MG/DL (ref 74–99)
GLUCOSE BLD MANUAL STRIP-MCNC: 61 MG/DL (ref 74–99)
GLUCOSE BLD MANUAL STRIP-MCNC: 84 MG/DL (ref 74–99)
GLUCOSE BLD MANUAL STRIP-MCNC: 93 MG/DL (ref 74–99)
GLUCOSE BLD MANUAL STRIP-MCNC: 95 MG/DL (ref 74–99)
GLUCOSE SERPL-MCNC: 111 MG/DL (ref 65–99)
GLUCOSE UR STRIP.AUTO-MCNC: ABNORMAL MG/DL
HCT VFR BLD AUTO: 42.6 % (ref 36–46)
HGB BLD-MCNC: 13.8 G/DL (ref 12–16)
KETONES UR STRIP.AUTO-MCNC: NEGATIVE MG/DL
LEUKOCYTE ESTERASE UR QL STRIP.AUTO: ABNORMAL
MCH RBC QN AUTO: 30.8 PG (ref 26–34)
MCHC RBC AUTO-ENTMCNC: 32.4 G/DL (ref 32–36)
MCV RBC AUTO: 95 FL (ref 80–100)
NITRITE UR QL STRIP.AUTO: NEGATIVE
NRBC BLD-RTO: 0 /100 WBCS (ref 0–0)
PH UR STRIP.AUTO: 7 [PH]
PLATELET # BLD AUTO: 313 X10*3/UL (ref 150–450)
POTASSIUM SERPL-SCNC: 3.9 MMOL/L (ref 3.4–5.1)
PROT UR STRIP.AUTO-MCNC: ABNORMAL MG/DL
RBC # BLD AUTO: 4.48 X10*6/UL (ref 4–5.2)
RBC # UR STRIP.AUTO: ABNORMAL /UL
RBC #/AREA URNS AUTO: >20 /HPF
SODIUM SERPL-SCNC: 138 MMOL/L (ref 133–145)
SP GR UR STRIP.AUTO: 1.02
SQUAMOUS #/AREA URNS AUTO: ABNORMAL /HPF
UROBILINOGEN UR STRIP.AUTO-MCNC: NORMAL MG/DL
VANCOMYCIN SERPL-MCNC: 20.5 UG/ML (ref 10–20)
WBC # BLD AUTO: 8.2 X10*3/UL (ref 4.4–11.3)
WBC #/AREA URNS AUTO: ABNORMAL /HPF
YEAST BUDDING #/AREA UR COMP ASSIST: PRESENT /HPF

## 2024-01-17 PROCEDURE — 2500000004 HC RX 250 GENERAL PHARMACY W/ HCPCS (ALT 636 FOR OP/ED): Performed by: INTERNAL MEDICINE

## 2024-01-17 PROCEDURE — 2500000005 HC RX 250 GENERAL PHARMACY W/O HCPCS: Performed by: RADIOLOGY

## 2024-01-17 PROCEDURE — 97168 OT RE-EVAL EST PLAN CARE: CPT | Mod: GO

## 2024-01-17 PROCEDURE — 82947 ASSAY GLUCOSE BLOOD QUANT: CPT

## 2024-01-17 PROCEDURE — 2500000001 HC RX 250 WO HCPCS SELF ADMINISTERED DRUGS (ALT 637 FOR MEDICARE OP): Performed by: NURSE PRACTITIONER

## 2024-01-17 PROCEDURE — 80202 ASSAY OF VANCOMYCIN: CPT | Performed by: PODIATRIST

## 2024-01-17 PROCEDURE — 10160 PNXR ASPIR ABSC HMTMA BULLA: CPT

## 2024-01-17 PROCEDURE — 76770 US EXAM ABDO BACK WALL COMP: CPT

## 2024-01-17 PROCEDURE — 2500000002 HC RX 250 W HCPCS SELF ADMINISTERED DRUGS (ALT 637 FOR MEDICARE OP, ALT 636 FOR OP/ED): Performed by: PODIATRIST

## 2024-01-17 PROCEDURE — 2720000007 HC OR 272 NO HCPCS

## 2024-01-17 PROCEDURE — 82550 ASSAY OF CK (CPK): CPT | Performed by: INTERNAL MEDICINE

## 2024-01-17 PROCEDURE — 2500000001 HC RX 250 WO HCPCS SELF ADMINISTERED DRUGS (ALT 637 FOR MEDICARE OP): Performed by: PODIATRIST

## 2024-01-17 PROCEDURE — 85027 COMPLETE CBC AUTOMATED: CPT | Performed by: PODIATRIST

## 2024-01-17 PROCEDURE — 2500000004 HC RX 250 GENERAL PHARMACY W/ HCPCS (ALT 636 FOR OP/ED): Mod: JZ | Performed by: NURSE PRACTITIONER

## 2024-01-17 PROCEDURE — 80048 BASIC METABOLIC PNL TOTAL CA: CPT | Performed by: PODIATRIST

## 2024-01-17 PROCEDURE — 97535 SELF CARE MNGMENT TRAINING: CPT | Mod: GO

## 2024-01-17 PROCEDURE — 81001 URINALYSIS AUTO W/SCOPE: CPT | Performed by: INTERNAL MEDICINE

## 2024-01-17 PROCEDURE — 97164 PT RE-EVAL EST PLAN CARE: CPT | Mod: GP

## 2024-01-17 PROCEDURE — 87070 CULTURE OTHR SPECIMN AEROBIC: CPT | Mod: TRILAB | Performed by: NURSE PRACTITIONER

## 2024-01-17 PROCEDURE — 1100000001 HC PRIVATE ROOM DAILY

## 2024-01-17 PROCEDURE — 36415 COLL VENOUS BLD VENIPUNCTURE: CPT | Performed by: PODIATRIST

## 2024-01-17 PROCEDURE — 2500000004 HC RX 250 GENERAL PHARMACY W/ HCPCS (ALT 636 FOR OP/ED): Performed by: PODIATRIST

## 2024-01-17 PROCEDURE — 87086 URINE CULTURE/COLONY COUNT: CPT | Mod: TRILAB,WESLAB | Performed by: INTERNAL MEDICINE

## 2024-01-17 RX ORDER — SODIUM CHLORIDE 9 MG/ML
75 INJECTION, SOLUTION INTRAVENOUS CONTINUOUS
Status: DISCONTINUED | OUTPATIENT
Start: 2024-01-17 | End: 2024-01-18

## 2024-01-17 RX ORDER — BISACODYL 10 MG/1
10 SUPPOSITORY RECTAL DAILY
Status: DISCONTINUED | OUTPATIENT
Start: 2024-01-17 | End: 2024-01-23 | Stop reason: HOSPADM

## 2024-01-17 RX ORDER — LIDOCAINE HYDROCHLORIDE 20 MG/ML
INJECTION, SOLUTION EPIDURAL; INFILTRATION; INTRACAUDAL; PERINEURAL
Status: COMPLETED | OUTPATIENT
Start: 2024-01-17 | End: 2024-01-17

## 2024-01-17 RX ADMIN — BISACODYL 10 MG: 10 SUPPOSITORY RECTAL at 17:12

## 2024-01-17 RX ADMIN — PILOCARPINE HYDROCHLORIDE 5 MG: 5 TABLET, FILM COATED ORAL at 16:46

## 2024-01-17 RX ADMIN — SODIUM CHLORIDE 75 ML/HR: 900 INJECTION, SOLUTION INTRAVENOUS at 12:31

## 2024-01-17 RX ADMIN — FLUOXETINE HYDROCHLORIDE 40 MG: 20 CAPSULE ORAL at 16:46

## 2024-01-17 RX ADMIN — POTASSIUM CHLORIDE 10 MEQ: 750 TABLET, EXTENDED RELEASE ORAL at 16:46

## 2024-01-17 RX ADMIN — LIDOCAINE HYDROCHLORIDE 5 ML: 20 INJECTION, SOLUTION EPIDURAL; INFILTRATION; INTRACAUDAL at 15:47

## 2024-01-17 RX ADMIN — FAMOTIDINE 20 MG: 20 TABLET, FILM COATED ORAL at 16:46

## 2024-01-17 RX ADMIN — DAPTOMYCIN 400 MG: 500 INJECTION, POWDER, LYOPHILIZED, FOR SOLUTION INTRAVENOUS at 16:50

## 2024-01-17 RX ADMIN — METOPROLOL TARTRATE 25 MG: 25 TABLET, FILM COATED ORAL at 16:46

## 2024-01-17 RX ADMIN — ATORVASTATIN CALCIUM 20 MG: 20 TABLET, FILM COATED ORAL at 20:23

## 2024-01-17 RX ADMIN — PIPERACILLIN SODIUM AND TAZOBACTAM SODIUM 2.25 G: 2; .25 INJECTION, SOLUTION INTRAVENOUS at 02:29

## 2024-01-17 RX ADMIN — PILOCARPINE HYDROCHLORIDE 5 MG: 5 TABLET, FILM COATED ORAL at 20:27

## 2024-01-17 RX ADMIN — PIPERACILLIN SODIUM AND TAZOBACTAM SODIUM 2.25 G: 2; .25 INJECTION, SOLUTION INTRAVENOUS at 07:59

## 2024-01-17 RX ADMIN — PIPERACILLIN SODIUM AND TAZOBACTAM SODIUM 2.25 G: 2; .25 INJECTION, SOLUTION INTRAVENOUS at 14:45

## 2024-01-17 RX ADMIN — AMLODIPINE BESYLATE 2.5 MG: 2.5 TABLET ORAL at 16:46

## 2024-01-17 RX ADMIN — PIPERACILLIN SODIUM AND TAZOBACTAM SODIUM 2.25 G: 2; .25 INJECTION, SOLUTION INTRAVENOUS at 20:27

## 2024-01-17 RX ADMIN — INSULIN GLARGINE 20 UNITS: 100 INJECTION, SOLUTION SUBCUTANEOUS at 20:56

## 2024-01-17 ASSESSMENT — COGNITIVE AND FUNCTIONAL STATUS - GENERAL
MOVING TO AND FROM BED TO CHAIR: A LOT
DRESSING REGULAR UPPER BODY CLOTHING: A LOT
MOBILITY SCORE: 10
PERSONAL GROOMING: A LITTLE
EATING MEALS: A LITTLE
DAILY ACTIVITIY SCORE: 12
TURNING FROM BACK TO SIDE WHILE IN FLAT BAD: A LOT
MOVING TO AND FROM BED TO CHAIR: A LOT
WALKING IN HOSPITAL ROOM: TOTAL
CLIMB 3 TO 5 STEPS WITH RAILING: TOTAL
DRESSING REGULAR LOWER BODY CLOTHING: TOTAL
WALKING IN HOSPITAL ROOM: TOTAL
MOVING TO AND FROM BED TO CHAIR: A LOT
HELP NEEDED FOR BATHING: A LOT
WALKING IN HOSPITAL ROOM: TOTAL
MOBILITY SCORE: 11
DAILY ACTIVITIY SCORE: 14
DRESSING REGULAR LOWER BODY CLOTHING: A LOT
HELP NEEDED FOR BATHING: A LOT
TOILETING: A LOT
EATING MEALS: A LITTLE
HELP NEEDED FOR BATHING: A LOT
TURNING FROM BACK TO SIDE WHILE IN FLAT BAD: A LOT
DRESSING REGULAR UPPER BODY CLOTHING: A LOT
PERSONAL GROOMING: A LITTLE
CLIMB 3 TO 5 STEPS WITH RAILING: TOTAL
MOVING FROM LYING ON BACK TO SITTING ON SIDE OF FLAT BED WITH BEDRAILS: A LITTLE
TOILETING: TOTAL
STANDING UP FROM CHAIR USING ARMS: A LOT
DRESSING REGULAR UPPER BODY CLOTHING: A LOT
MOVING FROM LYING ON BACK TO SITTING ON SIDE OF FLAT BED WITH BEDRAILS: A LOT
DAILY ACTIVITIY SCORE: 15
PERSONAL GROOMING: A LITTLE
TURNING FROM BACK TO SIDE WHILE IN FLAT BAD: A LOT
TOILETING: A LOT
DRESSING REGULAR LOWER BODY CLOTHING: A LOT
CLIMB 3 TO 5 STEPS WITH RAILING: TOTAL
MOVING FROM LYING ON BACK TO SITTING ON SIDE OF FLAT BED WITH BEDRAILS: A LITTLE
STANDING UP FROM CHAIR USING ARMS: A LOT
STANDING UP FROM CHAIR USING ARMS: A LOT
MOBILITY SCORE: 11

## 2024-01-17 ASSESSMENT — PAIN SCALES - GENERAL
PAINLEVEL_OUTOF10: 0 - NO PAIN
PAINLEVEL_OUTOF10: 8
PAINLEVEL_OUTOF10: 0 - NO PAIN
PAINLEVEL_OUTOF10: 8
PAINLEVEL_OUTOF10: 0 - NO PAIN
PAINLEVEL_OUTOF10: 4
PAINLEVEL_OUTOF10: 0 - NO PAIN
PAINLEVEL_OUTOF10: 4
PAINLEVEL_OUTOF10: 3

## 2024-01-17 ASSESSMENT — PAIN - FUNCTIONAL ASSESSMENT
PAIN_FUNCTIONAL_ASSESSMENT: 0-10
PAIN_FUNCTIONAL_ASSESSMENT: 0-10

## 2024-01-17 ASSESSMENT — ACTIVITIES OF DAILY LIVING (ADL)
ADL_ASSISTANCE: INDEPENDENT
BATHING_ASSISTANCE: MAXIMAL
HOME_MANAGEMENT_TIME_ENTRY: 13

## 2024-01-17 NOTE — PROGRESS NOTES
Occupational Therapy    Re-Evaluation    Patient Name: Carrie Rosario  MRN: [id number]  Today's Date: 1/17/2024  Time Calculation  Start Time: 0812  Stop Time: 0835  Time Calculation (min): 23 min    Assessment  IP OT Assessment  OT Assessment: Re-evaluation completed on 1/17 as patient is now RLE heel weight bearing only s/p Rt foot I&D.  On re-eval, she is primarily limited by back pain resulting in an overall decline from her functional baseline; requiring assist x2 for bed mobility and xfers, and poor tolerance to activity.  Requires continued OT services to maximize safety/IND prior to DC.    Prognosis: Good  Barriers to Discharge: Decreased caregiver support  Evaluation/Treatment Tolerance: Patient limited by pain  Medical Staff Made Aware: Yes  End of Session Communication: Bedside nurse  End of Session Patient Position: Bed, 4 rail up    Plan:  Treatment Interventions: ADL retraining, Functional transfer training, UE strengthening/ROM, Endurance training, Equipment evaluation/education, Patient/family training, Compensatory technique education  OT Frequency: 3 times per week  OT Discharge Recommendations: Moderate intensity level of continued care  OT Recommended Transfer Status: Assist of 2  OT - OK to Discharge: Yes    Subjective   Current Problem:  1. Compression fracture of L1 vertebra, initial encounter (CMS/[facility])        2. Other mechanical complication of internal fixation device of bone of toe, initial encounter (CMS/[facility])  Tissue/Wound Culture/Smear    Tissue/Wound Culture/Smear      3. Ulcer of right foot with muscle involvement without evidence of necrosis (CMS/[facility])  Tissue/Wound Culture/Smear    Tissue/Wound Culture/Smear        General:  General  Reason for Referral: Decreased ADLs  Referred By: Dr. Samson  Past Medical History Relevant to Rehab: R foot ulcer, buttock wound, DB, HTN, CAD, HLD  Prior to Session Communication: Bedside nurse  Patient Position Received: Bed, 4 rail up  Preferred  "Learning Style: verbal  General Comment: Cleared by nsg, pt met in supine, agreeable to OT session    Precautions:  LE Weight Bearing Status: Heel Weight Bearing in Post-Op Shoe (RLE heel weight bearing in forefoot offloading shoe)  Medical Precautions: Fall precautions, Spinal precautions    Pain:  Pain Assessment  Pain Assessment: 0-10  Pain Score: 4  Pain Type: Surgical pain  Pain Location: Foot  Pain Orientation: Right  Pain 2  Pain Score 2: 8  Pain Type 2: Acute pain  Pain Location 2: Back    Objective   Cognition:  Overall Cognitive Status: Within Functional Limits (fearful of mobility at times)    Home Living:  Type of Home: House  Lives With: Alone  Home Adaptive Equipment: Walker rolling or standard, Cane  Home Layout: Multi-level  Alternate Level Stairs-Rails: None  Alternate Level Stairs-Number of Steps: stair lift to bed/bath  Home Access: Stairs to enter with rails  Entrance Stairs-Rails: Right  Entrance Stairs-Number of Steps: 3  Bathroom Shower/Tub: Tub/shower unit  Bathroom Toilet: Standard  Bathroom Equipment: Grab bars in shower, Shower chair with back, Grab bars around toilet  Home Living Comments: sleeping on recliner on first floor for some time   Prior Function:  Level of [place]: Independent with ADLs and functional transfers, Independent with homemaking with ambulation  Receives Help From: Family  ADL Assistance: Independent  Homemaking Assistance: Independent (does report not \"being able to do much\" in regards to housekeeping)  Ambulatory Assistance: Independent (with rollator at all times)    ADL:  Eating Assistance: Stand by  Eating Deficit: Setup  Grooming Assistance: Stand by  Grooming Deficit: Setup  Bathing Assistance: Maximal  Bathing Deficit: Right upper leg, Left upper leg, Buttocks, Steadying, Other (Comment) (anticipated)  UE Dressing Assistance: Minimal  UE Dressing Deficit: Pull around back  LE Dressing Assistance: Total  LE Dressing Deficit: Don/doff R sock, Don/doff L " sock, Don/doff L shoe, Don/doff R shoe  Toileting Assistance with Device: Total  Toileting Deficit: Use of bedpan/urinal setup    Activity Tolerance:  Endurance: Decreased tolerance for upright activites  Rate of Perceived Dyspnea (RPD): EOB sitting for ~5 minutes prior to requiring return to supine d/t severe pain    Bed Mobility/Transfers:   Bed Mobility  Bed Mobility: Yes  Bed Mobility 1  Bed Mobility 1: Supine to sitting  Level of Assistance 1: Moderate assistance  Bed Mobility Comments 1: mod A x2 for BLE mgmt and trunk up via log roll  Bed Mobility 2  Bed Mobility  2: Sitting to supine  Level of Assistance 2: Maximum assistance  Bed Mobility Comments 2: max A x2 for safe/controlled descent. increased effort to elevate LEs    Transfers  Transfer: No (pt could not tolerate STS from EOB d/t severe back pain in unsupported sitting)    Vision: Vision - Basic Assessment  Current Vision: Wears glasses all the time  Sensation:  Light Touch: No apparent deficits  Strength:  Strength Comments: BUEs at least >/= 3/5 per clinical judgement. observed functionally.    Hand Function:  Hand Function  Gross Grasp: Functional  Coordination: Functional  Extremities: RUE   RUE : Within Functional Limits and LUE   LUE: Within Functional Limits    Outcome Measures: [facility] Daily Activity  Putting on and taking off regular lower body clothing: Total  Bathing (including washing, rinsing, drying): A lot  Putting on and taking off regular upper body clothing: A lot  Toileting, which includes using toilet, bedpan or urinal: Total  Taking care of personal grooming such as brushing teeth: A little  Eating Meals: A little  Daily Activity - Total Score: 12      Education Documentation  No documentation found.  Education Comments  No comments found.      Goals:   Encounter Problems       Encounter Problems (Active)       OT Goals       ADLs (Progressing)       Start:  01/12/24    Expected End:  02/09/24       Patient will complete ADL tasks  with MOD A using AE as needed, in order to increase patient's safety and independence with self-care tasks.         Functional Transfers (Progressing)       Start:  01/12/24    Expected End:  02/09/24       Patient will complete functional transfers with Min A in order to increase patient's safety and independence with daily tasks.         B UE Strengthening (Progressing)       Start:  01/12/24    Expected End:  02/09/24       Patient will increase B UE strengthening to 4+/5 for functional transfers.

## 2024-01-17 NOTE — NURSING NOTE
Patient in bed resting comfortably. NO change in previous assessment. Call light and belongings within reach. Bed alarm on.

## 2024-01-17 NOTE — PROGRESS NOTES
Vancomycin Dosing by Pharmacy- Cessation of Therapy    Consult to pharmacy for vancomycin dosing has been discontinued by the prescriber, pharmacy will sign off at this time.    Please call pharmacy if there are further questions or re-enter a consult if vancomycin is resumed.     Ayaz Blas, PharmD

## 2024-01-17 NOTE — CONSULTS
Reason For Consult  Acute kidney injury on chronic kidney disease stage III    History Of Present Illness  Carrie Rosario is a 83 y.o. female with a past medical history of chronic kidney disease stage III, diabetes mellitus, hypertension presented to the hospital with difficulty ambulating, back pain, found to have an L1 compression fracture, left psoas abscess, right foot ulcer.  Patient denies any other symptoms other than urinary incontinence.  She denies any NSAID use at home.  She was treated with vancomycin, did have an elevated vancomycin level of 23.  Her creatinine is 1.7, above her baseline of 1.1-1.3.  Notably she did have surgery yesterday with hardware removal and excision and debridement.  We are consulted for management of acute kidney injury on chronic kidney disease stage III.     Past Medical History  She has a past medical history of Arthritis, Cardiac arrest (CMS/[facility]), CVA (cerebral vascular accident) (CMS/[facility]), Diabetes mellitus (CMS/[facility]), GERD (gastroesophageal reflux disease), Heart disease, Hypercholesterolemia, Hypertension, Right foot drop, Right foot ulcer (CMS/[facility]), Spinal stenosis, and STEMI (ST elevation myocardial infarction) (CMS/[facility]).    Surgical History  She has a past surgical history that includes Other surgical history (04/25/2022); Other surgical history (04/25/2022); Other surgical history (04/25/2022); Other surgical history (04/25/2022); Other surgical history (04/25/2022); MR angio head wo IV contrast (12/28/2021); MR angio neck wo IV contrast (12/28/2021); Coronary angioplasty with stent (12/25/2021); and Foot surgery.     Social History  She reports that she has never smoked. She has never used smokeless tobacco. She reports that she does not currently use drugs. She reports that she does not drink alcohol.    Family History  Family History   Problem Relation Name Age of Onset    Diabetes Mother      Diabetes Father      No Known Problems Daughter      No Known Problems  Daughter          Allergies  Nickel and Sulfa (sulfonamide antibiotics)    Review of Systems  10 point review of systems was obtained and is negative other than what is indicated above in the HPI     Physical Exam  General: Awake, alert, no acute distress  Head/ears/nose/throat:  Normocephalic, atraumatic, dry mucous membranes  Neck:  No jugular venous distention, neck supple  Respiratory:  Clear to auscultation bilaterally, normal respiratory effort  Cardiovascular:  S1 and S2, no rubs  Gastrointestinal:  Soft, positive bowel sounds, no rebound or guarding  Musculoskeletal: no visible deformity noted  Extremities:  No edema, cyanosis  Neurologic:  Alert, responsive, cooperative with exam  Psychiatric: normal mood and affect         I&O 24HR    Intake/Output Summary (Last 24 hours) at 1/17/2024 1221  Last data filed at 1/17/2024 1100  Gross per 24 hour   Intake 250 ml   Output 500 ml   Net -250 ml       Vitals 24HR  Heart Rate:  [73-87]   Temp:  [36.3 °C (97.3 °F)-36.6 °C (97.9 °F)]   Resp:  [18-19]   BP: (113-132)/(57-79)   SpO2:  [93 %-100 %]       Relevant Results  Results for orders placed or performed during the hospital encounter of 01/11/24 (from the past 24 hour(s))   POCT GLUCOSE   Result Value Ref Range    POCT Glucose 141 (H) 74 - 99 mg/dL   Protime-INR   Result Value Ref Range    Protime 11.5 9.3 - 12.7 seconds    INR 1.1 0.9 - 1.2   POCT GLUCOSE   Result Value Ref Range    POCT Glucose 152 (H) 74 - 99 mg/dL   POCT GLUCOSE   Result Value Ref Range    POCT Glucose 202 (H) 74 - 99 mg/dL   CBC   Result Value Ref Range    WBC 8.2 4.4 - 11.3 x10*3/uL    nRBC 0.0 0.0 - 0.0 /100 WBCs    RBC 4.48 4.00 - 5.20 x10*6/uL    Hemoglobin 13.8 12.0 - 16.0 g/dL    Hematocrit 42.6 36.0 - 46.0 %    MCV 95 80 - 100 fL    MCH 30.8 26.0 - 34.0 pg    MCHC 32.4 32.0 - 36.0 g/dL    RDW 12.4 11.5 - 14.5 %    Platelets 313 150 - 450 x10*3/uL   Basic Metabolic Panel   Result Value Ref Range    Glucose 111 (H) 65 - 99 mg/dL     Sodium 138 133 - 145 mmol/L    Potassium 3.9 3.4 - 5.1 mmol/L    Chloride 100 97 - 107 mmol/L    Bicarbonate 28 24 - 31 mmol/L    Urea Nitrogen 40 (H) 8 - 25 mg/dL    Creatinine 1.70 (H) 0.40 - 1.60 mg/dL    eGFR 30 (L) >60 mL/min/1.73m*2    Calcium 9.9 8.5 - 10.4 mg/dL    Anion Gap 10 <=19 mmol/L   Vancomycin   Result Value Ref Range    Vancomycin 20.5 (H) 10.0 - 20.0 ug/mL   Creatine Kinase   Result Value Ref Range    Creatine Kinase 39 24 - 195 U/L   POCT GLUCOSE   Result Value Ref Range    POCT Glucose 93 74 - 99 mg/dL   POCT GLUCOSE   Result Value Ref Range    POCT Glucose 84 74 - 99 mg/dL   Urinalysis with Reflex Culture and Microscopic   Result Value Ref Range    Color, Urine Light-Yellow Light-Yellow, Yellow, Dark-Yellow    Appearance, Urine Clear Clear    Specific Gravity, Urine 1.025 1.005 - 1.035    pH, Urine 7.0 5.0, 5.5, 6.0, 6.5, 7.0, 7.5, 8.0    Protein, Urine 30 (1+) (A) NEGATIVE, 10 (TRACE), 20 (TRACE) mg/dL    Glucose, Urine 500 (3+) (A) Normal mg/dL    Blood, Urine OVER (3+) (A) NEGATIVE    Ketones, Urine NEGATIVE NEGATIVE mg/dL    Bilirubin, Urine NEGATIVE NEGATIVE    Urobilinogen, Urine Normal Normal mg/dL    Nitrite, Urine NEGATIVE NEGATIVE    Leukocyte Esterase, Urine 75 Polo/µL (A) NEGATIVE   Microscopic Only, Urine   Result Value Ref Range    WBC, Urine 1-5 1-5, NONE /HPF    RBC, Urine >20 (A) NONE, 1-2, 3-5 /HPF    Squamous Epithelial Cells, Urine 10-25 (FEW) Reference range not established. /HPF    Budding Yeast, Urine PRESENT (A) NONE /HPF          Assessment/Plan   1.  Acute kidney injury  - This is likely secondary to infection and elevated vancomycin level possibly developing acute tubular necrosis  2.  Chronic kidney disease stage III  - Baseline Creatinine 1.1-1.3  3.  Diabetes mellitus type 2  4.  Hypertension  5.  Compression fracture  6.  Left psoas abscess and right foot ulcer    Plan: Will check urinalysis, renal ultrasound, postvoid bladder scan.  Discontinue vancomycin due to  elevated levels, discussed with infectious disease NP and they will switch her from vancomycin to daptomycin.  Discontinue Farxiga.  Hold Lasix, start IV normal saline and monitor renal function.  Thank you for your consultation.    Paola López MD

## 2024-01-17 NOTE — NURSING NOTE
Patient in bed getting vs taken. Laying comfortably in bed, no signs of distress. Call light and belongings within reach.

## 2024-01-17 NOTE — PROGRESS NOTES
Carrie Rosario is a 83 y.o. female on day 5 of admission presenting with Compression fracture of L1 lumbar vertebra, closed, initial encounter (CMS/[facility]).     Patient was accepted to [facility]. Cultures pending, new culture is to be sent today after IR aspiration. Updated [facility].     PLAN: Discharge to [facility]illed [facility], no precert needed.     Bettie Valdes RN

## 2024-01-17 NOTE — PROGRESS NOTES
Carrie Rosario is a 83 y.o. female on day 5 of admission presenting with Compression fracture of L1 lumbar vertebra, closed, initial encounter (CMS/[facility]).      Subjective   Patient complains of lower left back pain. She states sometimes she is ok (with the pain) and tries not to move, when she moves the pain shoots up to 10 out of 10.        Objective     Last Recorded Vitals  /79 (BP Location: Left arm, Patient Position: Lying)   Pulse 81   Temp 36.6 °C (97.9 °F) (Oral)   Resp 18   Wt 68 kg (150 lb)   SpO2 100%   Intake/Output last 3 Shifts:    Intake/Output Summary (Last 24 hours) at 1/17/2024 1226  Last data filed at 1/17/2024 1100  Gross per 24 hour   Intake 250 ml   Output 500 ml   Net -250 ml       Admission Weight  Weight: 68 kg (150 lb) (01/11/24 1251)    Daily Weight  01/11/24 : 68 kg (150 lb)    Image Results  FL less than 1 hour  Narrative: Interpreted By:  Robbie Mancilla,   STUDY:  FL LESS THAN 1 HOUR; 1/16/2024 11:47 am      INDICATION:  Signs/Symptoms:intra op      COMPARISON:  None      ACCESSION NUMBER(S):  [id number]      ORDERING CLINICIAN:  TRISHA BAILEY      FINDINGS:  Fluoroscopy was provided during right foot surgery.      Total fluoroscopy time: Less than 1sec, dose: Air kerma 0.0mGy,  images:1      Impression: Fluoroscopy for right foot surgery.      Signed by: Robbie Mancilla 1/16/2024 12:48 PM  Dictation workstation:   [id number]      Physical Exam  Constitutional:       Appearance: Normal appearance.   Cardiovascular:      Rate and Rhythm: Normal rate and regular rhythm.      Pulses: Normal pulses.      Heart sounds: Normal heart sounds.   Pulmonary:      Effort: Pulmonary effort is normal.      Breath sounds: Normal breath sounds.   Abdominal:      General: Bowel sounds are normal.      Palpations: Abdomen is soft.   Musculoskeletal:         General: Normal range of motion.   Skin:     General: Skin is warm and dry.      Comments: Right foot with dressing dry and intact    Neurological:      Mental Status: She is alert and oriented to person, place, and time.         Relevant Results             Results for orders placed or performed during the hospital encounter of 01/11/24 (from the past 24 hour(s))   POCT GLUCOSE   Result Value Ref Range    POCT Glucose 141 (H) 74 - 99 mg/dL   Protime-INR   Result Value Ref Range    Protime 11.5 9.3 - 12.7 seconds    INR 1.1 0.9 - 1.2   POCT GLUCOSE   Result Value Ref Range    POCT Glucose 152 (H) 74 - 99 mg/dL   POCT GLUCOSE   Result Value Ref Range    POCT Glucose 202 (H) 74 - 99 mg/dL   CBC   Result Value Ref Range    WBC 8.2 4.4 - 11.3 x10*3/uL    nRBC 0.0 0.0 - 0.0 /100 WBCs    RBC 4.48 4.00 - 5.20 x10*6/uL    Hemoglobin 13.8 12.0 - 16.0 g/dL    Hematocrit 42.6 36.0 - 46.0 %    MCV 95 80 - 100 fL    MCH 30.8 26.0 - 34.0 pg    MCHC 32.4 32.0 - 36.0 g/dL    RDW 12.4 11.5 - 14.5 %    Platelets 313 150 - 450 x10*3/uL   Basic Metabolic Panel   Result Value Ref Range    Glucose 111 (H) 65 - 99 mg/dL    Sodium 138 133 - 145 mmol/L    Potassium 3.9 3.4 - 5.1 mmol/L    Chloride 100 97 - 107 mmol/L    Bicarbonate 28 24 - 31 mmol/L    Urea Nitrogen 40 (H) 8 - 25 mg/dL    Creatinine 1.70 (H) 0.40 - 1.60 mg/dL    eGFR 30 (L) >60 mL/min/1.73m*2    Calcium 9.9 8.5 - 10.4 mg/dL    Anion Gap 10 <=19 mmol/L   Vancomycin   Result Value Ref Range    Vancomycin 20.5 (H) 10.0 - 20.0 ug/mL   Creatine Kinase   Result Value Ref Range    Creatine Kinase 39 24 - 195 U/L   POCT GLUCOSE   Result Value Ref Range    POCT Glucose 93 74 - 99 mg/dL   POCT GLUCOSE   Result Value Ref Range    POCT Glucose 84 74 - 99 mg/dL   Urinalysis with Reflex Culture and Microscopic   Result Value Ref Range    Color, Urine Light-Yellow Light-Yellow, Yellow, Dark-Yellow    Appearance, Urine Clear Clear    Specific Gravity, Urine 1.025 1.005 - 1.035    pH, Urine 7.0 5.0, 5.5, 6.0, 6.5, 7.0, 7.5, 8.0    Protein, Urine 30 (1+) (A) NEGATIVE, 10 (TRACE), 20 (TRACE) mg/dL    Glucose, Urine 500  (3+) (A) Normal mg/dL    Blood, Urine OVER (3+) (A) NEGATIVE    Ketones, Urine NEGATIVE NEGATIVE mg/dL    Bilirubin, Urine NEGATIVE NEGATIVE    Urobilinogen, Urine Normal Normal mg/dL    Nitrite, Urine NEGATIVE NEGATIVE    Leukocyte Esterase, Urine 75 Polo/µL (A) NEGATIVE   Microscopic Only, Urine   Result Value Ref Range    WBC, Urine 1-5 1-5, NONE /HPF    RBC, Urine >20 (A) NONE, 1-2, 3-5 /HPF    Squamous Epithelial Cells, Urine 10-25 (FEW) Reference range not established. /HPF    Budding Yeast, Urine PRESENT (A) NONE /HPF       Assessment/Plan                  Principal Problem:    Compression fracture of L1 lumbar vertebra, closed, initial encounter (CMS/[facility])  Active Problems:    Compression fracture of L1 vertebra, initial encounter (CMS/[facility])    Discitis / OM / L Psoas Abscess / Acute Compression Fracture L1 / Inability to Ambulate  Ortho Spine and ID following  CT lumbar spine shows acute compression fracture L1 without significant height loss.   MRI Lumbar spine showed discitis/OM L1-2 with intramuscular abscess of the L psoas muscle. No epidural abscess seen.   IR will take patient for aspiration and wound culture today.  No surgical intervention needed per Dr. Kraus.   Broad spectrum IV abx, vanco and zosyn.   PRN pain medication and muscle relaxers. Improved with current meds.    PT/OT recommending moderate intensity therapy.  OOB with assist.   Fall precautions.      R Foot Ulceration   Podiatry, Dr. Kennedy to  took patient to OR for removal of hardware in right foot, POD #1  Wound RN saw, buttocks wound healed. Continue to keep foot wound clean and dry, gauze between the toes.   Wound culture R foot wound pending.       DM 2  HgA1c 8.9.  On tresiba at home, sub lantus.   SSI coverage.  Monitor blood sugars.      HTN  Continue home meds.   BP currently stable, monitor.      CAD  Continue core meds.      HLD  Continue statin.      DVT Risk  Heparin subcutaneous.   SCDs.      Plan   Patient will be discharged  to SNF, will require long term antibiotics  Plan of care discussed with patient and collaborating physician, Dr. Stephen Davis, APRN-CNP

## 2024-01-17 NOTE — PROGRESS NOTES
"Vancomycin Dosing by Pharmacy- FOLLOW UP    Carrie Rosario is a 83 y.o. year old female who Pharmacy has been consulted for vancomycin dosing for osteomyelitis/septic arthritis. Based on the patient's indication and renal status this patient is being dosed based on a goal AUC of 400-600.     Renal function is currently declining.    Current vancomycin dose: 500 mg given every 24 hours    Estimated vancomycin AUC on current dose: 488 mg/L.hr     Visit Vitals  /69 (BP Location: Left arm, Patient Position: Lying)   Pulse 76   Temp 36.4 °C (97.5 °F) (Oral)   Resp 18        Lab Results   Component Value Date    CREATININE 1.70 (H) 01/17/2024    CREATININE 1.60 01/16/2024    CREATININE 1.40 01/15/2024    CREATININE 1.30 01/14/2024        Patient weight is No results found for: \"PTWEIGHT\"    No results found for: \"CULTURE\"     I/O last 3 completed shifts:  In: 850 (12.5 mL/kg) [I.V.:400 (5.9 mL/kg); IV Piggyback:450]  Out: 1300 (19.1 mL/kg) [Urine:1300 (0.5 mL/kg/hr)]  Weight: 68 kg   [unfilled]    Lab Results   Component Value Date    PATIENTTEMP CANCELLED BY MD 12/25/2021    PATIENTTEMP 37.0 12/25/2021    PATIENTTEMP 37.0 12/25/2021        Assessment/Plan    Within goal AUC range. Continue current vancomycin regimen.    This dosing regimen is predicted by [employer] to result in the following pharmacokinetic parameters:  Loading dose: N/A  Regimen: 500 mg IV every 24 hours.  Start time: 14:58 on 01/17/2024  Exposure target: AUC24 (range)400-600 mg/L.hr   AUC24,ss: 488 mg/L.hr  Probability of AUC24 > 400: 91 %  Ctrough,ss: 16.7 mg/L  Probability of Ctrough,ss > 20: 18 %  Probability of nephrotoxicity (Lodise JOIE 2009): 12 %      The next level will be obtained on 1-18-24 at 0500 hr. May be obtained sooner if clinically indicated.   Will continue to monitor renal function daily while on vancomycin and order serum creatinine at least every 48 hours if not already ordered.  Follow for continued vancomycin needs, " clinical response, and signs/symptoms of toxicity.       Eddy Dent, PharmD

## 2024-01-17 NOTE — PROGRESS NOTES
Physical Therapy    Physical Therapy Re-evaluation    Patient Name: Carrie Rosario  MRN: [id number]  Today's Date: 1/17/2024  Time Calculation  Start Time: 0813  Stop Time: 0835  Time Calculation (min): 22 min     RE-EVAL; pt now s/p hardware removal and debirdement with antibiotic bead placement in R foot. 1/16/24    Assessment/Plan   PT Assessment  PT Assessment Results: Decreased strength, Decreased endurance, Impaired balance, Decreased mobility, Decreased safety awareness, Pain  Rehab Prognosis: Good  Medical Staff Made Aware: Yes  End of Session Communication: Bedside nurse  Assessment Comment: Pt tolerated sitting on EOB and LE ther ex. Pt appears limited primarily by increased back pain. Increased time spent at end of session to attempt to reposition pt for comfort. Pt reportedly going back to OR this date for back I and D; pt hopeful to have less back pain wtih mobility post-op.  End of Session Patient Position: Bed, 4 rail up     PT Plan  Treatment/Interventions: Bed mobility, Balance training, Endurance training, Therapeutic exercise, Therapeutic activity  PT Plan: Skilled PT  PT Frequency: 5 times per week  PT Discharge Recommendations: Moderate intensity level of continued care  PT Recommended Transfer Status: Total assist  PT - OK to Discharge: Yes      General Visit Information:   PT  Visit  PT Received On: 01/17/24  General  Reason for Referral: impaired mobility  Past Medical History Relevant to Rehab: RE-EVAL; pt now s/p hardware removal and debirdement with antibiotic bead placement in R foot.  Per podiatry, heel only WBing and off-loading shoe.  Co-Treatment: OT  Co-Treatment Reason: safety wtih mobility  Prior to Session Communication: Bedside nurse  Patient Position Received: Bed, 4 rail up  Preferred Learning Style: verbal  General Comment: Cleared for mobility per nursing. Pt supine in bed upon arrival. Agreeable to participate.    Subjective   Precautions:  Precautions  LE Weight Bearing  Status: Heel Weight Bearing in Post-Op Shoe  Medical Precautions: Fall precautions, Spinal precautions  Precautions Comment: off-loading shoe  Vital Signs:       Objective   Pain:  Pain Assessment  Pain Assessment: 0-10  Pain Score: 4  Pain Type: Surgical pain  Pain Location: Foot  Pain Orientation: Right  Pain 2  Pain Score 2: 8  Pain Type 2: Acute pain  Pain Location 2: Back  Cognition:  Cognition  Overall Cognitive Status: Within Functional Limits (pleasant/cooperative. Tearful at times during mobility due to increased c/o back pain)  Postural Control:  Postural Control  Posture Comment: flexed posture sitting on EOB  Static Sitting Balance  Static Sitting-Level of Assistance: Contact guard, Minimum assistance  Static Sitting-Comment/Number of Minutes: . increased R lateral lean and posterior tendency  Dynamic Sitting Balance  Dynamic Sitting-Comments: unable due to increased back pain  Extremity/Trunk Assessments:  Strength RLE  RLE Overall Strength: Greater than or equal to 3/5 as evidenced by functional mobility, Due to pain  Strength LLE  LLE Overall Strength: Greater than or equal to 3/5 as evidenced by functional mobility, Due to pain  Activity Tolerance:  Activity Tolerance  Endurance: Decreased tolerance for upright activites  Activity Tolerance Comments: encouraged pursed-lip breathing throughout session  Treatments:       Therapeutic Activity  Therapeutic Activity 1: Pt sat on EOB about 10 minutes wtih SBA to min assist x 1. Pt with increased R lateral lean and posterior tendency in efforts to minimize back pain. Repeated cueing to shift wt towards COG to promote upright posture. B UE support. Pt with increased c/o back pain. Was going to attempt transfer to [facility], however pt declined due to pain.    Bed Mobility 1  Bed Mobility 1: Supine to sitting  Level of Assistance 1: Moderate assistance  Bed Mobility Comments 1: x2 person assist via log roll technique. Increased time and effort due to pt's c/o back  pain  Bed Mobility 2  Bed Mobility  2: Sitting to supine  Level of Assistance 2: Maximum assistance  Bed Mobility Comments 2: reverse log roll technique.  Bed Mobility 3  Bed Mobility 3: Rolling right, Rolling left  Level of Assistance 3: Moderate assistance  Bed Mobility Comments 3: to allow for placement and removal of bedpan. Pt requesting to have BM, however unable to comfortably sit on bedpan due to pain.       Transfers  Transfer:  (pt could not tolerate STS from EOB d/t severe back pain in unsupported sitting)    Other Activity  Other Activity Performed: Yes  Other Activity 1: PT donned and fitted off-loading post-op shoe to R foot    Outcome Measures:  [facility] Basic Mobility  Turning from your back to your side while in a flat bed without using bedrails: A lot  Moving from lying on your back to sitting on the side of a flat bed without using bedrails: A lot  Moving to and from bed to chair (including a wheelchair): A lot  Standing up from a chair using your arms (e.g. wheelchair or bedside chair): A lot  To walk in hospital room: Total  Climbing 3-5 steps with railing: Total  Basic Mobility - Total Score: 10    Education Documentation  Precautions, taught by Raven Lopez PT at 1/17/2024 12:01 PM.  Learner: Patient  Readiness: Acceptance  Method: Explanation  Response: Needs Reinforcement    Body Mechanics, taught by Raven Lopez PT at 1/17/2024 12:01 PM.  Learner: Patient  Readiness: Acceptance  Method: Explanation  Response: Needs Reinforcement    Mobility Training, taught by Raven Lopez PT at 1/17/2024 12:01 PM.  Learner: Patient  Readiness: Acceptance  Method: Explanation  Response: Needs Reinforcement    Education Comments  No comments found.        OP EDUCATION:       Encounter Problems       Encounter Problems (Active)       Balance       Sitting Balance (Progressing)       Start:  01/17/24    Expected End:  01/31/24       Pt will demonstrate good sitting balance to promote safe engagement with out of bed  activities           Standing Balance (Progressing)       Start:  01/17/24    Expected End:  01/31/24       Pt will demonstrate good static standing balance to promote safe participation with out of bed activity, transfers, and mobility              Mobility       Ambulation (Progressing)       Start:  01/17/24    Expected End:  01/31/24       Pt will ambulate 50' modified independent assist with walker to promote safe home mobility              Pain - Adult          Safety       Spine Precautions for Pain Management (Progressing)       Start:  01/17/24    Expected End:  01/31/24       Pt will be independent with both understanding and demonstration of post op spine restrictions to promote safe functional mobility at discharge           Safe Mobility Techniques (Progressing)       Start:  01/17/24    Expected End:  01/31/24       Pt will correctly identify and demonstrate safe mobility techniques to reduce their risks for falls during their acute care stay               Transfers       Supine to sit (Progressing)       Start:  01/17/24    Expected End:  01/31/24       Pt will transfer supine to sitting at edge of bed with modified independent assist to promote acute care out of bed activity           Sit to stand (Progressing)       Start:  01/17/24    Expected End:  01/31/24       Pt will transfer sit to standing position with modified independent assist and walker to promote safe out of bed activity           Bed to chair (Progressing)       Start:  01/17/24    Expected End:  01/31/24       Pt will transfer from sitting edge of bed to the chair with modified independent assist and walker to promote out of bed activity and reduce the risks of prolonged acute care bedrest

## 2024-01-17 NOTE — CARE PLAN
Problem: Balance  Goal: Sitting Balance  Description: Pt will demonstrate good sitting balance to promote safe engagement with out of bed activities    Outcome: Progressing  Goal: Standing Balance  Description: Pt will demonstrate good static standing balance to promote safe participation with out of bed activity, transfers, and mobility    Outcome: Progressing     Problem: Mobility  Goal: Ambulation  Description: Pt will ambulate 50' modified independent assist with walker to promote safe home mobility    Outcome: Progressing     Problem: Safety  Goal: Spine Precautions for Pain Management  Description: Pt will be independent with both understanding and demonstration of post op spine restrictions to promote safe functional mobility at discharge    Outcome: Progressing  Goal: Safe Mobility Techniques  Description: Pt will correctly identify and demonstrate safe mobility techniques to reduce their risks for falls during their acute care stay     Outcome: Progressing     Problem: Transfers  Goal: Supine to sit  Description: Pt will transfer supine to sitting at edge of bed with modified independent assist to promote acute care out of bed activity    Outcome: Progressing  Goal: Sit to stand  Description: Pt will transfer sit to standing position with modified independent assist and walker to promote safe out of bed activity    Outcome: Progressing  Goal: Bed to chair  Description: Pt will transfer from sitting edge of bed to the chair with modified independent assist and walker to promote out of bed activity and reduce the risks of prolonged acute care bedrest    Outcome: Progressing

## 2024-01-17 NOTE — CARE PLAN
The patient's goals for the shift include  control pain    The clinical goals for the shift include pain control      Problem: Pain  Goal: My pain/discomfort is manageable  Outcome: Progressing

## 2024-01-17 NOTE — PROGRESS NOTES
Carrie Rosario is a 83 y.o. female on day 5 of admission presenting with Compression fracture of L1 lumbar vertebra, closed, initial encounter (CMS/[facility]).    Subjective   Interval History:   Afebrile, no chills  Denies foot pain  Denies shortness of breath or chest pain  Denies nausea, vomiting or diarrhea  Reports constipation-discussed with nursing              Objective   Range of Vitals (last 24 hours)  Heart Rate:  [73-87]   Temp:  [36.2 °C (97.2 °F)-36.6 °C (97.9 °F)]   Resp:  [17-19]   BP: (113-132)/(57-79)   SpO2:  [93 %-100 %]   Daily Weight  01/11/24 : 68 kg (150 lb)    Body mass index is 24.96 kg/m².    Physical Exam  Constitutional:       Appearance: Normal appearance.   HENT:      Head: Normocephalic and atraumatic.      Nose: Nose normal.      Mouth/Throat:      Mouth: Mucous membranes are moist.      Pharynx: Oropharynx is clear.   Eyes:      Extraocular Movements: Extraocular movements intact.      Conjunctiva/sclera: Conjunctivae normal.   Cardiovascular:      Rate and Rhythm: Normal rate and regular rhythm.   Pulmonary:      Effort: Pulmonary effort is normal.      Breath sounds: Normal breath sounds.   Abdominal:      General: Bowel sounds are normal.      Palpations: Abdomen is soft.   Musculoskeletal:         General: Normal range of motion.      Cervical back: Normal range of motion and neck supple.   Skin:     General: Skin is warm and dry.      Comments: Right foot dressing   Neurological:      General: No focal deficit present.      Mental Status: She is alert and oriented to person, place, and time. Mental status is at baseline.   Psychiatric:         Mood and Affect: Mood normal.         Behavior: Behavior normal.         Antibiotics  ketorolac (Toradol) injection 7.5 mg  tiZANidine (Zanaflex) tablet 2 mg  dextrose 50 % injection 25 g  glucagon (Glucagen) injection 1 mg  dextrose 10 % in water (D10W) infusion  insulin lispro (HumaLOG) injection 0-10 Units  insulin glargine (Lantus)  injection 15 Units  methylPREDNISolone (Medrol) tablet 24 mg  methylPREDNISolone (Medrol) tablet 20 mg  methylPREDNISolone (Medrol) tablet 16 mg  methylPREDNISolone (Medrol) tablet 12 mg  methylPREDNISolone (Medrol) tablet 8 mg  methylPREDNISolone (Medrol) tablet 4 mg  tiZANidine (Zanaflex) tablet 2 mg  heparin (porcine) injection 5,000 Units  acetaminophen (Tylenol) tablet 650 mg  polyethylene glycol (Glycolax, Miralax) packet 17 g  traMADol (Ultram) tablet 50 mg  insulin lispro (HumaLOG) injection 0-15 Units  insulin glargine (Lantus) injection 20 Units  amLODIPine (Norvasc) tablet 2.5 mg  aspirin EC tablet 81 mg  atorvastatin (Lipitor) tablet 20 mg  dapagliflozin propanediol (Farxiga) tablet 10 mg  famotidine (Pepcid) tablet 20 mg  FLUoxetine (PROzac) capsule 40 mg  furosemide (Lasix) tablet 20 mg  melatonin tablet 3 mg  metoprolol tartrate (Lopressor) tablet 25 mg  pilocarpine (Salagen) tablet 5 mg  potassium chloride CR (Klor-Con) ER tablet 10 mEq  piperacillin-tazobactam-dextrose (Zosyn) IV 2.25 g  vancomycin (Xellia) 1 g in 200 mL (Xellia) IVPB 1 g  oxyCODONE-acetaminophen (Percocet) 5-325 mg per tablet 1 tablet  vancomycin-diluent combo no.1 (Xellia) IVPB 750 mg      Relevant Results  Labs  Results from last 72 hours   Lab Units 01/17/24  0451 01/16/24  0406 01/15/24  0415   WBC AUTO x10*3/uL 8.2 11.6* 8.6   HEMOGLOBIN g/dL 13.8 14.7 14.9   HEMATOCRIT % 42.6 45.1 46.7*   PLATELETS AUTO x10*3/uL 313 345 320       Results from last 72 hours   Lab Units 01/17/24  0452 01/16/24  0406 01/15/24  0416   SODIUM mmol/L 138 135 137   POTASSIUM mmol/L 3.9 3.6 4.2   CHLORIDE mmol/L 100 97 99   CO2 mmol/L 28 22* 27   BUN mg/dL 40* 41* 36*   CREATININE mg/dL 1.70* 1.60 1.40   GLUCOSE mg/dL 111* 146* 104*   CALCIUM mg/dL 9.9 10.3 9.9   ANION GAP mmol/L 10 16 11   EGFR mL/min/1.73m*2 30* 32* 38*           Estimated Creatinine Clearance: 23 mL/min (A) (by C-G formula based on SCr of 1.7 mg/dL (H)).  C-Reactive Protein   Date  Value Ref Range Status   01/13/2024 2.60 (H) 0.00 - 2.00 mg/dL Final   10/11/2023 8.90 (H) 0.00 - 2.00 mg/dL Final     CRP   Date Value Ref Range Status   08/18/2021 3.1 (H) 0 - 2.0 MG/DL Final     Comment:     Performed at [address] [facility] [address]     Microbiology  Tissue culture pending  Wound culture pending with mixed gram positive and gram negative bacteria  Blood cultures negative for 3 days      Imaging  FL less than 1 hour    Result Date: 1/16/2024  Interpreted By:  Robbie Mancilla, STUDY: FL LESS THAN 1 HOUR; 1/16/2024 11:47 am   INDICATION: Signs/Symptoms:intra op   COMPARISON: None   ACCESSION NUMBER(S): [id number]   ORDERING CLINICIAN: TRISHA BAILEY   FINDINGS: Fluoroscopy was provided during right foot surgery.   Total fluoroscopy time: Less than 1sec, dose: Air kerma 0.0mGy, images:1       Fluoroscopy for right foot surgery.   Signed by: Robbie Mancilla 1/16/2024 12:48 PM Dictation workstation:   [id number]           Assessment/Plan   L1/L2 disc base infection  Left psoas abscess-most likely secondary to bacteremia, IR consulted  Right foot ulcer with retained exposed hardware, s/p right foot hardware removal 1/16-op note reviewed, to level of bone  Acute kidney injury - creatinine 1.7 on 1/17/24      IV vancomycin  IV Zosyn  Follow-up Blood cultures   Follow-up pending cultures  Interventional radiology consult for evaluation for possible aspiration  Pain management as needed  Supportive care  Monitor temperature and WBC  Podiatry follow up     Addendum 1230-Discussed with nephrology, creatinine increasing, will switch from vancomycin to daptomycin.  CK level 39 on 1/17/24.    Latasha Asher, APRN-CNP

## 2024-01-18 LAB
ANION GAP SERPL CALC-SCNC: 12 MMOL/L
BACTERIA BLD CULT: NORMAL
BACTERIA UR CULT: NORMAL
BUN SERPL-MCNC: 32 MG/DL (ref 8–25)
CALCIUM SERPL-MCNC: 9.7 MG/DL (ref 8.5–10.4)
CHLORIDE SERPL-SCNC: 104 MMOL/L (ref 97–107)
CO2 SERPL-SCNC: 24 MMOL/L (ref 24–31)
CREAT SERPL-MCNC: 1.4 MG/DL (ref 0.4–1.6)
EGFRCR SERPLBLD CKD-EPI 2021: 38 ML/MIN/1.73M*2
ERYTHROCYTE [DISTWIDTH] IN BLOOD BY AUTOMATED COUNT: 12.2 % (ref 11.5–14.5)
GLUCOSE BLD MANUAL STRIP-MCNC: 122 MG/DL (ref 74–99)
GLUCOSE BLD MANUAL STRIP-MCNC: 161 MG/DL (ref 74–99)
GLUCOSE BLD MANUAL STRIP-MCNC: 163 MG/DL (ref 74–99)
GLUCOSE BLD MANUAL STRIP-MCNC: 188 MG/DL (ref 74–99)
GLUCOSE BLD MANUAL STRIP-MCNC: 203 MG/DL (ref 74–99)
GLUCOSE BLD MANUAL STRIP-MCNC: 66 MG/DL (ref 74–99)
GLUCOSE SERPL-MCNC: 92 MG/DL (ref 65–99)
HCT VFR BLD AUTO: 46.4 % (ref 36–46)
HGB BLD-MCNC: 14.6 G/DL (ref 12–16)
MCH RBC QN AUTO: 30.9 PG (ref 26–34)
MCHC RBC AUTO-ENTMCNC: 31.5 G/DL (ref 32–36)
MCV RBC AUTO: 98 FL (ref 80–100)
NRBC BLD-RTO: 0 /100 WBCS (ref 0–0)
PLATELET # BLD AUTO: 277 X10*3/UL (ref 150–450)
POTASSIUM SERPL-SCNC: 3.7 MMOL/L (ref 3.4–5.1)
RBC # BLD AUTO: 4.73 X10*6/UL (ref 4–5.2)
SODIUM SERPL-SCNC: 140 MMOL/L (ref 133–145)
VANCOMYCIN SERPL-MCNC: 12.4 UG/ML (ref 10–20)
WBC # BLD AUTO: 8.9 X10*3/UL (ref 4.4–11.3)

## 2024-01-18 PROCEDURE — 87070 CULTURE OTHR SPECIMN AEROBIC: CPT | Performed by: NURSE PRACTITIONER

## 2024-01-18 PROCEDURE — 2500000002 HC RX 250 W HCPCS SELF ADMINISTERED DRUGS (ALT 637 FOR MEDICARE OP, ALT 636 FOR OP/ED): Performed by: PODIATRIST

## 2024-01-18 PROCEDURE — 2500000001 HC RX 250 WO HCPCS SELF ADMINISTERED DRUGS (ALT 637 FOR MEDICARE OP): Performed by: PODIATRIST

## 2024-01-18 PROCEDURE — 97530 THERAPEUTIC ACTIVITIES: CPT | Mod: GP

## 2024-01-18 PROCEDURE — 80048 BASIC METABOLIC PNL TOTAL CA: CPT | Performed by: NURSE PRACTITIONER

## 2024-01-18 PROCEDURE — 97110 THERAPEUTIC EXERCISES: CPT | Mod: GP

## 2024-01-18 PROCEDURE — 2500000004 HC RX 250 GENERAL PHARMACY W/ HCPCS (ALT 636 FOR OP/ED): Performed by: INTERNAL MEDICINE

## 2024-01-18 PROCEDURE — 36415 COLL VENOUS BLD VENIPUNCTURE: CPT | Performed by: NURSE PRACTITIONER

## 2024-01-18 PROCEDURE — 2500000004 HC RX 250 GENERAL PHARMACY W/ HCPCS (ALT 636 FOR OP/ED): Performed by: PODIATRIST

## 2024-01-18 PROCEDURE — 82947 ASSAY GLUCOSE BLOOD QUANT: CPT

## 2024-01-18 PROCEDURE — 80202 ASSAY OF VANCOMYCIN: CPT | Performed by: NURSE PRACTITIONER

## 2024-01-18 PROCEDURE — 85027 COMPLETE CBC AUTOMATED: CPT | Performed by: NURSE PRACTITIONER

## 2024-01-18 PROCEDURE — 1100000001 HC PRIVATE ROOM DAILY

## 2024-01-18 RX ADMIN — PIPERACILLIN SODIUM AND TAZOBACTAM SODIUM 2.25 G: 2; .25 INJECTION, SOLUTION INTRAVENOUS at 08:18

## 2024-01-18 RX ADMIN — INSULIN LISPRO 3 UNITS: 100 INJECTION, SOLUTION INTRAVENOUS; SUBCUTANEOUS at 13:45

## 2024-01-18 RX ADMIN — FAMOTIDINE 20 MG: 20 TABLET, FILM COATED ORAL at 10:01

## 2024-01-18 RX ADMIN — HEPARIN SODIUM 5000 UNITS: 5000 INJECTION, SOLUTION INTRAVENOUS; SUBCUTANEOUS at 18:01

## 2024-01-18 RX ADMIN — INSULIN GLARGINE 20 UNITS: 100 INJECTION, SOLUTION SUBCUTANEOUS at 20:29

## 2024-01-18 RX ADMIN — PILOCARPINE HYDROCHLORIDE 5 MG: 5 TABLET, FILM COATED ORAL at 20:24

## 2024-01-18 RX ADMIN — OXYCODONE AND ACETAMINOPHEN 1 TABLET: 5; 325 TABLET ORAL at 16:59

## 2024-01-18 RX ADMIN — POTASSIUM CHLORIDE 10 MEQ: 750 TABLET, EXTENDED RELEASE ORAL at 10:01

## 2024-01-18 RX ADMIN — PILOCARPINE HYDROCHLORIDE 5 MG: 5 TABLET, FILM COATED ORAL at 10:01

## 2024-01-18 RX ADMIN — SODIUM CHLORIDE 75 ML/HR: 900 INJECTION, SOLUTION INTRAVENOUS at 06:34

## 2024-01-18 RX ADMIN — FLUOXETINE HYDROCHLORIDE 40 MG: 20 CAPSULE ORAL at 10:01

## 2024-01-18 RX ADMIN — PILOCARPINE HYDROCHLORIDE 5 MG: 5 TABLET, FILM COATED ORAL at 15:09

## 2024-01-18 RX ADMIN — TRAMADOL HYDROCHLORIDE 50 MG: 50 TABLET ORAL at 13:56

## 2024-01-18 RX ADMIN — ATORVASTATIN CALCIUM 20 MG: 20 TABLET, FILM COATED ORAL at 20:24

## 2024-01-18 RX ADMIN — PIPERACILLIN SODIUM AND TAZOBACTAM SODIUM 2.25 G: 2; .25 INJECTION, SOLUTION INTRAVENOUS at 01:50

## 2024-01-18 RX ADMIN — METOPROLOL TARTRATE 25 MG: 25 TABLET, FILM COATED ORAL at 20:24

## 2024-01-18 RX ADMIN — OXYCODONE AND ACETAMINOPHEN 1 TABLET: 5; 325 TABLET ORAL at 08:24

## 2024-01-18 RX ADMIN — AMLODIPINE BESYLATE 2.5 MG: 2.5 TABLET ORAL at 10:01

## 2024-01-18 RX ADMIN — PIPERACILLIN SODIUM AND TAZOBACTAM SODIUM 2.25 G: 2; .25 INJECTION, SOLUTION INTRAVENOUS at 14:00

## 2024-01-18 RX ADMIN — PIPERACILLIN SODIUM AND TAZOBACTAM SODIUM 2.25 G: 2; .25 INJECTION, SOLUTION INTRAVENOUS at 20:24

## 2024-01-18 RX ADMIN — METOPROLOL TARTRATE 25 MG: 25 TABLET, FILM COATED ORAL at 10:01

## 2024-01-18 RX ADMIN — HEPARIN SODIUM 5000 UNITS: 5000 INJECTION, SOLUTION INTRAVENOUS; SUBCUTANEOUS at 05:47

## 2024-01-18 ASSESSMENT — COGNITIVE AND FUNCTIONAL STATUS - GENERAL
TURNING FROM BACK TO SIDE WHILE IN FLAT BAD: A LOT
CLIMB 3 TO 5 STEPS WITH RAILING: TOTAL
STANDING UP FROM CHAIR USING ARMS: TOTAL
TOILETING: A LOT
WALKING IN HOSPITAL ROOM: TOTAL
MOBILITY SCORE: 8
TURNING FROM BACK TO SIDE WHILE IN FLAT BAD: A LOT
WALKING IN HOSPITAL ROOM: TOTAL
TOILETING: A LOT
HELP NEEDED FOR BATHING: A LOT
CLIMB 3 TO 5 STEPS WITH RAILING: TOTAL
MOVING FROM LYING ON BACK TO SITTING ON SIDE OF FLAT BED WITH BEDRAILS: A LOT
MOBILITY SCORE: 8
DRESSING REGULAR LOWER BODY CLOTHING: A LOT
DAILY ACTIVITIY SCORE: 14
DAILY ACTIVITIY SCORE: 14
PERSONAL GROOMING: A LITTLE
DRESSING REGULAR UPPER BODY CLOTHING: A LOT
DRESSING REGULAR LOWER BODY CLOTHING: A LOT
STANDING UP FROM CHAIR USING ARMS: TOTAL
HELP NEEDED FOR BATHING: A LOT
MOBILITY SCORE: 8
CLIMB 3 TO 5 STEPS WITH RAILING: TOTAL
MOVING TO AND FROM BED TO CHAIR: TOTAL
MOVING TO AND FROM BED TO CHAIR: TOTAL
EATING MEALS: A LITTLE
EATING MEALS: A LITTLE
MOVING FROM LYING ON BACK TO SITTING ON SIDE OF FLAT BED WITH BEDRAILS: A LOT
MOVING TO AND FROM BED TO CHAIR: TOTAL
TURNING FROM BACK TO SIDE WHILE IN FLAT BAD: A LOT
WALKING IN HOSPITAL ROOM: TOTAL
MOVING FROM LYING ON BACK TO SITTING ON SIDE OF FLAT BED WITH BEDRAILS: A LOT
PERSONAL GROOMING: A LITTLE
STANDING UP FROM CHAIR USING ARMS: TOTAL
DRESSING REGULAR UPPER BODY CLOTHING: A LOT

## 2024-01-18 ASSESSMENT — PAIN - FUNCTIONAL ASSESSMENT
PAIN_FUNCTIONAL_ASSESSMENT: 0-10
PAIN_FUNCTIONAL_ASSESSMENT: FLACC (FACE, LEGS, ACTIVITY, CRY, CONSOLABILITY)
PAIN_FUNCTIONAL_ASSESSMENT: 0-10
PAIN_FUNCTIONAL_ASSESSMENT: 0-10

## 2024-01-18 ASSESSMENT — PAIN SCALES - GENERAL
PAINLEVEL_OUTOF10: 8
PAINLEVEL_OUTOF10: 4
PAINLEVEL_OUTOF10: 7
PAINLEVEL_OUTOF10: 8
PAINLEVEL_OUTOF10: 5 - MODERATE PAIN
PAINLEVEL_OUTOF10: 7
PAINLEVEL_OUTOF10: 0 - NO PAIN
PAINLEVEL_OUTOF10: 5 - MODERATE PAIN

## 2024-01-18 ASSESSMENT — PAIN DESCRIPTION - LOCATION
LOCATION: BACK
LOCATION: BACK

## 2024-01-18 ASSESSMENT — PAIN DESCRIPTION - ORIENTATION: ORIENTATION: LEFT

## 2024-01-18 NOTE — CARE PLAN
The patient's goals for the shift include  pain control    The clinical goals for the shift include pain control    Problem: Pain  Goal: My pain/discomfort is manageable  Outcome: Progressing

## 2024-01-18 NOTE — PROGRESS NOTES
"Carrie Rosario is a 83 y.o. female on day 6 of admission presenting with Compression fracture of L1 lumbar vertebra, closed, initial encounter (CMS/[facility]).    Subjective   Pt has been accepted to [facility] upon medical clearance. Pt will require piccline placement for IV ATBX prior to discharge.   [facility] states pt's bed is anticipated for a Saturday admission.   Upon medical clearance, pt will admit to [facility] on Saturday.        Objective     Physical Exam    Last Recorded Vitals  Blood pressure 130/68, pulse 71, temperature 36.4 °C (97.5 °F), temperature source Oral, resp. rate 15, height 1.651 m (5' 5\"), weight 68 kg (150 lb), SpO2 98 %.  Intake/Output last 3 Shifts:  I/O last 3 completed shifts:  In: 2151.3 (31.6 mL/kg) [I.V.:2001.3 (29.4 mL/kg); IV Piggyback:150]  Out: 650 (9.6 mL/kg) [Urine:650 (0.3 mL/kg/hr)]  Weight: 68 kg     Relevant Results                             Assessment/Plan   Principal Problem:    Compression fracture of L1 lumbar vertebra, closed, initial encounter (CMS/[facility])  Active Problems:    Compression fracture of L1 vertebra, initial encounter (CMS/[facility])               DON Keene      "

## 2024-01-18 NOTE — PROGRESS NOTES
Acute kidney injury is resolved, stop IV fluids.  Notably urinalysis shows budding yeast, notified infectious disease and will defer to them whether to treat and follow-up on final cultures.  Since acute kidney injury is resolved, will sign off at this time, please call back with any questions.  If renal function remains stable, okay to resume Lasix on discharge.  Given her age, recent acute kidney injury and possible UTI, I would avoid farxiga/SGLT2 inhibitor.  Patient can follow-up with us with a renal function panel 1 week after discharge and follow-up visit in 2 weeks.    Paola López MD

## 2024-01-18 NOTE — NURSING NOTE
Assumed care of patient. Patient in bed resting comfortably. BS 66 Asymptomatic. 4 oz apple juice given. Call light and belongings within reach. Bed alarm on.

## 2024-01-18 NOTE — PROGRESS NOTES
Physical Therapy    Physical Therapy Treatment    Patient Name: Carrie Rosario  MRN: [id number]  Today's Date: 1/18/2024  Time Calculation  Start Time: 1024  Stop Time: 1114  Time Calculation (min): 50 min       Assessment/Plan   PT Assessment  PT Assessment Results: Decreased strength, Decreased endurance, Impaired balance, Decreased mobility, Decreased safety awareness, Pain  Rehab Prognosis: Good  Medical Staff Made Aware: Yes  End of Session Communication: Bedside nurse  Assessment Comment: Pt tolerated sitting on EOB and LE ther ex. Pt appears limited primarily by increased back pain. Pt reportedly s/p drainage of back abscess on 1/17/24.  End of Session Patient Position: Bed, 4 rail up     PT Plan  Treatment/Interventions: Bed mobility, Transfer training, Balance training, Strengthening, Endurance training, Therapeutic exercise, Therapeutic activity  PT Plan: Skilled PT  PT Frequency: 5 times per week  PT Discharge Recommendations: Moderate intensity level of continued care  PT Recommended Transfer Status: Total assist  PT - OK to Discharge: Yes      General Visit Information:   PT  Visit  PT Received On: 01/18/24  General  Reason for Referral: impaired mobility  Prior to Session Communication: Bedside nurse  Patient Position Received: Bed, 4 rail up  Preferred Learning Style: verbal  General Comment: Cleared for mobility per nursing. Pt supine in bed upon arrival. Agreeable to participate.    Subjective   Precautions:  Precautions  LE Weight Bearing Status: Heel Weight Bearing in Post-Op Shoe  Medical Precautions: Fall precautions, Spinal precautions  Precautions Comment: off-loading shoe  Vital Signs:       Objective   Pain:  Pain Assessment  Pain Assessment: FLACC (Face, Legs, Activity, Cry, Consolability)  Pain Score: 8  Pain Type: Acute pain  Pain Location: Back  Pain Orientation:  (increases with mobility)  Cognition:  Cognition  Overall Cognitive Status: Within Functional Limits  Postural  Control:  Postural Control  Posture Comment: flexed posture sitting on EOB  Static Sitting Balance  Static Sitting-Level of Assistance:  (SBA with RUE support/forearm rested against elevated EOB to min assist when sitting upright.)  Extremity/Trunk Assessments:    Activity Tolerance:  Activity Tolerance  Endurance: Decreased tolerance for upright activites  Activity Tolerance Comments: Repeated instruction provided for purse-lip breathing to promote activity tolerance.  Treatments:  Therapeutic Exercise  Therapeutic Exercise Activity 1: x15-20 L ankle pumps (R ankle ROM (baseline drop foot)), B LAQs. scapular retraction into upright    Therapeutic Activity  Therapeutic Activity 1: Sat on EOB about 30 minutes with supervision to min assist. Initially required min/mod assist due to pain and increased R lateral lean and posterior lean. Positioned R forearm on pillow to allow for wt shifting and upright posture. Pt occasionally able to promote COG and wt shift into upright, however only able to tolerate about 1-2 minutes at a time, due to increased pain. Placed dhiraj steady in front of patient to attempt standing,  however unable due to increased pain.    Bed Mobility 1  Bed Mobility 1: Supine to sitting  Level of Assistance 1: Moderate assistance  Bed Mobility Comments 1: log roll technique, HOB slightly elevated. increased time and effort from patient.  Bed Mobility 2  Bed Mobility  2: Sitting to supine  Level of Assistance 2: Maximum assistance  Bed Mobility 3  Bed Mobility 3: Rolling right, Rolling left  Level of Assistance 3: Moderate assistance       Transfers  Transfer:  (Placed dhiraj steady in front of pt to promote attempt to stand, however pt unable to attempt due to increased L lower back pain)         Outcome Measures:  [facility] Basic Mobility  Turning from your back to your side while in a flat bed without using bedrails: A lot  Moving from lying on your back to sitting on the side of a flat bed without using  bedrails: A lot  Moving to and from bed to chair (including a wheelchair): Total  Standing up from a chair using your arms (e.g. wheelchair or bedside chair): Total  To walk in hospital room: Total  Climbing 3-5 steps with railing: Total  Basic Mobility - Total Score: 8    Education Documentation  Precautions, taught by Raven Lopez PT at 1/18/2024 11:34 AM.  Learner: Patient  Readiness: Acceptance  Method: Explanation  Response: Verbalizes Understanding    Body Mechanics, taught by Raven Lopez PT at 1/18/2024 11:34 AM.  Learner: Patient  Readiness: Acceptance  Method: Explanation  Response: Verbalizes Understanding    Mobility Training, taught by Raven Lopez PT at 1/18/2024 11:34 AM.  Learner: Patient  Readiness: Acceptance  Method: Explanation  Response: Verbalizes Understanding    Education Comments  No comments found.        OP EDUCATION:       Encounter Problems       Encounter Problems (Active)       Balance       Sitting Balance (Progressing)       Start:  01/17/24    Expected End:  01/31/24       Pt will demonstrate good sitting balance to promote safe engagement with out of bed activities           Standing Balance (Progressing)       Start:  01/17/24    Expected End:  01/31/24       Pt will demonstrate good static standing balance to promote safe participation with out of bed activity, transfers, and mobility              Mobility       Ambulation (Progressing)       Start:  01/17/24    Expected End:  01/31/24       Pt will ambulate 50' modified independent assist with walker to promote safe home mobility              Pain - Adult          Safety       Spine Precautions for Pain Management (Progressing)       Start:  01/17/24    Expected End:  01/31/24       Pt will be independent with both understanding and demonstration of post op spine restrictions to promote safe functional mobility at discharge           Safe Mobility Techniques (Progressing)       Start:  01/17/24    Expected End:  01/31/24       Pt  will correctly identify and demonstrate safe mobility techniques to reduce their risks for falls during their acute care stay               Transfers       Supine to sit (Progressing)       Start:  01/17/24    Expected End:  01/31/24       Pt will transfer supine to sitting at edge of bed with modified independent assist to promote acute care out of bed activity           Sit to stand (Progressing)       Start:  01/17/24    Expected End:  01/31/24       Pt will transfer sit to standing position with modified independent assist and walker to promote safe out of bed activity           Bed to chair (Progressing)       Start:  01/17/24    Expected End:  01/31/24       Pt will transfer from sitting edge of bed to the chair with modified independent assist and walker to promote out of bed activity and reduce the risks of prolonged acute care bedrest

## 2024-01-18 NOTE — PROGRESS NOTES
Carrie Rosario is a 83 y.o. female on day 6 of admission presenting with Compression fracture of L1 lumbar vertebra, closed, initial encounter (CMS/[facility]).    Subjective   Interval History:   Afebrile, no chills  Denies foot pain  Reports moderate back pain-states it is a little better today  Denies shortness of breath or chest pain  Denies nausea, vomiting or diarrhea  States she had a bowel movement this morning  Underwent CT guided aspiration by IR yesterday 1/17              Objective   Range of Vitals (last 24 hours)  Heart Rate:  [73-92]   Temp:  [36.6 °C (97.9 °F)-37.2 °C (99 °F)]   Resp:  [15-27]   BP: (113-155)/(53-81)   SpO2:  [93 %-100 %]   Daily Weight  01/11/24 : 68 kg (150 lb)    Body mass index is 24.96 kg/m².    Physical Exam  Constitutional:       Appearance: Normal appearance.   HENT:      Head: Normocephalic and atraumatic.      Nose: Nose normal.      Mouth/Throat:      Mouth: Mucous membranes are moist.      Pharynx: Oropharynx is clear.   Eyes:      Extraocular Movements: Extraocular movements intact.      Conjunctiva/sclera: Conjunctivae normal.   Cardiovascular:      Rate and Rhythm: Normal rate and regular rhythm.   Pulmonary:      Effort: Pulmonary effort is normal.      Breath sounds: Normal breath sounds.   Abdominal:      General: Bowel sounds are normal.      Palpations: Abdomen is soft.   Musculoskeletal:         General: Normal range of motion.      Cervical back: Normal range of motion and neck supple.   Skin:     General: Skin is warm and dry.      Comments: Right foot dressing   Neurological:      General: No focal deficit present.      Mental Status: She is alert and oriented to person, place, and time. Mental status is at baseline.   Psychiatric:         Mood and Affect: Mood normal.         Behavior: Behavior normal.         Antibiotics  ketorolac (Toradol) injection 7.5 mg  tiZANidine (Zanaflex) tablet 2 mg  dextrose 50 % injection 25 g  glucagon (Glucagen) injection 1  mg  dextrose 10 % in water (D10W) infusion  insulin lispro (HumaLOG) injection 0-10 Units  insulin glargine (Lantus) injection 15 Units  methylPREDNISolone (Medrol) tablet 24 mg  methylPREDNISolone (Medrol) tablet 20 mg  methylPREDNISolone (Medrol) tablet 16 mg  methylPREDNISolone (Medrol) tablet 12 mg  methylPREDNISolone (Medrol) tablet 8 mg  methylPREDNISolone (Medrol) tablet 4 mg  tiZANidine (Zanaflex) tablet 2 mg  heparin (porcine) injection 5,000 Units  acetaminophen (Tylenol) tablet 650 mg  polyethylene glycol (Glycolax, Miralax) packet 17 g  traMADol (Ultram) tablet 50 mg  insulin lispro (HumaLOG) injection 0-15 Units  insulin glargine (Lantus) injection 20 Units  amLODIPine (Norvasc) tablet 2.5 mg  aspirin EC tablet 81 mg  atorvastatin (Lipitor) tablet 20 mg  dapagliflozin propanediol (Farxiga) tablet 10 mg  famotidine (Pepcid) tablet 20 mg  FLUoxetine (PROzac) capsule 40 mg  furosemide (Lasix) tablet 20 mg  melatonin tablet 3 mg  metoprolol tartrate (Lopressor) tablet 25 mg  pilocarpine (Salagen) tablet 5 mg  potassium chloride CR (Klor-Con) ER tablet 10 mEq  piperacillin-tazobactam-dextrose (Zosyn) IV 2.25 g  vancomycin (Xellia) 1 g in 200 mL (Xellia) IVPB 1 g  oxyCODONE-acetaminophen (Percocet) 5-325 mg per tablet 1 tablet  vancomycin-diluent combo no.1 (Xellia) IVPB 750 mg      Relevant Results  Labs  Results from last 72 hours   Lab Units 01/18/24  0444 01/17/24  0451 01/16/24  0406   WBC AUTO x10*3/uL 8.9 8.2 11.6*   HEMOGLOBIN g/dL 14.6 13.8 14.7   HEMATOCRIT % 46.4* 42.6 45.1   PLATELETS AUTO x10*3/uL 277 313 345       Results from last 72 hours   Lab Units 01/18/24 0444 01/17/24  0452 01/16/24  0406   SODIUM mmol/L 140 138 135   POTASSIUM mmol/L 3.7 3.9 3.6   CHLORIDE mmol/L 104 100 97   CO2 mmol/L 24 28 22*   BUN mg/dL 32* 40* 41*   CREATININE mg/dL 1.40 1.70* 1.60   GLUCOSE mg/dL 92 111* 146*   CALCIUM mg/dL 9.7 9.9 10.3   ANION GAP mmol/L 12 10 16   EGFR mL/min/1.73m*2 38* 30* 32*            Estimated Creatinine Clearance: 27.9 mL/min (by C-G formula based on SCr of 1.4 mg/dL).  C-Reactive Protein   Date Value Ref Range Status   01/13/2024 2.60 (H) 0.00 - 2.00 mg/dL Final   10/11/2023 8.90 (H) 0.00 - 2.00 mg/dL Final     CRP   Date Value Ref Range Status   08/18/2021 3.1 (H) 0 - 2.0 MG/DL Final     Comment:     Performed at [address] [address]     Microbiology  Tissue culture pending  Wound culture finalized with mixed gram positive and gram negative bacteria  Blood cultures negative for 3 days      Imaging  US renal complete    Result Date: 1/17/2024  Interpreted By:  Robbie Mancilla, STUDY: US RENAL COMPLETE   INDICATION: Signs/Symptoms:JALEN   COMPARISON: Ultrasound from March 2022.   ACCESSION NUMBER(S): [id number]   ORDERING CLINICIAN: TARIQ MATSON   TECHNIQUE: Real-time renal ultrasound was performed.   FINDINGS: The right kidney measures 9.2 cm and the left kidney 10.3 cm in maximal length. There is normal thickness and echogenicity of the renal cortex bilaterally. No renal calculi are identified. There is no hydronephrosis. No solid renal masses are seen. Small cyst is again present in the upper pole of the left kidney measuring 10 x 11 x 12 mm (7 x 8 x 9 mm on prior).   Images of the bladder were also obtained. The bladder is only partially distended and therefore suboptimally evaluated. No bladder mass, stone or debris is identified. Bilateral ureteral jets were demonstrated.       Redemonstration of small cyst in the upper pole of the left kidney. No renal calculi or hydronephrosis.   Signed by: Robbie Mancilla 1/17/2024 2:16 PM Dictation workstation:   [id number]    FL less than 1 hour    Result Date: 1/16/2024  Interpreted By:  Robbie Mancilla, STUDY: FL LESS THAN 1 HOUR; 1/16/2024 11:47 am   INDICATION: Signs/Symptoms:intra op   COMPARISON: None   ACCESSION NUMBER(S): [id number]   ORDERING CLINICIAN: TRISHA BAILEY   FINDINGS: Fluoroscopy was provided during right foot  surgery.   Total fluoroscopy time: Less than 1sec, dose: Air kerma 0.0mGy, images:1       Fluoroscopy for right foot surgery.   Signed by: Robbie Mancilla 1/16/2024 12:48 PM Dictation workstation:   [id number]             Assessment/Plan   L1/L2 disc space infection  Left psoas abscess-most likely secondary to bacteremia, IR consulted  Right foot ulcer with retained exposed hardware, s/p right foot hardware removal 1/16-op note reviewed, to level of bone  Acute kidney injury - creatinine 1.7 on 1/17/24 - resolved  Pyuria-urine culture pending, UA with budding yeast-typically colonization     Continue IV daptomycin-switched from vancomycin on 1/17 for JALEN, now resolved  Continue IV Zosyn  CK level 39 on 1/17/24  Follow-up Blood cultures   Follow-up pending cultures  S/p CT guided aspiration by IR on 1/17/24  Pain management as needed  Supportive care  Monitor temperature and WBC  Podiatry follow up   Further recommendations based on pending cultures        Latasha Asher, APRN-CNP

## 2024-01-18 NOTE — PROGRESS NOTES
Occupational Therapy                 Therapy Communication Note    Patient Name: Carrie Rosario  MRN: [id number]  Today's Date: 1/18/2024     Discipline: Occupational Therapy    Missed Visit Reason: Missed Visit Reason: Other (Comment) (attempt to treat at 1332 and 1355 Pt eating lunch)    Missed Time: Attempt    Comment:

## 2024-01-18 NOTE — PROGRESS NOTES
Carrie Rosario is a 83 y.o. female on day 6 of admission presenting with Compression fracture of L1 lumbar vertebra, closed, initial encounter (CMS/[facility]).      Subjective   Patient resting comfortably in bed. Denies any pain or sob. She does have significant left lower back pain at times.         Objective     Last Recorded Vitals  /68 (BP Location: Left arm, Patient Position: Lying)   Pulse 71   Temp 36.4 °C (97.5 °F) (Oral)   Resp 15   Wt 68 kg (150 lb)   SpO2 98%   Intake/Output last 3 Shifts:    Intake/Output Summary (Last 24 hours) at 1/18/2024 1333  Last data filed at 1/18/2024 0900  Gross per 24 hour   Intake 2051.25 ml   Output 151 ml   Net 1900.25 ml       Admission Weight  Weight: 68 kg (150 lb) (01/11/24 1251)    Daily Weight  01/11/24 : 68 kg (150 lb)    Image Results  US renal complete  Narrative: Interpreted By:  Robbie Mancilla,   STUDY:  US RENAL COMPLETE      INDICATION:  Signs/Symptoms:JALEN      COMPARISON:  Ultrasound from March 2022.      ACCESSION NUMBER(S):  [id number]      ORDERING CLINICIAN:  TARIQ MATSON      TECHNIQUE:  Real-time renal ultrasound was performed.      FINDINGS:  The right kidney measures 9.2 cm and the left kidney 10.3 cm in  maximal length. There is normal thickness and echogenicity of the  renal cortex bilaterally. No renal calculi are identified.  There is no hydronephrosis.  No solid renal masses are seen.  Small cyst is again present in the upper pole of the left kidney  measuring 10 x 11 x 12 mm (7 x 8 x 9 mm on prior).      Images of the bladder were also obtained.  The bladder is only partially distended and therefore suboptimally  evaluated. No bladder mass, stone or debris is identified.  Bilateral ureteral jets were demonstrated.      Impression: Redemonstration of small cyst in the upper pole of the left kidney.  No renal calculi or hydronephrosis.      Signed by: Robbie Mancilla 1/17/2024 2:16 PM  Dictation workstation:   [id number]      Physical  Exam  Constitutional:       Appearance: Normal appearance.   Cardiovascular:      Rate and Rhythm: Normal rate and regular rhythm.      Pulses: Normal pulses.      Heart sounds: Normal heart sounds.   Pulmonary:      Effort: Pulmonary effort is normal.      Breath sounds: Normal breath sounds.   Abdominal:      General: Bowel sounds are normal.      Palpations: Abdomen is soft.   Musculoskeletal:         General: Normal range of motion.   Skin:     General: Skin is warm and dry.   Neurological:      Mental Status: She is alert and oriented to person, place, and time.         Relevant Results             Results for orders placed or performed during the hospital encounter of 01/11/24 (from the past 24 hour(s))   POCT GLUCOSE   Result Value Ref Range    POCT Glucose 61 (L) 74 - 99 mg/dL   POCT GLUCOSE   Result Value Ref Range    POCT Glucose 95 74 - 99 mg/dL   POCT GLUCOSE   Result Value Ref Range    POCT Glucose 209 (H) 74 - 99 mg/dL   CBC   Result Value Ref Range    WBC 8.9 4.4 - 11.3 x10*3/uL    nRBC 0.0 0.0 - 0.0 /100 WBCs    RBC 4.73 4.00 - 5.20 x10*6/uL    Hemoglobin 14.6 12.0 - 16.0 g/dL    Hematocrit 46.4 (H) 36.0 - 46.0 %    MCV 98 80 - 100 fL    MCH 30.9 26.0 - 34.0 pg    MCHC 31.5 (L) 32.0 - 36.0 g/dL    RDW 12.2 11.5 - 14.5 %    Platelets 277 150 - 450 x10*3/uL   Basic Metabolic Panel   Result Value Ref Range    Glucose 92 65 - 99 mg/dL    Sodium 140 133 - 145 mmol/L    Potassium 3.7 3.4 - 5.1 mmol/L    Chloride 104 97 - 107 mmol/L    Bicarbonate 24 24 - 31 mmol/L    Urea Nitrogen 32 (H) 8 - 25 mg/dL    Creatinine 1.40 0.40 - 1.60 mg/dL    eGFR 38 (L) >60 mL/min/1.73m*2    Calcium 9.7 8.5 - 10.4 mg/dL    Anion Gap 12 <=19 mmol/L   Vancomycin   Result Value Ref Range    Vancomycin 12.4 10.0 - 20.0 ug/mL   POCT GLUCOSE   Result Value Ref Range    POCT Glucose 66 (L) 74 - 99 mg/dL   POCT GLUCOSE   Result Value Ref Range    POCT Glucose 122 (H) 74 - 99 mg/dL   POCT GLUCOSE   Result Value Ref Range    POCT  Glucose 188 (H) 74 - 99 mg/dL         Assessment/Plan                  Principal Problem:    Compression fracture of L1 lumbar vertebra, closed, initial encounter (CMS/[facility])  Active Problems:    Compression fracture of L1 vertebra, initial encounter (CMS/[facility])      Discitis / OM / L Psoas Abscess / Acute Compression Fracture L1 / Inability to Ambulate  Ortho Spine and ID following  CT lumbar spine shows acute compression fracture L1 without significant height loss.   MRI Lumbar spine showed discitis/OM L1-2 with intramuscular abscess of the L psoas muscle. No epidural abscess seen.   Ir attempted aspiration of psoas abscess without much success, tissue was obtained and sent for culture.   No surgical intervention needed per Dr. Kraus.   Broad spectrum IV abx, vanco and zosyn.   PRN pain medication and muscle relaxers. Improved with current meds.    PT/OT recommending moderate intensity therapy.  OOB with assist.   Fall precautions.      R Foot Ulceration   Podiatry, Dr. Kennedy to  took patient to OR for removal of hardware in right foot, POD #2  Wound RN saw, buttocks wound healed. Continue to keep foot wound clean and dry, gauze between the toes.   Wound culture R foot wound pending.       DM 2  HgA1c 8.9.  On tresiba at home, sub lantus.   SSI coverage.  Monitor blood sugars.      HTN  Continue home meds.   BP currently stable, monitor.      CAD  Continue core meds.      HLD  Continue statin.      DVT Risk  Heparin subcutaneous.   SCDs.      Plan   Patient will be discharged to SNF, will require long term antibiotics  Plan of care discussed with patient and collaborating physician, Dr. Howard Davis, APRN-CNP

## 2024-01-19 LAB
BACTERIA SPEC CULT: ABNORMAL
BACTERIA SPEC CULT: ABNORMAL
GLUCOSE BLD MANUAL STRIP-MCNC: 103 MG/DL (ref 74–99)
GLUCOSE BLD MANUAL STRIP-MCNC: 198 MG/DL (ref 74–99)
GLUCOSE BLD MANUAL STRIP-MCNC: 234 MG/DL (ref 74–99)
GLUCOSE BLD MANUAL STRIP-MCNC: 247 MG/DL (ref 74–99)
GLUCOSE BLD MANUAL STRIP-MCNC: 70 MG/DL (ref 74–99)
GRAM STN SPEC: ABNORMAL
GRAM STN SPEC: ABNORMAL

## 2024-01-19 PROCEDURE — 97530 THERAPEUTIC ACTIVITIES: CPT | Mod: GO

## 2024-01-19 PROCEDURE — 97530 THERAPEUTIC ACTIVITIES: CPT | Mod: GP

## 2024-01-19 PROCEDURE — 2500000004 HC RX 250 GENERAL PHARMACY W/ HCPCS (ALT 636 FOR OP/ED): Performed by: PODIATRIST

## 2024-01-19 PROCEDURE — 2500000002 HC RX 250 W HCPCS SELF ADMINISTERED DRUGS (ALT 637 FOR MEDICARE OP, ALT 636 FOR OP/ED): Performed by: PODIATRIST

## 2024-01-19 PROCEDURE — 2500000004 HC RX 250 GENERAL PHARMACY W/ HCPCS (ALT 636 FOR OP/ED): Mod: JZ | Performed by: NURSE PRACTITIONER

## 2024-01-19 PROCEDURE — 1100000001 HC PRIVATE ROOM DAILY

## 2024-01-19 PROCEDURE — 2500000001 HC RX 250 WO HCPCS SELF ADMINISTERED DRUGS (ALT 637 FOR MEDICARE OP): Performed by: PODIATRIST

## 2024-01-19 PROCEDURE — 82947 ASSAY GLUCOSE BLOOD QUANT: CPT

## 2024-01-19 RX ADMIN — FAMOTIDINE 20 MG: 20 TABLET, FILM COATED ORAL at 08:49

## 2024-01-19 RX ADMIN — METOPROLOL TARTRATE 25 MG: 25 TABLET, FILM COATED ORAL at 08:49

## 2024-01-19 RX ADMIN — HEPARIN SODIUM 5000 UNITS: 5000 INJECTION, SOLUTION INTRAVENOUS; SUBCUTANEOUS at 17:07

## 2024-01-19 RX ADMIN — PIPERACILLIN SODIUM AND TAZOBACTAM SODIUM 2.25 G: 2; .25 INJECTION, SOLUTION INTRAVENOUS at 08:46

## 2024-01-19 RX ADMIN — INSULIN LISPRO 6 UNITS: 100 INJECTION, SOLUTION INTRAVENOUS; SUBCUTANEOUS at 17:05

## 2024-01-19 RX ADMIN — METOPROLOL TARTRATE 25 MG: 25 TABLET, FILM COATED ORAL at 20:27

## 2024-01-19 RX ADMIN — POTASSIUM CHLORIDE 10 MEQ: 750 TABLET, EXTENDED RELEASE ORAL at 08:50

## 2024-01-19 RX ADMIN — ATORVASTATIN CALCIUM 20 MG: 20 TABLET, FILM COATED ORAL at 20:27

## 2024-01-19 RX ADMIN — AMLODIPINE BESYLATE 2.5 MG: 2.5 TABLET ORAL at 08:48

## 2024-01-19 RX ADMIN — OXYCODONE AND ACETAMINOPHEN 1 TABLET: 5; 325 TABLET ORAL at 02:36

## 2024-01-19 RX ADMIN — DAPTOMYCIN 400 MG: 500 INJECTION, POWDER, LYOPHILIZED, FOR SOLUTION INTRAVENOUS at 14:15

## 2024-01-19 RX ADMIN — HEPARIN SODIUM 5000 UNITS: 5000 INJECTION, SOLUTION INTRAVENOUS; SUBCUTANEOUS at 05:54

## 2024-01-19 RX ADMIN — INSULIN GLARGINE 20 UNITS: 100 INJECTION, SOLUTION SUBCUTANEOUS at 21:00

## 2024-01-19 RX ADMIN — OXYCODONE AND ACETAMINOPHEN 1 TABLET: 5; 325 TABLET ORAL at 08:47

## 2024-01-19 RX ADMIN — PIPERACILLIN SODIUM AND TAZOBACTAM SODIUM 2.25 G: 2; .25 INJECTION, SOLUTION INTRAVENOUS at 14:10

## 2024-01-19 RX ADMIN — ASPIRIN 81 MG: 81 TABLET, COATED ORAL at 08:48

## 2024-01-19 RX ADMIN — PILOCARPINE HYDROCHLORIDE 5 MG: 5 TABLET, FILM COATED ORAL at 20:27

## 2024-01-19 RX ADMIN — FLUOXETINE HYDROCHLORIDE 40 MG: 20 CAPSULE ORAL at 08:49

## 2024-01-19 RX ADMIN — PIPERACILLIN SODIUM AND TAZOBACTAM SODIUM 2.25 G: 2; .25 INJECTION, SOLUTION INTRAVENOUS at 20:27

## 2024-01-19 RX ADMIN — PIPERACILLIN SODIUM AND TAZOBACTAM SODIUM 2.25 G: 2; .25 INJECTION, SOLUTION INTRAVENOUS at 01:34

## 2024-01-19 RX ADMIN — PILOCARPINE HYDROCHLORIDE 5 MG: 5 TABLET, FILM COATED ORAL at 17:00

## 2024-01-19 RX ADMIN — PILOCARPINE HYDROCHLORIDE 5 MG: 5 TABLET, FILM COATED ORAL at 08:49

## 2024-01-19 ASSESSMENT — PAIN SCALES - GENERAL
PAINLEVEL_OUTOF10: 2
PAINLEVEL_OUTOF10: 7
PAINLEVEL_OUTOF10: 9
PAINLEVEL_OUTOF10: 2
PAINLEVEL_OUTOF10: 8
PAINLEVEL_OUTOF10: 8

## 2024-01-19 ASSESSMENT — COGNITIVE AND FUNCTIONAL STATUS - GENERAL
DRESSING REGULAR UPPER BODY CLOTHING: A LOT
DRESSING REGULAR LOWER BODY CLOTHING: TOTAL
MOVING FROM LYING ON BACK TO SITTING ON SIDE OF FLAT BED WITH BEDRAILS: A LOT
EATING MEALS: A LOT
HELP NEEDED FOR BATHING: A LOT
STANDING UP FROM CHAIR USING ARMS: A LOT
DRESSING REGULAR LOWER BODY CLOTHING: TOTAL
TOILETING: TOTAL
HELP NEEDED FOR BATHING: A LOT
CLIMB 3 TO 5 STEPS WITH RAILING: TOTAL
PERSONAL GROOMING: A LITTLE
MOBILITY SCORE: 8
MOBILITY SCORE: 9
MOVING FROM LYING ON BACK TO SITTING ON SIDE OF FLAT BED WITH BEDRAILS: A LOT
DRESSING REGULAR UPPER BODY CLOTHING: A LOT
CLIMB 3 TO 5 STEPS WITH RAILING: TOTAL
CLIMB 3 TO 5 STEPS WITH RAILING: TOTAL
MOVING TO AND FROM BED TO CHAIR: TOTAL
TURNING FROM BACK TO SIDE WHILE IN FLAT BAD: A LOT
DRESSING REGULAR UPPER BODY CLOTHING: A LOT
WALKING IN HOSPITAL ROOM: TOTAL
WALKING IN HOSPITAL ROOM: TOTAL
TURNING FROM BACK TO SIDE WHILE IN FLAT BAD: A LOT
TURNING FROM BACK TO SIDE WHILE IN FLAT BAD: A LOT
TOILETING: A LOT
DAILY ACTIVITIY SCORE: 11
HELP NEEDED FOR BATHING: A LOT
MOBILITY SCORE: 9
MOVING TO AND FROM BED TO CHAIR: TOTAL
PERSONAL GROOMING: A LITTLE
EATING MEALS: A LOT
PERSONAL GROOMING: A LITTLE
STANDING UP FROM CHAIR USING ARMS: A LOT
TOILETING: TOTAL
MOVING TO AND FROM BED TO CHAIR: TOTAL
STANDING UP FROM CHAIR USING ARMS: TOTAL
DAILY ACTIVITIY SCORE: 11
MOVING FROM LYING ON BACK TO SITTING ON SIDE OF FLAT BED WITH BEDRAILS: A LOT
EATING MEALS: A LITTLE
DAILY ACTIVITIY SCORE: 14
DRESSING REGULAR LOWER BODY CLOTHING: A LOT
WALKING IN HOSPITAL ROOM: TOTAL

## 2024-01-19 ASSESSMENT — PAIN DESCRIPTION - ORIENTATION: ORIENTATION: LEFT

## 2024-01-19 ASSESSMENT — PAIN - FUNCTIONAL ASSESSMENT
PAIN_FUNCTIONAL_ASSESSMENT: 0-10
PAIN_FUNCTIONAL_ASSESSMENT: 0-10
PAIN_FUNCTIONAL_ASSESSMENT: FLACC (FACE, LEGS, ACTIVITY, CRY, CONSOLABILITY)
PAIN_FUNCTIONAL_ASSESSMENT: FLACC (FACE, LEGS, ACTIVITY, CRY, CONSOLABILITY)

## 2024-01-19 ASSESSMENT — PAIN DESCRIPTION - LOCATION: LOCATION: BACK

## 2024-01-19 NOTE — PROGRESS NOTES
Carrie Rosario is a 83 y.o. female on day 7 of admission presenting with Compression fracture of L1 lumbar vertebra, closed, initial encounter (CMS/[facility]).    Subjective   Interval History:   Afebrile, no chills  Denies foot pain  Denies shortness of breath or chest pain  Denies nausea, vomiting or diarrhea  Interval positive tissue culture with staph aureus               Objective   Range of Vitals (last 24 hours)  Heart Rate:  [69-75]   Temp:  [36.4 °C (97.5 °F)-37 °C (98.6 °F)]   Resp:  [15-20]   BP: (114-139)/(62-70)   SpO2:  [96 %-99 %]   Daily Weight  01/11/24 : 68 kg (150 lb)    Body mass index is 24.96 kg/m².    Physical Exam  Constitutional:       Appearance: Normal appearance.   HENT:      Head: Normocephalic and atraumatic.      Nose: Nose normal.      Mouth/Throat:      Mouth: Mucous membranes are moist.      Pharynx: Oropharynx is clear.   Eyes:      Extraocular Movements: Extraocular movements intact.      Conjunctiva/sclera: Conjunctivae normal.   Cardiovascular:      Rate and Rhythm: Normal rate and regular rhythm.   Pulmonary:      Effort: Pulmonary effort is normal.      Breath sounds: Normal breath sounds.   Abdominal:      General: Bowel sounds are normal.      Palpations: Abdomen is soft.   Musculoskeletal:         General: Normal range of motion.      Cervical back: Normal range of motion and neck supple.   Skin:     General: Skin is warm and dry.      Comments: Right foot dressing   Neurological:      General: No focal deficit present.      Mental Status: She is alert and oriented to person, place, and time. Mental status is at baseline.   Psychiatric:         Mood and Affect: Mood normal.         Behavior: Behavior normal.         Antibiotics  ketorolac (Toradol) injection 7.5 mg  tiZANidine (Zanaflex) tablet 2 mg  dextrose 50 % injection 25 g  glucagon (Glucagen) injection 1 mg  dextrose 10 % in water (D10W) infusion  insulin lispro (HumaLOG) injection 0-10 Units  insulin glargine (Lantus)  injection 15 Units  methylPREDNISolone (Medrol) tablet 24 mg  methylPREDNISolone (Medrol) tablet 20 mg  methylPREDNISolone (Medrol) tablet 16 mg  methylPREDNISolone (Medrol) tablet 12 mg  methylPREDNISolone (Medrol) tablet 8 mg  methylPREDNISolone (Medrol) tablet 4 mg  tiZANidine (Zanaflex) tablet 2 mg  heparin (porcine) injection 5,000 Units  acetaminophen (Tylenol) tablet 650 mg  polyethylene glycol (Glycolax, Miralax) packet 17 g  traMADol (Ultram) tablet 50 mg  insulin lispro (HumaLOG) injection 0-15 Units  insulin glargine (Lantus) injection 20 Units  amLODIPine (Norvasc) tablet 2.5 mg  aspirin EC tablet 81 mg  atorvastatin (Lipitor) tablet 20 mg  dapagliflozin propanediol (Farxiga) tablet 10 mg  famotidine (Pepcid) tablet 20 mg  FLUoxetine (PROzac) capsule 40 mg  furosemide (Lasix) tablet 20 mg  melatonin tablet 3 mg  metoprolol tartrate (Lopressor) tablet 25 mg  pilocarpine (Salagen) tablet 5 mg  potassium chloride CR (Klor-Con) ER tablet 10 mEq  piperacillin-tazobactam-dextrose (Zosyn) IV 2.25 g  vancomycin (Xellia) 1 g in 200 mL (Xellia) IVPB 1 g  oxyCODONE-acetaminophen (Percocet) 5-325 mg per tablet 1 tablet  vancomycin-diluent combo no.1 (Xellia) IVPB 750 mg      Relevant Results  Labs  Results from last 72 hours   Lab Units 01/18/24  0444 01/17/24  0451   WBC AUTO x10*3/uL 8.9 8.2   HEMOGLOBIN g/dL 14.6 13.8   HEMATOCRIT % 46.4* 42.6   PLATELETS AUTO x10*3/uL 277 313       Results from last 72 hours   Lab Units 01/18/24  0444 01/17/24  0452   SODIUM mmol/L 140 138   POTASSIUM mmol/L 3.7 3.9   CHLORIDE mmol/L 104 100   CO2 mmol/L 24 28   BUN mg/dL 32* 40*   CREATININE mg/dL 1.40 1.70*   GLUCOSE mg/dL 92 111*   CALCIUM mg/dL 9.7 9.9   ANION GAP mmol/L 12 10   EGFR mL/min/1.73m*2 38* 30*           Estimated Creatinine Clearance: 27.9 mL/min (by C-G formula based on SCr of 1.4 mg/dL).  C-Reactive Protein   Date Value Ref Range Status   01/13/2024 2.60 (H) 0.00 - 2.00 mg/dL Final   10/11/2023 8.90 (H) 0.00 -  2.00 mg/dL Final     CRP   Date Value Ref Range Status   08/18/2021 3.1 (H) 0 - 2.0 MG/DL Final     Comment:     Performed at [place] [id number]     Microbiology  Tissue culture pending  Wound culture finalized with mixed gram positive and gram negative bacteria  Blood cultures negative for 3 days      Imaging  US renal complete    Result Date: 1/17/2024  Interpreted By:  Robbie Mancilla, STUDY: US RENAL COMPLETE   INDICATION: Signs/Symptoms:JALEN   COMPARISON: Ultrasound from March 2022.   ACCESSION NUMBER(S): [id number]   ORDERING CLINICIAN: TARIQ MATSON   TECHNIQUE: Real-time renal ultrasound was performed.   FINDINGS: The right kidney measures 9.2 cm and the left kidney 10.3 cm in maximal length. There is normal thickness and echogenicity of the renal cortex bilaterally. No renal calculi are identified. There is no hydronephrosis. No solid renal masses are seen. Small cyst is again present in the upper pole of the left kidney measuring 10 x 11 x 12 mm (7 x 8 x 9 mm on prior).   Images of the bladder were also obtained. The bladder is only partially distended and therefore suboptimally evaluated. No bladder mass, stone or debris is identified. Bilateral ureteral jets were demonstrated.       Redemonstration of small cyst in the upper pole of the left kidney. No renal calculi or hydronephrosis.   Signed by: Robbie Mancilla 1/17/2024 2:16 PM Dictation workstation:   [id number]    FL less than 1 hour    Result Date: 1/16/2024  Interpreted By:  Robbie Mancilla, STUDY: FL LESS THAN 1 HOUR; 1/16/2024 11:47 am   INDICATION: Signs/Symptoms:intra op   COMPARISON: None   ACCESSION NUMBER(S): [id number]   ORDERING CLINICIAN: TRISHA BAILEY   FINDINGS: Fluoroscopy was provided during right foot surgery.   Total fluoroscopy time: Less than 1sec, dose: Air kerma 0.0mGy, images:1       Fluoroscopy for right foot surgery.   Signed by: Robbie Mancilla 1/16/2024 12:48 PM Dictation workstation:   [id number]              Assessment/Plan   L1/L2 disc space infection  Left psoas abscess-most likely secondary to bacteremia, IR consulted  Right foot ulcer with retained exposed hardware, s/p right foot hardware removal 1/16-op note reviewed, to level of bone-culture with staph aureus  Acute kidney injury - creatinine 1.7 on 1/17/24 - resolved  Pyuria-urine culture pending, UA with budding yeast-typically colonization     Continue IV daptomycin-switched from vancomycin on 1/17 for JALEN, now resolved  Continue IV Zosyn  CK level 39 on 1/17/24  Follow-up Blood cultures   Follow-up pending cultures  S/p CT guided aspiration by IR on 1/17/24  Pain management as needed  Supportive care  Monitor temperature and WBC  Podiatry follow up   Further recommendations based on pending cultures        CHELSEA Vera-CNP

## 2024-01-19 NOTE — PROGRESS NOTES
"Carrie Rosario is a 83 y.o. female on day 7 of admission presenting with Compression fracture of L1 lumbar vertebra, closed, initial encounter (CMS/[facility]).    Subjective   TCSW sent [facility] a message inquiring if pt is still able to admit on Saturday 1/20.        Objective     Physical Exam    Last Recorded Vitals  Blood pressure 139/64, pulse 69, temperature 37 °C (98.6 °F), temperature source Oral, resp. rate 20, height 1.651 m (5' 5\"), weight 68 kg (150 lb), SpO2 98 %.  Intake/Output last 3 Shifts:  I/O last 3 completed shifts:  In: 2681.3 (39.4 mL/kg) [P.O.:480; I.V.:2001.3 (29.4 mL/kg); IV Piggyback:200]  Out: 1 (0 mL/kg) [Stool:1]  Weight: 68 kg     Relevant Results                             Assessment/Plan   Principal Problem:    Compression fracture of L1 lumbar vertebra, closed, initial encounter (CMS/[facility])  Active Problems:    Compression fracture of L1 vertebra, initial encounter (CMS/[facility])               DON Keene      "

## 2024-01-19 NOTE — PROGRESS NOTES
"Carrie Rosario is a 83 y.o. female on day 7 of admission presenting with Compression fracture of L1 lumbar vertebra, closed, initial encounter (CMS/[facility]).    Subjective   Patient resting in chair. Mentions that right foot has not been painful. Has a forefoot offloading shoe in place.        Physical Exam    Objective     REVIEW OF SYSTEMS  GENERAL:  Negative for malaise, significant weight loss, fever  HEENT:  No changes in hearing or vision, no nose bleeds or other nasal problems and Negative for frequent or significant headaches  NECK:  Negative for lumps, goiter, pain and significant neck swelling  RESPIRATORY:  Negative for cough, wheezing and shortness of breath  CARDIOVASCULAR:  Negative for chest pain, leg swelling and palpitations  GI:  Negative for abdominal discomfort, blood in stools or black stools and change in bowel habits  :  Negative for dysuria, frequency and incontinence  MUSCULOSKELETAL:  Negative for joint pain or swelling, back pain, and muscle pain.  SKIN:  Negative for lesions, rash, and itching  PSYCH:  Negative for sleep disturbance, mood disorder and recent psychosocial stressors  HEMATOLOGY/LYMPHOLOGY:  Negative for prolonged bleeding, bruising easily, and swollen nodes.  ENDOCRINE:  Negative for cold or heat intolerance, polyuria, polydipsia and goiter  NEURO: Negative, denies any burning, tingling or numbness     Objective:   RLE exam:  DP/PT pulses are non palpable. Foot is warm and appears well perfused. Protective sensation diminished. Minimal edema noted. No ecchymosis noted. Incisions are all well coapted with sutures intact. Plantar hurricane flap is viable. No necrosis. No erythema, purulence, or malodor. No new wounds. No pain at surgical sites.       Last Recorded Vitals  Blood pressure 139/64, pulse 69, temperature 37 °C (98.6 °F), temperature source Oral, resp. rate 20, height 1.651 m (5' 5\"), weight 68 kg (150 lb), SpO2 98 %.    Intake/Output last 3 Shifts:  I/O last 3 " completed shifts:  In: 2681.3 (39.4 mL/kg) [P.O.:480; I.V.:2001.3 (29.4 mL/kg); IV Piggyback:200]  Out: 1 (0 mL/kg) [Stool:1]  Weight: 68 kg     Relevant Results  Results for orders placed or performed during the hospital encounter of 01/11/24 (from the past 24 hour(s))   POCT GLUCOSE   Result Value Ref Range    POCT Glucose 188 (H) 74 - 99 mg/dL   POCT GLUCOSE   Result Value Ref Range    POCT Glucose 163 (H) 74 - 99 mg/dL   POCT GLUCOSE   Result Value Ref Range    POCT Glucose 161 (H) 74 - 99 mg/dL   POCT GLUCOSE   Result Value Ref Range    POCT Glucose 203 (H) 74 - 99 mg/dL   POCT GLUCOSE   Result Value Ref Range    POCT Glucose 70 (L) 74 - 99 mg/dL   POCT GLUCOSE   Result Value Ref Range    POCT Glucose 103 (H) 74 - 99 mg/dL           Assessment/Plan   Principal Problem:    Compression fracture of L1 lumbar vertebra, closed, initial encounter (CMS/[facility])  Active Problems:    Compression fracture of L1 vertebra, initial encounter (CMS/[facility])    Patient is POD3 from right foot hardware removal, debridement of dorsal foot ulcer, and ulcer excision with flap closure.   -Patient examined and evaluated and findings discussed with patient in detail. Surgical sites are stable and appear to be healing appropriately without any signs of infection  - Dressing changed today- betadine wet to dry dressing place. Leave intact for 1 week  - WBAT in forefoot offloading shoe  - Abx per ID  - Patient to follow up with me after discharge       Akanksha Kennedy DPM

## 2024-01-19 NOTE — NURSING NOTE
Incontinent void this am, bladder scanned for 210ml at this time, will continue to monitor. No c/o pressure or inability to void.

## 2024-01-19 NOTE — PROGRESS NOTES
Physical Therapy    Physical Therapy Treatment    Patient Name: Carrie Rosario  MRN: [id number]  Today's Date: 1/19/2024  Time Calculation  Start Time: 1030  Stop Time: 1113  Time Calculation (min): 43 min       Assessment/Plan   PT Assessment  PT Assessment Results: Decreased strength, Decreased endurance, Impaired balance, Decreased mobility, Decreased safety awareness, Pain  Rehab Prognosis: Good  Evaluation/Treatment Tolerance: Patient limited by pain  Medical Staff Made Aware: Yes  End of Session Communication: Bedside nurse  Assessment Comment: Pt tolerated transfer to recliner with use of dhiraj steady. Cont to be limited in mobility primarily by increased pain.  End of Session Patient Position: Up in chair     PT Plan  Treatment/Interventions: Bed mobility, Transfer training, Balance training, Strengthening, Endurance training, Therapeutic exercise, Therapeutic activity  PT Plan: Skilled PT  PT Frequency: 5 times per week  PT Discharge Recommendations: Moderate intensity level of continued care  Equipment Recommended upon Discharge: Lift  PT Recommended Transfer Status: Total assist  PT - OK to Discharge: Yes      General Visit Information:   PT  Visit  PT Received On: 01/19/24  General  Reason for Referral: impaired mobility  Co-Treatment:  (partial co-treat with OT for OOB mobility)  Prior to Session Communication: Bedside nurse  Patient Position Received: Bed, 4 rail up  Preferred Learning Style: verbal  General Comment: Cleared for mobility per nursing. Pt supine in bed upon arrival. Agreeable to participate.    Subjective   Precautions:  Precautions  LE Weight Bearing Status: Heel Weight Bearing in Post-Op Shoe  Medical Precautions: Fall precautions, Spinal precautions  Precautions Comment: off-loading shoe  Vital Signs:       Objective   Pain:  Pain Assessment  Pain Assessment: FLACC (Face, Legs, Activity, Cry, Consolability)  Pain Score: 8  Pain Location: Back  Pain Orientation: Left,  Lower  Cognition:  Cognition  Overall Cognitive Status: Within Functional Limits (labile and tearful. appears fearful of mobility)  Attention:  (able to follow commands. Required increased time and positive encouragement to progress tasks.)  Postural Control:  Postural Control  Posture Comment: flexed posture sitting on EOB  Static Sitting Balance  Static Sitting-Level of Assistance:  (SBA with RUE support/forearm rested against elevated EOB to min assist when sitting upright.)  Static Standing Balance  Static Standing-Level of Assistance: Minimum assistance  Static Standing-Comment/Number of Minutes: BUE support holding onto dhiraj steady.  Extremity/Trunk Assessments:    Activity Tolerance:  Activity Tolerance  Endurance: Decreased tolerance for upright activites  Activity Tolerance Comments: Repeated instruction provided for purse-lip breathing to promote activity tolerance.  Treatments:  Therapeutic Exercise  Therapeutic Exercise Activity 1: x15-20 L ankle pumps (R ankle ROM (baseline drop foot)), B LAQs.    Therapeutic Activity  Therapeutic Activity 1: Sat on EOB about 20 minutes with supervision to min assist. Initially required min/mod assist due to pain and increased R lateral lean and posterior lean. Positioned R forearm on pillow to allow for wt shifting and upright posture. Pt occasionally able to promote COG and wt shift into upright, however only able to tolerate about 1-2 minutes at a time, due to increased pain. Placed dhiraj steady in front of patient to allow for UE support.    Bed Mobility 1  Bed Mobility 1: Supine to sitting  Level of Assistance 1: Maximum assistance  Bed Mobility Comments 1: via log roll and use of bedrails       Transfer 1  Technique 1: Sit to stand, Stand to sit  Transfer Device 1: Dhiraj Stedy  Transfer Level of Assistance 1: Moderate assistance, +2  Trials/Comments 1: from elevated EOB wtih increased pt effort due to c/o increased pain. Cueing for technique and sequencing while  maintaining heel WBing in RLE.  Transfers 2  Transfer From 2: Bed to  Transfer to 2: Chair with arms  Technique 2: Sit to stand, Stand to sit  Transfer Device 2: Radha Fabiano  Transfer Level of Assistance 2: Dependent         Outcome Measures:  [facility] Basic Mobility  Turning from your back to your side while in a flat bed without using bedrails: A lot  Moving from lying on your back to sitting on the side of a flat bed without using bedrails: A lot  Moving to and from bed to chair (including a wheelchair): Total  Standing up from a chair using your arms (e.g. wheelchair or bedside chair): A lot  To walk in hospital room: Total  Climbing 3-5 steps with railing: Total  Basic Mobility - Total Score: 9    Education Documentation  Precautions, taught by Raven Lopez PT at 1/19/2024 11:42 AM.  Learner: Patient  Readiness: Acceptance  Method: Explanation  Response: Verbalizes Understanding    Body Mechanics, taught by Raven Lopez PT at 1/19/2024 11:42 AM.  Learner: Patient  Readiness: Acceptance  Method: Explanation  Response: Verbalizes Understanding    Mobility Training, taught by Raven Lopez PT at 1/19/2024 11:42 AM.  Learner: Patient  Readiness: Acceptance  Method: Explanation  Response: Verbalizes Understanding    Education Comments  No comments found.        OP EDUCATION:       Encounter Problems       Encounter Problems (Active)       Balance       Sitting Balance (Progressing)       Start:  01/17/24    Expected End:  01/31/24       Pt will demonstrate good sitting balance to promote safe engagement with out of bed activities           Standing Balance (Progressing)       Start:  01/17/24    Expected End:  01/31/24       Pt will demonstrate good static standing balance to promote safe participation with out of bed activity, transfers, and mobility              Mobility       Ambulation (Progressing)       Start:  01/17/24    Expected End:  01/31/24       Pt will ambulate 50' modified independent assist with walker to  promote safe home mobility              Pain - Adult          Safety       Spine Precautions for Pain Management (Progressing)       Start:  01/17/24    Expected End:  01/31/24       Pt will be independent with both understanding and demonstration of post op spine restrictions to promote safe functional mobility at discharge           Safe Mobility Techniques (Progressing)       Start:  01/17/24    Expected End:  01/31/24       Pt will correctly identify and demonstrate safe mobility techniques to reduce their risks for falls during their acute care stay               Transfers       Supine to sit (Progressing)       Start:  01/17/24    Expected End:  01/31/24       Pt will transfer supine to sitting at edge of bed with modified independent assist to promote acute care out of bed activity           Sit to stand (Progressing)       Start:  01/17/24    Expected End:  01/31/24       Pt will transfer sit to standing position with modified independent assist and walker to promote safe out of bed activity           Bed to chair (Progressing)       Start:  01/17/24    Expected End:  01/31/24       Pt will transfer from sitting edge of bed to the chair with modified independent assist and walker to promote out of bed activity and reduce the risks of prolonged acute care bedrest

## 2024-01-19 NOTE — PROGRESS NOTES
Carrie Rosario is a 83 y.o. female on day 7 of admission presenting with Compression fracture of L1 lumbar vertebra, closed, initial encounter (CMS/[facility]).      Subjective   Patient resting in bed comfortably, denies pain or sob.        Objective     Last Recorded Vitals  /61 (BP Location: Left arm, Patient Position: Sitting)   Pulse 69   Temp 36.8 °C (98.2 °F) (Oral)   Resp 20   Wt 68 kg (150 lb)   SpO2 98%   Intake/Output last 3 Shifts:    Intake/Output Summary (Last 24 hours) at 1/19/2024 1421  Last data filed at 1/19/2024 0900  Gross per 24 hour   Intake 390 ml   Output --   Net 390 ml       Admission Weight  Weight: 68 kg (150 lb) (01/11/24 1251)    Daily Weight  01/11/24 : 68 kg (150 lb)    Image Results  CT guided aspiration of abscess, hematoma, cyst  Narrative: Interpreted By:  Javad Thomson,   STUDY:  CT-guided biopsy intervertebral disc      INDICATION:  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      COMPARISON:  None.      ACCESSION NUMBER(S):  [id number]      ORDERING CLINICIAN:  VEENA DHALIWAL      TECHNIQUE:  Conscious sedation: 20 minutes.  CT-guided percutaneous biopsy intervertebral disc L1-2      FINDINGS:  Informed consent obtained. Patient positioned prone and connected to  physiologic monitoring. Conscious sedation provided with versed and  fentanyl. Under CT guidance, 17 gauge trocar cannula advanced toward  the left psoas. Aspiration would not yield fluid at location  corresponding to small fluid collection seen on MRI. The cannula was  advanced further to the L1-2 intervertebral disc. 18 gauge biopsy  instrument advanced coaxially and a core biopsy obtained, submitted  for culture.      Impression: CT-guided biopsy of L1-2 intervertebral disc.      Signed by: Javad Thomson 1/18/2024 3:38 PM  Dictation workstation:   [id number]      Physical Exam  Constitutional:       Appearance: Normal appearance.   Cardiovascular:      Rate and Rhythm: Normal rate and  regular rhythm.      Pulses: Normal pulses.      Heart sounds: Normal heart sounds.   Pulmonary:      Effort: Pulmonary effort is normal.      Breath sounds: Normal breath sounds.   Abdominal:      General: Bowel sounds are normal.      Palpations: Abdomen is soft.   Skin:     General: Skin is warm and dry.      Comments: Right foot with dressing dr and intact   Neurological:      Mental Status: She is alert and oriented to person, place, and time.         Relevant Results             Results for orders placed or performed during the hospital encounter of 01/11/24 (from the past 24 hour(s))   POCT GLUCOSE   Result Value Ref Range    POCT Glucose 161 (H) 74 - 99 mg/dL   POCT GLUCOSE   Result Value Ref Range    POCT Glucose 203 (H) 74 - 99 mg/dL   POCT GLUCOSE   Result Value Ref Range    POCT Glucose 70 (L) 74 - 99 mg/dL   POCT GLUCOSE   Result Value Ref Range    POCT Glucose 103 (H) 74 - 99 mg/dL   POCT GLUCOSE   Result Value Ref Range    POCT Glucose 198 (H) 74 - 99 mg/dL       Assessment/Plan                  Principal Problem:    Compression fracture of L1 lumbar vertebra, closed, initial encounter (CMS/[facility])  Active Problems:    Compression fracture of L1 vertebra, initial encounter (CMS/[facility])    Discitis / OM / L Psoas Abscess / Acute Compression Fracture L1 / Inability to Ambulate  Ortho Spine and ID following  CT lumbar spine shows acute compression fracture L1 without significant height loss.   MRI Lumbar spine showed discitis/OM L1-2 with intramuscular abscess of the L psoas muscle. No epidural abscess seen.   IR attempted aspiration of psoas abscess without much success, tissue was obtained and sent for culture.    - culture growing MRSA, not finalized  No surgical intervention needed per Dr. Kraus.   Broad spectrum IV abx, vanco and zosyn.   PRN pain medication and muscle relaxers. Improved with current meds.    PT/OT recommending moderate intensity therapy.  OOB with assist.   Fall precautions.      R Foot  Ulceration   Podiatry, Dr. Kennedy to  took patient to OR for removal of hardware in right foot, POD #3  Wound culture R foot wound pending.       DM 2  HgA1c 8.9.  On tresiba at home, sub lantus.   SSI coverage.  Monitor blood sugars.      HTN  Continue home meds.   BP currently stable, monitor.      CAD  Continue core meds.      HLD  Continue statin.      DVT Risk  Heparin subcutaneous.   SCDs.      Plan   Patient will be discharged to SNF, will require long term antibiotics  Plan of care discussed with patient and collaborating physician, Dr. Howard Davis, APRN-CNP

## 2024-01-19 NOTE — CARE PLAN
The patient's goals for the shift include      The clinical goals for the shift include pain control      Problem: Pain  Goal: My pain/discomfort is manageable  Outcome: Progressing     Problem: Safety  Goal: Patient will be injury free during hospitalization  Outcome: Progressing  Goal: I will remain free of falls  Outcome: Progressing     Problem: Daily Care  Goal: Daily care needs are met  Outcome: Progressing     Problem: Psychosocial Needs  Goal: Demonstrates ability to cope with hospitalization/illness  Outcome: Progressing  Goal: Collaborate with me, my family, and caregiver to identify my specific goals  Outcome: Progressing     Problem: Discharge Barriers  Goal: My discharge needs are met  Outcome: Progressing     Problem: Skin  Goal: Decreased wound size/increased tissue granulation at next dressing change  1/19/2024 1825 by Kenna Woodruff RN  Outcome: Progressing  1/19/2024 1015 by Kenna Woodruff RN  Flowsheets (Taken 1/19/2024 1015)  Decreased wound size/increased tissue granulation at next dressing change: Promote sleep for wound healing  Goal: Participates in plan/prevention/treatment measures  1/19/2024 1825 by Kenna Woodruff RN  Outcome: Progressing  1/19/2024 1015 by Kenna Woodruff RN  Flowsheets (Taken 1/19/2024 1015)  Participates in plan/prevention/treatment measures: Elevate heels  Goal: Prevent/manage excess moisture  1/19/2024 1825 by Kenna Woodruff RN  Outcome: Progressing  1/19/2024 1015 by Kenna Woodruff RN  Flowsheets (Taken 1/19/2024 1015)  Prevent/manage excess moisture: Moisturize dry skin  Goal: Prevent/minimize sheer/friction injuries  1/19/2024 1825 by Kenna Woodruff RN  Outcome: Progressing  1/19/2024 1015 by Kenna Woodruff RN  Flowsheets (Taken 1/19/2024 1015)  Prevent/minimize sheer/friction injuries: Complete micro-shifts as needed if patient unable. Adjust patient position to relieve pressure points, not a full turn  Goal: Promote/optimize nutrition  1/19/2024  1825 by Kenna Woodruff RN  Outcome: Progressing  1/19/2024 1015 by Kenna Woodruff RN  Flowsheets (Taken 1/19/2024 1015)  Promote/optimize nutrition: Monitor/record intake including meals  Goal: Promote skin healing  1/19/2024 1825 by Kenna Woodruff RN  Outcome: Progressing  1/19/2024 1015 by Kenna Woodruff RN  Flowsheets (Taken 1/19/2024 1015)  Promote skin healing: Assess skin/pad under line(s)/device(s)     Problem: Diabetes  Goal: Achieve decreasing blood glucose levels by end of shift  Outcome: Progressing  Goal: Increase stability of blood glucose readings by end of shift  Outcome: Progressing  Goal: Maintain electrolyte levels within acceptable range throughout shift  Outcome: Progressing  Goal: Maintain glucose levels >70mg/dl to <250mg/dl throughout shift  Outcome: Progressing  Goal: No changes in neurological exam by end of shift  Outcome: Progressing     Problem: Fall/Injury  Goal: Not fall by end of shift  Outcome: Progressing  Goal: Be free from injury by end of the shift  Outcome: Progressing  Goal: Verbalize understanding of personal risk factors for fall in the hospital  Outcome: Progressing  Goal: Verbalize understanding of risk factor reduction measures to prevent injury from fall in the home  Outcome: Progressing  Goal: Use assistive devices by end of the shift  Outcome: Progressing  Goal: Pace activities to prevent fatigue by end of the shift  Outcome: Progressing     Problem: Pain - Adult  Goal: Verbalizes/displays adequate comfort level or baseline comfort level  Outcome: Progressing     Problem: Safety - Adult  Goal: Free from fall injury  Outcome: Progressing     Problem: Discharge Planning  Goal: Discharge to home or other facility with appropriate resources  Outcome: Progressing     Problem: Chronic Conditions and Co-morbidities  Goal: Patient's chronic conditions and co-morbidity symptoms are monitored and maintained or improved  Outcome: Progressing     Problem: Pain  Goal: Takes  deep breaths with improved pain control throughout the shift  Outcome: Progressing  Goal: Turns in bed with improved pain control throughout the shift  Outcome: Progressing

## 2024-01-19 NOTE — NURSING NOTE
Pt lying in bed watching TV, call light within reach.  Bed locked in low position with alarm on for safety. Call light within reach.

## 2024-01-19 NOTE — PROGRESS NOTES
"Occupational Therapy    Occupational Therapy Treatment    Name: Carrie Rosario  MRN: [id number]  : 1940  Date: 24  Time Calculation  Start Time: 1022  Stop Time: 1045  Time Calculation (min): 23 min    Assessment:  End of Session Communication: Bedside nurse  End of Session Patient Position: Up in chair  Plan:  Treatment Interventions: ADL retraining, Functional transfer training, Cognitive reorientation, Endurance training, UE strengthening/ROM, Patient/family training, Equipment evaluation/education, Compensatory technique education  OT Frequency: 3 times per week  OT Discharge Recommendations: Moderate intensity level of continued care  Equipment Recommended upon Discharge: Lift  OT Recommended Transfer Status: Assist of 2  OT - OK to Discharge: Yes    Subjective   Previous Visit Info:  OT Last Visit  OT Received On: 24    General:  General  Reason for Referral: Decreased ADLs  Referred By: Dr. Samson  Past Medical History Relevant to Rehab: spinal stenosis, Rt foot drop, CKD stage 3, MI, HTN  Prior to Session Communication: Bedside nurse  Patient Position Received: Bed, 4 rail up  Preferred Learning Style: verbal  General Comment: Cleared by nsg, pt met in supine, agreeable to OT. States \"I'm hopeful I can get out of bed today.\"    Precautions:  LE Weight Bearing Status: Heel Weight Bearing in Post-Op Shoe  Medical Precautions: Fall precautions  Precautions Comment: RLE forefoot offloading shoe    Pain Assessment:  Pain Assessment  Pain Assessment: FLACC (Face, Legs, Activity, Cry, Consolability)  Pain Score: 8  Pain Type: Acute pain  Pain Location: Back  Pain Orientation: Lower     Objective   Activities of Daily Living:    LE Dressing  LE Dressing: Yes  Shoe Level of Assistance: Maximum assistance  LE Dressing Where Assessed: Edge of bed  LE Dressing Comments: to cem Rt post op shoe and Lt shoe    Functional Standing Tolerance:  Functional Standing Tolerance  Time: 20s  Activity: static "  dhiraj steady  Functional Standing Tolerance Comments: mod A x2 to stand at device. increased time to advance    Bed Mobility/Transfers:   Bed Mobility  Bed Mobility: Yes  Bed Mobility 1  Bed Mobility 1: Supine to sitting  Level of Assistance 1: Maximum assistance  Bed Mobility Comments 1: via log roll, assist to advance LEs to EOB and for trunk up    Transfers  Transfer: Yes  Transfer 1  Technique 1: Sit to stand, Stand to sit  Transfer Device 1: Dhiraj Stedy  Transfer Level of Assistance 1: Moderate assistance, +2  Trials/Comments 1: mod A x2 for STS at dhiraj stedy x2 trials. effortful completion, cued for hand placement, LE repositioning and task sequencing  Transfers 2  Transfer From 2: Bed to  Transfer to 2: Chair with arms  Transfer Device 2: Dhiraj Stedy  Transfer Level of Assistance 2: Dependent    Therapy/Activity: Therapeutic Activity  Therapeutic Activity Performed: Yes  Therapeutic Activity 1: EOB sitting for ~20 minutes today with fair- tolerance d/t pain levels. completed in efforts to increase activity tolerance and OOB activity    Outcome Measures:  [facility] Daily Activity  Putting on and taking off regular lower body clothing: Total  Bathing (including washing, rinsing, drying): A lot  Putting on and taking off regular upper body clothing: A lot  Toileting, which includes using toilet, bedpan or urinal: Total  Taking care of personal grooming such as brushing teeth: A little  Eating Meals: A lot  Daily Activity - Total Score: 11    Education Documentation  No documentation found.  Education Comments  No comments found.      Goals:  Encounter Problems       Encounter Problems (Active)       OT Goals       ADLs (Progressing)       Start:  01/12/24    Expected End:  02/09/24       Patient will complete ADL tasks with MOD A using AE as needed, in order to increase patient's safety and independence with self-care tasks.         Functional Transfers (Progressing)       Start:  01/12/24    Expected End:   02/09/24       Patient will complete functional transfers with Min A in order to increase patient's safety and independence with daily tasks.         B UE Strengthening (Progressing)       Start:  01/12/24    Expected End:  02/09/24       Patient will increase B UE strengthening to 4+/5 for functional transfers.

## 2024-01-20 LAB
GLUCOSE BLD MANUAL STRIP-MCNC: 106 MG/DL (ref 74–99)
GLUCOSE BLD MANUAL STRIP-MCNC: 124 MG/DL (ref 74–99)
GLUCOSE BLD MANUAL STRIP-MCNC: 162 MG/DL (ref 74–99)
GLUCOSE BLD MANUAL STRIP-MCNC: 247 MG/DL (ref 74–99)

## 2024-01-20 PROCEDURE — 2500000001 HC RX 250 WO HCPCS SELF ADMINISTERED DRUGS (ALT 637 FOR MEDICARE OP): Performed by: PODIATRIST

## 2024-01-20 PROCEDURE — 2500000004 HC RX 250 GENERAL PHARMACY W/ HCPCS (ALT 636 FOR OP/ED): Performed by: PODIATRIST

## 2024-01-20 PROCEDURE — 2500000005 HC RX 250 GENERAL PHARMACY W/O HCPCS: Performed by: NURSE PRACTITIONER

## 2024-01-20 PROCEDURE — 2500000001 HC RX 250 WO HCPCS SELF ADMINISTERED DRUGS (ALT 637 FOR MEDICARE OP): Performed by: NURSE PRACTITIONER

## 2024-01-20 PROCEDURE — 82947 ASSAY GLUCOSE BLOOD QUANT: CPT

## 2024-01-20 PROCEDURE — 2500000002 HC RX 250 W HCPCS SELF ADMINISTERED DRUGS (ALT 637 FOR MEDICARE OP, ALT 636 FOR OP/ED): Performed by: PODIATRIST

## 2024-01-20 PROCEDURE — 1100000001 HC PRIVATE ROOM DAILY

## 2024-01-20 RX ORDER — DOCUSATE SODIUM 100 MG/1
100 CAPSULE, LIQUID FILLED ORAL 2 TIMES DAILY
Status: DISCONTINUED | OUTPATIENT
Start: 2024-01-20 | End: 2024-01-23 | Stop reason: HOSPADM

## 2024-01-20 RX ORDER — LIDOCAINE 560 MG/1
2 PATCH PERCUTANEOUS; TOPICAL; TRANSDERMAL DAILY
Status: DISCONTINUED | OUTPATIENT
Start: 2024-01-20 | End: 2024-01-23 | Stop reason: HOSPADM

## 2024-01-20 RX ORDER — ACETAMINOPHEN 325 MG/1
650 TABLET ORAL
Status: DISCONTINUED | OUTPATIENT
Start: 2024-01-20 | End: 2024-01-23 | Stop reason: HOSPADM

## 2024-01-20 RX ORDER — OXYCODONE HYDROCHLORIDE 5 MG/1
5 TABLET ORAL EVERY 6 HOURS PRN
Status: DISCONTINUED | OUTPATIENT
Start: 2024-01-20 | End: 2024-01-23 | Stop reason: HOSPADM

## 2024-01-20 RX ADMIN — PILOCARPINE HYDROCHLORIDE 5 MG: 5 TABLET, FILM COATED ORAL at 15:10

## 2024-01-20 RX ADMIN — FLUOXETINE HYDROCHLORIDE 40 MG: 20 CAPSULE ORAL at 09:06

## 2024-01-20 RX ADMIN — DOCUSATE SODIUM 100 MG: 100 CAPSULE, LIQUID FILLED ORAL at 15:10

## 2024-01-20 RX ADMIN — PIPERACILLIN SODIUM AND TAZOBACTAM SODIUM 2.25 G: 2; .25 INJECTION, SOLUTION INTRAVENOUS at 14:28

## 2024-01-20 RX ADMIN — HEPARIN SODIUM 5000 UNITS: 5000 INJECTION, SOLUTION INTRAVENOUS; SUBCUTANEOUS at 18:45

## 2024-01-20 RX ADMIN — DOCUSATE SODIUM 100 MG: 100 CAPSULE, LIQUID FILLED ORAL at 21:30

## 2024-01-20 RX ADMIN — METOPROLOL TARTRATE 25 MG: 25 TABLET, FILM COATED ORAL at 09:06

## 2024-01-20 RX ADMIN — ASPIRIN 81 MG: 81 TABLET, COATED ORAL at 09:06

## 2024-01-20 RX ADMIN — PIPERACILLIN SODIUM AND TAZOBACTAM SODIUM 2.25 G: 2; .25 INJECTION, SOLUTION INTRAVENOUS at 20:00

## 2024-01-20 RX ADMIN — POTASSIUM CHLORIDE 10 MEQ: 750 TABLET, EXTENDED RELEASE ORAL at 09:06

## 2024-01-20 RX ADMIN — HEPARIN SODIUM 5000 UNITS: 5000 INJECTION, SOLUTION INTRAVENOUS; SUBCUTANEOUS at 06:05

## 2024-01-20 RX ADMIN — PILOCARPINE HYDROCHLORIDE 5 MG: 5 TABLET, FILM COATED ORAL at 21:30

## 2024-01-20 RX ADMIN — ACETAMINOPHEN 650 MG: 325 TABLET ORAL at 18:45

## 2024-01-20 RX ADMIN — INSULIN GLARGINE 20 UNITS: 100 INJECTION, SOLUTION SUBCUTANEOUS at 21:20

## 2024-01-20 RX ADMIN — LIDOCAINE 1 PATCH: 4 PATCH TOPICAL at 15:10

## 2024-01-20 RX ADMIN — FUROSEMIDE 20 MG: 20 TABLET ORAL at 09:06

## 2024-01-20 RX ADMIN — PILOCARPINE HYDROCHLORIDE 5 MG: 5 TABLET, FILM COATED ORAL at 09:06

## 2024-01-20 RX ADMIN — ACETAMINOPHEN 650 MG: 325 TABLET ORAL at 21:30

## 2024-01-20 RX ADMIN — PIPERACILLIN SODIUM AND TAZOBACTAM SODIUM 2.25 G: 2; .25 INJECTION, SOLUTION INTRAVENOUS at 08:05

## 2024-01-20 RX ADMIN — PIPERACILLIN SODIUM AND TAZOBACTAM SODIUM 2.25 G: 2; .25 INJECTION, SOLUTION INTRAVENOUS at 01:28

## 2024-01-20 RX ADMIN — METOPROLOL TARTRATE 25 MG: 25 TABLET, FILM COATED ORAL at 21:30

## 2024-01-20 RX ADMIN — TRAMADOL HYDROCHLORIDE 50 MG: 50 TABLET ORAL at 06:05

## 2024-01-20 RX ADMIN — AMLODIPINE BESYLATE 2.5 MG: 2.5 TABLET ORAL at 09:06

## 2024-01-20 RX ADMIN — ATORVASTATIN CALCIUM 20 MG: 20 TABLET, FILM COATED ORAL at 21:30

## 2024-01-20 RX ADMIN — OXYCODONE AND ACETAMINOPHEN 1 TABLET: 5; 325 TABLET ORAL at 01:28

## 2024-01-20 RX ADMIN — ACETAMINOPHEN 650 MG: 325 TABLET ORAL at 16:09

## 2024-01-20 RX ADMIN — FAMOTIDINE 20 MG: 20 TABLET, FILM COATED ORAL at 09:06

## 2024-01-20 RX ADMIN — OXYCODONE AND ACETAMINOPHEN 1 TABLET: 5; 325 TABLET ORAL at 08:21

## 2024-01-20 ASSESSMENT — COGNITIVE AND FUNCTIONAL STATUS - GENERAL
DAILY ACTIVITIY SCORE: 12
EATING MEALS: A LITTLE
WALKING IN HOSPITAL ROOM: TOTAL
DRESSING REGULAR UPPER BODY CLOTHING: A LOT
CLIMB 3 TO 5 STEPS WITH RAILING: TOTAL
TURNING FROM BACK TO SIDE WHILE IN FLAT BAD: A LOT
MOVING FROM LYING ON BACK TO SITTING ON SIDE OF FLAT BED WITH BEDRAILS: A LOT
STANDING UP FROM CHAIR USING ARMS: A LOT
MOVING TO AND FROM BED TO CHAIR: TOTAL
MOBILITY SCORE: 9
PERSONAL GROOMING: A LITTLE
HELP NEEDED FOR BATHING: A LOT
DRESSING REGULAR LOWER BODY CLOTHING: TOTAL
TOILETING: TOTAL

## 2024-01-20 ASSESSMENT — PAIN - FUNCTIONAL ASSESSMENT
PAIN_FUNCTIONAL_ASSESSMENT: 0-10

## 2024-01-20 ASSESSMENT — PAIN DESCRIPTION - LOCATION
LOCATION: BACK
LOCATION: BACK

## 2024-01-20 ASSESSMENT — PAIN SCALES - GENERAL
PAINLEVEL_OUTOF10: 6
PAINLEVEL_OUTOF10: 10 - WORST POSSIBLE PAIN
PAINLEVEL_OUTOF10: 3

## 2024-01-20 ASSESSMENT — PAIN DESCRIPTION - ORIENTATION
ORIENTATION: LEFT
ORIENTATION: LOWER;LEFT

## 2024-01-20 ASSESSMENT — ENCOUNTER SYMPTOMS: BACK PAIN: 1

## 2024-01-20 NOTE — CARE PLAN
The patient's goals for the shift include  pain control    The clinical goals for the shift include pain control    Problem: Pain  Goal: My pain/discomfort is manageable  Outcome: Progressing     Problem: Safety  Goal: Patient will be injury free during hospitalization  Outcome: Progressing  Goal: I will remain free of falls  Outcome: Progressing

## 2024-01-20 NOTE — PROGRESS NOTES
Carrie Rosario is a 83 y.o. female on day 8 of admission presenting with Compression fracture of L1 lumbar vertebra, closed, initial encounter (CMS/[facility]).    Subjective   Interval History:   Afebrile, no chills  Severe back pain  No chest pain or shortness of breath  No nausea vomiting or diarrhea  Family present    Review of Systems   Musculoskeletal:  Positive for back pain.   All other systems reviewed and are negative.      Objective   Range of Vitals (last 24 hours)  Heart Rate:  [67-77]   Temp:  [36.4 °C (97.5 °F)-37 °C (98.6 °F)]   Resp:  [17-20]   BP: (126-134)/(62-70)   SpO2:  [96 %-100 %]   Daily Weight  01/11/24 : 68 kg (150 lb)    Body mass index is 24.96 kg/m².    Physical Exam  Constitutional:       Appearance: Normal appearance.   HENT:      Head: Normocephalic and atraumatic.      Nose: Nose normal.      Mouth/Throat:      Mouth: Mucous membranes are moist.      Pharynx: Oropharynx is clear.   Eyes:      Extraocular Movements: Extraocular movements intact.      Conjunctiva/sclera: Conjunctivae normal.   Cardiovascular:      Rate and Rhythm: Normal rate and regular rhythm.   Pulmonary:      Effort: Pulmonary effort is normal.      Breath sounds: Normal breath sounds.   Abdominal:      General: Bowel sounds are normal.      Palpations: Abdomen is soft.   Musculoskeletal:         General: Normal range of motion.      Cervical back: Normal range of motion and neck supple.   Skin:     General: Skin is warm and dry.      Comments: Right foot dressing   Neurological:      General: No focal deficit present.      Mental Status: She is alert and oriented to person, place, and time. Mental status is at baseline.   Psychiatric:         Mood and Affect: Mood normal.         Behavior: Behavior normal.     Antibiotics  ketorolac (Toradol) injection 7.5 mg  tiZANidine (Zanaflex) tablet 2 mg  dextrose 50 % injection 25 g  glucagon (Glucagen) injection 1 mg  dextrose 10 % in water (D10W) infusion  insulin lispro  (HumaLOG) injection 0-10 Units  insulin glargine (Lantus) injection 15 Units  methylPREDNISolone (Medrol) tablet 24 mg  methylPREDNISolone (Medrol) tablet 20 mg  methylPREDNISolone (Medrol) tablet 16 mg  methylPREDNISolone (Medrol) tablet 12 mg  methylPREDNISolone (Medrol) tablet 8 mg  methylPREDNISolone (Medrol) tablet 4 mg  tiZANidine (Zanaflex) tablet 2 mg  heparin (porcine) injection 5,000 Units  acetaminophen (Tylenol) tablet 650 mg  polyethylene glycol (Glycolax, Miralax) packet 17 g  traMADol (Ultram) tablet 50 mg  insulin lispro (HumaLOG) injection 0-15 Units  insulin glargine (Lantus) injection 20 Units  amLODIPine (Norvasc) tablet 2.5 mg  aspirin EC tablet 81 mg  atorvastatin (Lipitor) tablet 20 mg  dapagliflozin propanediol (Farxiga) tablet 10 mg  famotidine (Pepcid) tablet 20 mg  FLUoxetine (PROzac) capsule 40 mg  furosemide (Lasix) tablet 20 mg  melatonin tablet 3 mg  metoprolol tartrate (Lopressor) tablet 25 mg  pilocarpine (Salagen) tablet 5 mg  potassium chloride CR (Klor-Con) ER tablet 10 mEq  piperacillin-tazobactam-dextrose (Zosyn) IV 2.25 g  vancomycin (Xellia) 1 g in 200 mL (Xellia) IVPB 1 g  oxyCODONE-acetaminophen (Percocet) 5-325 mg per tablet 1 tablet  vancomycin-diluent combo no.1 (Xellia) IVPB 750 mg  vancomycin (Vancocin) 500 mg in dextrose 5 % 100 mL IV  lidocaine (Xylocaine) 10 mg/mL (1 %) injection 1 mg  oxygen (O2) therapy  lactated Ringer's infusion  ondansetron (Zofran) injection 4 mg  hydrALAZINE (Apresoline) injection 5 mg  HYDROmorphone (Dilaudid) injection 0.2 mg  HYDROmorphone (Dilaudid) injection 0.4 mg  ondansetron (Zofran) injection 4 mg  ondansetron (Zofran) injection  - Omnicell Override Pull  BUPivacaine HCl (Marcaine) 0.5 % (5 mg/mL) injection  vancomycin (Vancocin) vial for injection  vancomycin (Vancocin) 500 mg in dextrose 5 % 100 mL IV  sodium chloride 0.9% infusion  DAPTOmycin (Cubicin) 400 mg in sodium chloride 0.9% 50 mL IV  lidocaine PF (Xylocaine) 20 mg/mL (2 %)  injection  bisacodyl (Dulcolax) suppository 10 mg      Relevant Results  Labs  Results from last 72 hours   Lab Units 01/18/24  0444   WBC AUTO x10*3/uL 8.9   HEMOGLOBIN g/dL 14.6   HEMATOCRIT % 46.4*   PLATELETS AUTO x10*3/uL 277     Results from last 72 hours   Lab Units 01/18/24  0444   SODIUM mmol/L 140   POTASSIUM mmol/L 3.7   CHLORIDE mmol/L 104   CO2 mmol/L 24   BUN mg/dL 32*   CREATININE mg/dL 1.40   GLUCOSE mg/dL 92   CALCIUM mg/dL 9.7   ANION GAP mmol/L 12   EGFR mL/min/1.73m*2 38*         Estimated Creatinine Clearance: 27.9 mL/min (by C-G formula based on SCr of 1.4 mg/dL).  C-Reactive Protein   Date Value Ref Range Status   01/13/2024 2.60 (H) 0.00 - 2.00 mg/dL Final   10/11/2023 8.90 (H) 0.00 - 2.00 mg/dL Final     CRP   Date Value Ref Range Status   08/18/2021 3.1 (H) 0 - 2.0 MG/DL Final     Comment:     Performed at [address] [address]     Microbiology  Susceptibility data from last 14 days.  Collected Specimen Info Organism Amoxicillin/Clavulanate Ampicillin Ampicillin/Sulbactam Cefazolin Ciprofloxacin Clindamycin Erythromycin Gentamicin Levofloxacin Oxacillin Piperacillin/Tazobactam Tetracycline   01/16/24 Tissue from SOFT TISSUE BIOPSY Citrobacter koseri S R S S S   S S  S      Methicillin Resistant Staphylococcus aureus (MRSA)      R R   R  R   01/13/24 Tissue/Biopsy from Wound/Tissue Mixed Gram-Positive and Gram-Negative Bacteria                 Collected Specimen Info Organism Trimethoprim/Sulfamethoxazole Vancomycin   01/16/24 Tissue from SOFT TISSUE BIOPSY Citrobacter koseri S      Methicillin Resistant Staphylococcus aureus (MRSA) S S   01/13/24 Tissue/Biopsy from Wound/Tissue Mixed Gram-Positive and Gram-Negative Bacteria       Imaging  CT guided aspiration of abscess, hematoma, cyst    Result Date: 1/18/2024  Interpreted By:  Javad Thomson, STUDY: CT-guided biopsy intervertebral disc   INDICATION: 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    COMPARISON: None.   ACCESSION NUMBER(S): [id number]   ORDERING CLINICIAN: VEENA DHALIWAL   TECHNIQUE: Conscious sedation: 20 minutes. CT-guided percutaneous biopsy intervertebral disc L1-2   FINDINGS: Informed consent obtained. Patient positioned prone and connected to physiologic monitoring. Conscious sedation provided with versed and fentanyl. Under CT guidance, 17 gauge trocar cannula advanced toward the left psoas. Aspiration would not yield fluid at location corresponding to small fluid collection seen on MRI. The cannula was advanced further to the L1-2 intervertebral disc. 18 gauge biopsy instrument advanced coaxially and a core biopsy obtained, submitted for culture.       CT-guided biopsy of L1-2 intervertebral disc.   Signed by: Javad Thomson 1/18/2024 3:38 PM Dictation workstation:   [id number]    US renal complete    Result Date: 1/17/2024  Interpreted By:  Robbie Mancilla, STUDY: US RENAL COMPLETE   INDICATION: Signs/Symptoms:JALEN   COMPARISON: Ultrasound from March 2022.   ACCESSION NUMBER(S): [id number]   ORDERING CLINICIAN: TARIQ MATSON   TECHNIQUE: Real-time renal ultrasound was performed.   FINDINGS: The right kidney measures 9.2 cm and the left kidney 10.3 cm in maximal length. There is normal thickness and echogenicity of the renal cortex bilaterally. No renal calculi are identified. There is no hydronephrosis. No solid renal masses are seen. Small cyst is again present in the upper pole of the left kidney measuring 10 x 11 x 12 mm (7 x 8 x 9 mm on prior).   Images of the bladder were also obtained. The bladder is only partially distended and therefore suboptimally evaluated. No bladder mass, stone or debris is identified. Bilateral ureteral jets were demonstrated.       Redemonstration of small cyst in the upper pole of the left kidney. No renal calculi or hydronephrosis.   Signed by: Robbie Mancilla 1/17/2024 2:16 PM Dictation workstation:   [id number]    FL less than 1 hour    Result Date:  1/16/2024  Interpreted By:  Robbie Mancilla, STUDY: FL LESS THAN 1 HOUR; 1/16/2024 11:47 am   INDICATION: Signs/Symptoms:intra op   COMPARISON: None   ACCESSION NUMBER(S): [id number]   ORDERING CLINICIAN: TRISHA BAILEY   FINDINGS: Fluoroscopy was provided during right foot surgery.   Total fluoroscopy time: Less than 1sec, dose: Air kerma 0.0mGy, images:1       Fluoroscopy for right foot surgery.   Signed by: Robbie Mancilla 1/16/2024 12:48 PM Dictation workstation:   [id number]    MR lumbar spine wo IV contrast    Result Date: 1/13/2024  Interpreted By:  Pankaj Wisdom, STUDY: MR LUMBAR SPINE WO IV CONTRAST; ;  1/13/2024 7:34 am   INDICATION: Signs/Symptoms:Spontaneous L1 fracture.   COMPARISON: CT 01/11/2024   ACCESSION NUMBER(S): [id number]   ORDERING CLINICIAN: ROBBI PINEDA   TECHNIQUE: Multiplanar multisequence MRI obtained of the lumbar spine     FINDINGS: L1 vertebral body demonstrates diffuse marrow edema in particular along the inferior endplate with more focal fluid signal intensity with T2 weighted hyperintense appearance demonstrated with central endplate depression along the inferior endplate of the L1 vertebral body. Corresponding CT demonstrates resorption of the trabecular and cortical margin along the inferior endplate as well as extending to portions of the anterior and posteroinferior margin of the L1 vertebral body with mild retropulsion of the posterior margin of L1. This is not the typical appearance or presentation of the fracture line given the configuration. Also, more focal fluid signal intensity demonstrated within the disc space posteriorly is noted. Corresponding superior endplate of the L2 vertebral body demonstrates endplate reactive marrow edema with cortical erosion and corresponding mild reactive marrow edema along the superior endplate of L2. This constellation of the findings raises suspicion for discitis osteomyelitis. While, component of the findings along the inferior  endplate of L1 may reflect sequela of fracture the resorption within the vertebral body is atypical. There is diffuse surrounding paravertebral edema tracking within the psoas musculature bilaterally. There is more focal heterogeneous T2 weighted hyperintense collection along the left lateral margin of the L1 vertebral body about the inferior endplate. This extends into the adjacent psoas muscle with intramuscular well-marginated fluid collection with T2 weighted hyperintense heterogeneous appearance. This suggest component of intramuscular abscess formation. This collection measures 1.8 x 1.3 x 1.9 cm in the AP, transverse and craniocaudal dimension respectively. The edema tracks from the level of the L1 vertebral body level caudally to the L3 vertebral body level bilaterally. The edema tracks into the neural foramina bilaterally.   Thecal sac L1/2 level demonstrates no focal narrowing. There is no epidural fluid collection or definite epidural abscess formation. However, the cortical irregularity along the posteroinferior margin of the L1 vertebral body is of concern.   Postsurgical changes are demonstrated with spinal fusion from the L3 through S1 levels bilaterally with bilateral pedicle screws and rods in place. There is laminectomy changes from L4 through S1.   Evaluation of spinal levels are as follows:   T12/L1 demonstrates no narrowing of the thecal sac or foramina   L1/2 demonstrates mild retropulsion of the posteroinferior margin of the L1 vertebral body measuring 2 mm. There is no narrowing thecal sac. Ligamentum flavum hypertrophy demonstrated at this level. Neural foramina demonstrate mild bilateral narrowing.   L2/3 demonstrates diffuse circumferential disc bulge seen in conjunction with component of anterolisthesis measuring 3 mm. These findings in conjunction with severe facet hypertrophic change contributing to severe narrowing of the thecal sac at this level. Foramina demonstrate moderate  bilateral narrowing   There is severe narrowing of the thecal sac posterior to the L3 vertebral body extending from the superior endplate through to the inferior aspect of the vertebral body.   L3/4 demonstrates thecal sac to be decompressed. Foramina demonstrate moderate right and mild left foraminal narrowing   L4/5 demonstrates thecal sac to be decompressed there is anterolisthesis at this level. Foramina demonstrate mild right foraminal narrowing. Left foramina is unremarkable   L5/S1 demonstrates thecal sac to be decompressed. Foramina demonstrate mild right foraminal narrowing. Left foramina unremarkable               1. Findings raising suspicion for discitis osteomyelitis at the L1/2 intervertebral disc level with erosive changes along the inferior endplate of L1 and superior endplate of L2 as described above. There is more focal fluid signal intensity in the disc space as well as within the inferior endplate of the L1 vertebral body with corresponding resorption of the cortical and trabecular margin as seen on the CT. While, superimposed component of fracture along the inferior endplate of the L1 is a possibility previously described findings are likely secondary to postinfectious etiology given the configuration appearance.   2. Generalized edema in the paravertebral location with more focal paravertebral intramuscular abscess location within the left psoas muscle about the inferior endplate of the L1 vertebral body measuring 1.8 x 1.3 x 1.9 cm.   3. No epidural abscess formation is demonstrated.   4. Severe narrowing of the thecal sac at L2/3 extending into the inferior margin of the L3 vertebral body in relation to anterolisthesis, disc, and facet hypertrophic change.   Findings phoned to patient's nurse at the time of interpretation on 01/13/2024 at 7:58 a.m..     MACRO: Critical Finding:  See findings. Notification was initiated on 1/13/2024 at 7:58 am by  Pankaj Wisdom.  (**-OCF-**) Instructions:    Signed by: Pankaj Wisdom 1/13/2024 8:03 AM Dictation workstation:   [id number]    CT lumbar spine wo IV contrast    Result Date: 1/11/2024  Interpreted By:  Adrián Marcum, STUDY: CT LUMBAR SPINE WO IV CONTRAST; 1/11/2024 3:47 pm   INDICATION: Signs/Symptoms:lower back pain;   COMPARISON: None   ACCESSION NUMBER(S): [id number]   ORDERING CLINICIAN: ALEX MORRISSEY   TECHNIQUE: Contiguous axial images of the lumbar spine were performed. Coronal and sagittal reformatted images were also obtained. All CT examinations are performed with 1 or more of the following dose reduction techniques: Automated exposure control, adjustment of mA and/or kv according to patient's size, or use of iterative reconstruction techniques.   FINDINGS: There is an acute appearing compression fracture along the inferior half of the L1 vertebral body with mild retropulsion of the inferior half of L1. No significant height loss is noted at L1. The remainder of the vertebral bodies appear adequate in height. There is a normal appearance of a posterior fusion at L3 through S1 with bilateral pedicle screws and rods which appear intact. Facet degenerative changes are present.   There is grade 1 anterolisthesis of L4 on L5 measuring approximately 8 mm.   There is severe hypertrophic facet degenerative change at L2-3 which are moderate-severely narrowing the neural foramina as well as moderate-severe spinal canal stenosis. Diffuse disc bulging also contributes to spinal canal and neural foramina stenosis at this level.   There is also a large osteophyte from severe hypertrophic facet degenerative change at L3 which is also causing moderate to moderate-severe spinal canal stenosis posterior to L3. The remainder of the levels do not show evidence for high additional high-grade thecal sac stenosis.   Additional levels of neural foramina stenosis are noted. There is moderate-severe right neural foramina stenosis at L4-5 and at least moderate right  neural foramina stenosis at L5-S1.   The SI joints show degenerative gas but are otherwise intact.       Acute compression fracture along the inferior aspect of L1 vertebral body with mild retropulsion of the inferior aspect of L1. No significant height loss is noted at L1.   No additional acute fractures are noted.   Stable appearance of posterior fusion with bilateral rods and pedicle screws at L3 through S1.   Chronic degenerative changes with high-grade spinal canal stenosis noted at L2-3 and L3-4. There is high-grade bilateral neural foramina stenosis at L2-3. There is moderate-severe right neural foramina stenosis at L4-5.     Signed by: Adrián Marcum 1/11/2024 4:46 PM Dictation workstation:   [id number]    Assessment/Plan   L1/L2 disc space infection  Left psoas abscess-most likely secondary to bacteremia, IR consulted  Right foot ulcer with retained exposed hardware, s/p right foot hardware removal 1/16-op note reviewed, to level of bone-culture with staph aureus  Acute kidney injury - creatinine 1.7 on 1/17/24 - resolved  Pyuria-urine culture pending, UA with budding yeast-typically colonization     Continue IV daptomycin-switched from vancomycin on 1/17 for JALEN, now resolved  Continue IV Zosyn  CK level 39 on 1/17/24  Follow-up Blood cultures   Follow-up pending [id number] aspirate  S/p CT guided aspiration by IR on 1/18/24  Pain management as needed  Supportive care  Monitor temperature and WBC  Podiatry follow up   Further recommendations based on pending cultures  Long-term plan is at least 6 weeks of IV antibiotic therapy with consideration for suppressive therapy with Bactrim  PICC line versus Ritu catheter      Nav Conway MD

## 2024-01-20 NOTE — PROGRESS NOTES
SNF referral reviewed, [facility] is willing and able to accept patient after 2pm today.  No precert is needed    Discharge plan NOT secure  DO NOT DC without speaking to care coordination   MD will need to sign/certify the [facility] for skilled rehab prior to discharge

## 2024-01-20 NOTE — PROGRESS NOTES
Carrie Rosario is a 83 y.o. female on day 8 of admission presenting with Compression fracture of L1 lumbar vertebra, closed, initial encounter (CMS/[facility]).      Subjective   Laying flat in bed this morning, her pain is 10 out of 10 back.  Patient stated she is trying to hold off on her next Percocet dose.  I encouraged her to take it but if she still in pain later we can give her something else.  She does not have Dilaudid IV ordered.        Objective     Last Recorded Vitals  /64 (BP Location: Left arm, Patient Position: Lying)   Pulse 70   Temp 36.6 °C (97.9 °F) (Oral)   Resp 20   Wt 68 kg (150 lb)   SpO2 97%   Intake/Output last 3 Shifts:    Intake/Output Summary (Last 24 hours) at 1/20/2024 1232  Last data filed at 1/20/2024 1143  Gross per 24 hour   Intake 486 ml   Output 0 ml   Net 486 ml       Admission Weight  Weight: 68 kg (150 lb) (01/11/24 1251)    Daily Weight  01/11/24 : 68 kg (150 lb)    Image Results  CT guided aspiration of abscess, hematoma, cyst  Narrative: Interpreted By:  Javad Thomson,   STUDY:  CT-guided biopsy intervertebral disc      INDICATION:  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      COMPARISON:  None.      ACCESSION NUMBER(S):  [id number]      ORDERING CLINICIAN:  VEENA DHALIWAL      TECHNIQUE:  Conscious sedation: 20 minutes.  CT-guided percutaneous biopsy intervertebral disc L1-2      FINDINGS:  Informed consent obtained. Patient positioned prone and connected to  physiologic monitoring. Conscious sedation provided with versed and  fentanyl. Under CT guidance, 17 gauge trocar cannula advanced toward  the left psoas. Aspiration would not yield fluid at location  corresponding to small fluid collection seen on MRI. The cannula was  advanced further to the L1-2 intervertebral disc. 18 gauge biopsy  instrument advanced coaxially and a core biopsy obtained, submitted  for culture.      Impression: CT-guided biopsy of L1-2 intervertebral disc.       Signed by: Javad Thomson 1/18/2024 3:38 PM  Dictation workstation:   [id number]      Physical Exam  Constitutional:       Appearance: Normal appearance.   Cardiovascular:      Rate and Rhythm: Normal rate and regular rhythm.      Pulses: Normal pulses.      Heart sounds: Normal heart sounds.   Pulmonary:      Effort: Pulmonary effort is normal.      Breath sounds: Normal breath sounds.   Abdominal:      General: Bowel sounds are normal.      Palpations: Abdomen is soft.   Skin:     General: Skin is warm and dry.   Neurological:      Mental Status: She is alert and oriented to person, place, and time.         Relevant Results             Results for orders placed or performed during the hospital encounter of 01/11/24 (from the past 24 hour(s))   POCT GLUCOSE   Result Value Ref Range    POCT Glucose 234 (H) 74 - 99 mg/dL   POCT GLUCOSE   Result Value Ref Range    POCT Glucose 247 (H) 74 - 99 mg/dL   POCT GLUCOSE   Result Value Ref Range    POCT Glucose 106 (H) 74 - 99 mg/dL   POCT GLUCOSE   Result Value Ref Range    POCT Glucose 124 (H) 74 - 99 mg/dL       Assessment/Plan                  Principal Problem:    Compression fracture of L1 lumbar vertebra, closed, initial encounter (CMS/[facility])  Active Problems:    Compression fracture of L1 vertebra, initial encounter (CMS/[facility])  Discitis / OM / L Psoas Abscess / Acute Compression Fracture L1 / Inability to Ambulate  Ortho Spine and ID following  Spoke with Dr. Conway today about plan of care.  CT lumbar spine shows acute compression fracture L1 without significant height loss.   MRI Lumbar spine showed discitis/OM L1-2 with intramuscular abscess of the L psoas muscle. No epidural abscess seen.   IR attempted aspiration of psoas abscess without much success, tissue was obtained and sent for culture, 1/18   -culture has no growth to date  No surgical intervention needed per Dr. Kraus.   IV antibiotics, daptomycin and zosyn  PRN pain medication and muscle relaxer  PT/OT  recommending moderate intensity therapy.  OOB with assist.   Fall precautions.      R Foot Ulceration   Podiatry, Dr. Kennedy to  took patient to OR for removal of hardware in right foot, POD #4  Wound culture R foot wound pending.    -surgical culture growing MRSA  -culture take at bedside of foot has multiple organisms not finalized      DM 2  HgA1c 8.9.  On tresiba at home, sub lantus.   SSI coverage.  Monitor blood sugars.      HTN  Continue home meds.   BP currently stable, monitor.      CAD  Continue core meds.      HLD  Continue statin.      DVT Risk  Heparin subcutaneous.   SCDs.      Plan   Patient will be discharged to SNF, will require long term antibiotics  Plan of care discussed with patient and collaborating physician, Dr. Howard Davis, APRN-CNP

## 2024-01-20 NOTE — NURSING NOTE
Assumed care of patient. Patient in bed resting comfortably. No signs of distress or pain. Call light and belongings within reach. Bed alarm on.

## 2024-01-21 LAB
ANION GAP SERPL CALC-SCNC: 8 MMOL/L
BACTERIA SPEC CULT: NORMAL
BUN SERPL-MCNC: 17 MG/DL (ref 8–25)
CALCIUM SERPL-MCNC: 9.5 MG/DL (ref 8.5–10.4)
CHLORIDE SERPL-SCNC: 103 MMOL/L (ref 97–107)
CO2 SERPL-SCNC: 27 MMOL/L (ref 24–31)
CREAT SERPL-MCNC: 1.4 MG/DL (ref 0.4–1.6)
EGFRCR SERPLBLD CKD-EPI 2021: 38 ML/MIN/1.73M*2
ERYTHROCYTE [DISTWIDTH] IN BLOOD BY AUTOMATED COUNT: 12 % (ref 11.5–14.5)
GLUCOSE BLD MANUAL STRIP-MCNC: 143 MG/DL (ref 74–99)
GLUCOSE BLD MANUAL STRIP-MCNC: 167 MG/DL (ref 74–99)
GLUCOSE BLD MANUAL STRIP-MCNC: 234 MG/DL (ref 74–99)
GLUCOSE BLD MANUAL STRIP-MCNC: 235 MG/DL (ref 74–99)
GLUCOSE SERPL-MCNC: 166 MG/DL (ref 65–99)
GRAM STN SPEC: NORMAL
GRAM STN SPEC: NORMAL
HCT VFR BLD AUTO: 40.5 % (ref 36–46)
HGB BLD-MCNC: 13.3 G/DL (ref 12–16)
MCH RBC QN AUTO: 30.9 PG (ref 26–34)
MCHC RBC AUTO-ENTMCNC: 32.8 G/DL (ref 32–36)
MCV RBC AUTO: 94 FL (ref 80–100)
NRBC BLD-RTO: 0 /100 WBCS (ref 0–0)
PLATELET # BLD AUTO: 270 X10*3/UL (ref 150–450)
POTASSIUM SERPL-SCNC: 3.6 MMOL/L (ref 3.4–5.1)
RBC # BLD AUTO: 4.31 X10*6/UL (ref 4–5.2)
SODIUM SERPL-SCNC: 138 MMOL/L (ref 133–145)
WBC # BLD AUTO: 9.3 X10*3/UL (ref 4.4–11.3)

## 2024-01-21 PROCEDURE — 2500000005 HC RX 250 GENERAL PHARMACY W/O HCPCS: Performed by: NURSE PRACTITIONER

## 2024-01-21 PROCEDURE — 2500000001 HC RX 250 WO HCPCS SELF ADMINISTERED DRUGS (ALT 637 FOR MEDICARE OP): Performed by: NURSE PRACTITIONER

## 2024-01-21 PROCEDURE — 36415 COLL VENOUS BLD VENIPUNCTURE: CPT | Performed by: NURSE PRACTITIONER

## 2024-01-21 PROCEDURE — 82947 ASSAY GLUCOSE BLOOD QUANT: CPT

## 2024-01-21 PROCEDURE — 2500000001 HC RX 250 WO HCPCS SELF ADMINISTERED DRUGS (ALT 637 FOR MEDICARE OP): Performed by: PODIATRIST

## 2024-01-21 PROCEDURE — 1100000001 HC PRIVATE ROOM DAILY

## 2024-01-21 PROCEDURE — 80048 BASIC METABOLIC PNL TOTAL CA: CPT | Performed by: NURSE PRACTITIONER

## 2024-01-21 PROCEDURE — 85027 COMPLETE CBC AUTOMATED: CPT | Performed by: NURSE PRACTITIONER

## 2024-01-21 PROCEDURE — 2500000004 HC RX 250 GENERAL PHARMACY W/ HCPCS (ALT 636 FOR OP/ED): Mod: JZ | Performed by: NURSE PRACTITIONER

## 2024-01-21 PROCEDURE — 2500000004 HC RX 250 GENERAL PHARMACY W/ HCPCS (ALT 636 FOR OP/ED): Performed by: PODIATRIST

## 2024-01-21 PROCEDURE — 2500000002 HC RX 250 W HCPCS SELF ADMINISTERED DRUGS (ALT 637 FOR MEDICARE OP, ALT 636 FOR OP/ED): Performed by: PODIATRIST

## 2024-01-21 PROCEDURE — 2500000004 HC RX 250 GENERAL PHARMACY W/ HCPCS (ALT 636 FOR OP/ED): Performed by: INTERNAL MEDICINE

## 2024-01-21 RX ORDER — MEROPENEM AND SODIUM CHLORIDE 1 G/50ML
1 INJECTION, SOLUTION INTRAVENOUS EVERY 8 HOURS
Qty: 4500 ML | Refills: 1 | Status: SHIPPED | OUTPATIENT
Start: 2024-01-21 | End: 2024-02-24

## 2024-01-21 RX ORDER — DAPTOMYCIN 50 MG/ML
400 INJECTION, POWDER, LYOPHILIZED, FOR SOLUTION INTRAVENOUS EVERY OTHER DAY
Qty: 6000 MG | Refills: 1 | Status: SHIPPED | OUTPATIENT
Start: 2024-01-21 | End: 2024-02-24

## 2024-01-21 RX ORDER — MEROPENEM 1 G/1
1 INJECTION, POWDER, FOR SOLUTION INTRAVENOUS EVERY 12 HOURS
Status: DISCONTINUED | OUTPATIENT
Start: 2024-01-21 | End: 2024-01-23 | Stop reason: HOSPADM

## 2024-01-21 RX ADMIN — ATORVASTATIN CALCIUM 20 MG: 20 TABLET, FILM COATED ORAL at 22:14

## 2024-01-21 RX ADMIN — ACETAMINOPHEN 650 MG: 325 TABLET ORAL at 14:18

## 2024-01-21 RX ADMIN — PIPERACILLIN SODIUM AND TAZOBACTAM SODIUM 2.25 G: 2; .25 INJECTION, SOLUTION INTRAVENOUS at 07:59

## 2024-01-21 RX ADMIN — METOPROLOL TARTRATE 25 MG: 25 TABLET, FILM COATED ORAL at 22:14

## 2024-01-21 RX ADMIN — FUROSEMIDE 20 MG: 20 TABLET ORAL at 10:24

## 2024-01-21 RX ADMIN — OXYCODONE HYDROCHLORIDE 5 MG: 5 TABLET ORAL at 17:23

## 2024-01-21 RX ADMIN — PILOCARPINE HYDROCHLORIDE 5 MG: 5 TABLET, FILM COATED ORAL at 15:15

## 2024-01-21 RX ADMIN — FAMOTIDINE 20 MG: 20 TABLET, FILM COATED ORAL at 10:23

## 2024-01-21 RX ADMIN — HEPARIN SODIUM 5000 UNITS: 5000 INJECTION, SOLUTION INTRAVENOUS; SUBCUTANEOUS at 06:02

## 2024-01-21 RX ADMIN — ASPIRIN 81 MG: 81 TABLET, COATED ORAL at 10:22

## 2024-01-21 RX ADMIN — LIDOCAINE 2 PATCH: 4 PATCH TOPICAL at 10:21

## 2024-01-21 RX ADMIN — DOCUSATE SODIUM 100 MG: 100 CAPSULE, LIQUID FILLED ORAL at 22:33

## 2024-01-21 RX ADMIN — ACETAMINOPHEN 650 MG: 325 TABLET ORAL at 06:03

## 2024-01-21 RX ADMIN — HEPARIN SODIUM 5000 UNITS: 5000 INJECTION, SOLUTION INTRAVENOUS; SUBCUTANEOUS at 18:02

## 2024-01-21 RX ADMIN — DAPTOMYCIN 400 MG: 500 INJECTION, POWDER, LYOPHILIZED, FOR SOLUTION INTRAVENOUS at 14:59

## 2024-01-21 RX ADMIN — PILOCARPINE HYDROCHLORIDE 5 MG: 5 TABLET, FILM COATED ORAL at 22:22

## 2024-01-21 RX ADMIN — TRAMADOL HYDROCHLORIDE 50 MG: 50 TABLET ORAL at 10:36

## 2024-01-21 RX ADMIN — ACETAMINOPHEN 650 MG: 325 TABLET ORAL at 10:23

## 2024-01-21 RX ADMIN — ACETAMINOPHEN 650 MG: 325 TABLET ORAL at 16:40

## 2024-01-21 RX ADMIN — ACETAMINOPHEN 650 MG: 325 TABLET ORAL at 22:14

## 2024-01-21 RX ADMIN — PIPERACILLIN SODIUM AND TAZOBACTAM SODIUM 2.25 G: 2; .25 INJECTION, SOLUTION INTRAVENOUS at 02:37

## 2024-01-21 RX ADMIN — ACETAMINOPHEN 650 MG: 325 TABLET ORAL at 18:46

## 2024-01-21 RX ADMIN — INSULIN GLARGINE 20 UNITS: 100 INJECTION, SOLUTION SUBCUTANEOUS at 22:12

## 2024-01-21 RX ADMIN — PILOCARPINE HYDROCHLORIDE 5 MG: 5 TABLET, FILM COATED ORAL at 10:22

## 2024-01-21 RX ADMIN — PIPERACILLIN SODIUM AND TAZOBACTAM SODIUM 2.25 G: 2; .25 INJECTION, SOLUTION INTRAVENOUS at 14:18

## 2024-01-21 RX ADMIN — DOCUSATE SODIUM 100 MG: 100 CAPSULE, LIQUID FILLED ORAL at 10:23

## 2024-01-21 RX ADMIN — METOPROLOL TARTRATE 25 MG: 25 TABLET, FILM COATED ORAL at 10:24

## 2024-01-21 RX ADMIN — OXYCODONE HYDROCHLORIDE 5 MG: 5 TABLET ORAL at 08:10

## 2024-01-21 RX ADMIN — FLUOXETINE HYDROCHLORIDE 40 MG: 20 CAPSULE ORAL at 10:22

## 2024-01-21 RX ADMIN — POTASSIUM CHLORIDE 10 MEQ: 750 TABLET, EXTENDED RELEASE ORAL at 10:22

## 2024-01-21 RX ADMIN — INSULIN LISPRO 6 UNITS: 100 INJECTION, SOLUTION INTRAVENOUS; SUBCUTANEOUS at 17:59

## 2024-01-21 RX ADMIN — MEROPENEM 1 G: 1 INJECTION, POWDER, FOR SOLUTION INTRAVENOUS at 16:42

## 2024-01-21 RX ADMIN — AMLODIPINE BESYLATE 2.5 MG: 2.5 TABLET ORAL at 10:24

## 2024-01-21 ASSESSMENT — COGNITIVE AND FUNCTIONAL STATUS - GENERAL
DRESSING REGULAR LOWER BODY CLOTHING: TOTAL
STANDING UP FROM CHAIR USING ARMS: A LOT
WALKING IN HOSPITAL ROOM: TOTAL
DRESSING REGULAR UPPER BODY CLOTHING: A LOT
WALKING IN HOSPITAL ROOM: TOTAL
HELP NEEDED FOR BATHING: A LOT
HELP NEEDED FOR BATHING: A LOT
EATING MEALS: A LOT
CLIMB 3 TO 5 STEPS WITH RAILING: TOTAL
DRESSING REGULAR LOWER BODY CLOTHING: TOTAL
TOILETING: TOTAL
MOBILITY SCORE: 9
TURNING FROM BACK TO SIDE WHILE IN FLAT BAD: A LOT
DAILY ACTIVITIY SCORE: 11
MOBILITY SCORE: 9
MOVING FROM LYING ON BACK TO SITTING ON SIDE OF FLAT BED WITH BEDRAILS: A LOT
MOVING TO AND FROM BED TO CHAIR: TOTAL
STANDING UP FROM CHAIR USING ARMS: A LOT
DRESSING REGULAR UPPER BODY CLOTHING: A LOT
MOVING TO AND FROM BED TO CHAIR: TOTAL
MOVING FROM LYING ON BACK TO SITTING ON SIDE OF FLAT BED WITH BEDRAILS: A LOT
TOILETING: TOTAL
TURNING FROM BACK TO SIDE WHILE IN FLAT BAD: A LOT
CLIMB 3 TO 5 STEPS WITH RAILING: TOTAL
EATING MEALS: A LOT
PERSONAL GROOMING: A LITTLE
DAILY ACTIVITIY SCORE: 11
PERSONAL GROOMING: A LITTLE

## 2024-01-21 ASSESSMENT — PAIN SCALES - GENERAL
PAINLEVEL_OUTOF10: 3
PAINLEVEL_OUTOF10: 3
PAINLEVEL_OUTOF10: 8
PAINLEVEL_OUTOF10: 5 - MODERATE PAIN
PAINLEVEL_OUTOF10: 0 - NO PAIN
PAINLEVEL_OUTOF10: 6

## 2024-01-21 ASSESSMENT — PAIN - FUNCTIONAL ASSESSMENT: PAIN_FUNCTIONAL_ASSESSMENT: 0-10

## 2024-01-21 NOTE — PROGRESS NOTES
Carrie Rosario is a 83 y.o. female on day 9 of admission presenting with Compression fracture of L1 lumbar vertebra, closed, initial encounter (CMS/[facility]).    Subjective   Interval History:   Afebrile, no chills  Back pain controlled  No chest pain or shortness of breath  No nausea vomiting or diarrhea    Review of Systems   All other systems reviewed and are negative.      Objective   Range of Vitals (last 24 hours)  Heart Rate:  [65-75]   Temp:  [36.4 °C (97.5 °F)-36.6 °C (97.9 °F)]   Resp:  [16-20]   BP: (110-153)/(58-79)   SpO2:  [97 %-100 %]   Daily Weight  01/11/24 : 68 kg (150 lb)    Body mass index is 24.96 kg/m².    Physical Exam      Antibiotics  ketorolac (Toradol) injection 7.5 mg  tiZANidine (Zanaflex) tablet 2 mg  dextrose 50 % injection 25 g  glucagon (Glucagen) injection 1 mg  dextrose 10 % in water (D10W) infusion  insulin lispro (HumaLOG) injection 0-10 Units  insulin glargine (Lantus) injection 15 Units  methylPREDNISolone (Medrol) tablet 24 mg  methylPREDNISolone (Medrol) tablet 20 mg  methylPREDNISolone (Medrol) tablet 16 mg  methylPREDNISolone (Medrol) tablet 12 mg  methylPREDNISolone (Medrol) tablet 8 mg  methylPREDNISolone (Medrol) tablet 4 mg  tiZANidine (Zanaflex) tablet 2 mg  heparin (porcine) injection 5,000 Units  acetaminophen (Tylenol) tablet 650 mg  polyethylene glycol (Glycolax, Miralax) packet 17 g  traMADol (Ultram) tablet 50 mg  insulin lispro (HumaLOG) injection 0-15 Units  insulin glargine (Lantus) injection 20 Units  amLODIPine (Norvasc) tablet 2.5 mg  aspirin EC tablet 81 mg  atorvastatin (Lipitor) tablet 20 mg  dapagliflozin propanediol (Farxiga) tablet 10 mg  famotidine (Pepcid) tablet 20 mg  FLUoxetine (PROzac) capsule 40 mg  furosemide (Lasix) tablet 20 mg  melatonin tablet 3 mg  metoprolol tartrate (Lopressor) tablet 25 mg  pilocarpine (Salagen) tablet 5 mg  potassium chloride CR (Klor-Con) ER tablet 10 mEq  piperacillin-tazobactam-dextrose (Zosyn) IV 2.25 g  vancomycin  (Xellia) 1 g in 200 mL (Xellia) IVPB 1 g  oxyCODONE-acetaminophen (Percocet) 5-325 mg per tablet 1 tablet  vancomycin-diluent combo no.1 (Xellia) IVPB 750 mg  vancomycin (Vancocin) 500 mg in dextrose 5 % 100 mL IV  lidocaine (Xylocaine) 10 mg/mL (1 %) injection 1 mg  oxygen (O2) therapy  lactated Ringer's infusion  ondansetron (Zofran) injection 4 mg  hydrALAZINE (Apresoline) injection 5 mg  HYDROmorphone (Dilaudid) injection 0.2 mg  HYDROmorphone (Dilaudid) injection 0.4 mg  ondansetron (Zofran) injection 4 mg  ondansetron (Zofran) injection  - Omnicell Override Pull  BUPivacaine HCl (Marcaine) 0.5 % (5 mg/mL) injection  vancomycin (Vancocin) vial for injection  vancomycin (Vancocin) 500 mg in dextrose 5 % 100 mL IV  sodium chloride 0.9% infusion  DAPTOmycin (Cubicin) 400 mg in sodium chloride 0.9% 50 mL IV  lidocaine PF (Xylocaine) 20 mg/mL (2 %) injection  bisacodyl (Dulcolax) suppository 10 mg  lidocaine 4 % patch 2 patch  acetaminophen (Tylenol) tablet 650 mg  oxyCODONE (Roxicodone) immediate release tablet 5 mg  docusate sodium (Colace) capsule 100 mg      Relevant Results  Labs  Results from last 72 hours   Lab Units 01/21/24  0424   WBC AUTO x10*3/uL 9.3   HEMOGLOBIN g/dL 13.3   HEMATOCRIT % 40.5   PLATELETS AUTO x10*3/uL 270     Results from last 72 hours   Lab Units 01/21/24  0423   SODIUM mmol/L 138   POTASSIUM mmol/L 3.6   CHLORIDE mmol/L 103   CO2 mmol/L 27   BUN mg/dL 17   CREATININE mg/dL 1.40   GLUCOSE mg/dL 166*   CALCIUM mg/dL 9.5   ANION GAP mmol/L 8   EGFR mL/min/1.73m*2 38*         Estimated Creatinine Clearance: 27.9 mL/min (by C-G formula based on SCr of 1.4 mg/dL).  C-Reactive Protein   Date Value Ref Range Status   01/13/2024 2.60 (H) 0.00 - 2.00 mg/dL Final   10/11/2023 8.90 (H) 0.00 - 2.00 mg/dL Final     CRP   Date Value Ref Range Status   08/18/2021 3.1 (H) 0 - 2.0 MG/DL Final     Comment:     Performed at [facility] [place] [address] [facility] [address]     Microbiology  Susceptibility data  from last 14 days.  Collected Specimen Info Organism Amoxicillin/Clavulanate Ampicillin Ampicillin/Sulbactam Cefazolin Ciprofloxacin Clindamycin Erythromycin Gentamicin Levofloxacin Oxacillin Piperacillin/Tazobactam Tetracycline   01/16/24 Tissue from SOFT TISSUE BIOPSY Citrobacter koseri S R S S S   S S  S      Methicillin Resistant Staphylococcus aureus (MRSA)      R R   R  R   01/13/24 Tissue/Biopsy from Wound/Tissue Mixed Gram-Positive and Gram-Negative Bacteria                 Collected Specimen Info Organism Trimethoprim/Sulfamethoxazole Vancomycin   01/16/24 Tissue from SOFT TISSUE BIOPSY Citrobacter koseri S      Methicillin Resistant Staphylococcus aureus (MRSA) S S   01/13/24 Tissue/Biopsy from Wound/Tissue Mixed Gram-Positive and Gram-Negative Bacteria     Reviewed  Imaging  CT guided aspiration of abscess, hematoma, cyst    Result Date: 1/18/2024  Interpreted By:  Javad Thomson, STUDY: CT-guided biopsy intervertebral disc   INDICATION: 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   COMPARISON: None.   ACCESSION NUMBER(S): [id number]   ORDERING CLINICIAN: VEENA DHALIWAL   TECHNIQUE: Conscious sedation: 20 minutes. CT-guided percutaneous biopsy intervertebral disc L1-2   FINDINGS: Informed consent obtained. Patient positioned prone and connected to physiologic monitoring. Conscious sedation provided with versed and fentanyl. Under CT guidance, 17 gauge trocar cannula advanced toward the left psoas. Aspiration would not yield fluid at location corresponding to small fluid collection seen on MRI. The cannula was advanced further to the L1-2 intervertebral disc. 18 gauge biopsy instrument advanced coaxially and a core biopsy obtained, submitted for culture.       CT-guided biopsy of L1-2 intervertebral disc.   Signed by: Javad Thomson 1/18/2024 3:38 PM Dictation workstation:   [id number]    US renal complete    Result Date: 1/17/2024  Interpreted By:  Robbie Mancilla, STUDY: US RENAL  COMPLETE   INDICATION: Signs/Symptoms:JALEN   COMPARISON: Ultrasound from March 2022.   ACCESSION NUMBER(S): [id number]   ORDERING CLINICIAN: TARIQ MATSON   TECHNIQUE: Real-time renal ultrasound was performed.   FINDINGS: The right kidney measures 9.2 cm and the left kidney 10.3 cm in maximal length. There is normal thickness and echogenicity of the renal cortex bilaterally. No renal calculi are identified. There is no hydronephrosis. No solid renal masses are seen. Small cyst is again present in the upper pole of the left kidney measuring 10 x 11 x 12 mm (7 x 8 x 9 mm on prior).   Images of the bladder were also obtained. The bladder is only partially distended and therefore suboptimally evaluated. No bladder mass, stone or debris is identified. Bilateral ureteral jets were demonstrated.       Redemonstration of small cyst in the upper pole of the left kidney. No renal calculi or hydronephrosis.   Signed by: Robbie Mancilla 1/17/2024 2:16 PM Dictation workstation:   [id number]    FL less than 1 hour    Result Date: 1/16/2024  Interpreted By:  Robbie Mancilla, STUDY: FL LESS THAN 1 HOUR; 1/16/2024 11:47 am   INDICATION: Signs/Symptoms:intra op   COMPARISON: None   ACCESSION NUMBER(S): [id number]   ORDERING CLINICIAN: TRISHA BAILEY   FINDINGS: Fluoroscopy was provided during right foot surgery.   Total fluoroscopy time: Less than 1sec, dose: Air kerma 0.0mGy, images:1       Fluoroscopy for right foot surgery.   Signed by: Robbie Mancilla 1/16/2024 12:48 PM Dictation workstation:   [id number]    MR lumbar spine wo IV contrast    Result Date: 1/13/2024  Interpreted By:  Pankaj Wisdom, STUDY: MR LUMBAR SPINE WO IV CONTRAST; ;  1/13/2024 7:34 am   INDICATION: Signs/Symptoms:Spontaneous L1 fracture.   COMPARISON: CT 01/11/2024   ACCESSION NUMBER(S): [id number]   ORDERING CLINICIAN: ROBBI PINEDA   TECHNIQUE: Multiplanar multisequence MRI obtained of the lumbar spine     FINDINGS: L1 vertebral body demonstrates  diffuse marrow edema in particular along the inferior endplate with more focal fluid signal intensity with T2 weighted hyperintense appearance demonstrated with central endplate depression along the inferior endplate of the L1 vertebral body. Corresponding CT demonstrates resorption of the trabecular and cortical margin along the inferior endplate as well as extending to portions of the anterior and posteroinferior margin of the L1 vertebral body with mild retropulsion of the posterior margin of L1. This is not the typical appearance or presentation of the fracture line given the configuration. Also, more focal fluid signal intensity demonstrated within the disc space posteriorly is noted. Corresponding superior endplate of the L2 vertebral body demonstrates endplate reactive marrow edema with cortical erosion and corresponding mild reactive marrow edema along the superior endplate of L2. This constellation of the findings raises suspicion for discitis osteomyelitis. While, component of the findings along the inferior endplate of L1 may reflect sequela of fracture the resorption within the vertebral body is atypical. There is diffuse surrounding paravertebral edema tracking within the psoas musculature bilaterally. There is more focal heterogeneous T2 weighted hyperintense collection along the left lateral margin of the L1 vertebral body about the inferior endplate. This extends into the adjacent psoas muscle with intramuscular well-marginated fluid collection with T2 weighted hyperintense heterogeneous appearance. This suggest component of intramuscular abscess formation. This collection measures 1.8 x 1.3 x 1.9 cm in the AP, transverse and craniocaudal dimension respectively. The edema tracks from the level of the L1 vertebral body level caudally to the L3 vertebral body level bilaterally. The edema tracks into the neural foramina bilaterally.   Thecal sac L1/2 level demonstrates no focal narrowing. There is no  epidural fluid collection or definite epidural abscess formation. However, the cortical irregularity along the posteroinferior margin of the L1 vertebral body is of concern.   Postsurgical changes are demonstrated with spinal fusion from the L3 through S1 levels bilaterally with bilateral pedicle screws and rods in place. There is laminectomy changes from L4 through S1.   Evaluation of spinal levels are as follows:   T12/L1 demonstrates no narrowing of the thecal sac or foramina   L1/2 demonstrates mild retropulsion of the posteroinferior margin of the L1 vertebral body measuring 2 mm. There is no narrowing thecal sac. Ligamentum flavum hypertrophy demonstrated at this level. Neural foramina demonstrate mild bilateral narrowing.   L2/3 demonstrates diffuse circumferential disc bulge seen in conjunction with component of anterolisthesis measuring 3 mm. These findings in conjunction with severe facet hypertrophic change contributing to severe narrowing of the thecal sac at this level. Foramina demonstrate moderate bilateral narrowing   There is severe narrowing of the thecal sac posterior to the L3 vertebral body extending from the superior endplate through to the inferior aspect of the vertebral body.   L3/4 demonstrates thecal sac to be decompressed. Foramina demonstrate moderate right and mild left foraminal narrowing   L4/5 demonstrates thecal sac to be decompressed there is anterolisthesis at this level. Foramina demonstrate mild right foraminal narrowing. Left foramina is unremarkable   L5/S1 demonstrates thecal sac to be decompressed. Foramina demonstrate mild right foraminal narrowing. Left foramina unremarkable               1. Findings raising suspicion for discitis osteomyelitis at the L1/2 intervertebral disc level with erosive changes along the inferior endplate of L1 and superior endplate of L2 as described above. There is more focal fluid signal intensity in the disc space as well as within the inferior  endplate of the L1 vertebral body with corresponding resorption of the cortical and trabecular margin as seen on the CT. While, superimposed component of fracture along the inferior endplate of the L1 is a possibility previously described findings are likely secondary to postinfectious etiology given the configuration appearance.   2. Generalized edema in the paravertebral location with more focal paravertebral intramuscular abscess location within the left psoas muscle about the inferior endplate of the L1 vertebral body measuring 1.8 x 1.3 x 1.9 cm.   3. No epidural abscess formation is demonstrated.   4. Severe narrowing of the thecal sac at L2/3 extending into the inferior margin of the L3 vertebral body in relation to anterolisthesis, disc, and facet hypertrophic change.   Findings phoned to patient's nurse at the time of interpretation on 01/13/2024 at 7:58 a.m..     MACRO: Critical Finding:  See findings. Notification was initiated on 1/13/2024 at 7:58 am by  Pankaj iWsdom.  (**-OCF-**) Instructions:   Signed by: Pankaj Wisdom 1/13/2024 8:03 AM Dictation workstation:   [id number]    CT lumbar spine wo IV contrast    Result Date: 1/11/2024  Interpreted By:  Adrián Marcum, STUDY: CT LUMBAR SPINE WO IV CONTRAST; 1/11/2024 3:47 pm   INDICATION: Signs/Symptoms:lower back pain;   COMPARISON: None   ACCESSION NUMBER(S): [id number]   ORDERING CLINICIAN: ALEX MORRISSEY   TECHNIQUE: Contiguous axial images of the lumbar spine were performed. Coronal and sagittal reformatted images were also obtained. All CT examinations are performed with 1 or more of the following dose reduction techniques: Automated exposure control, adjustment of mA and/or kv according to patient's size, or use of iterative reconstruction techniques.   FINDINGS: There is an acute appearing compression fracture along the inferior half of the L1 vertebral body with mild retropulsion of the inferior half of L1. No significant height loss is  noted at L1. The remainder of the vertebral bodies appear adequate in height. There is a normal appearance of a posterior fusion at L3 through S1 with bilateral pedicle screws and rods which appear intact. Facet degenerative changes are present.   There is grade 1 anterolisthesis of L4 on L5 measuring approximately 8 mm.   There is severe hypertrophic facet degenerative change at L2-3 which are moderate-severely narrowing the neural foramina as well as moderate-severe spinal canal stenosis. Diffuse disc bulging also contributes to spinal canal and neural foramina stenosis at this level.   There is also a large osteophyte from severe hypertrophic facet degenerative change at L3 which is also causing moderate to moderate-severe spinal canal stenosis posterior to L3. The remainder of the levels do not show evidence for high additional high-grade thecal sac stenosis.   Additional levels of neural foramina stenosis are noted. There is moderate-severe right neural foramina stenosis at L4-5 and at least moderate right neural foramina stenosis at L5-S1.   The SI joints show degenerative gas but are otherwise intact.       Acute compression fracture along the inferior aspect of L1 vertebral body with mild retropulsion of the inferior aspect of L1. No significant height loss is noted at L1.   No additional acute fractures are noted.   Stable appearance of posterior fusion with bilateral rods and pedicle screws at L3 through S1.   Chronic degenerative changes with high-grade spinal canal stenosis noted at L2-3 and L3-4. There is high-grade bilateral neural foramina stenosis at L2-3. There is moderate-severe right neural foramina stenosis at L4-5.     Signed by: Adrián Marcum 1/11/2024 4:46 PM Dictation workstation:   [id number]     Assessment/Plan   L1/L2 disc space infection-most likely secondary to bacteremia  Left psoas abscess-most likely secondary to bacteremia, s/p CT-guided aspiration on 1/18/2024-culture  negative  Right foot ulcer with retained exposed hardware, s/p right foot hardware removal 1/16-op note reviewed, to level of bone-culture with MRSA and Citrobacter   Acute kidney injury - creatinine 1.7 on 1/17/24 - resolved  Pyuria-urine culture pending, UA with budding yeast-typically colonization     Continue IV Daptomycin  Discontinue Zosyn  IV meropenem  Ritu catheter placement by interventional radiology  Pain management as needed  Supportive care  Monitor temperature and WBC  Long-term plan is at least 6 weeks of IV antibiotic therapy-tentative stop date 2/24/2024, with consideration for suppressive therapy with Bactrim        Nva Conway MD

## 2024-01-21 NOTE — CARE PLAN
The patient's goals for the shift include  control pain    The clinical goals for the shift include monitor pain    Problem: Pain  Goal: My pain/discomfort is manageable  Outcome: Progressing     Problem: Safety  Goal: Patient will be injury free during hospitalization  Outcome: Progressing  Goal: I will remain free of falls  Outcome: Progressing

## 2024-01-21 NOTE — CARE PLAN
Problem: Skin  Goal: Decreased wound size/increased tissue granulation at next dressing change  Outcome: Progressing  Flowsheets (Taken 1/21/2024 0107)  Decreased wound size/increased tissue granulation at next dressing change:   Promote sleep for wound healing   Protective dressings over bony prominences  Goal: Participates in plan/prevention/treatment measures  Outcome: Progressing  Flowsheets (Taken 1/21/2024 0107)  Participates in plan/prevention/treatment measures: Elevate heels  Goal: Prevent/manage excess moisture  Outcome: Progressing  Flowsheets (Taken 1/21/2024 0107)  Prevent/manage excess moisture: Monitor for/manage infection if present  Goal: Prevent/minimize sheer/friction injuries  Outcome: Progressing  Flowsheets (Taken 1/21/2024 0107)  Prevent/minimize sheer/friction injuries: Complete micro-shifts as needed if patient unable. Adjust patient position to relieve pressure points, not a full turn  Goal: Promote/optimize nutrition  Outcome: Progressing  Flowsheets (Taken 1/21/2024 0107)  Promote/optimize nutrition: Monitor/record intake including meals  Goal: Promote skin healing  Outcome: Progressing  Flowsheets (Taken 1/21/2024 0107)  Promote skin healing:   Assess skin/pad under line(s)/device(s)   Protective dressings over bony prominences

## 2024-01-21 NOTE — NURSING NOTE
Assumed care of patient. Patient laying in bed comfortably. No signs of distress. Call light and belongings within reach. Bed alarm on.

## 2024-01-21 NOTE — PROGRESS NOTES
Carrie Rosario is a 83 y.o. female on day 9 of admission presenting with Compression fracture of L1 lumbar vertebra, closed, initial encounter (CMS/[facility]).      Subjective   Patient is having back pain and has not had any narcotic in over 24 hours, she agrees to take oxy and try to eat breakfast.       Objective     Last Recorded Vitals  /74 (BP Location: Left arm, Patient Position: Lying)   Pulse 70   Temp 36.4 °C (97.5 °F) (Oral)   Resp 16   Wt 68 kg (150 lb)   SpO2 97%   Intake/Output last 3 Shifts:    Intake/Output Summary (Last 24 hours) at 1/21/2024 1015  Last data filed at 1/21/2024 0237  Gross per 24 hour   Intake 150 ml   Output 0 ml   Net 150 ml       Admission Weight  Weight: 68 kg (150 lb) (01/11/24 1251)    Daily Weight  01/11/24 : 68 kg (150 lb)    Image Results  CT guided aspiration of abscess, hematoma, cyst  Narrative: Interpreted By:  Javad Thomson,   STUDY:  CT-guided biopsy intervertebral disc      INDICATION:  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      COMPARISON:  None.      ACCESSION NUMBER(S):  [id number]      ORDERING CLINICIAN:  VEENA DHALIWAL      TECHNIQUE:  Conscious sedation: 20 minutes.  CT-guided percutaneous biopsy intervertebral disc L1-2      FINDINGS:  Informed consent obtained. Patient positioned prone and connected to  physiologic monitoring. Conscious sedation provided with versed and  fentanyl. Under CT guidance, 17 gauge trocar cannula advanced toward  the left psoas. Aspiration would not yield fluid at location  corresponding to small fluid collection seen on MRI. The cannula was  advanced further to the L1-2 intervertebral disc. 18 gauge biopsy  instrument advanced coaxially and a core biopsy obtained, submitted  for culture.      Impression: CT-guided biopsy of L1-2 intervertebral disc.      Signed by: Javad Thomson 1/18/2024 3:38 PM  Dictation workstation:   [id number]      Physical Exam  Constitutional:       Appearance: Normal  appearance.   Cardiovascular:      Rate and Rhythm: Normal rate and regular rhythm.   Pulmonary:      Effort: Pulmonary effort is normal.      Breath sounds: Normal breath sounds.   Abdominal:      General: Bowel sounds are normal.      Palpations: Abdomen is soft.   Skin:     General: Skin is warm and dry.   Neurological:      Mental Status: She is alert and oriented to person, place, and time.         Relevant Results             Results for orders placed or performed during the hospital encounter of 01/11/24 (from the past 24 hour(s))   POCT GLUCOSE   Result Value Ref Range    POCT Glucose 124 (H) 74 - 99 mg/dL   POCT GLUCOSE   Result Value Ref Range    POCT Glucose 162 (H) 74 - 99 mg/dL   POCT GLUCOSE   Result Value Ref Range    POCT Glucose 247 (H) 74 - 99 mg/dL   Basic Metabolic Panel   Result Value Ref Range    Glucose 166 (H) 65 - 99 mg/dL    Sodium 138 133 - 145 mmol/L    Potassium 3.6 3.4 - 5.1 mmol/L    Chloride 103 97 - 107 mmol/L    Bicarbonate 27 24 - 31 mmol/L    Urea Nitrogen 17 8 - 25 mg/dL    Creatinine 1.40 0.40 - 1.60 mg/dL    eGFR 38 (L) >60 mL/min/1.73m*2    Calcium 9.5 8.5 - 10.4 mg/dL    Anion Gap 8 <=19 mmol/L   CBC   Result Value Ref Range    WBC 9.3 4.4 - 11.3 x10*3/uL    nRBC 0.0 0.0 - 0.0 /100 WBCs    RBC 4.31 4.00 - 5.20 x10*6/uL    Hemoglobin 13.3 12.0 - 16.0 g/dL    Hematocrit 40.5 36.0 - 46.0 %    MCV 94 80 - 100 fL    MCH 30.9 26.0 - 34.0 pg    MCHC 32.8 32.0 - 36.0 g/dL    RDW 12.0 11.5 - 14.5 %    Platelets 270 150 - 450 x10*3/uL   POCT GLUCOSE   Result Value Ref Range    POCT Glucose 143 (H) 74 - 99 mg/dL       Assessment/Plan                  Principal Problem:    Compression fracture of L1 lumbar vertebra, closed, initial encounter (CMS/[facility])  Active Problems:    Compression fracture of L1 vertebra, initial encounter (CMS/[facility])    Discitis / OM / L Psoas Abscess / Acute Compression Fracture L1 / Inability to Ambulate  Ortho Spine and ID following  CT lumbar spine shows acute  compression fracture L1 without significant height loss.   MRI Lumbar spine showed discitis/OM L1-2 with intramuscular abscess of the L psoas muscle. No epidural abscess seen.   IR attempted aspiration of psoas abscess without much success, tissue was obtained and sent for culture, 1/18   -culture has no growth to date  No surgical intervention needed per Dr. Kraus.   IV antibiotics, daptomycin and zosyn  PRN pain medication and muscle relaxer  PT/OT recommending moderate intensity therapy  - premedication for therapy inpatient is needed. Patient has very little tolerance to activity  OOB with assist.   Fall precautions.      R Foot Ulceration   Podiatry, Dr. Kennedy to  took patient to OR for removal of hardware in right foot, POD #5  Wound culture R foot wound pending.    -surgical culture growing MRSA  -culture take at bedside of foot has multiple organisms not finalized      DM 2  HgA1c 8.9.  On tresiba at home, sub lantus.   SSI coverage.  Monitor blood sugars.      HTN  Continue home meds.   BP currently stable, monitor.      CAD  Continue core meds.      HLD  Continue statin.      DVT Risk  Heparin subcutaneous.   SCDs.      Plan   Patient will be discharged to SNF, will require long term antibiotics, 6 weeks of IV antibiotics and likely Bactrim suppression indefinitely.   Plan of care discussed with patient and collaborating physician, Dr. Howard Davis, APRN-CNP

## 2024-01-22 LAB
ANION GAP SERPL CALC-SCNC: 12 MMOL/L
BUN SERPL-MCNC: 18 MG/DL (ref 8–25)
CALCIUM SERPL-MCNC: 9.8 MG/DL (ref 8.5–10.4)
CHLORIDE SERPL-SCNC: 105 MMOL/L (ref 97–107)
CO2 SERPL-SCNC: 25 MMOL/L (ref 24–31)
CREAT SERPL-MCNC: 1.2 MG/DL (ref 0.4–1.6)
EGFRCR SERPLBLD CKD-EPI 2021: 45 ML/MIN/1.73M*2
ERYTHROCYTE [DISTWIDTH] IN BLOOD BY AUTOMATED COUNT: 12.3 % (ref 11.5–14.5)
GLUCOSE BLD MANUAL STRIP-MCNC: 100 MG/DL (ref 74–99)
GLUCOSE BLD MANUAL STRIP-MCNC: 146 MG/DL (ref 74–99)
GLUCOSE BLD MANUAL STRIP-MCNC: 168 MG/DL (ref 74–99)
GLUCOSE BLD MANUAL STRIP-MCNC: 209 MG/DL (ref 74–99)
GLUCOSE SERPL-MCNC: 96 MG/DL (ref 65–99)
HCT VFR BLD AUTO: 44.8 % (ref 36–46)
HGB BLD-MCNC: 14.4 G/DL (ref 12–16)
MCH RBC QN AUTO: 31.1 PG (ref 26–34)
MCHC RBC AUTO-ENTMCNC: 32.1 G/DL (ref 32–36)
MCV RBC AUTO: 97 FL (ref 80–100)
NRBC BLD-RTO: 0 /100 WBCS (ref 0–0)
PLATELET # BLD AUTO: 276 X10*3/UL (ref 150–450)
POTASSIUM SERPL-SCNC: 4 MMOL/L (ref 3.4–5.1)
RBC # BLD AUTO: 4.63 X10*6/UL (ref 4–5.2)
SODIUM SERPL-SCNC: 142 MMOL/L (ref 133–145)
WBC # BLD AUTO: 10.1 X10*3/UL (ref 4.4–11.3)

## 2024-01-22 PROCEDURE — 2500000001 HC RX 250 WO HCPCS SELF ADMINISTERED DRUGS (ALT 637 FOR MEDICARE OP): Performed by: PODIATRIST

## 2024-01-22 PROCEDURE — 82947 ASSAY GLUCOSE BLOOD QUANT: CPT

## 2024-01-22 PROCEDURE — 2500000001 HC RX 250 WO HCPCS SELF ADMINISTERED DRUGS (ALT 637 FOR MEDICARE OP): Performed by: NURSE PRACTITIONER

## 2024-01-22 PROCEDURE — 99232 SBSQ HOSP IP/OBS MODERATE 35: CPT | Performed by: NURSE PRACTITIONER

## 2024-01-22 PROCEDURE — 36415 COLL VENOUS BLD VENIPUNCTURE: CPT | Performed by: NURSE PRACTITIONER

## 2024-01-22 PROCEDURE — 1100000001 HC PRIVATE ROOM DAILY

## 2024-01-22 PROCEDURE — 2500000004 HC RX 250 GENERAL PHARMACY W/ HCPCS (ALT 636 FOR OP/ED): Performed by: INTERNAL MEDICINE

## 2024-01-22 PROCEDURE — 85027 COMPLETE CBC AUTOMATED: CPT | Performed by: NURSE PRACTITIONER

## 2024-01-22 PROCEDURE — 2500000004 HC RX 250 GENERAL PHARMACY W/ HCPCS (ALT 636 FOR OP/ED): Performed by: PODIATRIST

## 2024-01-22 PROCEDURE — 97535 SELF CARE MNGMENT TRAINING: CPT | Mod: GO,CO

## 2024-01-22 PROCEDURE — 97530 THERAPEUTIC ACTIVITIES: CPT | Mod: GP

## 2024-01-22 PROCEDURE — 80048 BASIC METABOLIC PNL TOTAL CA: CPT | Performed by: NURSE PRACTITIONER

## 2024-01-22 PROCEDURE — 2500000002 HC RX 250 W HCPCS SELF ADMINISTERED DRUGS (ALT 637 FOR MEDICARE OP, ALT 636 FOR OP/ED): Performed by: PODIATRIST

## 2024-01-22 PROCEDURE — 97110 THERAPEUTIC EXERCISES: CPT | Mod: GO,CO

## 2024-01-22 RX ADMIN — FUROSEMIDE 20 MG: 20 TABLET ORAL at 08:35

## 2024-01-22 RX ADMIN — DOCUSATE SODIUM 100 MG: 100 CAPSULE, LIQUID FILLED ORAL at 08:36

## 2024-01-22 RX ADMIN — MEROPENEM 1 G: 1 INJECTION, POWDER, FOR SOLUTION INTRAVENOUS at 16:10

## 2024-01-22 RX ADMIN — OXYCODONE HYDROCHLORIDE 5 MG: 5 TABLET ORAL at 04:25

## 2024-01-22 RX ADMIN — TRAMADOL HYDROCHLORIDE 50 MG: 50 TABLET ORAL at 10:07

## 2024-01-22 RX ADMIN — FAMOTIDINE 20 MG: 20 TABLET, FILM COATED ORAL at 08:35

## 2024-01-22 RX ADMIN — PILOCARPINE HYDROCHLORIDE 5 MG: 5 TABLET, FILM COATED ORAL at 21:06

## 2024-01-22 RX ADMIN — POTASSIUM CHLORIDE 10 MEQ: 750 TABLET, EXTENDED RELEASE ORAL at 08:35

## 2024-01-22 RX ADMIN — HEPARIN SODIUM 5000 UNITS: 5000 INJECTION, SOLUTION INTRAVENOUS; SUBCUTANEOUS at 06:30

## 2024-01-22 RX ADMIN — INSULIN GLARGINE 20 UNITS: 100 INJECTION, SOLUTION SUBCUTANEOUS at 21:06

## 2024-01-22 RX ADMIN — METOPROLOL TARTRATE 25 MG: 25 TABLET, FILM COATED ORAL at 08:35

## 2024-01-22 RX ADMIN — METOPROLOL TARTRATE 25 MG: 25 TABLET, FILM COATED ORAL at 21:06

## 2024-01-22 RX ADMIN — ACETAMINOPHEN 650 MG: 325 TABLET ORAL at 06:30

## 2024-01-22 RX ADMIN — ASPIRIN 81 MG: 81 TABLET, COATED ORAL at 08:35

## 2024-01-22 RX ADMIN — ACETAMINOPHEN 650 MG: 325 TABLET ORAL at 12:49

## 2024-01-22 RX ADMIN — AMLODIPINE BESYLATE 2.5 MG: 2.5 TABLET ORAL at 08:35

## 2024-01-22 RX ADMIN — ATORVASTATIN CALCIUM 20 MG: 20 TABLET, FILM COATED ORAL at 21:05

## 2024-01-22 RX ADMIN — PILOCARPINE HYDROCHLORIDE 5 MG: 5 TABLET, FILM COATED ORAL at 08:35

## 2024-01-22 RX ADMIN — PILOCARPINE HYDROCHLORIDE 5 MG: 5 TABLET, FILM COATED ORAL at 16:10

## 2024-01-22 RX ADMIN — FLUOXETINE HYDROCHLORIDE 40 MG: 20 CAPSULE ORAL at 08:36

## 2024-01-22 RX ADMIN — ACETAMINOPHEN 650 MG: 325 TABLET ORAL at 16:10

## 2024-01-22 RX ADMIN — HEPARIN SODIUM 5000 UNITS: 5000 INJECTION, SOLUTION INTRAVENOUS; SUBCUTANEOUS at 19:06

## 2024-01-22 RX ADMIN — ACETAMINOPHEN 650 MG: 325 TABLET ORAL at 22:00

## 2024-01-22 RX ADMIN — DOCUSATE SODIUM 100 MG: 100 CAPSULE, LIQUID FILLED ORAL at 21:06

## 2024-01-22 RX ADMIN — ACETAMINOPHEN 650 MG: 325 TABLET ORAL at 19:06

## 2024-01-22 RX ADMIN — MEROPENEM 1 G: 1 INJECTION, POWDER, FOR SOLUTION INTRAVENOUS at 04:25

## 2024-01-22 ASSESSMENT — COGNITIVE AND FUNCTIONAL STATUS - GENERAL
TURNING FROM BACK TO SIDE WHILE IN FLAT BAD: A LOT
HELP NEEDED FOR BATHING: A LOT
MOBILITY SCORE: 9
TURNING FROM BACK TO SIDE WHILE IN FLAT BAD: A LOT
DRESSING REGULAR LOWER BODY CLOTHING: TOTAL
PERSONAL GROOMING: A LITTLE
WALKING IN HOSPITAL ROOM: TOTAL
DAILY ACTIVITIY SCORE: 14
DAILY ACTIVITIY SCORE: 12
MOVING FROM LYING ON BACK TO SITTING ON SIDE OF FLAT BED WITH BEDRAILS: A LOT
MOBILITY SCORE: 9
CLIMB 3 TO 5 STEPS WITH RAILING: TOTAL
STANDING UP FROM CHAIR USING ARMS: A LOT
EATING MEALS: A LITTLE
DRESSING REGULAR UPPER BODY CLOTHING: A LOT
DRESSING REGULAR UPPER BODY CLOTHING: A LOT
HELP NEEDED FOR BATHING: A LOT
MOVING TO AND FROM BED TO CHAIR: TOTAL
PERSONAL GROOMING: A LITTLE
HELP NEEDED FOR BATHING: A LOT
STANDING UP FROM CHAIR USING ARMS: A LOT
MOVING TO AND FROM BED TO CHAIR: TOTAL
TOILETING: TOTAL
MOVING FROM LYING ON BACK TO SITTING ON SIDE OF FLAT BED WITH BEDRAILS: A LOT
CLIMB 3 TO 5 STEPS WITH RAILING: TOTAL
DRESSING REGULAR UPPER BODY CLOTHING: A LOT
DRESSING REGULAR LOWER BODY CLOTHING: TOTAL
DAILY ACTIVITIY SCORE: 14
TOILETING: A LOT
WALKING IN HOSPITAL ROOM: TOTAL
PERSONAL GROOMING: A LITTLE
DRESSING REGULAR LOWER BODY CLOTHING: TOTAL
TOILETING: A LOT

## 2024-01-22 ASSESSMENT — PAIN DESCRIPTION - ORIENTATION: ORIENTATION: MID;LEFT

## 2024-01-22 ASSESSMENT — PAIN SCALES - GENERAL
PAINLEVEL_OUTOF10: 1
PAINLEVEL_OUTOF10: 6
PAINLEVEL_OUTOF10: 10 - WORST POSSIBLE PAIN
PAINLEVEL_OUTOF10: 8
PAINLEVEL_OUTOF10: 3
PAINLEVEL_OUTOF10: 8
PAINLEVEL_OUTOF10: 3

## 2024-01-22 ASSESSMENT — PAIN DESCRIPTION - LOCATION: LOCATION: BACK

## 2024-01-22 ASSESSMENT — PAIN - FUNCTIONAL ASSESSMENT
PAIN_FUNCTIONAL_ASSESSMENT: 0-10
PAIN_FUNCTIONAL_ASSESSMENT: FLACC (FACE, LEGS, ACTIVITY, CRY, CONSOLABILITY)
PAIN_FUNCTIONAL_ASSESSMENT: 0-10

## 2024-01-22 NOTE — PROGRESS NOTES
Carrie Rosario is a 83 y.o. female on day 10 of admission presenting with Compression fracture of L1 lumbar vertebra, closed, initial encounter (CMS/[facility]).    Subjective   Interval History:   Afebrile, no chills  Reports back pain with movement  Denies shortness of breath or chest pain  Denies nausea vomiting or diarrhea        Objective   Range of Vitals (last 24 hours)  Heart Rate:  [65-76]   Temp:  [36.5 °C (97.7 °F)-36.8 °C (98.2 °F)]   Resp:  [16-18]   BP: (100-151)/(57-76)   SpO2:  [97 %-100 %]   Daily Weight  01/11/24 : 68 kg (150 lb)    Body mass index is 24.96 kg/m².    Physical Exam  Constitutional:       Appearance: Normal appearance.   HENT:      Head: Normocephalic and atraumatic.      Nose: Nose normal.      Mouth/Throat:      Mouth: Mucous membranes are moist.      Pharynx: Oropharynx is clear.   Eyes:      Extraocular Movements: Extraocular movements intact.      Conjunctiva/sclera: Conjunctivae normal.   Cardiovascular:      Rate and Rhythm: Normal rate and regular rhythm.   Pulmonary:      Effort: Pulmonary effort is normal.      Breath sounds: Normal breath sounds.   Abdominal:      General: Bowel sounds are normal.      Palpations: Abdomen is soft.   Musculoskeletal:         General: Normal range of motion.      Cervical back: Normal range of motion and neck supple.   Skin:     General: Skin is warm and dry.      Comments: Right foot dressing   Neurological:      General: No focal deficit present.      Mental Status: She is alert and oriented to person, place, and time. Mental status is at baseline.   Psychiatric:         Mood and Affect: Mood normal.         Behavior: Behavior normal.         Antibiotics  ketorolac (Toradol) injection 7.5 mg  tiZANidine (Zanaflex) tablet 2 mg  dextrose 50 % injection 25 g  glucagon (Glucagen) injection 1 mg  dextrose 10 % in water (D10W) infusion  insulin lispro (HumaLOG) injection 0-10 Units  insulin glargine (Lantus) injection 15 Units  methylPREDNISolone  (Medrol) tablet 24 mg  methylPREDNISolone (Medrol) tablet 20 mg  methylPREDNISolone (Medrol) tablet 16 mg  methylPREDNISolone (Medrol) tablet 12 mg  methylPREDNISolone (Medrol) tablet 8 mg  methylPREDNISolone (Medrol) tablet 4 mg  tiZANidine (Zanaflex) tablet 2 mg  heparin (porcine) injection 5,000 Units  acetaminophen (Tylenol) tablet 650 mg  polyethylene glycol (Glycolax, Miralax) packet 17 g  traMADol (Ultram) tablet 50 mg  insulin lispro (HumaLOG) injection 0-15 Units  insulin glargine (Lantus) injection 20 Units  amLODIPine (Norvasc) tablet 2.5 mg  aspirin EC tablet 81 mg  atorvastatin (Lipitor) tablet 20 mg  dapagliflozin propanediol (Farxiga) tablet 10 mg  famotidine (Pepcid) tablet 20 mg  FLUoxetine (PROzac) capsule 40 mg  furosemide (Lasix) tablet 20 mg  melatonin tablet 3 mg  metoprolol tartrate (Lopressor) tablet 25 mg  pilocarpine (Salagen) tablet 5 mg  potassium chloride CR (Klor-Con) ER tablet 10 mEq  piperacillin-tazobactam-dextrose (Zosyn) IV 2.25 g  vancomycin (Xellia) 1 g in 200 mL (Xellia) IVPB 1 g  oxyCODONE-acetaminophen (Percocet) 5-325 mg per tablet 1 tablet  vancomycin-diluent combo no.1 (Xellia) IVPB 750 mg  vancomycin (Vancocin) 500 mg in dextrose 5 % 100 mL IV  lidocaine (Xylocaine) 10 mg/mL (1 %) injection 1 mg  oxygen (O2) therapy  lactated Ringer's infusion  ondansetron (Zofran) injection 4 mg  hydrALAZINE (Apresoline) injection 5 mg  HYDROmorphone (Dilaudid) injection 0.2 mg  HYDROmorphone (Dilaudid) injection 0.4 mg  ondansetron (Zofran) injection 4 mg  ondansetron (Zofran) injection  - Omnicell Override Pull  BUPivacaine HCl (Marcaine) 0.5 % (5 mg/mL) injection  vancomycin (Vancocin) vial for injection  vancomycin (Vancocin) 500 mg in dextrose 5 % 100 mL IV  sodium chloride 0.9% infusion  DAPTOmycin (Cubicin) 400 mg in sodium chloride 0.9% 50 mL IV  lidocaine PF (Xylocaine) 20 mg/mL (2 %) injection  bisacodyl (Dulcolax) suppository 10 mg  lidocaine 4 % patch 2 patch  acetaminophen  (Tylenol) tablet 650 mg  oxyCODONE (Roxicodone) immediate release tablet 5 mg  docusate sodium (Colace) capsule 100 mg      Relevant Results  Labs  Results from last 72 hours   Lab Units 01/22/24  0441 01/21/24  0424   WBC AUTO x10*3/uL 10.1 9.3   HEMOGLOBIN g/dL 14.4 13.3   HEMATOCRIT % 44.8 40.5   PLATELETS AUTO x10*3/uL 276 270       Results from last 72 hours   Lab Units 01/22/24  0441 01/21/24  0423   SODIUM mmol/L 142 138   POTASSIUM mmol/L 4.0 3.6   CHLORIDE mmol/L 105 103   CO2 mmol/L 25 27   BUN mg/dL 18 17   CREATININE mg/dL 1.20 1.40   GLUCOSE mg/dL 96 166*   CALCIUM mg/dL 9.8 9.5   ANION GAP mmol/L 12 8   EGFR mL/min/1.73m*2 45* 38*           Estimated Creatinine Clearance: 32.5 mL/min (by C-G formula based on SCr of 1.2 mg/dL).  C-Reactive Protein   Date Value Ref Range Status   01/13/2024 2.60 (H) 0.00 - 2.00 mg/dL Final   10/11/2023 8.90 (H) 0.00 - 2.00 mg/dL Final     CRP   Date Value Ref Range Status   08/18/2021 3.1 (H) 0 - 2.0 MG/DL Final     Comment:     Performed at [address] [address]     Microbiology  Susceptibility data from last 14 days.  Collected Specimen Info Organism Amoxicillin/Clavulanate Ampicillin Ampicillin/Sulbactam Cefazolin Ciprofloxacin Clindamycin Erythromycin Gentamicin Levofloxacin Oxacillin Piperacillin/Tazobactam Tetracycline   01/16/24 Tissue from SOFT TISSUE BIOPSY Citrobacter koseri S R S S S   S S  S      Methicillin Resistant Staphylococcus aureus (MRSA)      R R   R  R   01/13/24 Tissue/Biopsy from Wound/Tissue Mixed Gram-Positive and Gram-Negative Bacteria                 Collected Specimen Info Organism Trimethoprim/Sulfamethoxazole Vancomycin   01/16/24 Tissue from SOFT TISSUE BIOPSY Citrobacter koseri S      Methicillin Resistant Staphylococcus aureus (MRSA) S S   01/13/24 Tissue/Biopsy from Wound/Tissue Mixed Gram-Positive and Gram-Negative Bacteria       Reviewed  Imaging  CT guided aspiration of abscess, hematoma, cyst    Result Date:  1/18/2024  Interpreted By:  Javad Thomson, STUDY: CT-guided biopsy intervertebral disc   INDICATION: 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   COMPARISON: None.   ACCESSION NUMBER(S): [id number]   ORDERING CLINICIAN: VEENA DHALIWAL   TECHNIQUE: Conscious sedation: 20 minutes. CT-guided percutaneous biopsy intervertebral disc L1-2   FINDINGS: Informed consent obtained. Patient positioned prone and connected to physiologic monitoring. Conscious sedation provided with versed and fentanyl. Under CT guidance, 17 gauge trocar cannula advanced toward the left psoas. Aspiration would not yield fluid at location corresponding to small fluid collection seen on MRI. The cannula was advanced further to the L1-2 intervertebral disc. 18 gauge biopsy instrument advanced coaxially and a core biopsy obtained, submitted for culture.       CT-guided biopsy of L1-2 intervertebral disc.   Signed by: Javad Thomson 1/18/2024 3:38 PM Dictation workstation:   [id number]    US renal complete    Result Date: 1/17/2024  Interpreted By:  Robbie Mancilla, STUDY: US RENAL COMPLETE   INDICATION: Signs/Symptoms:JALEN   COMPARISON: Ultrasound from March 2022.   ACCESSION NUMBER(S): [id number]   ORDERING CLINICIAN: TARIQ MATSON   TECHNIQUE: Real-time renal ultrasound was performed.   FINDINGS: The right kidney measures 9.2 cm and the left kidney 10.3 cm in maximal length. There is normal thickness and echogenicity of the renal cortex bilaterally. No renal calculi are identified. There is no hydronephrosis. No solid renal masses are seen. Small cyst is again present in the upper pole of the left kidney measuring 10 x 11 x 12 mm (7 x 8 x 9 mm on prior).   Images of the bladder were also obtained. The bladder is only partially distended and therefore suboptimally evaluated. No bladder mass, stone or debris is identified. Bilateral ureteral jets were demonstrated.       Redemonstration of small cyst in the upper pole of the  left kidney. No renal calculi or hydronephrosis.   Signed by: Robbie Mancilla 1/17/2024 2:16 PM Dictation workstation:   [id number]    FL less than 1 hour    Result Date: 1/16/2024  Interpreted By:  Robbie Mancilla, STUDY: FL LESS THAN 1 HOUR; 1/16/2024 11:47 am   INDICATION: Signs/Symptoms:intra op   COMPARISON: None   ACCESSION NUMBER(S): [id number]   ORDERING CLINICIAN: TRISHA BAILEY   FINDINGS: Fluoroscopy was provided during right foot surgery.   Total fluoroscopy time: Less than 1sec, dose: Air kerma 0.0mGy, images:1       Fluoroscopy for right foot surgery.   Signed by: Robbie Mancilla 1/16/2024 12:48 PM Dictation workstation:   [id number]    MR lumbar spine wo IV contrast    Result Date: 1/13/2024  Interpreted By:  Pankaj Wisdom, STUDY: MR LUMBAR SPINE WO IV CONTRAST; ;  1/13/2024 7:34 am   INDICATION: Signs/Symptoms:Spontaneous L1 fracture.   COMPARISON: CT 01/11/2024   ACCESSION NUMBER(S): [id number]   ORDERING CLINICIAN: ROBBI PINEDA   TECHNIQUE: Multiplanar multisequence MRI obtained of the lumbar spine     FINDINGS: L1 vertebral body demonstrates diffuse marrow edema in particular along the inferior endplate with more focal fluid signal intensity with T2 weighted hyperintense appearance demonstrated with central endplate depression along the inferior endplate of the L1 vertebral body. Corresponding CT demonstrates resorption of the trabecular and cortical margin along the inferior endplate as well as extending to portions of the anterior and posteroinferior margin of the L1 vertebral body with mild retropulsion of the posterior margin of L1. This is not the typical appearance or presentation of the fracture line given the configuration. Also, more focal fluid signal intensity demonstrated within the disc space posteriorly is noted. Corresponding superior endplate of the L2 vertebral body demonstrates endplate reactive marrow edema with cortical erosion and corresponding mild reactive marrow  edema along the superior endplate of L2. This constellation of the findings raises suspicion for discitis osteomyelitis. While, component of the findings along the inferior endplate of L1 may reflect sequela of fracture the resorption within the vertebral body is atypical. There is diffuse surrounding paravertebral edema tracking within the psoas musculature bilaterally. There is more focal heterogeneous T2 weighted hyperintense collection along the left lateral margin of the L1 vertebral body about the inferior endplate. This extends into the adjacent psoas muscle with intramuscular well-marginated fluid collection with T2 weighted hyperintense heterogeneous appearance. This suggest component of intramuscular abscess formation. This collection measures 1.8 x 1.3 x 1.9 cm in the AP, transverse and craniocaudal dimension respectively. The edema tracks from the level of the L1 vertebral body level caudally to the L3 vertebral body level bilaterally. The edema tracks into the neural foramina bilaterally.   Thecal sac L1/2 level demonstrates no focal narrowing. There is no epidural fluid collection or definite epidural abscess formation. However, the cortical irregularity along the posteroinferior margin of the L1 vertebral body is of concern.   Postsurgical changes are demonstrated with spinal fusion from the L3 through S1 levels bilaterally with bilateral pedicle screws and rods in place. There is laminectomy changes from L4 through S1.   Evaluation of spinal levels are as follows:   T12/L1 demonstrates no narrowing of the thecal sac or foramina   L1/2 demonstrates mild retropulsion of the posteroinferior margin of the L1 vertebral body measuring 2 mm. There is no narrowing thecal sac. Ligamentum flavum hypertrophy demonstrated at this level. Neural foramina demonstrate mild bilateral narrowing.   L2/3 demonstrates diffuse circumferential disc bulge seen in conjunction with component of anterolisthesis measuring 3 mm.  These findings in conjunction with severe facet hypertrophic change contributing to severe narrowing of the thecal sac at this level. Foramina demonstrate moderate bilateral narrowing   There is severe narrowing of the thecal sac posterior to the L3 vertebral body extending from the superior endplate through to the inferior aspect of the vertebral body.   L3/4 demonstrates thecal sac to be decompressed. Foramina demonstrate moderate right and mild left foraminal narrowing   L4/5 demonstrates thecal sac to be decompressed there is anterolisthesis at this level. Foramina demonstrate mild right foraminal narrowing. Left foramina is unremarkable   L5/S1 demonstrates thecal sac to be decompressed. Foramina demonstrate mild right foraminal narrowing. Left foramina unremarkable               1. Findings raising suspicion for discitis osteomyelitis at the L1/2 intervertebral disc level with erosive changes along the inferior endplate of L1 and superior endplate of L2 as described above. There is more focal fluid signal intensity in the disc space as well as within the inferior endplate of the L1 vertebral body with corresponding resorption of the cortical and trabecular margin as seen on the CT. While, superimposed component of fracture along the inferior endplate of the L1 is a possibility previously described findings are likely secondary to postinfectious etiology given the configuration appearance.   2. Generalized edema in the paravertebral location with more focal paravertebral intramuscular abscess location within the left psoas muscle about the inferior endplate of the L1 vertebral body measuring 1.8 x 1.3 x 1.9 cm.   3. No epidural abscess formation is demonstrated.   4. Severe narrowing of the thecal sac at L2/3 extending into the inferior margin of the L3 vertebral body in relation to anterolisthesis, disc, and facet hypertrophic change.   Findings phoned to patient's nurse at the time of interpretation on  01/13/2024 at 7:58 a.m..     MACRO: Critical Finding:  See findings. Notification was initiated on 1/13/2024 at 7:58 am by  Pankaj Wisdom.  (**-OCF-**) Instructions:   Signed by: Pankaj Wisdom 1/13/2024 8:03 AM Dictation workstation:   [id number]    CT lumbar spine wo IV contrast    Result Date: 1/11/2024  Interpreted By:  Adrián Marcum, STUDY: CT LUMBAR SPINE WO IV CONTRAST; 1/11/2024 3:47 pm   INDICATION: Signs/Symptoms:lower back pain;   COMPARISON: None   ACCESSION NUMBER(S): [id number]   ORDERING CLINICIAN: ALEX MORRISSEY   TECHNIQUE: Contiguous axial images of the lumbar spine were performed. Coronal and sagittal reformatted images were also obtained. All CT examinations are performed with 1 or more of the following dose reduction techniques: Automated exposure control, adjustment of mA and/or kv according to patient's size, or use of iterative reconstruction techniques.   FINDINGS: There is an acute appearing compression fracture along the inferior half of the L1 vertebral body with mild retropulsion of the inferior half of L1. No significant height loss is noted at L1. The remainder of the vertebral bodies appear adequate in height. There is a normal appearance of a posterior fusion at L3 through S1 with bilateral pedicle screws and rods which appear intact. Facet degenerative changes are present.   There is grade 1 anterolisthesis of L4 on L5 measuring approximately 8 mm.   There is severe hypertrophic facet degenerative change at L2-3 which are moderate-severely narrowing the neural foramina as well as moderate-severe spinal canal stenosis. Diffuse disc bulging also contributes to spinal canal and neural foramina stenosis at this level.   There is also a large osteophyte from severe hypertrophic facet degenerative change at L3 which is also causing moderate to moderate-severe spinal canal stenosis posterior to L3. The remainder of the levels do not show evidence for high additional high-grade  thecal sac stenosis.   Additional levels of neural foramina stenosis are noted. There is moderate-severe right neural foramina stenosis at L4-5 and at least moderate right neural foramina stenosis at L5-S1.   The SI joints show degenerative gas but are otherwise intact.       Acute compression fracture along the inferior aspect of L1 vertebral body with mild retropulsion of the inferior aspect of L1. No significant height loss is noted at L1.   No additional acute fractures are noted.   Stable appearance of posterior fusion with bilateral rods and pedicle screws at L3 through S1.   Chronic degenerative changes with high-grade spinal canal stenosis noted at L2-3 and L3-4. There is high-grade bilateral neural foramina stenosis at L2-3. There is moderate-severe right neural foramina stenosis at L4-5.     Signed by: Adrián Marcum 1/11/2024 4:46 PM Dictation workstation:   [id number]     Assessment/Plan   L1/L2 disc space infection-most likely secondary to bacteremia  Left psoas abscess-most likely secondary to bacteremia, s/p CT-guided aspiration on 1/18/2024-culture negative  Right foot ulcer with retained exposed hardware, s/p right foot hardware removal 1/16-op note reviewed, to level of bone-culture with MRSA and Citrobacter   Acute kidney injury - creatinine 1.7 on 1/17/24 - resolved  Pyuria-urine culture pending, UA with budding yeast-typically colonization       IV Daptomycin  IV meropenem  Ritu catheter placement by interventional radiology  Pain management as needed  Supportive care  Monitor temperature and WBC  Long-term plan is at least 6 weeks of IV antibiotic therapy-tentative stop date 2/24/2024, with consideration for suppressive therapy with Bactrim        CHELSEA Vera-CNP

## 2024-01-22 NOTE — PROGRESS NOTES
Occupational Therapy    OT Treatment    Patient Name: Carrie Rosario  MRN: [id number]  Today's Date: 1/22/2024  Time Calculation  Start Time: 1030 (Simultaneous filing. User may not have seen previous data.)  Stop Time: 1125 (Simultaneous filing. User may not have seen previous data.)  Time Calculation (min): 55 min (Simultaneous filing. User may not have seen previous data.)         Assessment:  End of Session Patient Position: Bed, 3 rail up (all needs in reach)  OT Assessment Results: Decreased ADL status, Decreased upper extremity strength, Decreased safe judgment during ADL, Decreased sensation, Decreased endurance, Decreased functional mobility, Decreased gross motor control  Plan:  Treatment Interventions: ADL retraining, Functional transfer training, UE strengthening/ROM  OT Frequency: 3 times per week  OT Discharge Recommendations: Moderate intensity level of continued care  Equipment Recommended upon Discharge: Lift  OT Recommended Transfer Status: Dependent  OT - OK to Discharge: Yes  Treatment Interventions: ADL retraining, Functional transfer training, UE strengthening/ROM    Subjective   Previous Visit Info:  OT Last Visit  OT Received On: 01/22/24  General:  General  Reason for Referral: impaired mobility  Referred By: Dr. Samson  Past Medical History Relevant to Rehab: spinal stenosis, Rt foot drop, CKD stage 3, MI, HTN  Co-Treatment: PT  Co-Treatment Reason:  (2 person skilled assist to maximize safety and functional abilities during treatment.)  Prior to Session Communication: Bedside nurse  Patient Position Received: Bed, 3 rail up  General Comment:  (Agreeable to treatment, Pt. incontinent5, urine and stool, session statrt, soiled linen)  Precautions:  Post-Surgical Precautions: Spinal precautions    Pain:  Pain Assessment  Pain Assessment: 0-10  Pain Score: 10 - Worst possible pain  Pain Location: Back (Lt. buttocks)  Pain Orientation: Left  Pain Interventions: Repositioned (Pt. medicated prior  to session)    Objective         Activities of Daily Living: Toileting  Toileting Level of Assistance: Dependent  Where Assessed: Bed level  Toileting Comments: Hygiene and brief management.    Bed Mobility/Transfers: Bed Mobility 1  Bed Mobility 1: Supine to sitting  Level of Assistance 1: Moderate assistance (x2)  Bed Mobility Comments 1: Log roll with head of bed elevated ~30* PT. REQUIRED VERY VERY MUCH ADDITIONAL TIME AND EFFORT TO COMPLETE, NEEDING FREQUENT EXTENDED REST BREAKS.  Bed Mobility 2  Bed Mobility  2: Sitting to supine  Level of Assistance 2: Maximum assistance (x2, log roll tech.)  Bed Mobility Comments 2: Dependent x 2 to reposition to head  of bed.    Transfer 1  Trials/Comments 1: Attempted sit to stand on dhiraj stedy x2 trials, Pt. unable to complete due to 10/10 pain       Therapy/Activity: Therapeutic Exercise  Therapeutic Exercise Performed: Yes  Therapeutic Exercise Activity 1: bicep curls  Therapeutic Exercise Activity 2: scap protraction and retraction  Therapeutic Exercise Activity 3: Pt. completes therex supine in bed, cues for correct alignmentt      Outcome Measures:[facility] Daily Activity  Putting on and taking off regular lower body clothing: Total  Bathing (including washing, rinsing, drying): A lot  Putting on and taking off regular upper body clothing: A lot  Toileting, which includes using toilet, bedpan or urinal: Total  Taking care of personal grooming such as brushing teeth: A little  Eating Meals: A little  Daily Activity - Total Score: 12        Education Documentation  Body Mechanics, taught by GIACOMO Lin at 1/22/2024 11:55 AM.  Learner: Patient  Readiness: Acceptance  Method: Demonstration, Explanation  Response: Verbalizes Understanding, Demonstrated Understanding, Needs Reinforcement    Precautions, taught by GIACOMO Lin at 1/22/2024 11:55 AM.  Learner: Patient  Readiness: Acceptance  Method: Demonstration, Explanation  Response: Verbalizes Understanding,  Demonstrated Understanding, Needs Reinforcement    Home Exercise Program, taught by GIACOMO Lin at 1/22/2024 11:55 AM.  Learner: Patient  Readiness: Acceptance  Method: Demonstration, Explanation  Response: Verbalizes Understanding, Demonstrated Understanding, Needs Reinforcement    ADL Training, taught by GIACOMO Lin at 1/22/2024 11:55 AM.  Learner: Patient  Readiness: Acceptance  Method: Demonstration, Explanation  Response: Verbalizes Understanding, Demonstrated Understanding, Needs Reinforcement    Education Comments  No comments found.      Goals:  Encounter Problems       Encounter Problems (Active)           OT Goals       ADLs (Progressing)       Start:  01/12/24    Expected End:  02/09/24       Patient will complete ADL tasks with MOD A using AE as needed, in order to increase patient's safety and independence with self-care tasks.         Functional Transfers (Progressing)       Start:  01/12/24    Expected End:  02/09/24       Patient will complete functional transfers with Min A in order to increase patient's safety and independence with daily tasks.         B UE Strengthening (Progressing)       Start:  01/12/24    Expected End:  02/09/24       Patient will increase B UE strengthening to 4+/5 for functional transfers.            Safety       Spine Precautions for Pain Management (Progressing)       Start:  01/17/24    Expected End:  01/31/24       Pt will be independent with both understanding and demonstration of post op spine restrictions to promote safe functional mobility at discharge           Safe Mobility Techniques (Progressing)       Start:  01/17/24    Expected End:  01/31/24       Pt will correctly identify and demonstrate safe mobility techniques to reduce their risks for falls during their acute care stay               Transfers       Supine to sit (Progressing)       Start:  01/17/24    Expected End:  01/31/24       Pt will transfer supine to sitting at edge of bed with  modified independent assist to promote acute care out of bed activity           Sit to stand (Progressing)       Start:  01/17/24    Expected End:  01/31/24       Pt will transfer sit to standing position with modified independent assist and walker to promote safe out of bed activity           Bed to chair (Progressing)       Start:  01/17/24    Expected End:  01/31/24       Pt will transfer from sitting edge of bed to the chair with modified independent assist and walker to promote out of bed activity and reduce the risks of prolonged acute care bedrest

## 2024-01-22 NOTE — PROGRESS NOTES
"Carrie Rosario is a 83 y.o. female on day 10 of admission presenting with Compression fracture of L1 lumbar vertebra, closed, initial encounter (CMS/[facility]).    Subjective   Patient seen and examined.  Documented concerns/events overnight from nursing.  Afebrile overnight.  Denies chills.  Continues to complain of pain with movement.       Objective     Physical Exam  Constitutional:       General: She is not in acute distress.     Appearance: She is ill-appearing. She is not toxic-appearing.   Cardiovascular:      Rate and Rhythm: Normal rate and regular rhythm.      Pulses: Normal pulses.      Heart sounds: Normal heart sounds.   Pulmonary:      Effort: Pulmonary effort is normal.      Breath sounds: Normal breath sounds.   Abdominal:      General: Bowel sounds are normal. There is no distension.      Palpations: Abdomen is soft.      Tenderness: There is no abdominal tenderness.   Skin:     General: Skin is warm and dry.      Comments: Dressing to right foot dry and intact   Neurological:      General: No focal deficit present.      Mental Status: She is alert and oriented to person, place, and time.   Psychiatric:         Mood and Affect: Mood normal.         Behavior: Behavior normal.         Last Recorded Vitals  Blood pressure 142/70, pulse 70, temperature 36.6 °C (97.9 °F), temperature source Oral, resp. rate 16, height 1.651 m (5' 5\"), weight 68 kg (150 lb), SpO2 97 %.  Intake/Output last 3 Shifts:  I/O last 3 completed shifts:  In: 790 (11.6 mL/kg) [P.O.:440; IV Piggyback:350]  Out: - (0 mL/kg)   Weight: 68 kg     Relevant Results  Scheduled medications  acetaminophen, 650 mg, oral, 6x daily  amLODIPine, 2.5 mg, oral, Daily  aspirin, 81 mg, oral, Daily  atorvastatin, 20 mg, oral, Nightly  bisacodyl, 10 mg, rectal, Daily  [Held by provider] dapagliflozin propanediol, 10 mg, oral, q AM  daptomycin, 6 mg/kg, intravenous, q48h  docusate sodium, 100 mg, oral, BID  famotidine, 20 mg, oral, Daily  FLUoxetine, 40 " mg, oral, Daily  furosemide, 20 mg, oral, Daily  heparin (porcine), 5,000 Units, subcutaneous, q12h  insulin glargine, 20 Units, subcutaneous, q24h  insulin lispro, 0-15 Units, subcutaneous, TID with meals  lidocaine, 0.1 mL, subcutaneous, Once  lidocaine, 2 patch, transdermal, Daily  meropenem, 1 g, intravenous, q12h  metoprolol tartrate, 25 mg, oral, BID  pilocarpine, 5 mg, oral, TID  potassium chloride CR, 10 mEq, oral, Daily      Continuous medications     PRN medications  PRN medications: dextrose 10 % in water (D10W), dextrose, glucagon, hydrALAZINE, HYDROmorphone, HYDROmorphone, melatonin, ondansetron, oxyCODONE, oxygen, polyethylene glycol, tiZANidine, traMADol     following data reviewed    WBC- 10.1  Hgb-14.4  Hct-44.8  Platelets-276      Na-142  K+-4.0  Cl-105  Bicarb-25  BUN-18  creatinine-1.2    Culture from 1/18(intervertebral disc)  No growth      Culture from 1/16(soft tissue)  MRSA and Citrobacter     Blood cx   No growth                 Assessment/Plan   Principal Problem:    Compression fracture of L1 lumbar vertebra, closed, initial encounter (CMS/[facility])  Active Problems:    Compression fracture of L1 vertebra, initial encounter (CMS/[facility])    Discitis / OM / L Psoas Abscess / Acute Compression Fracture L1 / Inability to Ambulate  -ID following  -PT, OT recommend mod intensity rehab  -IV ATB (daptomycin and meropenem until 2/24/2024 per ID recommendations)   -will likely need Bactrim indefinitely  -Ritu catheter placement by IR  -Orthopedic spine surgeon signed off, no surgical intervention    R Foot Ulceration   -Podiatry took patient to the OR for removal of hardware right foot  -Follow-up outpatient with podiatry  -Dressing changed on 1/19 to remain intact for 1 week per podiatry    DM II  -Monitor blood glucose  -ISS  -Diabetic diet  -HgA1C 8.9  -Continue Lantus    HTN  -Monitor blood pressure  -Continue home medications    CAD  -Continue home medications     HLD  -Continue statin.      DVT  Risk  Heparin subcutaneous.   SCDs.      Plan   Above plan discussed at length with patient she is in agreement  Discharge to skilled nursing facility after Ritu catheter placed               I spent 25 minutes in the professional and overall care of this patient.      Lilia Conner, APRN-CNP

## 2024-01-22 NOTE — CONSULTS
"Nutrition Assessement Note    Nutrition Assessment    Reason for Assessment: Length of stay    Reason for Hospital Admission:  Carrie Rosario is a 83 y.o. female who is admitted for low back pain and inability to ambulate. No surgical plans per Dr. Kraus. MRI shows osteomyelitis and discitis at L1 L1-2 disc and L2 vertebrae and psoas abscess. R foot ulcer with exposed hardware which was removed by podiatry on 1/16. Plan for SNF at discharge.    Nutrition History:  Food and Nutrient History: reports \"ok\" appetite. enjoys her meals in the hospital.  Energy Intake: Fair 50-75 %  GI Symptoms: Nausea with pain    Anthropometrics:  Ht: 165.1 cm (5' 5\"), Wt: 58.1 kg (128 lb), BMI: 21.3    Weight Change:  Weight History / % Weight Change: pt reports usual wt of 140-150#. current wt of 128# on bedscale at this time. she was unaware of any wt changes.    Nutrition Focused Physical Exam Findings:  Subcutaneous Fat Loss  Orbital Fat Pads: Mild-Moderate (slight dark circles and slight hollowing)  Buccal Fat Pads: Well nourished (full, rounded cheeks)    Muscle Wasting  Temporalis: Mild-Moderate (slight depression)  Pectoralis (Clavicular Region): Well nourished (clavicle not visible)  Deltoid/Trapezius: Well nourished (rounded appearance at arm, shoulder, neck)  Interosseous: Well nourished (muscle bulges)    Physical Findings (Nutrition Deficiency/Toxicity)  Skin:  (R foot surgical incision)    Nutrition Significant Labs:  Lab Results   Component Value Date    WBC 10.1 01/22/2024    HGB 14.4 01/22/2024    HCT 44.8 01/22/2024     01/22/2024    CHOL 153 04/27/2023    TRIG 90 04/27/2023    HDL 57 04/27/2023    ALT 15 10/12/2023    AST 27 10/12/2023     01/22/2024    K 4.0 01/22/2024     01/22/2024    CREATININE 1.20 01/22/2024    BUN 18 01/22/2024    CO2 25 01/22/2024    TSH 0.33 04/21/2022    INR 1.1 01/16/2024    HGBA1C 8.9 (H) 01/11/2024    JENNIFFER 31 (H) 04/27/2023       Current Facility-Administered " Medications:     acetaminophen (Tylenol) tablet 650 mg, 650 mg, oral, 6x daily, RICHAR Jacobsen, 650 mg at 01/22/24 1249    amLODIPine (Norvasc) tablet 2.5 mg, 2.5 mg, oral, Daily, Akanksha E Iosue, DPM, 2.5 mg at 01/22/24 0835    aspirin EC tablet 81 mg, 81 mg, oral, Daily, Akanksha E Iosue, DPM, 81 mg at 01/22/24 0835    atorvastatin (Lipitor) tablet 20 mg, 20 mg, oral, Nightly, Akanksha E Iosue, DPM, 20 mg at 01/21/24 2214    bisacodyl (Dulcolax) suppository 10 mg, 10 mg, rectal, Daily, CHELSEA Jacobsen-CNP, 10 mg at 01/17/24 1712    [Held by provider] dapagliflozin propanediol (Farxiga) tablet 10 mg, 10 mg, oral, q AM, Akanksha Herre, DPM, 10 mg at 01/16/24 1528    DAPTOmycin (Cubicin) 400 mg in sodium chloride 0.9% 50 mL IV, 6 mg/kg, intravenous, q48h, RICHAR Haji, Stopped at 01/21/24 1529    dextrose 10 % in water (D10W) infusion, 0.3 g/kg/hr, intravenous, Once PRN, Akanksha Taysue, DPM    dextrose 50 % injection 25 g, 25 g, intravenous, q15 min PRN, Akanksha Herre, DPM    docusate sodium (Colace) capsule 100 mg, 100 mg, oral, BID, CHELSEA Jacobsen-CNP, 100 mg at 01/22/24 0836    famotidine (Pepcid) tablet 20 mg, 20 mg, oral, Daily, Akanksha E Iosue, DPM, 20 mg at 01/22/24 0835    FLUoxetine (PROzac) capsule 40 mg, 40 mg, oral, Daily, Akanksha E Iosue, DPM, 40 mg at 01/22/24 0836    furosemide (Lasix) tablet 20 mg, 20 mg, oral, Daily, Akanksha E Iosue, DPM, 20 mg at 01/22/24 0835    glucagon (Glucagen) injection 1 mg, 1 mg, intramuscular, q15 min PRN, Akanksha Kennedy DPM    heparin (porcine) injection 5,000 Units, 5,000 Units, subcutaneous, q12h, Akanksha Herre DPM, 5,000 Units at 01/22/24 0630    hydrALAZINE (Apresoline) injection 5 mg, 5 mg, intravenous, q30 min PRN, Jose Wiggins MD    HYDROmorphone (Dilaudid) injection 0.2 mg, 0.2 mg, intravenous, q5 min PRN, Jose Wiggins MD    HYDROmorphone (Dilaudid) injection 0.4 mg, 0.4 mg, intravenous, q5 min PRN, Jose Wiggins MD     insulin glargine (Lantus) injection 20 Units, 20 Units, subcutaneous, q24h, Akanksha Taysue, DPM, 20 Units at 01/21/24 2212    insulin lispro (HumaLOG) injection 0-15 Units, 0-15 Units, subcutaneous, TID with meals, Akanksha Taysue, DPM, 6 Units at 01/21/24 1759    lidocaine (Xylocaine) 10 mg/mL (1 %) injection 1 mg, 0.1 mL, subcutaneous, Once, Jose Wiggins MD    lidocaine 4 % patch 2 patch, 2 patch, transdermal, Daily, RICHAR Jacobsen, 2 patch at 01/21/24 1021    melatonin tablet 3 mg, 3 mg, oral, Nightly PRN, Akanksha Herre, DPM    meropenem (Merrem) in sodium chloride 0.9 % 100 mL IV 1 g, 1 g, intravenous, q12h, Nav Conway MD, Stopped at 01/22/24 0455    metoprolol tartrate (Lopressor) tablet 25 mg, 25 mg, oral, BID, Akanksha Herre, DPM, 25 mg at 01/22/24 0835    ondansetron (Zofran) injection 4 mg, 4 mg, intravenous, Once PRN, Jose Wiggins MD    oxyCODONE (Roxicodone) immediate release tablet 5 mg, 5 mg, oral, q6h PRN, RICHAR Jacobsen, 5 mg at 01/22/24 0425    oxygen (O2) therapy, , inhalation, Continuous PRN - O2/gases, Jose Wiggins MD    pilocarpine (Salagen) tablet 5 mg, 5 mg, oral, TID, Akanksha Herre, DPM, 5 mg at 01/22/24 0835    polyethylene glycol (Glycolax, Miralax) packet 17 g, 17 g, oral, Daily PRN, Akanksha Herre, DPM, 17 g at 01/16/24 0054    potassium chloride CR (Klor-Con) ER tablet 10 mEq, 10 mEq, oral, Daily, Akanksha Herre, DPM, 10 mEq at 01/22/24 0835    tiZANidine (Zanaflex) tablet 2 mg, 2 mg, oral, q6h PRN, Akanksha E Iosue, DPM    traMADol (Ultram) tablet 50 mg, 50 mg, oral, q6h PRN, Akanksha E Iosue, DPM, 50 mg at 01/22/24 1007    Dietary Orders (From admission, onward)       Start     Ordered    01/17/24 1634  Adult diet Carb Controlled; 90 gram carb/meal, 60 gram Carb evening snack  Diet effective now        Question Answer Comment   Diet type Carb Controlled    Carb diet selection: 90 gram carb/meal, 60 gram Carb evening snack        01/17/24 3960                   Estimated Needs:   Estimated Energy Needs  Total Energy Estimated Needs (kCal): 1745 kCal  Total Estimated Energy Need per Day (kCal/kg): 30 kCal/kg  Method for Estimating Needs: actual wt    Estimated Protein Needs  Total Protein Estimated Needs (g): 70 g  Total Protein Estimated Needs (g/kg): 1.2 g/kg  Method for Estimating Needs: actual wt    Estimated Fluid Needs  Total Fluid Estimated Needs (mL): 1745 mL  Method for Estimating Needs: 1 ml/kcal        Nutrition Diagnosis   Nutrition Diagnosis:  Malnutrition Diagnosis  Patient has Malnutrition Diagnosis: No    Nutrition Diagnosis  Patient has Nutrition Diagnosis: Yes  Diagnosis Status (1): New  Nutrition Diagnosis 1: Increased nutrient needs  Related to (1): increased demand for nutrients  As Evidenced by (1): wound       Nutrition Interventions/Recommendations   Nutrition Interventions and Recommendations:    Nutrition Prescription:  Individualized Nutrition Prescription Provided for : 1745 kcals and 70g protein to be provided via diet    Nutrition Interventions:   Food and/or Nutrient Delivery Interventions  Interventions: Meals and snacks  Meals and Snacks: Carbohydrate-modified diet  Goal: provide as ordered  Additional Interventions: declined needing nutrition supplements    Education Documentation  No documentation found.           Nutrition Monitoring and Evaluation   Monitoring/Evaluation:   Food/Nutrient Related History Monitoring  Monitoring and Evaluation Plan: Energy intake  Energy Intake: Estimated energy intake  Criteria: pt to consume >/= 75% estimated needs    Body Composition/Growth/Weight History  Monitoring and Evaluation Plan: Weight  Weight: Measured weight  Criteria: pt will maintain wt       Time Spent/Follow-up:   Follow Up  Time Spent (min): 30 minutes  Last Date of Nutrition Visit: 01/22/24  Nutrition Follow-Up Needed?: 5-7 days  Follow up Comment: 1/26/24

## 2024-01-22 NOTE — PROGRESS NOTES
Physical Therapy    Physical Therapy Treatment    Patient Name: Carrie Rosario  MRN: [id number]  Today's Date: 1/22/2024          Assessment/Plan   PT Assessment  PT Assessment Results: Decreased strength, Decreased endurance, Impaired balance, Decreased mobility, Decreased safety awareness, Pain  Rehab Prognosis: Fair  Barriers to Discharge: pain  Evaluation/Treatment Tolerance: Patient limited by pain  Medical Staff Made Aware: Yes  End of Session Communication: Bedside nurse  End of Session Patient Position: Bed, 3 rail up, Alarm off, caregiver present (OT remains at bedside)    Assessment Comment: Pt cont to be limited primarily by increased pain. Timed with nursing for pain meds to have been given to patient prior to PT session. No major change in pain management noted. Unable to attempt standing and transfer to recliner this date. Increased time required to complete all transfers/activity.       PT Plan  Treatment/Interventions: Bed mobility, Transfer training, Balance training, Strengthening, Endurance training, Therapeutic activity, Therapeutic exercise  PT Plan: Skilled PT  PT Frequency: 5 times per week  PT Discharge Recommendations: Moderate intensity level of continued care  Equipment Recommended upon Discharge: Lift  PT Recommended Transfer Status: Total assist  PT - OK to Discharge: Yes      General Visit Information:   PT  Visit  PT Received On: 01/22/24  General  Reason for Referral: impaired mobility  Co-Treatment Reason: partial co-treat with OT for safe OOB mobility.  Pt's pariticpation limited by pain.  Prior to Session Communication: Bedside nurse  Patient Position Received: Bed, 4 rail up  Preferred Learning Style: verbal  General Comment: Cleared for mobility per nursing. Pt supine in bed upon arrival. Agreeable to participate.    Subjective   Precautions:  Precautions  LE Weight Bearing Status: Heel Weight Bearing in Post-Op Shoe  Medical Precautions: Fall precautions, Spinal  precautions  Precautions Comment: off-loading shoe  Vital Signs:       Objective   Pain:  Pain Assessment  Pain Assessment: FLACC (Face, Legs, Activity, Cry, Consolability)  Pain Score: 8  Pain Type: Acute pain  Pain Location: Back  Pain Orientation: Left, Lower  Cognition:  Cognition  Overall Cognitive Status: Within Functional Limits (Appears anxious at times; labile and tearful of pain)  Postural Control:  Postural Control  Posture Comment: flexed posture sitting on EOB  Static Sitting Balance  Static Sitting-Level of Assistance: Contact guard, Minimum assistance  Static Standing Balance  Static Standing-Comment/Number of Minutes: unable to attempt this date due to increased c/o pain  Extremity/Trunk Assessments:    Activity Tolerance:  Activity Tolerance  Endurance: Decreased tolerance for upright activites  Activity Tolerance Comments: Repeated instruction provided for purse-lip breathing to promote activity tolerance.  Treatments:       Therapeutic Activity  Therapeutic Activity 1: Sat on EOB about 20 minutes with supervision to min assist. Initially required min/mod assist due to pain and increased R lateral lean and posterior lean. Positioned R forearm on pillow to allow for wt shifting and upright posture. Pt occasionally able to promote COG and wt shift into upright, however only able to tolerate about 1-2 minutes at a time, due to increased pain. Placed dhiraj steady in front of patient to allow for UE support and to attempt standing, however unable to progress to standing this date due to increased c/o pain.         Bed Mobility 1  Bed Mobility 1: Supine to sitting  Level of Assistance 1: Maximum assistance  Bed Mobility Comments 1: via log roll. Markedly increased time and pt effort due to pain and fear of pain  Bed Mobility 2  Bed Mobility  2: Sitting to supine  Level of Assistance 2: Maximum assistance  Bed Mobility Comments 2: increased time to complete  Bed Mobility 3  Bed Mobility 3: Rolling right,  Rolling left  Level of Assistance 3: Moderate assistance  Bed Mobility Comments 3: to allow for hygiene and linen change       Transfers  Transfer:  (unable to attempt standing this date due to c/o increased pain; pt tearful and yelling out with mobility.)          Outcome Measures:  [facility] Basic Mobility  Turning from your back to your side while in a flat bed without using bedrails: A lot  Moving from lying on your back to sitting on the side of a flat bed without using bedrails: A lot  Moving to and from bed to chair (including a wheelchair): Total  Standing up from a chair using your arms (e.g. wheelchair or bedside chair): A lot  To walk in hospital room: Total  Climbing 3-5 steps with railing: Total  Basic Mobility - Total Score: 9    Education Documentation  No documentation found.  Education Comments  No comments found.        OP EDUCATION:       Encounter Problems       Encounter Problems (Active)       Balance       Sitting Balance (Progressing)       Start:  01/17/24    Expected End:  01/31/24       Pt will demonstrate good sitting balance to promote safe engagement with out of bed activities           Standing Balance (Progressing)       Start:  01/17/24    Expected End:  01/31/24       Pt will demonstrate good static standing balance to promote safe participation with out of bed activity, transfers, and mobility              Mobility       Ambulation (Progressing)       Start:  01/17/24    Expected End:  01/31/24       Pt will ambulate 50' modified independent assist with walker to promote safe home mobility              Pain - Adult          Safety       Spine Precautions for Pain Management (Progressing)       Start:  01/17/24    Expected End:  01/31/24       Pt will be independent with both understanding and demonstration of post op spine restrictions to promote safe functional mobility at discharge           Safe Mobility Techniques (Progressing)       Start:  01/17/24    Expected End:  01/31/24        Pt will correctly identify and demonstrate safe mobility techniques to reduce their risks for falls during their acute care stay               Transfers       Supine to sit (Progressing)       Start:  01/17/24    Expected End:  01/31/24       Pt will transfer supine to sitting at edge of bed with modified independent assist to promote acute care out of bed activity           Sit to stand (Progressing)       Start:  01/17/24    Expected End:  01/31/24       Pt will transfer sit to standing position with modified independent assist and walker to promote safe out of bed activity           Bed to chair (Progressing)       Start:  01/17/24    Expected End:  01/31/24       Pt will transfer from sitting edge of bed to the chair with modified independent assist and walker to promote out of bed activity and reduce the risks of prolonged acute care bedrest

## 2024-01-22 NOTE — CARE PLAN
Problem: Skin  Goal: Participates in plan/prevention/treatment measures  Outcome: Progressing  Flowsheets (Taken 1/21/2024 2310)  Participates in plan/prevention/treatment measures: Elevate heels  Goal: Prevent/manage excess moisture  Outcome: Progressing  Flowsheets (Taken 1/21/2024 2310)  Prevent/manage excess moisture:   Monitor for/manage infection if present   Cleanse incontinence/protect with barrier cream  Goal: Prevent/minimize sheer/friction injuries  Outcome: Progressing  Flowsheets (Taken 1/21/2024 2310)  Prevent/minimize sheer/friction injuries: Complete micro-shifts as needed if patient unable. Adjust patient position to relieve pressure points, not a full turn  Goal: Promote skin healing  Outcome: Progressing  Flowsheets (Taken 1/21/2024 2310)  Promote skin healing: Protective dressings over bony prominences

## 2024-01-22 NOTE — PROGRESS NOTES
"  Carrie Rosario is a 83 y.o. female on day 10 of admission presenting with Compression fracture of L1 lumbar vertebra, closed, initial encounter (CMS/[facility]).    Subjective   [facility] states they are able to accept pt for SNF, as long as she does not DC on IV DAPTO.     TCSW sent a message to CHIKI Beauchamp CNP, inquiring if Dapto is pt's final IV ATBX regimen. Per Lora, IV Dapto is pt's discharge ATBX.   TCSW met with pt to discuss the above information and inform pt that [facility] no longer able to accept her upon discharge.  Pt requests TCSW to call her daughter Apoorva to discuss new SNF choice.      TCSW placed TCT pt's daughter at [phone] and [facility] requesting a return call back.   Objective     Physical Exam    Last Recorded Vitals  Blood pressure 122/76, pulse 71, temperature 37 °C (98.6 °F), temperature source Oral, resp. rate 18, height 1.651 m (5' 5\"), weight 68 kg (150 lb), SpO2 97 %.  Intake/Output last 3 Shifts:  I/O last 3 completed shifts:  In: 790 (11.6 mL/kg) [P.O.:440; IV Piggyback:350]  Out: - (0 mL/kg)   Weight: 68 kg     Relevant Results                             Assessment/Plan   Principal Problem:    Compression fracture of L1 lumbar vertebra, closed, initial encounter (CMS/[facility])  Active Problems:    Compression fracture of L1 vertebra, initial encounter (CMS/[facility])               Kenny Ramirez, DON      "

## 2024-01-23 ENCOUNTER — HOSPITAL ENCOUNTER (OUTPATIENT)
Dept: CARDIOLOGY | Facility: HOSPITAL | Age: 84
Discharge: HOME | DRG: 463 | End: 2024-01-23
Payer: MEDICARE

## 2024-01-23 VITALS
HEART RATE: 80 BPM | HEIGHT: 65 IN | BODY MASS INDEX: 21.34 KG/M2 | RESPIRATION RATE: 17 BRPM | DIASTOLIC BLOOD PRESSURE: 92 MMHG | OXYGEN SATURATION: 100 % | TEMPERATURE: 97.5 F | SYSTOLIC BLOOD PRESSURE: 146 MMHG | WEIGHT: 128.09 LBS

## 2024-01-23 VITALS
HEART RATE: 82 BPM | DIASTOLIC BLOOD PRESSURE: 68 MMHG | HEIGHT: 65 IN | WEIGHT: 128 LBS | TEMPERATURE: 97.3 F | OXYGEN SATURATION: 100 % | BODY MASS INDEX: 21.33 KG/M2 | SYSTOLIC BLOOD PRESSURE: 134 MMHG | RESPIRATION RATE: 18 BRPM

## 2024-01-23 DIAGNOSIS — L03.119 CELLULITIS OF EXTREMITY: ICD-10-CM

## 2024-01-23 DIAGNOSIS — N18.30: Primary | ICD-10-CM

## 2024-01-23 LAB
GLUCOSE BLD MANUAL STRIP-MCNC: 120 MG/DL (ref 74–99)
GLUCOSE BLD MANUAL STRIP-MCNC: 94 MG/DL (ref 74–99)

## 2024-01-23 PROCEDURE — 99152 MOD SED SAME PHYS/QHP 5/>YRS: CPT

## 2024-01-23 PROCEDURE — 36558 INSERT TUNNELED CV CATH: CPT

## 2024-01-23 PROCEDURE — 2720000007 HC OR 272 NO HCPCS

## 2024-01-23 PROCEDURE — 2500000004 HC RX 250 GENERAL PHARMACY W/ HCPCS (ALT 636 FOR OP/ED): Performed by: RADIOLOGY

## 2024-01-23 PROCEDURE — 2500000004 HC RX 250 GENERAL PHARMACY W/ HCPCS (ALT 636 FOR OP/ED): Performed by: NURSE PRACTITIONER

## 2024-01-23 PROCEDURE — 76937 US GUIDE VASCULAR ACCESS: CPT

## 2024-01-23 PROCEDURE — 7100000010 HC PHASE TWO TIME - EACH INCREMENTAL 1 MINUTE

## 2024-01-23 PROCEDURE — C1894 INTRO/SHEATH, NON-LASER: HCPCS

## 2024-01-23 PROCEDURE — C1751 CATH, INF, PER/CENT/MIDLINE: HCPCS

## 2024-01-23 PROCEDURE — 2780000003 HC OR 278 NO HCPCS

## 2024-01-23 PROCEDURE — 99232 SBSQ HOSP IP/OBS MODERATE 35: CPT | Performed by: NURSE PRACTITIONER

## 2024-01-23 PROCEDURE — 82947 ASSAY GLUCOSE BLOOD QUANT: CPT

## 2024-01-23 PROCEDURE — 2500000005 HC RX 250 GENERAL PHARMACY W/O HCPCS: Performed by: RADIOLOGY

## 2024-01-23 PROCEDURE — 2500000004 HC RX 250 GENERAL PHARMACY W/ HCPCS (ALT 636 FOR OP/ED): Mod: JZ | Performed by: NURSE PRACTITIONER

## 2024-01-23 PROCEDURE — 2500000001 HC RX 250 WO HCPCS SELF ADMINISTERED DRUGS (ALT 637 FOR MEDICARE OP): Performed by: NURSE PRACTITIONER

## 2024-01-23 PROCEDURE — 7100000009 HC PHASE TWO TIME - INITIAL BASE CHARGE

## 2024-01-23 PROCEDURE — 2500000004 HC RX 250 GENERAL PHARMACY W/ HCPCS (ALT 636 FOR OP/ED): Performed by: INTERNAL MEDICINE

## 2024-01-23 PROCEDURE — 77001 FLUOROGUIDE FOR VEIN DEVICE: CPT

## 2024-01-23 RX ORDER — FENTANYL CITRATE 50 UG/ML
INJECTION, SOLUTION INTRAMUSCULAR; INTRAVENOUS AS NEEDED
Status: DISCONTINUED | OUTPATIENT
Start: 2024-01-23 | End: 2024-01-24 | Stop reason: HOSPADM

## 2024-01-23 RX ORDER — MIDAZOLAM HYDROCHLORIDE 1 MG/ML
INJECTION, SOLUTION INTRAMUSCULAR; INTRAVENOUS AS NEEDED
Status: DISCONTINUED | OUTPATIENT
Start: 2024-01-23 | End: 2024-01-24 | Stop reason: HOSPADM

## 2024-01-23 RX ORDER — MORPHINE SULFATE 2 MG/ML
1 INJECTION, SOLUTION INTRAMUSCULAR; INTRAVENOUS ONCE
Status: COMPLETED | OUTPATIENT
Start: 2024-01-23 | End: 2024-01-23

## 2024-01-23 RX ORDER — BISACODYL 10 MG/1
10 SUPPOSITORY RECTAL DAILY PRN
Status: ON HOLD
Start: 2024-01-23

## 2024-01-23 RX ORDER — LIDOCAINE 560 MG/1
2 PATCH PERCUTANEOUS; TOPICAL; TRANSDERMAL DAILY
Status: ON HOLD
Start: 2024-01-23

## 2024-01-23 RX ORDER — DOCUSATE SODIUM 100 MG/1
100 CAPSULE, LIQUID FILLED ORAL 2 TIMES DAILY PRN
Status: ON HOLD
Start: 2024-01-23

## 2024-01-23 RX ORDER — OXYCODONE HYDROCHLORIDE 5 MG/1
5 TABLET ORAL EVERY 6 HOURS PRN
Qty: 12 TABLET | Refills: 0 | Status: SHIPPED | OUTPATIENT
Start: 2024-01-23 | End: 2024-01-26

## 2024-01-23 RX ORDER — OXYCODONE HYDROCHLORIDE 5 MG/1
5 TABLET ORAL EVERY 6 HOURS PRN
Qty: 12 TABLET | Refills: 0
Start: 2024-01-23 | End: 2024-01-23 | Stop reason: SDUPTHER

## 2024-01-23 RX ORDER — POLYETHYLENE GLYCOL 3350 17 G/17G
17 POWDER, FOR SOLUTION ORAL DAILY PRN
Status: ON HOLD
Start: 2024-01-23

## 2024-01-23 RX ORDER — LIDOCAINE HYDROCHLORIDE 10 MG/ML
INJECTION, SOLUTION EPIDURAL; INFILTRATION; INTRACAUDAL; PERINEURAL AS NEEDED
Status: DISCONTINUED | OUTPATIENT
Start: 2024-01-23 | End: 2024-01-24 | Stop reason: HOSPADM

## 2024-01-23 RX ORDER — INSULIN LISPRO 100 [IU]/ML
0-15 INJECTION, SOLUTION INTRAVENOUS; SUBCUTANEOUS
Status: ON HOLD
Start: 2024-01-23

## 2024-01-23 RX ADMIN — MEROPENEM 1 G: 1 INJECTION, POWDER, FOR SOLUTION INTRAVENOUS at 15:21

## 2024-01-23 RX ADMIN — FENTANYL CITRATE 50 MCG: 50 INJECTION, SOLUTION INTRAMUSCULAR; INTRAVENOUS at 11:58

## 2024-01-23 RX ADMIN — MIDAZOLAM 1 MG: 1 INJECTION INTRAMUSCULAR; INTRAVENOUS at 11:58

## 2024-01-23 RX ADMIN — POTASSIUM CHLORIDE 10 MEQ: 750 TABLET, EXTENDED RELEASE ORAL at 14:42

## 2024-01-23 RX ADMIN — FLUOXETINE HYDROCHLORIDE 40 MG: 20 CAPSULE ORAL at 14:41

## 2024-01-23 RX ADMIN — MEROPENEM 1 G: 1 INJECTION, POWDER, FOR SOLUTION INTRAVENOUS at 03:57

## 2024-01-23 RX ADMIN — ACETAMINOPHEN 650 MG: 325 TABLET ORAL at 18:21

## 2024-01-23 RX ADMIN — MORPHINE SULFATE 1 MG: 2 INJECTION, SOLUTION INTRAMUSCULAR; INTRAVENOUS at 09:31

## 2024-01-23 RX ADMIN — FAMOTIDINE 20 MG: 20 TABLET, FILM COATED ORAL at 14:41

## 2024-01-23 RX ADMIN — ASPIRIN 81 MG: 81 TABLET, COATED ORAL at 14:42

## 2024-01-23 RX ADMIN — FUROSEMIDE 20 MG: 20 TABLET ORAL at 14:42

## 2024-01-23 RX ADMIN — DAPTOMYCIN 400 MG: 500 INJECTION, POWDER, LYOPHILIZED, FOR SOLUTION INTRAVENOUS at 15:25

## 2024-01-23 RX ADMIN — ACETAMINOPHEN 650 MG: 325 TABLET ORAL at 14:49

## 2024-01-23 RX ADMIN — LIDOCAINE HYDROCHLORIDE 5 ML: 10 INJECTION, SOLUTION EPIDURAL; INFILTRATION; INTRACAUDAL; PERINEURAL at 11:59

## 2024-01-23 RX ADMIN — AMLODIPINE BESYLATE 2.5 MG: 2.5 TABLET ORAL at 14:42

## 2024-01-23 ASSESSMENT — COGNITIVE AND FUNCTIONAL STATUS - GENERAL
HELP NEEDED FOR BATHING: A LOT
CLIMB 3 TO 5 STEPS WITH RAILING: TOTAL
PERSONAL GROOMING: A LITTLE
TOILETING: A LOT
DAILY ACTIVITIY SCORE: 14
MOVING FROM LYING ON BACK TO SITTING ON SIDE OF FLAT BED WITH BEDRAILS: A LOT
TURNING FROM BACK TO SIDE WHILE IN FLAT BAD: A LOT
MOVING TO AND FROM BED TO CHAIR: TOTAL
STANDING UP FROM CHAIR USING ARMS: A LOT
WALKING IN HOSPITAL ROOM: TOTAL
MOBILITY SCORE: 9
DRESSING REGULAR LOWER BODY CLOTHING: TOTAL
DRESSING REGULAR UPPER BODY CLOTHING: A LOT

## 2024-01-23 ASSESSMENT — PAIN SCALES - GENERAL
PAINLEVEL_OUTOF10: 0 - NO PAIN
PAINLEVEL_OUTOF10: 0 - NO PAIN

## 2024-01-23 ASSESSMENT — PAIN - FUNCTIONAL ASSESSMENT
PAIN_FUNCTIONAL_ASSESSMENT: 0-10
PAIN_FUNCTIONAL_ASSESSMENT: 0-10

## 2024-01-23 ASSESSMENT — PAIN SCALES - WONG BAKER: WONGBAKER_NUMERICALRESPONSE: HURTS LITTLE BIT

## 2024-01-23 NOTE — DISCHARGE SUMMARY
"Discharge Diagnosis  Compression fracture of L1 lumbar vertebra, closed, initial encounter (CMS/[facility])    Issues Requiring Follow-Up            Discharge Meds     Your medication list        START taking these medications        Instructions Last Dose Given Next Dose Due   bisacodyl 10 mg suppository  Commonly known as: Dulcolax      Insert 1 suppository (10 mg) into the rectum once daily as needed for constipation.       DAPTOmycin 500 mg injection  Commonly known as: Cubicin      Infuse 400 mg into a venous catheter every other day.       docusate sodium 100 mg capsule  Commonly known as: Colace      Take 1 capsule (100 mg) by mouth 2 times a day as needed for constipation.       insulin lispro 100 unit/mL injection  Commonly known as: HumaLOG      Inject 0-0.15 mL (0-15 Units) under the skin 3 times a day with meals. Take as directed per insulin instructions.       lidocaine 4 % patch      Place 2 patches over 12 hours on the skin once daily. Remove & discard patch within 12 hours or as directed by MD.       meropenem 1 gram/50 mL piggyback IV  Commonly known as: Merrem      Infuse 50 mL (1 g) over 30 minutes into a venous catheter every 8 hours.       oxyCODONE 5 mg immediate release tablet  Commonly known as: Roxicodone      Take 1 tablet (5 mg) by mouth every 6 hours if needed for moderate pain (4 - 6) for up to 3 days.       polyethylene glycol 17 gram packet  Commonly known as: Glycolax, Miralax      Take 17 g by mouth once daily as needed (constipation).              CHANGE how you take these medications        Instructions Last Dose Given Next Dose Due   Droplet Pen Needle 31 gauge x 5/16\" needle  Generic drug: pen needle, diabetic  What changed: See the new instructions.      use 1 PEN NEEDLE to inject MEDICATION subcutaneously three times a day       metoprolol tartrate 50 mg tablet  Commonly known as: Lopressor  What changed: Another medication with the same name was removed. Continue taking this " medication, and follow the directions you see here.      take 1 tablet by mouth twice a day              CONTINUE taking these medications        Instructions Last Dose Given Next Dose Due   acetaminophen 325 mg tablet  Commonly known as: Tylenol      Take 2 tablets (650 mg) by mouth every 4 hours if needed for fever (temp greater than 38.0 C) (greater than or equal to 38 C).       amLODIPine 2.5 mg tablet  Commonly known as: Norvasc           aspirin 81 mg EC tablet           atorvastatin 20 mg tablet  Commonly known as: Lipitor           Calmoseptine 0.44-20.6 % ointment  Generic drug: menthol-zinc oxide           dapagliflozin propanediol 10 mg  Commonly known as: Farxiga      Take 1 tablet (10 mg) by mouth once daily in the morning. Hold if not eating or drinking normally.       famotidine 20 mg tablet  Commonly known as: Pepcid      take 1 tablet by mouth once daily       FLUoxetine 40 mg capsule  Commonly known as: PROzac      take 1 capsule by mouth once daily       FreeStyle Brandon 2 [place] misc  Generic drug: FreeStyle Brandon reader           FreeStyle Brandon 2 Sensor kit  Generic drug: FreeStyle Brandon sensor system           FreeStyle Lite Strips strip  Generic drug: blood sugar diagnostic           furosemide 20 mg tablet  Commonly known as: Lasix      take 1 tablet by mouth once daily       insulin degludec 100 unit/mL (3 mL) injection  Commonly known as: Tresiba FlexTouch U-100      INJECT 20 UNITS SUBCUTANEOUSLY ONE TIME DAILY AS DIRECTED.       melatonin 3 mg tablet           mirabegron 25 mg tablet extended release 24 hr 24 hr tablet  Commonly known as: Myrbetriq      Take 1 tablet (25 mg) by mouth once daily.       pilocarpine 5 mg tablet  Commonly known as: Salagen      Take 1 tablet (5 mg) by mouth 2 times a day.       potassium chloride CR 10 mEq ER tablet  Commonly known as: Klor-Con      take 1 tablet by mouth once daily       VITAMIN D3 ORAL                  STOP taking these medications   "    insulin aspart 100 unit/mL (3 mL) pen  Commonly known as: NovoLOG                  Where to Get Your Medications        These medications were sent to [id number] - [place], [place] - [address] [place] [id number]      Phone: [phone]   Droplet Pen Needle 31 gauge x 5/16\" needle       Information about where to get these medications is not yet available    Ask your nurse or doctor about these medications  bisacodyl 10 mg suppository  DAPTOmycin 500 mg injection  docusate sodium 100 mg capsule  insulin lispro 100 unit/mL injection  lidocaine 4 % patch  meropenem 1 gram/50 mL piggyback IV  oxyCODONE 5 mg immediate release tablet  polyethylene glycol 17 gram packet         Test Results Pending At Discharge  Pending Labs       Order Current Status    Extra Urine Gray Tube Collected (01/17/24 1143)    Urinalysis with Reflex Culture and Microscopic In process            Hospital Course    HPI from admission  Carrie Rosario is a 83 y.o. female presenting with low back pain, inability to ambulate. Has history of spinal stenosis s/p lumbar surgery many years ago with resultant R foot drop. States she was doing well at home, she is ambulatory at baseline, actually lives alone. She woke up about 3 days ago with low back pain, it has progressively worsened to the point where she can barely walk to the bathroom and came in for further eval. She denies any recent falls or known trauma. CT imaging of the lumbar spine demonstrated acute compression fracture L1 without significant height loss. She denies any loss of bowel or bladder (has urine incontinence at baseline) and no saddle anesthesia. They medicated her and attempted to ambulate and she could not so admission was requested. She denies any chest pain, SOB, abdominal pain, diarrhea, constipation, urinary symptoms.      ED: Toradol, tizanidine.   She will be admitted for further work up and management of the acute lumbar " compression fracture and inability to ambulate.        During hospital course patient was seen by infectious disease, podiatry, nephrology, and orthopedic spine surgeon.  She was also seen by PT, OT who recommended moderate intensity rehab.  Patient is agreeable to skilled nursing facility.  She will continue on Dapto until 2/24/2024 per ID recommendations..  She will follow-up with ID on an outpatient basis after completing daptomycin will likely require lifelong Bactrim suppressive therapy.  She will follow-up with podiatry on an outpatient basis.  Dressing to RLE changed by podiatry on 1/19/2024 and to remain intact for 1 week and follow-up with podiatry on an outpatient basis for further management.  Her JALEN has resolved and she will follow-up with Dr. Paola López  in 2 weeks her Farxiga has been discontinue his recommendations.  Patient will be discharged in stable condition she will follow-up with PCP    Above Case and plan discussed collaborating physician    Pertinent Physical Exam At Time of Discharge  Physical Exam  Constitutional:       General: She is not in acute distress.     Appearance: She is ill-appearing. She is not toxic-appearing.   Cardiovascular:      Rate and Rhythm: Normal rate and regular rhythm.      Pulses: Normal pulses.      Heart sounds: Normal heart sounds.   Pulmonary:      Effort: Pulmonary effort is normal.      Breath sounds: Normal breath sounds.   Abdominal:      General: Bowel sounds are normal. There is no distension.      Palpations: Abdomen is soft.      Tenderness: There is no abdominal tenderness.   Skin:     General: Skin is warm and dry.      Comments: Dressing to right foot dry and intact   Neurological:      General: No focal deficit present.      Mental Status: She is alert and oriented to person, place, and time.   Psychiatric:         Mood and Affect: Mood normal.        Outpatient Follow-Up  Future Appointments   Date Time Provider Department Center   2/15/2024  9:15  AM Brian Youngblood MD [id number] [facility]   5/30/2024 10:30 AM Gato Gannon DO [id number] [facility]         Lilia Conner, APRN-CNP

## 2024-01-23 NOTE — PROGRESS NOTES
"Carrie Rosario is a 83 y.o. female on day 11 of admission presenting with Compression fracture of L1 lumbar vertebra, closed, initial encounter (CMS/[facility]).    Subjective   TCSW connected with pt's daughter Apoorva to procure additional SNF choices; as [facility] will not accept pt with IV Daptomycin. Apoorva requests a new SNF list to review. TCSW sent SNF list via careport. Pt is medically ready for discharge upon securing a bed at a different SNF.     **UPDATE**  TCSW received updated SNF choices from pt's daughter.   TCSW submitted referrals to [facility], [facility], [facility], [facility] rehab, and [facility].     Only [facility] is able to accept pt with IV Daptomycin.   TCSW will inform pt and pt's daughter of the accepting facility. Pt is medically ready today.     **UPDATE**  1302-   [facility] is able to admit pt today upon medical clearance.      Transportation has been arranged via round trip for approx 1545.     Objective     Physical Exam    Last Recorded Vitals  Blood pressure 139/61, pulse 72, temperature 36.3 °C (97.3 °F), temperature source Oral, resp. rate 18, height 1.651 m (5' 5\"), weight 58.1 kg (128 lb), SpO2 100 %.  Intake/Output last 3 Shifts:  I/O last 3 completed shifts:  In: 400 (6.9 mL/kg) [IV Piggyback:400]  Out: 1 (0 mL/kg) [Stool:1]  Weight: 58.1 kg     Relevant Results                             Assessment/Plan   Principal Problem:    Compression fracture of L1 lumbar vertebra, closed, initial encounter (CMS/[facility])  Active Problems:    Compression fracture of L1 vertebra, initial encounter (CMS/[facility])               Kenny Ramirez, DON      "

## 2024-01-23 NOTE — PROGRESS NOTES
"Physical Therapy                 Therapy Communication Note    Patient Name: Carrie Rosario  MRN: [id number]  Today's Date: 1/23/2024     Discipline: Physical Therapy    Missed Visit Reason: Missed Visit Reason: Other (Comment)    Missed Time: Cancel    Comment: Pt at OSH for procedure. Not scheduled to return \"until later.\"   "

## 2024-01-23 NOTE — PROGRESS NOTES
"Carrie Rosario is a 83 y.o. female on day 11 of admission presenting with Compression fracture of L1 lumbar vertebra, closed, initial encounter (CMS/[facility]).    Subjective   Interval History:   S/p Ritu catheter placement  Afebrile, no chills  Denies shortness of breath or chest pain  Denies nausea vomiting or diarrhea      Objective   Range of Vitals (last 24 hours)  Heart Rate:  [67-82]   Temp:  [36.3 °C (97.3 °F)-37 °C (98.6 °F)]   Resp:  [16-20]   BP: (108-163)/(59-92)   Height:  [165.1 cm (5' 5\")]   Weight:  [58.1 kg (128 lb 1.4 oz)]   SpO2:  [94 %-100 %]   Daily Weight  01/22/24 : 58.1 kg (128 lb)    Body mass index is 21.3 kg/m².    Physical Exam  Constitutional:       Appearance: Normal appearance.   HENT:      Head: Normocephalic and atraumatic.      Nose: Nose normal.      Mouth/Throat:      Mouth: Mucous membranes are moist.      Pharynx: Oropharynx is clear.   Eyes:      Extraocular Movements: Extraocular movements intact.      Conjunctiva/sclera: Conjunctivae normal.   Cardiovascular:      Rate and Rhythm: Normal rate and regular rhythm.   Pulmonary:      Effort: Pulmonary effort is normal.      Breath sounds: Normal breath sounds.   Abdominal:      General: Bowel sounds are normal.      Palpations: Abdomen is soft.   Musculoskeletal:         General: Normal range of motion.      Cervical back: Normal range of motion and neck supple.   Skin:     General: Skin is warm and dry.      Comments: Right foot dressing   Neurological:      General: No focal deficit present.      Mental Status: She is alert and oriented to person, place, and time. Mental status is at baseline.   Psychiatric:         Mood and Affect: Mood normal.         Behavior: Behavior normal.         Antibiotics  ketorolac (Toradol) injection 7.5 mg  tiZANidine (Zanaflex) tablet 2 mg  dextrose 50 % injection 25 g  glucagon (Glucagen) injection 1 mg  dextrose 10 % in water (D10W) infusion  insulin lispro (HumaLOG) injection 0-10 Units  insulin " glargine (Lantus) injection 15 Units  methylPREDNISolone (Medrol) tablet 24 mg  methylPREDNISolone (Medrol) tablet 20 mg  methylPREDNISolone (Medrol) tablet 16 mg  methylPREDNISolone (Medrol) tablet 12 mg  methylPREDNISolone (Medrol) tablet 8 mg  methylPREDNISolone (Medrol) tablet 4 mg  tiZANidine (Zanaflex) tablet 2 mg  heparin (porcine) injection 5,000 Units  acetaminophen (Tylenol) tablet 650 mg  polyethylene glycol (Glycolax, Miralax) packet 17 g  traMADol (Ultram) tablet 50 mg  insulin lispro (HumaLOG) injection 0-15 Units  insulin glargine (Lantus) injection 20 Units  amLODIPine (Norvasc) tablet 2.5 mg  aspirin EC tablet 81 mg  atorvastatin (Lipitor) tablet 20 mg  dapagliflozin propanediol (Farxiga) tablet 10 mg  famotidine (Pepcid) tablet 20 mg  FLUoxetine (PROzac) capsule 40 mg  furosemide (Lasix) tablet 20 mg  melatonin tablet 3 mg  metoprolol tartrate (Lopressor) tablet 25 mg  pilocarpine (Salagen) tablet 5 mg  potassium chloride CR (Klor-Con) ER tablet 10 mEq  piperacillin-tazobactam-dextrose (Zosyn) IV 2.25 g  vancomycin (Xellia) 1 g in 200 mL (Xellia) IVPB 1 g  oxyCODONE-acetaminophen (Percocet) 5-325 mg per tablet 1 tablet  vancomycin-diluent combo no.1 (Xellia) IVPB 750 mg  vancomycin (Vancocin) 500 mg in dextrose 5 % 100 mL IV  lidocaine (Xylocaine) 10 mg/mL (1 %) injection 1 mg  oxygen (O2) therapy  lactated Ringer's infusion  ondansetron (Zofran) injection 4 mg  hydrALAZINE (Apresoline) injection 5 mg  HYDROmorphone (Dilaudid) injection 0.2 mg  HYDROmorphone (Dilaudid) injection 0.4 mg  ondansetron (Zofran) injection 4 mg  ondansetron (Zofran) injection  - Omnicell Override Pull  BUPivacaine HCl (Marcaine) 0.5 % (5 mg/mL) injection  vancomycin (Vancocin) vial for injection  vancomycin (Vancocin) 500 mg in dextrose 5 % 100 mL IV  sodium chloride 0.9% infusion  DAPTOmycin (Cubicin) 400 mg in sodium chloride 0.9% 50 mL IV  lidocaine PF (Xylocaine) 20 mg/mL (2 %) injection  bisacodyl (Dulcolax)  suppository 10 mg  lidocaine 4 % patch 2 patch  acetaminophen (Tylenol) tablet 650 mg  oxyCODONE (Roxicodone) immediate release tablet 5 mg  docusate sodium (Colace) capsule 100 mg      Relevant Results  Labs  Results from last 72 hours   Lab Units 01/22/24  0441 01/21/24  0424   WBC AUTO x10*3/uL 10.1 9.3   HEMOGLOBIN g/dL 14.4 13.3   HEMATOCRIT % 44.8 40.5   PLATELETS AUTO x10*3/uL 276 270       Results from last 72 hours   Lab Units 01/22/24  0441 01/21/24  0423   SODIUM mmol/L 142 138   POTASSIUM mmol/L 4.0 3.6   CHLORIDE mmol/L 105 103   CO2 mmol/L 25 27   BUN mg/dL 18 17   CREATININE mg/dL 1.20 1.40   GLUCOSE mg/dL 96 166*   CALCIUM mg/dL 9.8 9.5   ANION GAP mmol/L 12 8   EGFR mL/min/1.73m*2 45* 38*           Estimated Creatinine Clearance: 32.5 mL/min (by C-G formula based on SCr of 1.2 mg/dL).  C-Reactive Protein   Date Value Ref Range Status   01/13/2024 2.60 (H) 0.00 - 2.00 mg/dL Final   10/11/2023 8.90 (H) 0.00 - 2.00 mg/dL Final     CRP   Date Value Ref Range Status   08/18/2021 3.1 (H) 0 - 2.0 MG/DL Final     Comment:     Performed at [place] [id number]     Microbiology  Susceptibility data from last 14 days.  Collected Specimen Info Organism Amoxicillin/Clavulanate Ampicillin Ampicillin/Sulbactam Cefazolin Ciprofloxacin Clindamycin Erythromycin Gentamicin Levofloxacin Oxacillin Piperacillin/Tazobactam Tetracycline   01/16/24 Tissue from SOFT TISSUE BIOPSY Citrobacter koseri S R S S S   S S  S      Methicillin Resistant Staphylococcus aureus (MRSA)      R R   R  R   01/13/24 Tissue/Biopsy from Wound/Tissue Mixed Gram-Positive and Gram-Negative Bacteria                 Collected Specimen Info Organism Trimethoprim/Sulfamethoxazole Vancomycin   01/16/24 Tissue from SOFT TISSUE BIOPSY Citrobacter koseri S      Methicillin Resistant Staphylococcus aureus (MRSA) S S   01/13/24 Tissue/Biopsy from Wound/Tissue Mixed Gram-Positive and Gram-Negative Bacteria       Reviewed  Imaging  CT  guided aspiration of abscess, hematoma, cyst    Result Date: 1/18/2024  Interpreted By:  Javad Thomson, STUDY: CT-guided biopsy intervertebral disc   INDICATION: 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   COMPARISON: None.   ACCESSION NUMBER(S): [id number]   ORDERING CLINICIAN: VEENA DHALIWAL   TECHNIQUE: Conscious sedation: 20 minutes. CT-guided percutaneous biopsy intervertebral disc L1-2   FINDINGS: Informed consent obtained. Patient positioned prone and connected to physiologic monitoring. Conscious sedation provided with versed and fentanyl. Under CT guidance, 17 gauge trocar cannula advanced toward the left psoas. Aspiration would not yield fluid at location corresponding to small fluid collection seen on MRI. The cannula was advanced further to the L1-2 intervertebral disc. 18 gauge biopsy instrument advanced coaxially and a core biopsy obtained, submitted for culture.       CT-guided biopsy of L1-2 intervertebral disc.   Signed by: Javad Thomson 1/18/2024 3:38 PM Dictation workstation:   [id number]    US renal complete    Result Date: 1/17/2024  Interpreted By:  Robbie Mancilla, STUDY: US RENAL COMPLETE   INDICATION: Signs/Symptoms:JALEN   COMPARISON: Ultrasound from March 2022.   ACCESSION NUMBER(S): [id number]   ORDERING CLINICIAN: TARIQ MATSON   TECHNIQUE: Real-time renal ultrasound was performed.   FINDINGS: The right kidney measures 9.2 cm and the left kidney 10.3 cm in maximal length. There is normal thickness and echogenicity of the renal cortex bilaterally. No renal calculi are identified. There is no hydronephrosis. No solid renal masses are seen. Small cyst is again present in the upper pole of the left kidney measuring 10 x 11 x 12 mm (7 x 8 x 9 mm on prior).   Images of the bladder were also obtained. The bladder is only partially distended and therefore suboptimally evaluated. No bladder mass, stone or debris is identified. Bilateral ureteral jets were demonstrated.        Redemonstration of small cyst in the upper pole of the left kidney. No renal calculi or hydronephrosis.   Signed by: Robbie Mancilla 1/17/2024 2:16 PM Dictation workstation:   [id number]    FL less than 1 hour    Result Date: 1/16/2024  Interpreted By:  Robbie Mancilla, STUDY: FL LESS THAN 1 HOUR; 1/16/2024 11:47 am   INDICATION: Signs/Symptoms:intra op   COMPARISON: None   ACCESSION NUMBER(S): [id number]   ORDERING CLINICIAN: TRISHA BAILEY   FINDINGS: Fluoroscopy was provided during right foot surgery.   Total fluoroscopy time: Less than 1sec, dose: Air kerma 0.0mGy, images:1       Fluoroscopy for right foot surgery.   Signed by: Robbie Mancilla 1/16/2024 12:48 PM Dictation workstation:   [id number]    MR lumbar spine wo IV contrast    Result Date: 1/13/2024  Interpreted By:  Pankaj Wisdom, STUDY: MR LUMBAR SPINE WO IV CONTRAST; ;  1/13/2024 7:34 am   INDICATION: Signs/Symptoms:Spontaneous L1 fracture.   COMPARISON: CT 01/11/2024   ACCESSION NUMBER(S): [id number]   ORDERING CLINICIAN: ROBBI PINEDA   TECHNIQUE: Multiplanar multisequence MRI obtained of the lumbar spine     FINDINGS: L1 vertebral body demonstrates diffuse marrow edema in particular along the inferior endplate with more focal fluid signal intensity with T2 weighted hyperintense appearance demonstrated with central endplate depression along the inferior endplate of the L1 vertebral body. Corresponding CT demonstrates resorption of the trabecular and cortical margin along the inferior endplate as well as extending to portions of the anterior and posteroinferior margin of the L1 vertebral body with mild retropulsion of the posterior margin of L1. This is not the typical appearance or presentation of the fracture line given the configuration. Also, more focal fluid signal intensity demonstrated within the disc space posteriorly is noted. Corresponding superior endplate of the L2 vertebral body demonstrates endplate reactive marrow edema with  cortical erosion and corresponding mild reactive marrow edema along the superior endplate of L2. This constellation of the findings raises suspicion for discitis osteomyelitis. While, component of the findings along the inferior endplate of L1 may reflect sequela of fracture the resorption within the vertebral body is atypical. There is diffuse surrounding paravertebral edema tracking within the psoas musculature bilaterally. There is more focal heterogeneous T2 weighted hyperintense collection along the left lateral margin of the L1 vertebral body about the inferior endplate. This extends into the adjacent psoas muscle with intramuscular well-marginated fluid collection with T2 weighted hyperintense heterogeneous appearance. This suggest component of intramuscular abscess formation. This collection measures 1.8 x 1.3 x 1.9 cm in the AP, transverse and craniocaudal dimension respectively. The edema tracks from the level of the L1 vertebral body level caudally to the L3 vertebral body level bilaterally. The edema tracks into the neural foramina bilaterally.   Thecal sac L1/2 level demonstrates no focal narrowing. There is no epidural fluid collection or definite epidural abscess formation. However, the cortical irregularity along the posteroinferior margin of the L1 vertebral body is of concern.   Postsurgical changes are demonstrated with spinal fusion from the L3 through S1 levels bilaterally with bilateral pedicle screws and rods in place. There is laminectomy changes from L4 through S1.   Evaluation of spinal levels are as follows:   T12/L1 demonstrates no narrowing of the thecal sac or foramina   L1/2 demonstrates mild retropulsion of the posteroinferior margin of the L1 vertebral body measuring 2 mm. There is no narrowing thecal sac. Ligamentum flavum hypertrophy demonstrated at this level. Neural foramina demonstrate mild bilateral narrowing.   L2/3 demonstrates diffuse circumferential disc bulge seen in  conjunction with component of anterolisthesis measuring 3 mm. These findings in conjunction with severe facet hypertrophic change contributing to severe narrowing of the thecal sac at this level. Foramina demonstrate moderate bilateral narrowing   There is severe narrowing of the thecal sac posterior to the L3 vertebral body extending from the superior endplate through to the inferior aspect of the vertebral body.   L3/4 demonstrates thecal sac to be decompressed. Foramina demonstrate moderate right and mild left foraminal narrowing   L4/5 demonstrates thecal sac to be decompressed there is anterolisthesis at this level. Foramina demonstrate mild right foraminal narrowing. Left foramina is unremarkable   L5/S1 demonstrates thecal sac to be decompressed. Foramina demonstrate mild right foraminal narrowing. Left foramina unremarkable               1. Findings raising suspicion for discitis osteomyelitis at the L1/2 intervertebral disc level with erosive changes along the inferior endplate of L1 and superior endplate of L2 as described above. There is more focal fluid signal intensity in the disc space as well as within the inferior endplate of the L1 vertebral body with corresponding resorption of the cortical and trabecular margin as seen on the CT. While, superimposed component of fracture along the inferior endplate of the L1 is a possibility previously described findings are likely secondary to postinfectious etiology given the configuration appearance.   2. Generalized edema in the paravertebral location with more focal paravertebral intramuscular abscess location within the left psoas muscle about the inferior endplate of the L1 vertebral body measuring 1.8 x 1.3 x 1.9 cm.   3. No epidural abscess formation is demonstrated.   4. Severe narrowing of the thecal sac at L2/3 extending into the inferior margin of the L3 vertebral body in relation to anterolisthesis, disc, and facet hypertrophic change.   Findings  phoned to patient's nurse at the time of interpretation on 01/13/2024 at 7:58 a.m..     MACRO: Critical Finding:  See findings. Notification was initiated on 1/13/2024 at 7:58 am by  Pankaj Wisdom.  (**-OCF-**) Instructions:   Signed by: Pankaj Wisdom 1/13/2024 8:03 AM Dictation workstation:   [id number]    CT lumbar spine wo IV contrast    Result Date: 1/11/2024  Interpreted By:  Adrián Marcum, STUDY: CT LUMBAR SPINE WO IV CONTRAST; 1/11/2024 3:47 pm   INDICATION: Signs/Symptoms:lower back pain;   COMPARISON: None   ACCESSION NUMBER(S): [id number]   ORDERING CLINICIAN: ALEX MORRISSEY   TECHNIQUE: Contiguous axial images of the lumbar spine were performed. Coronal and sagittal reformatted images were also obtained. All CT examinations are performed with 1 or more of the following dose reduction techniques: Automated exposure control, adjustment of mA and/or kv according to patient's size, or use of iterative reconstruction techniques.   FINDINGS: There is an acute appearing compression fracture along the inferior half of the L1 vertebral body with mild retropulsion of the inferior half of L1. No significant height loss is noted at L1. The remainder of the vertebral bodies appear adequate in height. There is a normal appearance of a posterior fusion at L3 through S1 with bilateral pedicle screws and rods which appear intact. Facet degenerative changes are present.   There is grade 1 anterolisthesis of L4 on L5 measuring approximately 8 mm.   There is severe hypertrophic facet degenerative change at L2-3 which are moderate-severely narrowing the neural foramina as well as moderate-severe spinal canal stenosis. Diffuse disc bulging also contributes to spinal canal and neural foramina stenosis at this level.   There is also a large osteophyte from severe hypertrophic facet degenerative change at L3 which is also causing moderate to moderate-severe spinal canal stenosis posterior to L3. The remainder of the  levels do not show evidence for high additional high-grade thecal sac stenosis.   Additional levels of neural foramina stenosis are noted. There is moderate-severe right neural foramina stenosis at L4-5 and at least moderate right neural foramina stenosis at L5-S1.   The SI joints show degenerative gas but are otherwise intact.       Acute compression fracture along the inferior aspect of L1 vertebral body with mild retropulsion of the inferior aspect of L1. No significant height loss is noted at L1.   No additional acute fractures are noted.   Stable appearance of posterior fusion with bilateral rods and pedicle screws at L3 through S1.   Chronic degenerative changes with high-grade spinal canal stenosis noted at L2-3 and L3-4. There is high-grade bilateral neural foramina stenosis at L2-3. There is moderate-severe right neural foramina stenosis at L4-5.     Signed by: Adrián Marcum 1/11/2024 4:46 PM Dictation workstation:   [id number]     Assessment/Plan   L1/L2 disc space infection-most likely secondary to bacteremia  Left psoas abscess-most likely secondary to bacteremia, s/p CT-guided aspiration on 1/18/2024-culture negative  Right foot ulcer with retained exposed hardware, s/p right foot hardware removal 1/16-op note reviewed, to level of bone-culture with MRSA and Citrobacter   Acute kidney injury - creatinine 1.7 on 1/17/24 - resolved  Pyuria-urine culture pending, UA with budding yeast-typically colonization       IV Daptomycin  IV meropenem  Ritu catheter placed 1/23/24 by interventional radiology  Pain management as needed  Supportive care  Monitor temperature and WBC  Long-term plan is at least 6 weeks of IV antibiotic therapy-IV daptomycin 400 mg every 48 hours and IV meropenem 1 gram every 12 hours till tentative stop date 2/24/2024, with consideration for suppressive therapy with Bactrim  Weekly CBC with diff, CMP, CK level  Follow up with Dr. Omar Bustillos, APRN-CNP

## 2024-01-23 NOTE — PROGRESS NOTES
"Carrie Rosario is a 83 y.o. female on day 11 of admission presenting with Compression fracture of L1 lumbar vertebra, closed, initial encounter (CMS/[facility]).    Subjective   Patient seen and examined.  No documented concerns/events overnight from nursing.  Afebrile overnight.  Denies chills.  No complaints voiced.       Objective     Physical Exam  Constitutional:       General: She is not in acute distress.     Appearance: She is ill-appearing. She is not toxic-appearing.   Cardiovascular:      Rate and Rhythm: Normal rate and regular rhythm.      Pulses: Normal pulses.      Heart sounds: Normal heart sounds.   Pulmonary:      Effort: Pulmonary effort is normal.      Breath sounds: Normal breath sounds.   Abdominal:      General: Bowel sounds are normal. There is no distension.      Palpations: Abdomen is soft.      Tenderness: There is no abdominal tenderness.   Skin:     General: Skin is warm and dry.      Comments: Dressing to right foot dry and intact   Neurological:      General: No focal deficit present.      Mental Status: She is alert and oriented to person, place, and time.   Psychiatric:         Mood and Affect: Mood normal.        Last Recorded Vitals  Blood pressure 139/61, pulse 72, temperature 36.3 °C (97.3 °F), temperature source Oral, resp. rate 18, height 1.651 m (5' 5\"), weight 58.1 kg (128 lb), SpO2 100 %.  Intake/Output last 3 Shifts:  I/O last 3 completed shifts:  In: 400 (6.9 mL/kg) [IV Piggyback:400]  Out: 1 (0 mL/kg) [Stool:1]  Weight: 58.1 kg     Relevant Results  Scheduled medications  acetaminophen, 650 mg, oral, 6x daily  amLODIPine, 2.5 mg, oral, Daily  aspirin, 81 mg, oral, Daily  atorvastatin, 20 mg, oral, Nightly  bisacodyl, 10 mg, rectal, Daily  [Held by provider] dapagliflozin propanediol, 10 mg, oral, q AM  daptomycin, 6 mg/kg, intravenous, q48h  docusate sodium, 100 mg, oral, BID  famotidine, 20 mg, oral, Daily  FLUoxetine, 40 mg, oral, Daily  furosemide, 20 mg, oral, " Daily  heparin (porcine), 5,000 Units, subcutaneous, q12h  insulin glargine, 20 Units, subcutaneous, q24h  insulin lispro, 0-15 Units, subcutaneous, TID with meals  lidocaine, 0.1 mL, subcutaneous, Once  lidocaine, 2 patch, transdermal, Daily  meropenem, 1 g, intravenous, q12h  metoprolol tartrate, 25 mg, oral, BID  morphine, 1 mg, intravenous, Once  pilocarpine, 5 mg, oral, TID  potassium chloride CR, 10 mEq, oral, Daily      Continuous medications     PRN medications  PRN medications: dextrose 10 % in water (D10W), dextrose, glucagon, hydrALAZINE, HYDROmorphone, HYDROmorphone, melatonin, ondansetron, oxyCODONE, oxygen, polyethylene glycol, tiZANidine, traMADol                             Assessment/Plan   Principal Problem:    Compression fracture of L1 lumbar vertebra, closed, initial encounter (CMS/[facility])  Active Problems:    Compression fracture of L1 vertebra, initial encounter (CMS/[facility])    Discitis / OM / L Psoas Abscess / Acute Compression Fracture L1 / Inability to Ambulate  -ID following  -PT, OT recommend mod intensity rehab  -IV ATB (daptomycin and meropenem until 2/24/2024 per ID recommendations)   -will likely need Bactrim indefinitely  -Ritu catheter placement by IR  -Orthopedic spine surgeon signed off, no surgical intervention     R Foot Ulceration   -Podiatry took patient to the OR for removal of hardware right foot  -Follow-up outpatient with podiatry  -Dressing changed on 1/19 to remain intact for 1 week per podiatry     DM II  -Monitor blood glucose  -ISS  -Diabetic diet  -HgA1C 8.9  -Continue Lantus     HTN  -Monitor blood pressure  -Continue home medications     CAD  -Continue home medications     HLD  -Continue statin.      DVT Risk  Heparin subcutaneous.   SCDs.      Plan   Above plan discussed at length with patient she is in agreement  Discharge to skilled nursing facility after Ritu catheter placed                I spent 25 minutes in the professional and overall care of this  patient.      Lilia Conner, APRN-CNP

## 2024-01-25 ENCOUNTER — NURSING HOME VISIT (OUTPATIENT)
Dept: POST ACUTE CARE | Facility: EXTERNAL LOCATION | Age: 84
End: 2024-01-25
Payer: MEDICARE

## 2024-01-25 DIAGNOSIS — E78.5 HYPERLIPIDEMIA, UNSPECIFIED HYPERLIPIDEMIA TYPE: ICD-10-CM

## 2024-01-25 DIAGNOSIS — I25.10 ASHD (ARTERIOSCLEROTIC HEART DISEASE): ICD-10-CM

## 2024-01-25 DIAGNOSIS — R53.1 WEAKNESS: ICD-10-CM

## 2024-01-25 DIAGNOSIS — K68.12 PSOAS ABSCESS (MULTI): ICD-10-CM

## 2024-01-25 DIAGNOSIS — S32.010D COMPRESSION FRACTURE OF L1 VERTEBRA WITH ROUTINE HEALING, SUBSEQUENT ENCOUNTER: Primary | ICD-10-CM

## 2024-01-25 DIAGNOSIS — L97.509 TYPE 2 DIABETES MELLITUS WITH FOOT ULCER, WITHOUT LONG-TERM CURRENT USE OF INSULIN (MULTI): ICD-10-CM

## 2024-01-25 DIAGNOSIS — E11.621 TYPE 2 DIABETES MELLITUS WITH FOOT ULCER, WITHOUT LONG-TERM CURRENT USE OF INSULIN (MULTI): ICD-10-CM

## 2024-01-25 DIAGNOSIS — L97.409 ULCER OF HEEL, UNSPECIFIED LATERALITY, UNSPECIFIED ULCER STAGE (MULTI): ICD-10-CM

## 2024-01-25 DIAGNOSIS — Z91.81 AT RISK FOR FALLS: ICD-10-CM

## 2024-01-25 DIAGNOSIS — I10 HTN (HYPERTENSION), BENIGN: ICD-10-CM

## 2024-01-25 PROCEDURE — 99305 1ST NF CARE MODERATE MDM 35: CPT | Performed by: INTERNAL MEDICINE

## 2024-01-25 NOTE — LETTER
Patient: Carrie Rosario  : 1940    Encounter Date: 2024    PLACE OF SERVICE:  [facility]    This is new/initial history and physical.    Subjective  Patient ID: Carrie Rosario is a 83 y.o. female who presents for new/initial history and Annual Exam.    Ms. Carrie Rosario is an 83-year-old female with history of acute compression fracture of L1 with psoas abscess on the left.  She is a diabetic and unable to care for herself.  She requires supportive care.    Review of Systems   Constitutional:  Negative for chills and fever.   Cardiovascular:  Negative for chest pain.   All other systems reviewed and are negative.    Objective  /78   Pulse 82   Temp 36.6 °C (97.9 °F)   Resp 16     Physical Exam  Vitals reviewed.   Constitutional:       General: She is not in acute distress.     Comments: This is a well-developed, well-nourished female, sitting in a chair   HENT:      Right Ear: Tympanic membrane, ear canal and external ear normal.      Left Ear: Tympanic membrane, ear canal and external ear normal.   Eyes:      General: No scleral icterus.     Pupils: Pupils are equal, round, and reactive to light.   Neck:      Vascular: No carotid bruit.   Cardiovascular:      Heart sounds: Normal heart sounds, S1 normal and S2 normal. No murmur heard.     No friction rub.   Pulmonary:      Effort: Pulmonary effort is normal.      Breath sounds: Normal breath sounds and air entry.   Abdominal:      Palpations: There is no hepatomegaly, splenomegaly or mass.   Musculoskeletal:         General: No swelling or deformity. Normal range of motion.      Cervical back: Neck supple.      Right lower leg: No edema.      Left lower leg: No edema.      Comments: Back exam shows tenderness over the patient's lumbosacral spine.   Lymphadenopathy:      Cervical: No cervical adenopathy.      Upper Body:      Right upper body: No axillary adenopathy.      Left upper body: No axillary adenopathy.       Lower Body: No right inguinal adenopathy. No left inguinal adenopathy.   Neurological:      Mental Status: She is oriented to person, place, and time.      Cranial Nerves: Cranial nerves 2-12 are intact. No cranial nerve deficit.      Sensory: No sensory deficit.      Motor: Motor function is intact. No weakness.      Gait: Gait is intact.      Deep Tendon Reflexes: Reflexes normal.   Psychiatric:         Mood and Affect: Mood normal. Mood is not anxious or depressed. Affect is not angry.         Behavior: Behavior is not agitated.         Thought Content: Thought content normal.         Judgment: Judgment normal.     LAB WORK:  Laboratory studies were reviewed.    Assessment/Plan  Problem List Items Addressed This Visit             ICD-10-CM       Cardiac and Vasculature    HTN (hypertension), benign I10       Endocrine/Metabolic    Type 2 diabetes mellitus (CMS/[facility]) E11.9     Other Visit Diagnoses         Codes    Compression fracture of L1 vertebra with routine healing, subsequent encounter    -  Primary S32.010D    Psoas abscess (CMS/[facility])     K68.12    Hyperlipidemia, unspecified hyperlipidemia type     E78.5    ASHD (arteriosclerotic heart disease)     I25.10    Weakness     R53.1    At risk for falls     Z91.81    Ulcer of heel, unspecified laterality, unspecified ulcer stage (CMS/[facility])     L97.409        1. Acute compression fracture of L1, on pain control.  2. Psoas abscess, on antibiotic.  3. Diabetes, on insulin.  4. Hypertension, med controlled.  5. Hyperlipidemia, on statin.  6. ASHD, on aspirin.  7. Weakness, on PT/OT.  8. Fall risk, on fall precaution.  9. Heel ulcer, continue wound care, follow-up with Podiatry.    Scribe Attestation  By signing my name below, IAni, Elena attest that this documentation has been prepared under the direction and in the presence of Magnolia Blanca MD.       Electronically Signed By: Magnolia Blanca MD   1/31/24  9:22 AM

## 2024-01-28 ENCOUNTER — NURSING HOME VISIT (OUTPATIENT)
Dept: POST ACUTE CARE | Facility: EXTERNAL LOCATION | Age: 84
End: 2024-01-28
Payer: MEDICARE

## 2024-01-28 DIAGNOSIS — Z91.81 AT RISK FOR FALLS: ICD-10-CM

## 2024-01-28 DIAGNOSIS — I25.10 ASHD (ARTERIOSCLEROTIC HEART DISEASE): ICD-10-CM

## 2024-01-28 DIAGNOSIS — I10 HTN (HYPERTENSION), BENIGN: ICD-10-CM

## 2024-01-28 DIAGNOSIS — S32.010D COMPRESSION FRACTURE OF L1 VERTEBRA WITH ROUTINE HEALING, SUBSEQUENT ENCOUNTER: Primary | ICD-10-CM

## 2024-01-28 DIAGNOSIS — E78.5 HYPERLIPIDEMIA, UNSPECIFIED HYPERLIPIDEMIA TYPE: ICD-10-CM

## 2024-01-28 DIAGNOSIS — M48.00 SPINAL STENOSIS, UNSPECIFIED SPINAL REGION: ICD-10-CM

## 2024-01-28 DIAGNOSIS — L97.509 TYPE 2 DIABETES MELLITUS WITH FOOT ULCER, WITHOUT LONG-TERM CURRENT USE OF INSULIN (MULTI): ICD-10-CM

## 2024-01-28 DIAGNOSIS — K68.12 PSOAS ABSCESS (MULTI): ICD-10-CM

## 2024-01-28 DIAGNOSIS — E11.621 TYPE 2 DIABETES MELLITUS WITH FOOT ULCER, WITHOUT LONG-TERM CURRENT USE OF INSULIN (MULTI): ICD-10-CM

## 2024-01-28 DIAGNOSIS — R53.1 WEAKNESS: ICD-10-CM

## 2024-01-28 PROCEDURE — 99308 SBSQ NF CARE LOW MDM 20: CPT | Performed by: INTERNAL MEDICINE

## 2024-01-28 NOTE — LETTER
Patient: Carrie Rosario  : 1940    Encounter Date: 2024    PLACE OF SERVICE:  [facility]    This is a subsequent visit.    Subjective  Patient ID: Carrie Rosario is a 83 y.o. female who presents for Follow-up.    Ms. Carrie Rosario is an 83-year-old female with history of psoas abscess with acute compression fracture of L1.  She is currently on pain control, as well as antibiotic therapy.  She is unable to care for herself and requires supportive care.    Review of Systems   Constitutional:  Negative for chills and fever.   Cardiovascular:  Negative for chest pain.   All other systems reviewed and are negative.    Objective  /82   Pulse 84   Temp 36.9 °C (98.4 °F)   Resp 18     Physical Exam  Vitals reviewed.   Constitutional:       General: She is not in acute distress.     Comments: This is a well-developed, well-nourished female, sitting in a chair.   HENT:      Right Ear: Tympanic membrane, ear canal and external ear normal.      Left Ear: Tympanic membrane, ear canal and external ear normal.   Eyes:      General: No scleral icterus.     Pupils: Pupils are equal, round, and reactive to light.   Neck:      Vascular: No carotid bruit.   Cardiovascular:      Heart sounds: Normal heart sounds, S1 normal and S2 normal. No murmur heard.     No friction rub.   Pulmonary:      Effort: Pulmonary effort is normal.      Breath sounds: Normal breath sounds and air entry.   Abdominal:      Palpations: There is no hepatomegaly, splenomegaly or mass.   Musculoskeletal:         General: No swelling or deformity. Normal range of motion.      Cervical back: Neck supple.      Lumbar back: Tenderness present.      Right lower leg: No edema.      Left lower leg: No edema.   Lymphadenopathy:      Cervical: No cervical adenopathy.      Upper Body:      Right upper body: No axillary adenopathy.      Left upper body: No axillary adenopathy.      Lower Body: No right inguinal adenopathy.  No left inguinal adenopathy.   Neurological:      Mental Status: She is oriented to person, place, and time.      Cranial Nerves: Cranial nerves 2-12 are intact. No cranial nerve deficit.      Sensory: No sensory deficit.      Motor: Motor function is intact. No weakness.      Gait: Gait is intact.      Deep Tendon Reflexes: Reflexes normal.   Psychiatric:         Mood and Affect: Mood normal. Mood is not anxious or depressed. Affect is not angry.         Behavior: Behavior is not agitated.         Thought Content: Thought content normal.         Judgment: Judgment normal.     LAB WORK:  Laboratory studies were reviewed.    Assessment/Plan  Problem List Items Addressed This Visit             ICD-10-CM       Cardiac and Vasculature    HTN (hypertension), benign I10       Endocrine/Metabolic    Type 2 diabetes mellitus (CMS/[facility]) E11.9     Other Visit Diagnoses         Codes    Compression fracture of L1 vertebra with routine healing, subsequent encounter    -  Primary S32.010D    Psoas abscess (CMS/[facility])     K68.12    Weakness     R53.1    At risk for falls     Z91.81    ASHD (arteriosclerotic heart disease)     I25.10    Hyperlipidemia, unspecified hyperlipidemia type     E78.5    Spinal stenosis, unspecified spinal region     M48.00        1. Acute compression fracture of L1, on pain control.  2. Diabetes, on insulin.  3. Psoas abscess, on antibiotic.  4. Hypertension, med controlled.  5. Weakness, on PT/OT.  6. Fall risk, on fall precaution.  7. ASHD, on aspirin.  8. Hyperlipidemia, on statin.  9. Spinal stenosis, on pain control.    Scribe Attestation  By signing my name below, IAni, Scribe attest that this documentation has been prepared under the direction and in the presence of Magnolia Blanca MD.       Electronically Signed By: Magnolia Blanca MD   1/31/24  9:21 AM

## 2024-01-29 VITALS
DIASTOLIC BLOOD PRESSURE: 82 MMHG | SYSTOLIC BLOOD PRESSURE: 130 MMHG | HEART RATE: 84 BPM | RESPIRATION RATE: 18 BRPM | TEMPERATURE: 98.4 F

## 2024-01-29 VITALS
HEART RATE: 82 BPM | TEMPERATURE: 97.9 F | RESPIRATION RATE: 16 BRPM | DIASTOLIC BLOOD PRESSURE: 78 MMHG | SYSTOLIC BLOOD PRESSURE: 128 MMHG

## 2024-01-29 ASSESSMENT — ENCOUNTER SYMPTOMS
CHILLS: 0
FEVER: 0
CHILLS: 0
FEVER: 0

## 2024-01-29 NOTE — PROGRESS NOTES
PLACE OF SERVICE:  [facility]    This is a subsequent visit.    Subjective   Patient ID: Carrie Rosario is a 83 y.o. female who presents for Follow-up.    Ms. Carrie Rosario is an 83-year-old female with history of psoas abscess with acute compression fracture of L1.  She is currently on pain control, as well as antibiotic therapy.  She is unable to care for herself and requires supportive care.    Review of Systems   Constitutional:  Negative for chills and fever.   Cardiovascular:  Negative for chest pain.   All other systems reviewed and are negative.    Objective   /82   Pulse 84   Temp 36.9 °C (98.4 °F)   Resp 18     Physical Exam  Vitals reviewed.   Constitutional:       General: She is not in acute distress.     Comments: This is a well-developed, well-nourished female, sitting in a chair.   HENT:      Right Ear: Tympanic membrane, ear canal and external ear normal.      Left Ear: Tympanic membrane, ear canal and external ear normal.   Eyes:      General: No scleral icterus.     Pupils: Pupils are equal, round, and reactive to light.   Neck:      Vascular: No carotid bruit.   Cardiovascular:      Heart sounds: Normal heart sounds, S1 normal and S2 normal. No murmur heard.     No friction rub.   Pulmonary:      Effort: Pulmonary effort is normal.      Breath sounds: Normal breath sounds and air entry.   Abdominal:      Palpations: There is no hepatomegaly, splenomegaly or mass.   Musculoskeletal:         General: No swelling or deformity. Normal range of motion.      Cervical back: Neck supple.      Lumbar back: Tenderness present.      Right lower leg: No edema.      Left lower leg: No edema.   Lymphadenopathy:      Cervical: No cervical adenopathy.      Upper Body:      Right upper body: No axillary adenopathy.      Left upper body: No axillary adenopathy.      Lower Body: No right inguinal adenopathy. No left inguinal adenopathy.   Neurological:      Mental Status: She is  oriented to person, place, and time.      Cranial Nerves: Cranial nerves 2-12 are intact. No cranial nerve deficit.      Sensory: No sensory deficit.      Motor: Motor function is intact. No weakness.      Gait: Gait is intact.      Deep Tendon Reflexes: Reflexes normal.   Psychiatric:         Mood and Affect: Mood normal. Mood is not anxious or depressed. Affect is not angry.         Behavior: Behavior is not agitated.         Thought Content: Thought content normal.         Judgment: Judgment normal.     LAB WORK:  Laboratory studies were reviewed.    Assessment/Plan   Problem List Items Addressed This Visit             ICD-10-CM       Cardiac and Vasculature    HTN (hypertension), benign I10       Endocrine/Metabolic    Type 2 diabetes mellitus (CMS/[facility]) E11.9     Other Visit Diagnoses         Codes    Compression fracture of L1 vertebra with routine healing, subsequent encounter    -  Primary S32.010D    Psoas abscess (CMS/[facility])     K68.12    Weakness     R53.1    At risk for falls     Z91.81    ASHD (arteriosclerotic heart disease)     I25.10    Hyperlipidemia, unspecified hyperlipidemia type     E78.5    Spinal stenosis, unspecified spinal region     M48.00        1. Acute compression fracture of L1, on pain control.  2. Diabetes, on insulin.  3. Psoas abscess, on antibiotic.  4. Hypertension, med controlled.  5. Weakness, on PT/OT.  6. Fall risk, on fall precaution.  7. ASHD, on aspirin.  8. Hyperlipidemia, on statin.  9. Spinal stenosis, on pain control.    Scribe Attestation  By signing my name below, IAni Scribe attest that this documentation has been prepared under the direction and in the presence of Magnolia Blanca MD.   All medical record entries made by the scribe were personally dictated by me I have reviewed the chart and agree the record accurately reflects my personal performance of his history physical examination and management

## 2024-01-29 NOTE — PROGRESS NOTES
PLACE OF SERVICE:  [facility]    This is new/initial history and physical.    Subjective   Patient ID: Carrie Rosario is a 83 y.o. female who presents for new/initial history and Annual Exam.    Ms. Carrie Rosario is an 83-year-old female with history of acute compression fracture of L1 with psoas abscess on the left.  She is a diabetic and unable to care for herself.  She requires supportive care.    Review of Systems   Constitutional:  Negative for chills and fever.   Cardiovascular:  Negative for chest pain.   All other systems reviewed and are negative.    Objective   /78   Pulse 82   Temp 36.6 °C (97.9 °F)   Resp 16     Physical Exam  Vitals reviewed.   Constitutional:       General: She is not in acute distress.     Comments: This is a well-developed, well-nourished female, sitting in a chair   HENT:      Right Ear: Tympanic membrane, ear canal and external ear normal.      Left Ear: Tympanic membrane, ear canal and external ear normal.   Eyes:      General: No scleral icterus.     Pupils: Pupils are equal, round, and reactive to light.   Neck:      Vascular: No carotid bruit.   Cardiovascular:      Heart sounds: Normal heart sounds, S1 normal and S2 normal. No murmur heard.     No friction rub.   Pulmonary:      Effort: Pulmonary effort is normal.      Breath sounds: Normal breath sounds and air entry.   Abdominal:      Palpations: There is no hepatomegaly, splenomegaly or mass.   Musculoskeletal:         General: No swelling or deformity. Normal range of motion.      Cervical back: Neck supple.      Right lower leg: No edema.      Left lower leg: No edema.      Comments: Back exam shows tenderness over the patient's lumbosacral spine.   Lymphadenopathy:      Cervical: No cervical adenopathy.      Upper Body:      Right upper body: No axillary adenopathy.      Left upper body: No axillary adenopathy.      Lower Body: No right inguinal adenopathy. No left inguinal  adenopathy.   Neurological:      Mental Status: She is oriented to person, place, and time.      Cranial Nerves: Cranial nerves 2-12 are intact. No cranial nerve deficit.      Sensory: No sensory deficit.      Motor: Motor function is intact. No weakness.      Gait: Gait is intact.      Deep Tendon Reflexes: Reflexes normal.   Psychiatric:         Mood and Affect: Mood normal. Mood is not anxious or depressed. Affect is not angry.         Behavior: Behavior is not agitated.         Thought Content: Thought content normal.         Judgment: Judgment normal.     LAB WORK:  Laboratory studies were reviewed.    Assessment/Plan   Problem List Items Addressed This Visit             ICD-10-CM       Cardiac and Vasculature    HTN (hypertension), benign I10       Endocrine/Metabolic    Type 2 diabetes mellitus (CMS/[facility]) E11.9     Other Visit Diagnoses         Codes    Compression fracture of L1 vertebra with routine healing, subsequent encounter    -  Primary S32.010D    Psoas abscess (CMS/[facility])     K68.12    Hyperlipidemia, unspecified hyperlipidemia type     E78.5    ASHD (arteriosclerotic heart disease)     I25.10    Weakness     R53.1    At risk for falls     Z91.81    Ulcer of heel, unspecified laterality, unspecified ulcer stage (CMS/[facility])     L97.409        1. Acute compression fracture of L1, on pain control.  2. Psoas abscess, on antibiotic.  3. Diabetes, on insulin.  4. Hypertension, med controlled.  5. Hyperlipidemia, on statin.  6. ASHD, on aspirin.  7. Weakness, on PT/OT.  8. Fall risk, on fall precaution.  9. Heel ulcer, continue wound care, follow-up with Podiatry.    Scribe Attestation  By signing my name below, IAni Scribe attest that this documentation has been prepared under the direction and in the presence of Magnolia Blanca MD.

## 2024-01-31 DIAGNOSIS — A41.9 SEPSIS, DUE TO UNSPECIFIED ORGANISM, UNSPECIFIED WHETHER ACUTE ORGAN DYSFUNCTION PRESENT (MULTI): ICD-10-CM

## 2024-02-01 ENCOUNTER — APPOINTMENT (OUTPATIENT)
Dept: PREADMISSION TESTING | Facility: HOSPITAL | Age: 84
End: 2024-02-01
Payer: MEDICARE

## 2024-02-02 ENCOUNTER — NURSING HOME VISIT (OUTPATIENT)
Dept: POST ACUTE CARE | Facility: EXTERNAL LOCATION | Age: 84
End: 2024-02-02
Payer: MEDICARE

## 2024-02-02 DIAGNOSIS — E78.5 HYPERLIPIDEMIA, UNSPECIFIED HYPERLIPIDEMIA TYPE: ICD-10-CM

## 2024-02-02 DIAGNOSIS — M46.20 PARASPINAL ABSCESS (MULTI): ICD-10-CM

## 2024-02-02 DIAGNOSIS — I10 HTN (HYPERTENSION), BENIGN: ICD-10-CM

## 2024-02-02 DIAGNOSIS — E11.9 TYPE 2 DIABETES MELLITUS WITHOUT COMPLICATION, WITH LONG-TERM CURRENT USE OF INSULIN (MULTI): ICD-10-CM

## 2024-02-02 DIAGNOSIS — Z91.81 AT RISK FOR FALLS: ICD-10-CM

## 2024-02-02 DIAGNOSIS — I63.9 CEREBROVASCULAR ACCIDENT (CVA), UNSPECIFIED MECHANISM (MULTI): ICD-10-CM

## 2024-02-02 DIAGNOSIS — M19.90 OSTEOARTHRITIS, UNSPECIFIED OSTEOARTHRITIS TYPE, UNSPECIFIED SITE: ICD-10-CM

## 2024-02-02 DIAGNOSIS — K21.9 GASTROESOPHAGEAL REFLUX DISEASE WITHOUT ESOPHAGITIS: ICD-10-CM

## 2024-02-02 DIAGNOSIS — S32.010A COMPRESSION FRACTURE OF L1 VERTEBRA, INITIAL ENCOUNTER (MULTI): Primary | ICD-10-CM

## 2024-02-02 DIAGNOSIS — R53.1 WEAKNESS: ICD-10-CM

## 2024-02-02 DIAGNOSIS — Z79.4 TYPE 2 DIABETES MELLITUS WITHOUT COMPLICATION, WITH LONG-TERM CURRENT USE OF INSULIN (MULTI): ICD-10-CM

## 2024-02-02 PROCEDURE — 99308 SBSQ NF CARE LOW MDM 20: CPT | Performed by: INTERNAL MEDICINE

## 2024-02-02 NOTE — LETTER
Patient: Carrie Rosario  : 1940    Encounter Date: 2024    PLACE OF SERVICE: [facility]    This is a subsequent visit.    Subjective  Patient ID: Carrie Rosario is a 83 y.o. female who presents for Follow-up.    Ms. Carrie Rosario is an 83-year-old female with history of fracture of L1 with paraspinal abscess.  She is a diabetic and unable to care for herself.  She requires supportive care.    Review of Systems   Constitutional:  Negative for chills and fever.   Cardiovascular:  Negative for chest pain.   All other systems reviewed and are negative.    Objective  /78   Pulse 82   Temp 36.8 °C (98.3 °F)   Resp 16     Physical Exam  Vitals reviewed.   Constitutional:       General: She is not in acute distress.     Comments: This is a well-developed and well-nourished female, sitting in a chair.   HENT:      Right Ear: Tympanic membrane, ear canal and external ear normal.      Left Ear: Tympanic membrane, ear canal and external ear normal.   Eyes:      General: No scleral icterus.     Pupils: Pupils are equal, round, and reactive to light.   Neck:      Vascular: No carotid bruit.   Cardiovascular:      Heart sounds: Normal heart sounds, S1 normal and S2 normal. No murmur heard.     No friction rub.   Pulmonary:      Effort: Pulmonary effort is normal.      Breath sounds: Normal breath sounds and air entry.   Abdominal:      Palpations: There is no hepatomegaly, splenomegaly or mass.   Musculoskeletal:         General: No swelling or deformity. Normal range of motion.      Cervical back: Neck supple.      Right lower leg: No edema.      Left lower leg: No edema.   Lymphadenopathy:      Cervical: No cervical adenopathy.      Upper Body:      Right upper body: No axillary adenopathy.      Left upper body: No axillary adenopathy.      Lower Body: No right inguinal adenopathy. No left inguinal adenopathy.   Skin:     Comments: BACK: Exam shows dressing over the patient's  lumbar area.  There is no foul odor.  There is no color drainage.   Neurological:      Mental Status: She is oriented to person, place, and time.      Cranial Nerves: Cranial nerves 2-12 are intact. No cranial nerve deficit.      Sensory: No sensory deficit.      Motor: Motor function is intact. No weakness.      Gait: Gait is intact.      Deep Tendon Reflexes: Reflexes normal.   Psychiatric:         Mood and Affect: Mood normal. Mood is not anxious or depressed. Affect is not angry.         Behavior: Behavior is not agitated.         Thought Content: Thought content normal.         Judgment: Judgment normal.     LAB WORK: Laboratory studies were reviewed.    Assessment/Plan  Problem List Items Addressed This Visit             ICD-10-CM       Cardiac and Vasculature    HTN (hypertension), benign I10       Endocrine/Metabolic    Type 2 diabetes mellitus (CMS/[facility]) E11.9       Gastrointestinal and Abdominal    Gastroesophageal reflux disease K21.9       Neuro    Cerebrovascular accident (CMS/[facility]) I63.9       Other    Compression fracture of L1 vertebra, initial encounter (CMS/[facility]) - Primary S32.010A     Other Visit Diagnoses         Codes    Paraspinal abscess (CMS/[facility])     M46.20    Weakness     R53.1    At risk for falls     Z91.81    Hyperlipidemia, unspecified hyperlipidemia type     E78.5    Osteoarthritis, unspecified osteoarthritis type, unspecified site     M19.90        1. Fracture of L1, on pain control.  2. Paraspinal abscess, on antibiotic, follow with Neurosurgery.  3. Diabetes, on insulin.  4. CVA, on supportive care.  5. Weakness, on PT/OT.  6. Fall risk, on fall precaution.  7. Hypertension, medically controlled.  8. Hyperlipidemia, on statin.  9. Reflux disease, on PPI.  10. Osteoarthritis, on Tylenol.    Scribe Attestation  By signing my name below, Ani PEDERSON Scribe attest that this documentation has been prepared under the direction and in the presence of Magnolia Blanca MD.   All medical record  entries made by the scribe were personally dictated by me I have reviewed the chart and agree the record accurately reflects my personal performance of his history physical examination and management      Electronically Signed By: Magnolia Blanca MD   2/28/24  6:16 PM

## 2024-02-05 NOTE — DOCUMENTATION CLARIFICATION NOTE
"    PATIENT:               ZACH BULL  ACCT #:                  [id number]  MRN:                       [id number]  :                       1940  ADMIT DATE:       2024 12:39 PM  DISCH DATE:        2024 6:25 PM  RESPONDING PROVIDER #:        [id number]          PROVIDER RESPONSE TEXT:    Traumatic fracture    [facility] QUERY TEXT:    [facility]_Fractures    Instruction:    Based on your assessment of the patient and the clinical information, please provide the requested documentation by clicking on the appropriate radio button and enter any additional information if prompted.    Question: Please further clarify specify if able fracture type as        When answering this query, please exercise your independent professional judgment. The fact that a question is being asked, does not imply that any particular answer is desired or expected.    The patient's clinical indicators include:  Clinical Information:  83 year old female -  Acute Compression Fracture along the inferior half of the L1 vertebral body.    Clinical Indicators:  24- ER Record- \"Presents for atraumatic lower back pain x 3 days\".  24- H/P-\"Presenting with low back pain, inability to ambulate. Has history of spinal stenosis s/p lumbar surgery many years ago with resultant R foot drop. CT imaging of the lumbar spine demonstrated acute compression fracture L1 without significant height loss\".  24- Dr. Kraus - \"I reviewed the images of the CT of the lumbar spine.She has retained pedicle instrumentation and a  solid fusion from L3-S1.She has advanced facet arthropathy at L 2-3.She has what appears to be lower  vertebral body fracture of L1.There appears to be extensive dissolution of the body, there is  generalized osteopenia.There is no canal compromise and no gross instability.  24- Dr. Kraus- \"I reviewed the images of the MRI of the lumbar spine.It demonstrates osteomyelitis and discitis at L1 L1-2 discand L2 vertebrae.There is a " "small left extension of infection as a psoas abscess.There is also longstanding severe spinal stenosis at the proximal adjacent edge of her prior fusion, at L2-3.  There is no apparent involvement of the previous hardware L3-S1\".  1/13/24- Hospitalist Progress Note - \"MRI Lumbar spine showed discitis/OM L1-2 with intramuscular abscess of the L psoas muscle\".  1/23/24- DC Diagnosis \"Compression fracture of L1 lumbar vertebra, closed\".    Treatment:  Orthopedic Spine Consult  PT/OT  PRN pain medician and muscle relaxers  DC Skilled Nrsing    Risk Factors:  Elderly  Spinal Canal Stenosis  Options provided:  -- Traumatic fracture  -- Non-Traumatic fracture  -- Pathologic fracture  -- Other - I will add my own diagnosis  -- Refer to Clinical Documentation Reviewer    Query created by: Noemí Kahn on 1/29/2024 3:16 PM      Electronically signed by:  BRUCE MCQUEEN 2/5/2024 7:38 AM          "

## 2024-02-11 ENCOUNTER — NURSING HOME VISIT (OUTPATIENT)
Dept: POST ACUTE CARE | Facility: EXTERNAL LOCATION | Age: 84
End: 2024-02-11
Payer: MEDICARE

## 2024-02-11 DIAGNOSIS — M46.20 PARASPINAL ABSCESS (MULTI): ICD-10-CM

## 2024-02-11 DIAGNOSIS — E78.5 HYPERLIPIDEMIA, UNSPECIFIED HYPERLIPIDEMIA TYPE: ICD-10-CM

## 2024-02-11 DIAGNOSIS — I63.9 CEREBROVASCULAR ACCIDENT (CVA), UNSPECIFIED MECHANISM (MULTI): ICD-10-CM

## 2024-02-11 DIAGNOSIS — M19.90 OSTEOARTHRITIS, UNSPECIFIED OSTEOARTHRITIS TYPE, UNSPECIFIED SITE: ICD-10-CM

## 2024-02-11 DIAGNOSIS — Z79.4 TYPE 2 DIABETES MELLITUS WITHOUT COMPLICATION, WITH LONG-TERM CURRENT USE OF INSULIN (MULTI): ICD-10-CM

## 2024-02-11 DIAGNOSIS — S32.010D COMPRESSION FRACTURE OF L1 VERTEBRA WITH ROUTINE HEALING, SUBSEQUENT ENCOUNTER: Primary | ICD-10-CM

## 2024-02-11 DIAGNOSIS — Z91.81 AT RISK FOR FALLS: ICD-10-CM

## 2024-02-11 DIAGNOSIS — R53.1 WEAKNESS: ICD-10-CM

## 2024-02-11 DIAGNOSIS — I10 HTN (HYPERTENSION), BENIGN: ICD-10-CM

## 2024-02-11 DIAGNOSIS — E11.9 TYPE 2 DIABETES MELLITUS WITHOUT COMPLICATION, WITH LONG-TERM CURRENT USE OF INSULIN (MULTI): ICD-10-CM

## 2024-02-11 PROCEDURE — 99308 SBSQ NF CARE LOW MDM 20: CPT | Performed by: INTERNAL MEDICINE

## 2024-02-11 NOTE — LETTER
Patient: Carrie Rosario  : 1940    Encounter Date: 2024    PLACE OF SERVICE: [facility]    This is a subsequent visit.    Subjective  Patient ID: Carrie Rosario is a 83 y.o. female who presents for Follow-up.    Ms. Carrie Rosario is an 83-year-old female with history of compression fracture of L1.  She was also found to have a paraspinal abscess.  She continues on antibiotics as well as pain control.  She is a fall risk and requires supportive care.    Review of Systems   Constitutional:  Negative for chills and fever.   Cardiovascular:  Negative for chest pain.   All other systems reviewed and are negative.    Objective  /80   Pulse 78   Temp 36.7 °C (98.1 °F)   Resp 16     Physical Exam  Vitals reviewed.   Constitutional:       General: She is not in acute distress.     Comments: This is a well-developed and well-nourished female, sitting in a chair.   HENT:      Right Ear: Tympanic membrane, ear canal and external ear normal.      Left Ear: Tympanic membrane, ear canal and external ear normal.   Eyes:      General: No scleral icterus.     Pupils: Pupils are equal, round, and reactive to light.   Neck:      Vascular: No carotid bruit.   Cardiovascular:      Heart sounds: Normal heart sounds, S1 normal and S2 normal. No murmur heard.     No friction rub.   Pulmonary:      Effort: Pulmonary effort is normal.      Breath sounds: Normal breath sounds and air entry.   Abdominal:      Palpations: There is no hepatomegaly, splenomegaly or mass.   Musculoskeletal:         General: No swelling or deformity. Normal range of motion.      Cervical back: Neck supple.      Right lower leg: No edema.      Left lower leg: No edema.   Lymphadenopathy:      Cervical: No cervical adenopathy.      Upper Body:      Right upper body: No axillary adenopathy.      Left upper body: No axillary adenopathy.      Lower Body: No right inguinal adenopathy. No left inguinal adenopathy.    Skin:     Comments: BACK:  Exam shows dressing over the patient's lumbar region.   Neurological:      Mental Status: She is oriented to person, place, and time.      Cranial Nerves: Cranial nerves 2-12 are intact. No cranial nerve deficit.      Sensory: No sensory deficit.      Motor: Motor function is intact. No weakness.      Gait: Gait is intact.      Deep Tendon Reflexes: Reflexes normal.   Psychiatric:         Mood and Affect: Mood normal. Mood is not anxious or depressed. Affect is not angry.         Behavior: Behavior is not agitated.         Thought Content: Thought content normal.         Judgment: Judgment normal.     LAB WORK: Laboratory studies were reviewed.    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 Fracture of L1, on pain control.  2. Paraspinal abscess, continue wound care, follow with Neurosurgery.  3. Diabetes, on insulin.  4. Hypertension, medically controlled.  5. Hyperlipidemia, on statin.  6. Weakness, on PT/OT.  7. Fall risk, on fall precaution.  8. CVA, on supportive care.  9. Osteoarthritis, on Tylenol.    Scribe Attestation  By signing my name below, IAni, Scrceline attest that this documentation has been prepared under the direction and in the presence of Magnolia Blanca MD.       Electronically Signed By: Magnolia Blanca MD   2/28/24  6:16 PM

## 2024-02-15 ENCOUNTER — HOSPITAL ENCOUNTER (OUTPATIENT)
Dept: RADIOLOGY | Facility: HOSPITAL | Age: 84
Discharge: HOME | End: 2024-02-15
Payer: MEDICARE

## 2024-02-15 DIAGNOSIS — A41.9 SEPSIS, DUE TO UNSPECIFIED ORGANISM, UNSPECIFIED WHETHER ACUTE ORGAN DYSFUNCTION PRESENT (MULTI): ICD-10-CM

## 2024-02-15 PROCEDURE — 36589 REMOVAL TUNNELED CV CATH: CPT

## 2024-02-18 ENCOUNTER — NURSING HOME VISIT (OUTPATIENT)
Dept: POST ACUTE CARE | Facility: EXTERNAL LOCATION | Age: 84
End: 2024-02-18
Payer: MEDICARE

## 2024-02-18 DIAGNOSIS — K21.9 GASTROESOPHAGEAL REFLUX DISEASE WITHOUT ESOPHAGITIS: ICD-10-CM

## 2024-02-18 DIAGNOSIS — E78.5 HYPERLIPIDEMIA, UNSPECIFIED HYPERLIPIDEMIA TYPE: ICD-10-CM

## 2024-02-18 DIAGNOSIS — S32.010D COMPRESSION FRACTURE OF L1 VERTEBRA WITH ROUTINE HEALING, SUBSEQUENT ENCOUNTER: Primary | ICD-10-CM

## 2024-02-18 DIAGNOSIS — R53.1 WEAKNESS: ICD-10-CM

## 2024-02-18 DIAGNOSIS — I10 HTN (HYPERTENSION), BENIGN: ICD-10-CM

## 2024-02-18 DIAGNOSIS — M46.20 PARASPINAL ABSCESS (MULTI): ICD-10-CM

## 2024-02-18 DIAGNOSIS — E11.9 TYPE 2 DIABETES MELLITUS WITHOUT COMPLICATION, WITH LONG-TERM CURRENT USE OF INSULIN (MULTI): ICD-10-CM

## 2024-02-18 DIAGNOSIS — Z79.4 TYPE 2 DIABETES MELLITUS WITHOUT COMPLICATION, WITH LONG-TERM CURRENT USE OF INSULIN (MULTI): ICD-10-CM

## 2024-02-18 DIAGNOSIS — Z91.81 AT RISK FOR FALLS: ICD-10-CM

## 2024-02-18 DIAGNOSIS — M19.90 OSTEOARTHRITIS, UNSPECIFIED OSTEOARTHRITIS TYPE, UNSPECIFIED SITE: ICD-10-CM

## 2024-02-18 PROCEDURE — 99308 SBSQ NF CARE LOW MDM 20: CPT | Performed by: INTERNAL MEDICINE

## 2024-02-18 NOTE — LETTER
Patient: Carrie Rosario  : 1940    Encounter Date: 2024    PLACE OF SERVICE: [facility]    This is a subsequent visit.    Subjective  Patient ID: Carrie Rosario is a 83 y.o. female who presents for Follow-up.    Ms. Carrie Rosario is an 83-year-old female with history of paraspinal abscess with compression fracture of L1.  She has a difficult time ambulating and requires supportive care.    Review of Systems   Constitutional:  Negative for chills and fever.   Cardiovascular:  Negative for chest pain.   All other systems reviewed and are negative.    Objective  /78   Pulse 76   Temp 36.7 °C (98 °F)   Resp 16     Physical Exam  Vitals reviewed.   Constitutional:       General: She is not in acute distress.     Comments: This is a well-developed and well-nourished female, sitting in a chair.   HENT:      Right Ear: Tympanic membrane, ear canal and external ear normal.      Left Ear: Tympanic membrane, ear canal and external ear normal.   Eyes:      General: No scleral icterus.     Pupils: Pupils are equal, round, and reactive to light.   Neck:      Vascular: No carotid bruit.   Cardiovascular:      Heart sounds: Normal heart sounds, S1 normal and S2 normal. No murmur heard.     No friction rub.   Pulmonary:      Effort: Pulmonary effort is normal.      Breath sounds: Normal breath sounds and air entry.   Abdominal:      Palpations: There is no hepatomegaly, splenomegaly or mass.   Musculoskeletal:         General: No swelling or deformity. Normal range of motion.      Cervical back: Neck supple.      Right lower leg: No edema.      Left lower leg: No edema.   Lymphadenopathy:      Cervical: No cervical adenopathy.      Upper Body:      Right upper body: No axillary adenopathy.      Left upper body: No axillary adenopathy.      Lower Body: No right inguinal adenopathy. No left inguinal adenopathy.   Neurological:      Mental Status: She is oriented to person, place, and  time.      Cranial Nerves: Cranial nerves 2-12 are intact. No cranial nerve deficit.      Sensory: No sensory deficit.      Motor: Motor function is intact. No weakness.      Gait: Gait is intact.      Deep Tendon Reflexes: Reflexes normal.   Psychiatric:         Mood and Affect: Mood normal. Mood is not anxious or depressed. Affect is not angry.         Behavior: Behavior is not agitated.         Thought Content: Thought content normal.         Judgment: Judgment normal.     LAB WORK: Laboratory studies were reviewed.    Assessment/Plan  Problem List Items Addressed This Visit             ICD-10-CM       Cardiac and Vasculature    HTN (hypertension), benign I10       Endocrine/Metabolic    Type 2 diabetes mellitus (CMS/[facility]) E11.9       Gastrointestinal and Abdominal    Gastroesophageal reflux disease K21.9     Other Visit Diagnoses         Codes    Compression fracture of L1 vertebra with routine healing, subsequent encounter    -  Primary S32.010D    Paraspinal abscess (CMS/[facility])     M46.20    Weakness     R53.1    At risk for falls     Z91.81    Hyperlipidemia, unspecified hyperlipidemia type     E78.5    Osteoarthritis, unspecified osteoarthritis type, unspecified site     M19.90        1. Fracture of L1, on pain control.  2. Weakness, on PT/OT.  3. Fall risk, on fall precaution.  4. Paraspinal abscess, continue wound care, follow with Neurosurgery.  5. Diabetes, on insulin.  6. Hypertension, medically controlled.  7. Hyperlipidemia, on statin.  8. Reflux disease, on PPI.  9. Osteoarthritis, on Tylenol.    Scribe Attestation  By signing my name below, IAni Scribe attest that this documentation has been prepared under the direction and in the presence of Magnolia Blanca MD.   All medical record entries made by the scribe were personally dictated by me I have reviewed the chart and agree the record accurately reflects my personal performance of his history physical examination and  management      Electronically Signed By: Magnolia Blanca MD   2/27/24  9:29 AM

## 2024-02-24 ENCOUNTER — NURSING HOME VISIT (OUTPATIENT)
Dept: POST ACUTE CARE | Facility: EXTERNAL LOCATION | Age: 84
End: 2024-02-24
Payer: MEDICARE

## 2024-02-24 DIAGNOSIS — M48.00 SPINAL STENOSIS, UNSPECIFIED SPINAL REGION: ICD-10-CM

## 2024-02-24 DIAGNOSIS — F32.A DEPRESSION, UNSPECIFIED DEPRESSION TYPE: ICD-10-CM

## 2024-02-24 DIAGNOSIS — R53.1 WEAKNESS: ICD-10-CM

## 2024-02-24 DIAGNOSIS — E78.5 HYPERLIPIDEMIA, UNSPECIFIED HYPERLIPIDEMIA TYPE: ICD-10-CM

## 2024-02-24 DIAGNOSIS — Z79.4 TYPE 2 DIABETES MELLITUS WITHOUT COMPLICATION, WITH LONG-TERM CURRENT USE OF INSULIN (MULTI): ICD-10-CM

## 2024-02-24 DIAGNOSIS — Z91.81 AT RISK FOR FALLS: ICD-10-CM

## 2024-02-24 DIAGNOSIS — I10 HTN (HYPERTENSION), BENIGN: ICD-10-CM

## 2024-02-24 DIAGNOSIS — M46.20 PARASPINAL ABSCESS (MULTI): ICD-10-CM

## 2024-02-24 DIAGNOSIS — E11.9 TYPE 2 DIABETES MELLITUS WITHOUT COMPLICATION, WITH LONG-TERM CURRENT USE OF INSULIN (MULTI): ICD-10-CM

## 2024-02-24 DIAGNOSIS — S32.010D COMPRESSION FRACTURE OF L1 VERTEBRA WITH ROUTINE HEALING, SUBSEQUENT ENCOUNTER: Primary | ICD-10-CM

## 2024-02-24 PROCEDURE — 99308 SBSQ NF CARE LOW MDM 20: CPT | Performed by: INTERNAL MEDICINE

## 2024-02-24 NOTE — LETTER
Patient: Carrie Rosario  : 1940    Encounter Date: 2024    PLACE OF SERVICE:  [facility]    This is a subsequent visit.    Subjective  Patient ID: Carrie Rosario is a 83 y.o. female who presents for Follow-up.    Ms. Carrie Rosario is an 83-year-old female with history of compression fracture of L1 with intraspinal abscess.  She is a diabetic and is followed closely by Neurosurgery.  She is unable to care for herself and requires supportive care.    Review of Systems   Constitutional:  Negative for chills and fever.   Cardiovascular:  Negative for chest pain.   All other systems reviewed and are negative.    Objective  /82   Pulse 78   Temp 36.9 °C (98.4 °F)   Resp 18     Physical Exam  Vitals reviewed.   Constitutional:       General: She is not in acute distress.     Comments: This is a well-developed and well-nourished female, sitting in a chair   HENT:      Right Ear: Tympanic membrane, ear canal and external ear normal.      Left Ear: Tympanic membrane, ear canal and external ear normal.   Eyes:      General: No scleral icterus.     Pupils: Pupils are equal, round, and reactive to light.   Neck:      Vascular: No carotid bruit.   Cardiovascular:      Heart sounds: Normal heart sounds, S1 normal and S2 normal. No murmur heard.     No friction rub.   Pulmonary:      Effort: Pulmonary effort is normal.      Breath sounds: Normal breath sounds and air entry.   Abdominal:      Palpations: There is no hepatomegaly, splenomegaly or mass.   Musculoskeletal:         General: No swelling or deformity. Normal range of motion.      Cervical back: Neck supple.      Right lower leg: No edema.      Left lower leg: No edema.      Comments: The patient's back exam shows dressing in place.  There is no foul odor.  There is no colored drainage.   Lymphadenopathy:      Cervical: No cervical adenopathy.      Upper Body:      Right upper body: No axillary adenopathy.      Left upper  body: No axillary adenopathy.      Lower Body: No right inguinal adenopathy. No left inguinal adenopathy.   Neurological:      Mental Status: She is oriented to person, place, and time.      Cranial Nerves: Cranial nerves 2-12 are intact. No cranial nerve deficit.      Sensory: No sensory deficit.      Motor: Motor function is intact. No weakness.      Gait: Gait is intact.      Deep Tendon Reflexes: Reflexes normal.   Psychiatric:         Mood and Affect: Mood normal. Mood is not anxious or depressed. Affect is not angry.         Behavior: Behavior is not agitated.         Thought Content: Thought content normal.         Judgment: Judgment normal.     LAB WORK:  Laboratory studies were reviewed.    Assessment/Plan  Problem List Items Addressed This Visit             ICD-10-CM       Cardiac and Vasculature    HTN (hypertension), benign I10       Endocrine/Metabolic    Type 2 diabetes mellitus (CMS/[facility]) E11.9     Other Visit Diagnoses         Codes    Compression fracture of L1 vertebra with routine healing, subsequent encounter    -  Primary S32.010D    Paraspinal abscess (CMS/[facility])     M46.20    Weakness     R53.1    At risk for falls     Z91.81    Spinal stenosis, unspecified spinal region     M48.00    Depression, unspecified depression type     F32.A    Hyperlipidemia, unspecified hyperlipidemia type     E78.5        1. Compression fracture at L1, on pain control.  2. Spinal abscess, continue wound care, follow with Neurosurgery.  3. Diabetes, on insulin.  4. Weakness, on PT/OT.  5. Fall risk, on fall precaution.  6. Spinal stenosis, on pain control.  7. Hypertension, medically controlled.  8. Depression, on medication.  9. Hyperlipidemia, on statin.    Scribe Attestation  By signing my name below, Ani PEDERSON Scribe attest that this documentation has been prepared under the direction and in the presence of Magnolia Blanca MD.   All medical record entries made by the scribe were personally dictated by me I  have reviewed the chart and agree the record accurately reflects my personal performance of his history physical examination and management      Electronically Signed By: Magnolia Blanca MD   3/5/24 12:21 PM

## 2024-02-26 ENCOUNTER — HOSPITAL ENCOUNTER (OUTPATIENT)
Dept: RADIOLOGY | Facility: HOSPITAL | Age: 84
Discharge: HOME | End: 2024-02-26
Payer: MEDICARE

## 2024-02-26 VITALS
HEART RATE: 76 BPM | DIASTOLIC BLOOD PRESSURE: 78 MMHG | RESPIRATION RATE: 16 BRPM | TEMPERATURE: 98 F | SYSTOLIC BLOOD PRESSURE: 124 MMHG

## 2024-02-26 VITALS
RESPIRATION RATE: 16 BRPM | TEMPERATURE: 98.3 F | HEART RATE: 82 BPM | DIASTOLIC BLOOD PRESSURE: 78 MMHG | SYSTOLIC BLOOD PRESSURE: 124 MMHG

## 2024-02-26 VITALS
SYSTOLIC BLOOD PRESSURE: 126 MMHG | DIASTOLIC BLOOD PRESSURE: 80 MMHG | RESPIRATION RATE: 16 BRPM | TEMPERATURE: 98.1 F | HEART RATE: 78 BPM

## 2024-02-26 DIAGNOSIS — G06.1 INTRASPINAL ABSCESS AND GRANULOMA (HHS-HCC): ICD-10-CM

## 2024-02-26 PROCEDURE — 72158 MRI LUMBAR SPINE W/O & W/DYE: CPT

## 2024-02-26 PROCEDURE — A9575 INJ GADOTERATE MEGLUMI 0.1ML: HCPCS | Performed by: INTERNAL MEDICINE

## 2024-02-26 PROCEDURE — 2500000004 HC RX 250 GENERAL PHARMACY W/ HCPCS (ALT 636 FOR OP/ED): Performed by: INTERNAL MEDICINE

## 2024-02-26 RX ORDER — GADOTERATE MEGLUMINE 376.9 MG/ML
0.2 INJECTION INTRAVENOUS
Status: COMPLETED | OUTPATIENT
Start: 2024-02-26 | End: 2024-02-26

## 2024-02-26 RX ADMIN — GADOTERATE MEGLUMINE 11.6 ML: 376.9 INJECTION INTRAVENOUS at 14:13

## 2024-02-26 ASSESSMENT — ENCOUNTER SYMPTOMS
FEVER: 0
CHILLS: 0
FEVER: 0
FEVER: 0
CHILLS: 0
CHILLS: 0

## 2024-02-26 NOTE — PROGRESS NOTES
PLACE OF SERVICE: [facility]    This is a subsequent visit.    Subjective   Patient ID: Carrie Rosario is a 83 y.o. female who presents for Follow-up.    Ms. Carrie Rosario is an 83-year-old female with history of paraspinal abscess with compression fracture of L1.  She has a difficult time ambulating and requires supportive care.    Review of Systems   Constitutional:  Negative for chills and fever.   Cardiovascular:  Negative for chest pain.   All other systems reviewed and are negative.    Objective   /78   Pulse 76   Temp 36.7 °C (98 °F)   Resp 16     Physical Exam  Vitals reviewed.   Constitutional:       General: She is not in acute distress.     Comments: This is a well-developed and well-nourished female, sitting in a chair.   HENT:      Right Ear: Tympanic membrane, ear canal and external ear normal.      Left Ear: Tympanic membrane, ear canal and external ear normal.   Eyes:      General: No scleral icterus.     Pupils: Pupils are equal, round, and reactive to light.   Neck:      Vascular: No carotid bruit.   Cardiovascular:      Heart sounds: Normal heart sounds, S1 normal and S2 normal. No murmur heard.     No friction rub.   Pulmonary:      Effort: Pulmonary effort is normal.      Breath sounds: Normal breath sounds and air entry.   Abdominal:      Palpations: There is no hepatomegaly, splenomegaly or mass.   Musculoskeletal:         General: No swelling or deformity. Normal range of motion.      Cervical back: Neck supple.      Right lower leg: No edema.      Left lower leg: No edema.   Lymphadenopathy:      Cervical: No cervical adenopathy.      Upper Body:      Right upper body: No axillary adenopathy.      Left upper body: No axillary adenopathy.      Lower Body: No right inguinal adenopathy. No left inguinal adenopathy.   Neurological:      Mental Status: She is oriented to person, place, and time.      Cranial Nerves: Cranial nerves 2-12 are intact. No cranial  nerve deficit.      Sensory: No sensory deficit.      Motor: Motor function is intact. No weakness.      Gait: Gait is intact.      Deep Tendon Reflexes: Reflexes normal.   Psychiatric:         Mood and Affect: Mood normal. Mood is not anxious or depressed. Affect is not angry.         Behavior: Behavior is not agitated.         Thought Content: Thought content normal.         Judgment: Judgment normal.     LAB WORK: Laboratory studies were reviewed.    Assessment/Plan   Problem List Items Addressed This Visit             ICD-10-CM       Cardiac and Vasculature    HTN (hypertension), benign I10       Endocrine/Metabolic    Type 2 diabetes mellitus (CMS/[facility]) E11.9       Gastrointestinal and Abdominal    Gastroesophageal reflux disease K21.9     Other Visit Diagnoses         Codes    Compression fracture of L1 vertebra with routine healing, subsequent encounter    -  Primary S32.010D    Paraspinal abscess (CMS/[facility])     M46.20    Weakness     R53.1    At risk for falls     Z91.81    Hyperlipidemia, unspecified hyperlipidemia type     E78.5    Osteoarthritis, unspecified osteoarthritis type, unspecified site     M19.90        1. Fracture of L1, on pain control.  2. Weakness, on PT/OT.  3. Fall risk, on fall precaution.  4. Paraspinal abscess, continue wound care, follow with Neurosurgery.  5. Diabetes, on insulin.  6. Hypertension, medically controlled.  7. Hyperlipidemia, on statin.  8. Reflux disease, on PPI.  9. Osteoarthritis, on Tylenol.    Scribe Attestation  By signing my name below, Ani PEDERSON Scribe attest that this documentation has been prepared under the direction and in the presence of Magnolia Blanca MD.   All medical record entries made by the scribe were personally dictated by me I have reviewed the chart and agree the record accurately reflects my personal performance of his history physical examination and management

## 2024-02-26 NOTE — PROGRESS NOTES
PLACE OF SERVICE: [facility]    This is a subsequent visit.    Subjective   Patient ID: Carrie Rosario is a 83 y.o. female who presents for Follow-up.    Ms. Carrie Rosario is an 83-year-old female with history of compression fracture of L1.  She was also found to have a paraspinal abscess.  She continues on antibiotics as well as pain control.  She is a fall risk and requires supportive care.    Review of Systems   Constitutional:  Negative for chills and fever.   Cardiovascular:  Negative for chest pain.   All other systems reviewed and are negative.    Objective   /80   Pulse 78   Temp 36.7 °C (98.1 °F)   Resp 16     Physical Exam  Vitals reviewed.   Constitutional:       General: She is not in acute distress.     Comments: This is a well-developed and well-nourished female, sitting in a chair.   HENT:      Right Ear: Tympanic membrane, ear canal and external ear normal.      Left Ear: Tympanic membrane, ear canal and external ear normal.   Eyes:      General: No scleral icterus.     Pupils: Pupils are equal, round, and reactive to light.   Neck:      Vascular: No carotid bruit.   Cardiovascular:      Heart sounds: Normal heart sounds, S1 normal and S2 normal. No murmur heard.     No friction rub.   Pulmonary:      Effort: Pulmonary effort is normal.      Breath sounds: Normal breath sounds and air entry.   Abdominal:      Palpations: There is no hepatomegaly, splenomegaly or mass.   Musculoskeletal:         General: No swelling or deformity. Normal range of motion.      Cervical back: Neck supple.      Right lower leg: No edema.      Left lower leg: No edema.   Lymphadenopathy:      Cervical: No cervical adenopathy.      Upper Body:      Right upper body: No axillary adenopathy.      Left upper body: No axillary adenopathy.      Lower Body: No right inguinal adenopathy. No left inguinal adenopathy.   Skin:     Comments: BACK:  Exam shows dressing over the patient's lumbar  region.   Neurological:      Mental Status: She is oriented to person, place, and time.      Cranial Nerves: Cranial nerves 2-12 are intact. No cranial nerve deficit.      Sensory: No sensory deficit.      Motor: Motor function is intact. No weakness.      Gait: Gait is intact.      Deep Tendon Reflexes: Reflexes normal.   Psychiatric:         Mood and Affect: Mood normal. Mood is not anxious or depressed. Affect is not angry.         Behavior: Behavior is not agitated.         Thought Content: Thought content normal.         Judgment: Judgment normal.     LAB WORK: Laboratory studies were reviewed.    Assessment/Plan   Problem List Items Addressed This Visit             ICD-10-CM       Cardiac and Vasculature    HTN (hypertension), benign I10       Endocrine/Metabolic    Type 2 diabetes mellitus (CMS/[facility]) E11.9       Neuro    Cerebrovascular accident (CMS/[facility]) I63.9     Other Visit Diagnoses         Codes    Compression fracture of L1 vertebra with routine healing, subsequent encounter    -  Primary S32.010D    Paraspinal abscess (CMS/[facility])     M46.20    Weakness     R53.1    At risk for falls     Z91.81    Hyperlipidemia, unspecified hyperlipidemia type     E78.5    Osteoarthritis, unspecified osteoarthritis type, unspecified site     M19.90        1. Fracture of L1, on pain control.  2. Paraspinal abscess, continue wound care, follow with Neurosurgery.  3. Diabetes, on insulin.  4. Hypertension, medically controlled.  5. Hyperlipidemia, on statin.  6. Weakness, on PT/OT.  7. Fall risk, on fall precaution.  8. CVA, on supportive care.  9. Osteoarthritis, on Tylenol.    Scribe Attestation  By signing my name below, IAni, Scribe attest that this documentation has been prepared under the direction and in the presence of Magnolia Blanca MD.

## 2024-02-26 NOTE — PROGRESS NOTES
PLACE OF SERVICE: [facility]    This is a subsequent visit.    Subjective   Patient ID: Carrie Rosario is a 83 y.o. female who presents for Follow-up.    Ms. Carrie Rosario is an 83-year-old female with history of fracture of L1 with paraspinal abscess.  She is a diabetic and unable to care for herself.  She requires supportive care.    Review of Systems   Constitutional:  Negative for chills and fever.   Cardiovascular:  Negative for chest pain.   All other systems reviewed and are negative.    Objective   /78   Pulse 82   Temp 36.8 °C (98.3 °F)   Resp 16     Physical Exam  Vitals reviewed.   Constitutional:       General: She is not in acute distress.     Comments: This is a well-developed and well-nourished female, sitting in a chair.   HENT:      Right Ear: Tympanic membrane, ear canal and external ear normal.      Left Ear: Tympanic membrane, ear canal and external ear normal.   Eyes:      General: No scleral icterus.     Pupils: Pupils are equal, round, and reactive to light.   Neck:      Vascular: No carotid bruit.   Cardiovascular:      Heart sounds: Normal heart sounds, S1 normal and S2 normal. No murmur heard.     No friction rub.   Pulmonary:      Effort: Pulmonary effort is normal.      Breath sounds: Normal breath sounds and air entry.   Abdominal:      Palpations: There is no hepatomegaly, splenomegaly or mass.   Musculoskeletal:         General: No swelling or deformity. Normal range of motion.      Cervical back: Neck supple.      Right lower leg: No edema.      Left lower leg: No edema.   Lymphadenopathy:      Cervical: No cervical adenopathy.      Upper Body:      Right upper body: No axillary adenopathy.      Left upper body: No axillary adenopathy.      Lower Body: No right inguinal adenopathy. No left inguinal adenopathy.   Skin:     Comments: BACK: Exam shows dressing over the patient's lumbar area.  There is no foul odor.  There is no color drainage.    Neurological:      Mental Status: She is oriented to person, place, and time.      Cranial Nerves: Cranial nerves 2-12 are intact. No cranial nerve deficit.      Sensory: No sensory deficit.      Motor: Motor function is intact. No weakness.      Gait: Gait is intact.      Deep Tendon Reflexes: Reflexes normal.   Psychiatric:         Mood and Affect: Mood normal. Mood is not anxious or depressed. Affect is not angry.         Behavior: Behavior is not agitated.         Thought Content: Thought content normal.         Judgment: Judgment normal.     LAB WORK: Laboratory studies were reviewed.    Assessment/Plan   Problem List Items Addressed This Visit             ICD-10-CM       Cardiac and Vasculature    HTN (hypertension), benign I10       Endocrine/Metabolic    Type 2 diabetes mellitus (CMS/[facility]) E11.9       Gastrointestinal and Abdominal    Gastroesophageal reflux disease K21.9       Neuro    Cerebrovascular accident (CMS/[facility]) I63.9       Other    Compression fracture of L1 vertebra, initial encounter (CMS/HCC) - Primary S32.010A     Other Visit Diagnoses         Codes    Paraspinal abscess (CMS/HCC)     M46.20    Weakness     R53.1    At risk for falls     Z91.81    Hyperlipidemia, unspecified hyperlipidemia type     E78.5    Osteoarthritis, unspecified osteoarthritis type, unspecified site     M19.90        1. Fracture of L1, on pain control.  2. Paraspinal abscess, on antibiotic, follow with Neurosurgery.  3. Diabetes, on insulin.  4. CVA, on supportive care.  5. Weakness, on PT/OT.  6. Fall risk, on fall precaution.  7. Hypertension, medically controlled.  8. Hyperlipidemia, on statin.  9. Reflux disease, on PPI.  10. Osteoarthritis, on Tylenol.    Scribe Attestation  By signing my name below, IAni Scribe attest that this documentation has been prepared under the direction and in the presence of Magnolia Blanca MD.   All medical record entries made by the carolinaibmisael were personally dictated by me I have  reviewed the chart and agree the record accurately reflects my personal performance of his history physical examination and management

## 2024-02-27 RX ORDER — TRAMADOL HYDROCHLORIDE 50 MG/1
50 TABLET ORAL EVERY 6 HOURS PRN
Qty: 30 TABLET | Refills: 4 | OUTPATIENT
Start: 2024-02-27

## 2024-03-01 DIAGNOSIS — F32.A DEPRESSION, UNSPECIFIED DEPRESSION TYPE: ICD-10-CM

## 2024-03-01 DIAGNOSIS — R52 PAIN: ICD-10-CM

## 2024-03-01 RX ORDER — TRAMADOL HYDROCHLORIDE 50 MG/1
50 TABLET ORAL EVERY 6 HOURS PRN
Qty: 30 TABLET | Refills: 4 | OUTPATIENT
Start: 2024-03-01

## 2024-03-04 RX ORDER — TRAMADOL HYDROCHLORIDE 50 MG/1
50 TABLET ORAL EVERY 6 HOURS PRN
Qty: 30 TABLET | Refills: 0 | Status: ON HOLD | OUTPATIENT
Start: 2024-03-04

## 2024-03-05 VITALS
HEART RATE: 78 BPM | DIASTOLIC BLOOD PRESSURE: 82 MMHG | SYSTOLIC BLOOD PRESSURE: 128 MMHG | RESPIRATION RATE: 18 BRPM | TEMPERATURE: 98.4 F

## 2024-03-05 ASSESSMENT — ENCOUNTER SYMPTOMS
FEVER: 0
CHILLS: 0

## 2024-03-05 NOTE — PROGRESS NOTES
PLACE OF SERVICE:  [facility]    This is a subsequent visit.    Subjective   Patient ID: Carrie Rosario is a 83 y.o. female who presents for Follow-up.    Ms. Carrie Rosario is an 83-year-old female with history of compression fracture of L1 with intraspinal abscess.  She is a diabetic and is followed closely by Neurosurgery.  She is unable to care for herself and requires supportive care.    Review of Systems   Constitutional:  Negative for chills and fever.   Cardiovascular:  Negative for chest pain.   All other systems reviewed and are negative.    Objective   /82   Pulse 78   Temp 36.9 °C (98.4 °F)   Resp 18     Physical Exam  Vitals reviewed.   Constitutional:       General: She is not in acute distress.     Comments: This is a well-developed and well-nourished female, sitting in a chair   HENT:      Right Ear: Tympanic membrane, ear canal and external ear normal.      Left Ear: Tympanic membrane, ear canal and external ear normal.   Eyes:      General: No scleral icterus.     Pupils: Pupils are equal, round, and reactive to light.   Neck:      Vascular: No carotid bruit.   Cardiovascular:      Heart sounds: Normal heart sounds, S1 normal and S2 normal. No murmur heard.     No friction rub.   Pulmonary:      Effort: Pulmonary effort is normal.      Breath sounds: Normal breath sounds and air entry.   Abdominal:      Palpations: There is no hepatomegaly, splenomegaly or mass.   Musculoskeletal:         General: No swelling or deformity. Normal range of motion.      Cervical back: Neck supple.      Right lower leg: No edema.      Left lower leg: No edema.      Comments: The patient's back exam shows dressing in place.  There is no foul odor.  There is no colored drainage.   Lymphadenopathy:      Cervical: No cervical adenopathy.      Upper Body:      Right upper body: No axillary adenopathy.      Left upper body: No axillary adenopathy.      Lower Body: No right inguinal  adenopathy. No left inguinal adenopathy.   Neurological:      Mental Status: She is oriented to person, place, and time.      Cranial Nerves: Cranial nerves 2-12 are intact. No cranial nerve deficit.      Sensory: No sensory deficit.      Motor: Motor function is intact. No weakness.      Gait: Gait is intact.      Deep Tendon Reflexes: Reflexes normal.   Psychiatric:         Mood and Affect: Mood normal. Mood is not anxious or depressed. Affect is not angry.         Behavior: Behavior is not agitated.         Thought Content: Thought content normal.         Judgment: Judgment normal.     LAB WORK:  Laboratory studies were reviewed.    Assessment/Plan   Problem List Items Addressed This Visit             ICD-10-CM       Cardiac and Vasculature    HTN (hypertension), benign I10       Endocrine/Metabolic    Type 2 diabetes mellitus (CMS/[facility]) E11.9     Other Visit Diagnoses         Codes    Compression fracture of L1 vertebra with routine healing, subsequent encounter    -  Primary S32.010D    Paraspinal abscess (CMS/[facility])     M46.20    Weakness     R53.1    At risk for falls     Z91.81    Spinal stenosis, unspecified spinal region     M48.00    Depression, unspecified depression type     F32.A    Hyperlipidemia, unspecified hyperlipidemia type     E78.5        1. Compression fracture at L1, on pain control.  2. Spinal abscess, continue wound care, follow with Neurosurgery.  3. Diabetes, on insulin.  4. Weakness, on PT/OT.  5. Fall risk, on fall precaution.  6. Spinal stenosis, on pain control.  7. Hypertension, medically controlled.  8. Depression, on medication.  9. Hyperlipidemia, on statin.    Scribe Attestation  By signing my name below, IAni Scribe attest that this documentation has been prepared under the direction and in the presence of Magnolia Blanca MD.   All medical record entries made by the scribe were personally dictated by me I have reviewed the chart and agree the record accurately reflects my  personal performance of his history physical examination and management

## 2024-04-22 ENCOUNTER — OFFICE VISIT (OUTPATIENT)
Dept: PRIMARY CARE | Facility: CLINIC | Age: 84
End: 2024-04-22
Payer: MEDICARE

## 2024-04-22 VITALS
OXYGEN SATURATION: 97 % | HEART RATE: 73 BPM | RESPIRATION RATE: 18 BRPM | SYSTOLIC BLOOD PRESSURE: 120 MMHG | DIASTOLIC BLOOD PRESSURE: 82 MMHG

## 2024-04-22 DIAGNOSIS — E11.649 TYPE 2 DIABETES MELLITUS WITH HYPOGLYCEMIA WITHOUT COMA, WITH LONG-TERM CURRENT USE OF INSULIN (MULTI): ICD-10-CM

## 2024-04-22 DIAGNOSIS — N39.3 SUI (STRESS URINARY INCONTINENCE, FEMALE): Primary | ICD-10-CM

## 2024-04-22 DIAGNOSIS — Z79.4 TYPE 2 DIABETES MELLITUS WITH HYPOGLYCEMIA WITHOUT COMA, WITH LONG-TERM CURRENT USE OF INSULIN (MULTI): ICD-10-CM

## 2024-04-22 DIAGNOSIS — S32.010A COMPRESSION FRACTURE OF L1 LUMBAR VERTEBRA, CLOSED, INITIAL ENCOUNTER (MULTI): ICD-10-CM

## 2024-04-22 PROCEDURE — 1125F AMNT PAIN NOTED PAIN PRSNT: CPT | Performed by: FAMILY MEDICINE

## 2024-04-22 PROCEDURE — 3074F SYST BP LT 130 MM HG: CPT | Performed by: FAMILY MEDICINE

## 2024-04-22 PROCEDURE — 1036F TOBACCO NON-USER: CPT | Performed by: FAMILY MEDICINE

## 2024-04-22 PROCEDURE — 99214 OFFICE O/P EST MOD 30 MIN: CPT | Performed by: FAMILY MEDICINE

## 2024-04-22 PROCEDURE — 1159F MED LIST DOCD IN RCRD: CPT | Performed by: FAMILY MEDICINE

## 2024-04-22 PROCEDURE — 1157F ADVNC CARE PLAN IN RCRD: CPT | Performed by: FAMILY MEDICINE

## 2024-04-22 PROCEDURE — 3079F DIAST BP 80-89 MM HG: CPT | Performed by: FAMILY MEDICINE

## 2024-04-22 RX ORDER — ATORVASTATIN CALCIUM 20 MG/1
20 TABLET, FILM COATED ORAL
Status: ON HOLD | COMMUNITY

## 2024-04-22 RX ORDER — HYDROXYZINE PAMOATE 25 MG/1
25 CAPSULE ORAL EVERY 6 HOURS PRN
Status: ON HOLD | COMMUNITY

## 2024-04-22 RX ORDER — FESOTERODINE FUMARATE 8 MG/1
8 TABLET, FILM COATED, EXTENDED RELEASE ORAL
Status: ON HOLD | COMMUNITY

## 2024-04-22 ASSESSMENT — PATIENT HEALTH QUESTIONNAIRE - PHQ9
SUM OF ALL RESPONSES TO PHQ9 QUESTIONS 1 AND 2: 0
2. FEELING DOWN, DEPRESSED OR HOPELESS: NOT AT ALL
1. LITTLE INTEREST OR PLEASURE IN DOING THINGS: NOT AT ALL

## 2024-04-22 ASSESSMENT — PAIN SCALES - GENERAL: PAINLEVEL: 5

## 2024-04-22 ASSESSMENT — ENCOUNTER SYMPTOMS
LOSS OF SENSATION IN FEET: 0
OCCASIONAL FEELINGS OF UNSTEADINESS: 1
DEPRESSION: 0

## 2024-04-22 NOTE — PROGRESS NOTES
Subjective   Patient ID: Carrie Rosario is a 83 y.o. female who presents for Follow-up ([facility], fracture on her back).    HPI   1. She is here for follow-up of type 2 diabetes.    She is on Tresiba 20 units and rapid acting insulin in the form of lispro on a sliding scale throughout the day.  His daughter is with her today and states that her sugars been running constantly low in the morning and she has been requiring very little rapid acting insulin.  She had a episode where she was mistakenly given too much rapid acting insulin which led to hypoglycemia and a short stay in the hospital.     2. CARRIE is seen today also for follow up of High cholesterol.  She is on   Atorvastatin     3. CARRIE is here also for follow up of benign essential hypertension.   she is followed by Dr. Gannon.      4. CARRIE is seen today also for follow up of depression.   She is on fluoxetine.      5.   She is also here for follow-up of atherosclerotic cardiovascular disease and slight congestive heart failure.  She is being followed by Dr. Gannon.    6. She is also here for follow-up of back pain from vertebral fracture in 1/24.  She has been in assisted living and does not get physical therapy.  She states her back feels and that she has now been bothered by bilateral hip pain for the past several weeks.  No definite trauma.  She has as needed pain medication from Tylenol and tramadol which she says helps.    7. She is also here for follow-up of urge incontinence.  She does take Myrbetriq but finds it is not helping very much so she would like to stop this.    Review of Systems  Denies headache, blurred vision, chest pain, shortness of breath, nausea or vomiting, change in bowel habits or leg pain or swelling.    Objective   There were no vitals taken for this visit.    Physical Exam  Vitals and nurse's notes reviewed  General: no acute distress.She is in a wheelchair.  HEENT: Normal  Neck:  Supple  Lungs: Clear  Cardio: RRR w/o murmur  Abdomen: Soft, nontender, no hepatosplenomegaly  Extremities: No edema, no calf tenderness. Range of motion of both hips in flexion and internal and external rotation cause no increase in pain.  Neuro: Alert and oriented with no focal deficits]  Assessment/Plan   Problem List Items Addressed This Visit             ICD-10-CM       High    Type 2 diabetes mellitus (Multi) E11.9     Will decrease her Tresiba due to her regular hypoglycemic episodes.  Also will switch rapid acting insulin from lispro to aspart to see if this would be less expensive.I did call the nurse at [facility] and let her know about the decrease in Tresiba so this could be started this evening.         Compression fracture of L1 lumbar vertebra, closed, initial encounter (Multi) S32.010A     She states her back pain is improving.  Will continue with physical therapy but will give them orders to direct attention to her hips.  I suspect she does not have significant DJD but perhaps radiation of pain from the back to hips or possibly even soft tissue strain of her hips. Continue Tylenol and tramadol on appearing basis.  If no improvement with the physical therapy after several weeks would consider either Ortho or pain management referral.            Medium    SHAUN (stress urinary incontinence, female) - Primary N39.3     Will stop Myrbetriq since it is not helping at this point.

## 2024-04-22 NOTE — ASSESSMENT & PLAN NOTE
She states her back pain is improving.  Will continue with physical therapy but will give them orders to direct attention to her hips.  I suspect she does not have significant DJD but perhaps radiation of pain from the back to hips or possibly even soft tissue strain of her hips. Continue Tylenol and tramadol on appearing basis.  If no improvement with the physical therapy after several weeks would consider either Ortho or pain management referral.

## 2024-04-29 ENCOUNTER — TELEPHONE (OUTPATIENT)
Dept: PRIMARY CARE | Facility: CLINIC | Age: 84
End: 2024-04-29
Payer: MEDICARE

## 2024-04-29 NOTE — TELEPHONE ENCOUNTER
I received a phone call from a nurse at [facility] this [facility] saying that her sugars in the morning continue to drop into the 50 range.  Her sugars throughout the day are mostly between 150 and sometimes spike up to 300.  She is currently on Tresiba 15 units nightly and zinc sliding scale rapid acting insulin.  Will decrease her Tresiba to 13 units and continue the sliding scale.  They are to call back in a few days if sugars are still fluctuating.  The nurses station at [facility] is [phone].

## 2024-05-01 ENCOUNTER — HOSPITAL ENCOUNTER (OUTPATIENT)
Dept: RADIOLOGY | Facility: CLINIC | Age: 84
Discharge: HOME | End: 2024-05-01
Payer: MEDICARE

## 2024-05-01 DIAGNOSIS — K68.12 PSOAS MUSCLE ABSCESS (MULTI): ICD-10-CM

## 2024-05-01 PROCEDURE — 72158 MRI LUMBAR SPINE W/O & W/DYE: CPT | Performed by: RADIOLOGY

## 2024-05-01 PROCEDURE — 72158 MRI LUMBAR SPINE W/O & W/DYE: CPT

## 2024-05-01 PROCEDURE — 2550000001 HC RX 255 CONTRASTS: Performed by: INTERNAL MEDICINE

## 2024-05-01 PROCEDURE — A9575 INJ GADOTERATE MEGLUMI 0.1ML: HCPCS | Performed by: INTERNAL MEDICINE

## 2024-05-01 RX ORDER — GADOTERATE MEGLUMINE 376.9 MG/ML
12 INJECTION INTRAVENOUS
Status: COMPLETED | OUTPATIENT
Start: 2024-05-01 | End: 2024-05-01

## 2024-05-01 RX ADMIN — GADOTERATE MEGLUMINE 12 ML: 376.9 INJECTION INTRAVENOUS at 13:41

## 2024-05-30 ENCOUNTER — OFFICE VISIT (OUTPATIENT)
Dept: CARDIOLOGY | Facility: CLINIC | Age: 84
End: 2024-05-30
Payer: MEDICARE

## 2024-05-30 VITALS — SYSTOLIC BLOOD PRESSURE: 118 MMHG | DIASTOLIC BLOOD PRESSURE: 72 MMHG | HEART RATE: 72 BPM | OXYGEN SATURATION: 100 %

## 2024-05-30 DIAGNOSIS — I25.10 CORONARY ARTERY DISEASE: Primary | ICD-10-CM

## 2024-05-30 PROCEDURE — 3078F DIAST BP <80 MM HG: CPT | Performed by: INTERNAL MEDICINE

## 2024-05-30 PROCEDURE — 1036F TOBACCO NON-USER: CPT | Performed by: INTERNAL MEDICINE

## 2024-05-30 PROCEDURE — 99213 OFFICE O/P EST LOW 20 MIN: CPT | Performed by: INTERNAL MEDICINE

## 2024-05-30 PROCEDURE — 1126F AMNT PAIN NOTED NONE PRSNT: CPT | Performed by: INTERNAL MEDICINE

## 2024-05-30 PROCEDURE — 1159F MED LIST DOCD IN RCRD: CPT | Performed by: INTERNAL MEDICINE

## 2024-05-30 PROCEDURE — 1157F ADVNC CARE PLAN IN RCRD: CPT | Performed by: INTERNAL MEDICINE

## 2024-05-30 PROCEDURE — 3074F SYST BP LT 130 MM HG: CPT | Performed by: INTERNAL MEDICINE

## 2024-05-30 RX ORDER — DAPAGLIFLOZIN 10 MG/1
5 TABLET, FILM COATED ORAL EVERY 24 HOURS
Status: ON HOLD | COMMUNITY

## 2024-05-30 RX ORDER — PILOCARPINE HYDROCHLORIDE 5 MG/1
5 TABLET, FILM COATED ORAL 2 TIMES DAILY
Status: ON HOLD | COMMUNITY

## 2024-05-30 ASSESSMENT — PAIN SCALES - GENERAL: PAINLEVEL: 0-NO PAIN

## 2024-05-30 NOTE — ASSESSMENT & PLAN NOTE
Has a history of diastolic heart failure that is stable on guideline directed medical therapy.  Doing well from cardiac standpoint and follow-up in 6 months

## 2024-05-30 NOTE — PROGRESS NOTES
Subjective      Chief Complaint   Patient presents with    Follow-up        Here to reassess her known coronary artery disease and she remains angina free with no signs of volume overload or congestive heart failure.  She remains on moderate dose statin therapy and low-dose aspirin therapy.    Previous: Successful right coronary artery drug-eluting stent deployment December 2021 in the setting where she had ventricular fibrillation arrest with recurrent defibrillation shocks in the setting of an inferior wall myocardial infarction cardiac catheterization showed mild left main disease mild LAD disease and mild circumflex disease with total occlusion of the proximal right coronary stented with a 3/20 mm drug-eluting stent proximally and she had clot migration distally at that time.  Left ventricular ejection fraction 55% on cardiac catheterization and subsequent echocardiogram showed normalization of LV function with left ventricular action fraction 55 to 59% and she had modest pulmonary hypertension with PA pressure 49 mmHg.  She is on modest dose statin therapy because of her older age group.         Review of Systems   All other systems reviewed and are negative.       Objective   Physical Exam  Constitutional:       Appearance: Normal appearance.   HENT:      Head: Normocephalic and atraumatic.   Eyes:      Pupils: Pupils are equal, round, and reactive to light.   Cardiovascular:      Rate and Rhythm: Normal rate and regular rhythm.      Pulses: Normal pulses.      Heart sounds: Normal heart sounds.   Pulmonary:      Effort: Pulmonary effort is normal.      Breath sounds: Normal breath sounds.   Abdominal:      General: Abdomen is flat. Bowel sounds are normal.      Palpations: Abdomen is soft.   Musculoskeletal:         General: Normal range of motion.      Cervical back: Normal range of motion.      Comments: In a wheelchair after compression fracture and osteomyelitis   Skin:     General: Skin is warm and dry.    Neurological:      General: No focal deficit present.   Psychiatric:         Mood and Affect: Mood normal.         Judgment: Judgment normal.          Lab Review:   Not applicable    Coronary arteriosclerosis  Angina free on guideline directed medical therapy for coronary artery disease.    Congestive heart failure (Multi)  Has a history of diastolic heart failure that is stable on guideline directed medical therapy

## 2024-06-06 ENCOUNTER — TELEPHONE (OUTPATIENT)
Dept: PRIMARY CARE | Facility: CLINIC | Age: 84
End: 2024-06-06
Payer: MEDICARE

## 2024-06-06 DIAGNOSIS — I10 HTN (HYPERTENSION), BENIGN: ICD-10-CM

## 2024-06-06 DIAGNOSIS — E11.649 TYPE 2 DIABETES MELLITUS WITH HYPOGLYCEMIA WITHOUT COMA, WITH LONG-TERM CURRENT USE OF INSULIN (MULTI): ICD-10-CM

## 2024-06-06 DIAGNOSIS — E78.5 DYSLIPIDEMIA: ICD-10-CM

## 2024-06-06 DIAGNOSIS — Z79.4 TYPE 2 DIABETES MELLITUS WITH HYPOGLYCEMIA WITHOUT COMA, WITH LONG-TERM CURRENT USE OF INSULIN (MULTI): ICD-10-CM

## 2024-06-06 NOTE — TELEPHONE ENCOUNTER
Patients daughter  Karoline called  [phone] she has a CPE 7-11 please fax lab order to [facility] [phone]  where she is at and they will do her blood work Attn; Nadja fax# [phone]

## 2024-06-11 ENCOUNTER — TELEPHONE (OUTPATIENT)
Dept: PRIMARY CARE | Facility: CLINIC | Age: 84
End: 2024-06-11
Payer: MEDICARE

## 2024-06-11 ENCOUNTER — HOSPITAL ENCOUNTER (INPATIENT)
Facility: HOSPITAL | Age: 84
LOS: 5 days | Discharge: HOME HEALTH | DRG: 070 | End: 2024-06-16
Attending: INTERNAL MEDICINE | Admitting: NURSE PRACTITIONER
Payer: MEDICARE
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
## 2024-06-11 DIAGNOSIS — S32.010A COMPRESSION FRACTURE OF L1 VERTEBRA, INITIAL ENCOUNTER (MULTI): ICD-10-CM

## 2024-06-11 DIAGNOSIS — S32.010A COMPRESSION FRACTURE OF L1 LUMBAR VERTEBRA, CLOSED, INITIAL ENCOUNTER (MULTI): ICD-10-CM

## 2024-06-11 DIAGNOSIS — M86.9 OSTEOMYELITIS (MULTI): Primary | ICD-10-CM

## 2024-06-11 DIAGNOSIS — F41.9 ANXIETY: ICD-10-CM

## 2024-06-11 LAB
BASOPHILS # BLD AUTO: 0.07 X10*3/UL (ref 0–0.1)
BASOPHILS NFR BLD AUTO: 1 %
EOSINOPHIL # BLD AUTO: 0.3 X10*3/UL (ref 0–0.4)
EOSINOPHIL NFR BLD AUTO: 4.4 %
ERYTHROCYTE [DISTWIDTH] IN BLOOD BY AUTOMATED COUNT: 11.6 % (ref 11.5–14.5)
GLUCOSE BLD MANUAL STRIP-MCNC: 157 MG/DL (ref 74–99)
HCT VFR BLD AUTO: 38.7 % (ref 36–46)
HGB BLD-MCNC: 12.8 G/DL (ref 12–16)
IMM GRANULOCYTES # BLD AUTO: 0.01 X10*3/UL (ref 0–0.5)
IMM GRANULOCYTES NFR BLD AUTO: 0.1 % (ref 0–0.9)
LYMPHOCYTES # BLD AUTO: 1.59 X10*3/UL (ref 0.8–3)
LYMPHOCYTES NFR BLD AUTO: 23.1 %
MCH RBC QN AUTO: 32 PG (ref 26–34)
MCHC RBC AUTO-ENTMCNC: 33.1 G/DL (ref 32–36)
MCV RBC AUTO: 97 FL (ref 80–100)
MONOCYTES # BLD AUTO: 0.88 X10*3/UL (ref 0.05–0.8)
MONOCYTES NFR BLD AUTO: 12.8 %
NEUTROPHILS # BLD AUTO: 4.02 X10*3/UL (ref 1.6–5.5)
NEUTROPHILS NFR BLD AUTO: 58.6 %
NRBC BLD-RTO: 0 /100 WBCS (ref 0–0)
PLATELET # BLD AUTO: 290 X10*3/UL (ref 150–450)
RBC # BLD AUTO: 4 X10*6/UL (ref 4–5.2)
WBC # BLD AUTO: 6.9 X10*3/UL (ref 4.4–11.3)

## 2024-06-11 PROCEDURE — 36415 COLL VENOUS BLD VENIPUNCTURE: CPT | Performed by: NURSE PRACTITIONER

## 2024-06-11 PROCEDURE — 87075 CULTR BACTERIA EXCEPT BLOOD: CPT | Mod: TRILAB | Performed by: NURSE PRACTITIONER

## 2024-06-11 PROCEDURE — 87086 URINE CULTURE/COLONY COUNT: CPT | Mod: TRILAB,WESLAB | Performed by: NURSE PRACTITIONER

## 2024-06-11 PROCEDURE — 82947 ASSAY GLUCOSE BLOOD QUANT: CPT

## 2024-06-11 PROCEDURE — 2500000001 HC RX 250 WO HCPCS SELF ADMINISTERED DRUGS (ALT 637 FOR MEDICARE OP): Performed by: NURSE PRACTITIONER

## 2024-06-11 PROCEDURE — 2500000004 HC RX 250 GENERAL PHARMACY W/ HCPCS (ALT 636 FOR OP/ED): Performed by: NURSE PRACTITIONER

## 2024-06-11 PROCEDURE — 86140 C-REACTIVE PROTEIN: CPT | Performed by: NURSE PRACTITIONER

## 2024-06-11 PROCEDURE — 85025 COMPLETE CBC W/AUTO DIFF WBC: CPT | Performed by: NURSE PRACTITIONER

## 2024-06-11 PROCEDURE — 1100000001 HC PRIVATE ROOM DAILY

## 2024-06-11 PROCEDURE — 80053 COMPREHEN METABOLIC PANEL: CPT | Performed by: NURSE PRACTITIONER

## 2024-06-11 PROCEDURE — 83735 ASSAY OF MAGNESIUM: CPT | Performed by: NURSE PRACTITIONER

## 2024-06-11 PROCEDURE — 87040 BLOOD CULTURE FOR BACTERIA: CPT | Mod: 91,TRILAB | Performed by: NURSE PRACTITIONER

## 2024-06-11 RX ORDER — ENOXAPARIN SODIUM 100 MG/ML
40 INJECTION SUBCUTANEOUS EVERY 24 HOURS
Status: DISCONTINUED | OUTPATIENT
Start: 2024-06-11 | End: 2024-06-16 | Stop reason: HOSPADM

## 2024-06-11 RX ORDER — TALC
3 POWDER (GRAM) TOPICAL NIGHTLY PRN
Status: DISCONTINUED | OUTPATIENT
Start: 2024-06-11 | End: 2024-06-16 | Stop reason: HOSPADM

## 2024-06-11 RX ORDER — ACETAMINOPHEN 325 MG/1
650 TABLET ORAL EVERY 4 HOURS PRN
Status: DISCONTINUED | OUTPATIENT
Start: 2024-06-11 | End: 2024-06-13

## 2024-06-11 RX ORDER — ATORVASTATIN CALCIUM 20 MG/1
20 TABLET, FILM COATED ORAL NIGHTLY
Status: DISCONTINUED | OUTPATIENT
Start: 2024-06-11 | End: 2024-06-16 | Stop reason: HOSPADM

## 2024-06-11 RX ORDER — TRAMADOL HYDROCHLORIDE 50 MG/1
50 TABLET ORAL EVERY 6 HOURS PRN
Status: DISCONTINUED | OUTPATIENT
Start: 2024-06-11 | End: 2024-06-13

## 2024-06-11 RX ORDER — ACETAMINOPHEN 160 MG/5ML
650 SOLUTION ORAL EVERY 4 HOURS PRN
Status: DISCONTINUED | OUTPATIENT
Start: 2024-06-11 | End: 2024-06-13

## 2024-06-11 RX ORDER — DEXTROSE 50 % IN WATER (D50W) INTRAVENOUS SYRINGE
12.5
Status: DISCONTINUED | OUTPATIENT
Start: 2024-06-11 | End: 2024-06-16 | Stop reason: HOSPADM

## 2024-06-11 RX ORDER — INSULIN LISPRO 100 [IU]/ML
0-5 INJECTION, SOLUTION INTRAVENOUS; SUBCUTANEOUS
Status: DISCONTINUED | OUTPATIENT
Start: 2024-06-12 | End: 2024-06-16 | Stop reason: HOSPADM

## 2024-06-11 RX ORDER — ONDANSETRON 4 MG/1
4 TABLET, ORALLY DISINTEGRATING ORAL EVERY 8 HOURS PRN
Status: DISCONTINUED | OUTPATIENT
Start: 2024-06-11 | End: 2024-06-16 | Stop reason: HOSPADM

## 2024-06-11 RX ORDER — MEROPENEM AND SODIUM CHLORIDE 1 G/50ML
1 INJECTION, SOLUTION INTRAVENOUS EVERY 8 HOURS
Status: DISCONTINUED | OUTPATIENT
Start: 2024-06-11 | End: 2024-06-13

## 2024-06-11 RX ORDER — AMLODIPINE BESYLATE 2.5 MG/1
2.5 TABLET ORAL DAILY
Status: DISCONTINUED | OUTPATIENT
Start: 2024-06-12 | End: 2024-06-16 | Stop reason: HOSPADM

## 2024-06-11 RX ORDER — FAMOTIDINE 20 MG/1
20 TABLET, FILM COATED ORAL DAILY
Status: DISCONTINUED | OUTPATIENT
Start: 2024-06-12 | End: 2024-06-16 | Stop reason: HOSPADM

## 2024-06-11 RX ORDER — ONDANSETRON HYDROCHLORIDE 2 MG/ML
4 INJECTION, SOLUTION INTRAVENOUS EVERY 8 HOURS PRN
Status: DISCONTINUED | OUTPATIENT
Start: 2024-06-11 | End: 2024-06-16 | Stop reason: HOSPADM

## 2024-06-11 RX ORDER — FLUOXETINE HYDROCHLORIDE 20 MG/1
40 CAPSULE ORAL DAILY
Status: DISCONTINUED | OUTPATIENT
Start: 2024-06-12 | End: 2024-06-16 | Stop reason: HOSPADM

## 2024-06-11 RX ORDER — SODIUM CHLORIDE 9 MG/ML
75 INJECTION, SOLUTION INTRAVENOUS CONTINUOUS
Status: DISCONTINUED | OUTPATIENT
Start: 2024-06-11 | End: 2024-06-12

## 2024-06-11 RX ORDER — ACETAMINOPHEN 650 MG/1
650 SUPPOSITORY RECTAL EVERY 4 HOURS PRN
Status: DISCONTINUED | OUTPATIENT
Start: 2024-06-11 | End: 2024-06-13

## 2024-06-11 RX ORDER — ASPIRIN 81 MG/1
81 TABLET ORAL DAILY
Status: DISCONTINUED | OUTPATIENT
Start: 2024-06-12 | End: 2024-06-16 | Stop reason: HOSPADM

## 2024-06-11 RX ORDER — DEXTROSE 50 % IN WATER (D50W) INTRAVENOUS SYRINGE
25
Status: DISCONTINUED | OUTPATIENT
Start: 2024-06-11 | End: 2024-06-16 | Stop reason: HOSPADM

## 2024-06-11 RX ORDER — METOPROLOL TARTRATE 50 MG/1
50 TABLET ORAL 2 TIMES DAILY
Status: DISCONTINUED | OUTPATIENT
Start: 2024-06-11 | End: 2024-06-16 | Stop reason: HOSPADM

## 2024-06-11 SDOH — SOCIAL STABILITY: SOCIAL INSECURITY: DO YOU FEEL ANYONE HAS EXPLOITED OR TAKEN ADVANTAGE OF YOU FINANCIALLY OR OF YOUR PERSONAL PROPERTY?: NO

## 2024-06-11 SDOH — SOCIAL STABILITY: SOCIAL INSECURITY: WITHIN THE LAST YEAR, HAVE YOU BEEN HUMILIATED OR EMOTIONALLY ABUSED IN OTHER WAYS BY YOUR PARTNER OR EX-PARTNER?: NO

## 2024-06-11 SDOH — SOCIAL STABILITY: SOCIAL INSECURITY: ARE YOU OR HAVE YOU BEEN THREATENED OR ABUSED PHYSICALLY, EMOTIONALLY, OR SEXUALLY BY ANYONE?: NO

## 2024-06-11 SDOH — SOCIAL STABILITY: SOCIAL INSECURITY: ABUSE: ADULT

## 2024-06-11 SDOH — SOCIAL STABILITY: SOCIAL INSECURITY: WITHIN THE LAST YEAR, HAVE YOU BEEN AFRAID OF YOUR PARTNER OR EX-PARTNER?: NO

## 2024-06-11 SDOH — SOCIAL STABILITY: SOCIAL NETWORK: HOW OFTEN DO YOU ATTEND CHURCH OR RELIGIOUS SERVICES?: PATIENT DECLINED

## 2024-06-11 SDOH — HEALTH STABILITY: MENTAL HEALTH
HOW OFTEN DO YOU NEED TO HAVE SOMEONE HELP YOU WHEN YOU READ INSTRUCTIONS, PAMPHLETS, OR OTHER WRITTEN MATERIAL FROM YOUR DOCTOR OR PHARMACY?: NEVER

## 2024-06-11 SDOH — ECONOMIC STABILITY: INCOME INSECURITY: IN THE PAST 12 MONTHS, HAS THE ELECTRIC, GAS, OIL, OR WATER COMPANY THREATENED TO SHUT OFF SERVICE IN YOUR HOME?: NO

## 2024-06-11 SDOH — ECONOMIC STABILITY: FOOD INSECURITY: WITHIN THE PAST 12 MONTHS, THE FOOD YOU BOUGHT JUST DIDN'T LAST AND YOU DIDN'T HAVE MONEY TO GET MORE.: NEVER TRUE

## 2024-06-11 SDOH — HEALTH STABILITY: PHYSICAL HEALTH
ON AVERAGE, HOW MANY DAYS PER WEEK DO YOU ENGAGE IN MODERATE TO STRENUOUS EXERCISE (LIKE A BRISK WALK)?: PATIENT DECLINED

## 2024-06-11 SDOH — SOCIAL STABILITY: SOCIAL NETWORK: HOW OFTEN DO YOU ATTENT MEETINGS OF THE CLUB OR ORGANIZATION YOU BELONG TO?: PATIENT DECLINED

## 2024-06-11 SDOH — HEALTH STABILITY: PHYSICAL HEALTH: ON AVERAGE, HOW MANY MINUTES DO YOU ENGAGE IN EXERCISE AT THIS LEVEL?: PATIENT DECLINED

## 2024-06-11 SDOH — SOCIAL STABILITY: SOCIAL INSECURITY: ARE THERE ANY APPARENT SIGNS OF INJURIES/BEHAVIORS THAT COULD BE RELATED TO ABUSE/NEGLECT?: NO

## 2024-06-11 SDOH — SOCIAL STABILITY: SOCIAL NETWORK: HOW OFTEN DO YOU GET TOGETHER WITH FRIENDS OR RELATIVES?: PATIENT DECLINED

## 2024-06-11 SDOH — SOCIAL STABILITY: SOCIAL NETWORK: IN A TYPICAL WEEK, HOW MANY TIMES DO YOU TALK ON THE PHONE WITH FAMILY, FRIENDS, OR NEIGHBORS?: PATIENT DECLINED

## 2024-06-11 SDOH — SOCIAL STABILITY: SOCIAL INSECURITY: DO YOU FEEL UNSAFE GOING BACK TO THE PLACE WHERE YOU ARE LIVING?: NO

## 2024-06-11 SDOH — SOCIAL STABILITY: SOCIAL INSECURITY: HAS ANYONE EVER THREATENED TO HURT YOUR FAMILY OR YOUR PETS?: NO

## 2024-06-11 SDOH — ECONOMIC STABILITY: FOOD INSECURITY: WITHIN THE PAST 12 MONTHS, YOU WORRIED THAT YOUR FOOD WOULD RUN OUT BEFORE YOU GOT MONEY TO BUY MORE.: NEVER TRUE

## 2024-06-11 SDOH — SOCIAL STABILITY: SOCIAL NETWORK: ARE YOU MARRIED, WIDOWED, DIVORCED, SEPARATED, NEVER MARRIED, OR LIVING WITH A PARTNER?: PATIENT DECLINED

## 2024-06-11 SDOH — SOCIAL STABILITY: SOCIAL NETWORK
DO YOU BELONG TO ANY CLUBS OR ORGANIZATIONS SUCH AS CHURCH GROUPS UNIONS, FRATERNAL OR ATHLETIC GROUPS, OR SCHOOL GROUPS?: PATIENT DECLINED

## 2024-06-11 SDOH — SOCIAL STABILITY: SOCIAL INSECURITY: HAVE YOU HAD ANY THOUGHTS OF HARMING ANYONE ELSE?: NO

## 2024-06-11 SDOH — SOCIAL STABILITY: SOCIAL INSECURITY: DOES ANYONE TRY TO KEEP YOU FROM HAVING/CONTACTING OTHER FRIENDS OR DOING THINGS OUTSIDE YOUR HOME?: NO

## 2024-06-11 SDOH — SOCIAL STABILITY: SOCIAL INSECURITY: POSSIBLE ABUSE REPORTED TO:: ADVOCATE

## 2024-06-11 SDOH — SOCIAL STABILITY: SOCIAL INSECURITY: WERE YOU ABLE TO COMPLETE ALL THE BEHAVIORAL HEALTH SCREENINGS?: YES

## 2024-06-11 SDOH — SOCIAL STABILITY: SOCIAL INSECURITY: HAVE YOU HAD THOUGHTS OF HARMING ANYONE ELSE?: NO

## 2024-06-11 ASSESSMENT — ACTIVITIES OF DAILY LIVING (ADL)
LACK_OF_TRANSPORTATION: PATIENT DECLINED
DRESSING YOURSELF: NEEDS ASSISTANCE
BATHING: NEEDS ASSISTANCE
GROOMING: INDEPENDENT
TOILETING: NEEDS ASSISTANCE
HEARING - RIGHT EAR: FUNCTIONAL
HEARING - LEFT EAR: FUNCTIONAL
PATIENT'S MEMORY ADEQUATE TO SAFELY COMPLETE DAILY ACTIVITIES?: YES
JUDGMENT_ADEQUATE_SAFELY_COMPLETE_DAILY_ACTIVITIES: YES
WALKS IN HOME: NEEDS ASSISTANCE
ASSISTIVE_DEVICE: WHEELCHAIR
FEEDING YOURSELF: INDEPENDENT
ADEQUATE_TO_COMPLETE_ADL: YES

## 2024-06-11 ASSESSMENT — PAIN - FUNCTIONAL ASSESSMENT
PAIN_FUNCTIONAL_ASSESSMENT: 0-10
PAIN_FUNCTIONAL_ASSESSMENT: 0-10

## 2024-06-11 ASSESSMENT — COLUMBIA-SUICIDE SEVERITY RATING SCALE - C-SSRS
1. IN THE PAST MONTH, HAVE YOU WISHED YOU WERE DEAD OR WISHED YOU COULD GO TO SLEEP AND NOT WAKE UP?: NO
2. HAVE YOU ACTUALLY HAD ANY THOUGHTS OF KILLING YOURSELF?: NO
6. HAVE YOU EVER DONE ANYTHING, STARTED TO DO ANYTHING, OR PREPARED TO DO ANYTHING TO END YOUR LIFE?: NO

## 2024-06-11 ASSESSMENT — COGNITIVE AND FUNCTIONAL STATUS - GENERAL
TOILETING: A LOT
DRESSING REGULAR LOWER BODY CLOTHING: A LOT
PERSONAL GROOMING: A LOT
MOBILITY SCORE: 12
MOVING FROM LYING ON BACK TO SITTING ON SIDE OF FLAT BED WITH BEDRAILS: A LOT
HELP NEEDED FOR BATHING: A LOT
CLIMB 3 TO 5 STEPS WITH RAILING: TOTAL
MOBILITY SCORE: 11
DRESSING REGULAR LOWER BODY CLOTHING: A LOT
TURNING FROM BACK TO SIDE WHILE IN FLAT BAD: A LOT
TURNING FROM BACK TO SIDE WHILE IN FLAT BAD: A LOT
CLIMB 3 TO 5 STEPS WITH RAILING: A LOT
EATING MEALS: A LOT
DRESSING REGULAR UPPER BODY CLOTHING: A LOT
PERSONAL GROOMING: A LOT
WALKING IN HOSPITAL ROOM: A LOT
DRESSING REGULAR UPPER BODY CLOTHING: A LOT
STANDING UP FROM CHAIR USING ARMS: A LOT
WALKING IN HOSPITAL ROOM: A LOT
PATIENT BASELINE BEDBOUND: NO
MOVING FROM LYING ON BACK TO SITTING ON SIDE OF FLAT BED WITH BEDRAILS: A LOT
EATING MEALS: A LITTLE
DAILY ACTIVITIY SCORE: 12
STANDING UP FROM CHAIR USING ARMS: A LOT
TOILETING: A LOT
DAILY ACTIVITIY SCORE: 13
MOVING TO AND FROM BED TO CHAIR: A LOT
MOVING TO AND FROM BED TO CHAIR: A LOT
HELP NEEDED FOR BATHING: A LOT

## 2024-06-11 ASSESSMENT — LIFESTYLE VARIABLES
AUDIT-C TOTAL SCORE: 0
SKIP TO QUESTIONS 9-10: 1
AUDIT-C TOTAL SCORE: 0
PRESCIPTION_ABUSE_PAST_12_MONTHS: NO
HOW OFTEN DO YOU HAVE A DRINK CONTAINING ALCOHOL: NEVER
HOW MANY STANDARD DRINKS CONTAINING ALCOHOL DO YOU HAVE ON A TYPICAL DAY: PATIENT DOES NOT DRINK
SUBSTANCE_ABUSE_PAST_12_MONTHS: NO
HOW OFTEN DO YOU HAVE 6 OR MORE DRINKS ON ONE OCCASION: NEVER

## 2024-06-11 ASSESSMENT — PAIN SCALES - GENERAL
PAINLEVEL_OUTOF10: 0 - NO PAIN
PAINLEVEL_OUTOF10: 6

## 2024-06-11 ASSESSMENT — PAIN DESCRIPTION - LOCATION: LOCATION: BACK

## 2024-06-11 NOTE — H&P
History Of Present Illness  Carrie Rosario is a 83 y.o. female presenting with osteomyelitis of the spine.  History significant for diabetes, hyperlipidemia, chronic kidney disease, diastolic heart failure and hypertension.  She was here in January 2024 with compression fracture of L1 lumbar vertebrae along with osteomyelitis of the spine and was seen by Dr. Kraus and Dr. Conway.  He currently resides at assisted living and was brought to [facility] because she was found on floor and confused complaining of lower back pain at the ER over there they did a CT and thought that the spine has worsened and she was brought to [facility] for further evaluation.  Currently she denies any chest pain or shortness of breath she states that her back pain has improved.  She denies any headache, dizziness, nausea/vomiting.     Past Medical History  Past Medical History:   Diagnosis Date    Anxiety     Arthritis     ASHD (arteriosclerotic heart disease)     At risk for falls     Cardiac arrest (Multi)     Compression fracture of L1 lumbar vertebra (Multi)     CVA (cerebral vascular accident) (Multi)     Depression     Diabetes mellitus (Multi)     GERD (gastroesophageal reflux disease)     Heart disease     Heel ulcer (Multi)     Hypercholesterolemia     Hyperlipidemia     Hypertension     Osteoarthritis     Paraspinal abscess (Multi)     Psoas abscess (Multi)     Right foot drop     Right foot ulcer (Multi)     Spinal stenosis     STEMI (ST elevation myocardial infarction) (Multi)     TIA (transient ischemic attack)     Weakness        Surgical History  Past Surgical History:   Procedure Laterality Date    CORONARY ANGIOPLASTY WITH STENT PLACEMENT  12/25/2021    FOOT SURGERY      multiple    MR HEAD ANGIO WO IV CONTRAST  12/28/2021    MR HEAD ANGIO WO IV CONTRAST [facility]    MR NECK ANGIO WO IV CONTRAST  12/28/2021    MR NECK ANGIO WO IV CONTRAST [facility]    OTHER SURGICAL HISTORY   "04/25/2022    Cataract surgery    OTHER SURGICAL HISTORY  04/25/2022    YAG laser capsulotomy    OTHER SURGICAL HISTORY  04/25/2022    Foot surgery    OTHER SURGICAL HISTORY  04/25/2022    Back surgery    OTHER SURGICAL HISTORY  04/25/2022    Arterial stent placement        Social History  She reports that she has never smoked. She has never been exposed to tobacco smoke. She has never used smokeless tobacco. She reports that she does not currently use alcohol. She reports that she does not currently use drugs.    Family History  Family History   Problem Relation Name Age of Onset    Diabetes Mother      Diabetes Father      No Known Problems Daughter      No Known Problems Daughter      Diabetes Other      Hypertension Other          Allergies  Nickel and Sulfa (sulfonamide antibiotics)    Review of Systems  All systems reviewed and negative except as noted in HPI  Physical Exam  Constitutional: No acute distress, calm, cooperative  HEENT: PERRL, normocephalic, atraumatic, mucous membranes moist  Cardiovascular: Regular rhythm and rate,   Respiratory: Lungs clear to auscultation,   Gastrointestinal: Bowel sounds positive x 4, soft, nontender  Neurologic: Alert and oriented x 2 equal strength bilaterally  Musculoskeletal: Able to move all extremities, no edema  Skin: Warm, dry and intact  Last Recorded Vitals  Blood pressure 136/71, pulse 79, temperature 35.6 °C (96.1 °F), temperature source Temporal, resp. rate 19, height 1.626 m (5' 4\"), weight 53.1 kg (117 lb), SpO2 98%.    Relevant Results             Assessment/Plan   Principal Problem:    Osteomyelitis (Multi)    Osteomyelitis of the spine  Urinary tract infection  .  Will order daptomycin and meropenem as she was on from previous admission  .  Follow cultures   .  Consult infectious disease  .  Consult Dr. Kraus    Acute on chronic kidney disease  .  Likely due to dehydration  .  Gentle hydration  .  Consult nephrology    Type 2 diabetes mellitus  .  " Hypoglycemia protocol  .  Accu-Cheks before meals and at bedtime  .  Insulin sliding scale    Hyperlipidemia  .  Continue home atorvastatin    Hypertension  .  Continue home Norvasc and Lopressor    DVT prophylaxis  .  Lovenox                   I spent 75 minutes in the professional and overall care of this patient.      Jimmie Moore, CHELSEA-CNP

## 2024-06-11 NOTE — TELEPHONE ENCOUNTER
A nurse from [facility] called to inform you that this patient was sent to the [facility] in [place] due to having extreme back pain and a change in mental status.

## 2024-06-11 NOTE — CARE PLAN
Problem: Pain  Goal: My pain/discomfort is manageable  Outcome: Progressing     Problem: Safety  Goal: Patient will be injury free during hospitalization  Outcome: Progressing  Goal: I will remain free of falls  Outcome: Progressing     Problem: Daily Care  Goal: Daily care needs are met  Outcome: Progressing     Problem: Psychosocial Needs  Goal: Demonstrates ability to cope with hospitalization/illness  Outcome: Progressing  Goal: Collaborate with me, my family, and caregiver to identify my specific goals  Outcome: Progressing  Flowsheets (Taken 6/11/2024 0314)  Cultural Requests During Hospitalization: no  Spiritual Requests During Hospitalization: no     Problem: Discharge Barriers  Goal: My discharge needs are met  Outcome: Progressing

## 2024-06-12 LAB
ALBUMIN SERPL-MCNC: 3.4 G/DL (ref 3.5–5)
ALP BLD-CCNC: 102 U/L (ref 35–125)
ALT SERPL-CCNC: 5 U/L (ref 5–40)
ANION GAP SERPL CALC-SCNC: 12 MMOL/L
ANION GAP SERPL CALC-SCNC: 8 MMOL/L
AST SERPL-CCNC: 18 U/L (ref 5–40)
BILIRUB SERPL-MCNC: 0.3 MG/DL (ref 0.1–1.2)
BUN SERPL-MCNC: 20 MG/DL (ref 8–25)
BUN SERPL-MCNC: 22 MG/DL (ref 8–25)
CALCIUM SERPL-MCNC: 9.1 MG/DL (ref 8.5–10.4)
CALCIUM SERPL-MCNC: 9.6 MG/DL (ref 8.5–10.4)
CHLORIDE SERPL-SCNC: 102 MMOL/L (ref 97–107)
CHLORIDE SERPL-SCNC: 108 MMOL/L (ref 97–107)
CK SERPL-CCNC: 86 U/L (ref 24–195)
CO2 SERPL-SCNC: 25 MMOL/L (ref 24–31)
CO2 SERPL-SCNC: 25 MMOL/L (ref 24–31)
CREAT SERPL-MCNC: 1.1 MG/DL (ref 0.4–1.6)
CREAT SERPL-MCNC: 1.2 MG/DL (ref 0.4–1.6)
CRP SERPL-MCNC: 3 MG/DL (ref 0–2)
CRP SERPL-MCNC: 3.4 MG/DL (ref 0–2)
EGFRCR SERPLBLD CKD-EPI 2021: 45 ML/MIN/1.73M*2
EGFRCR SERPLBLD CKD-EPI 2021: 50 ML/MIN/1.73M*2
ERYTHROCYTE [DISTWIDTH] IN BLOOD BY AUTOMATED COUNT: 11.4 % (ref 11.5–14.5)
ERYTHROCYTE [SEDIMENTATION RATE] IN BLOOD BY WESTERGREN METHOD: 42 MM/H (ref 0–30)
GLUCOSE BLD MANUAL STRIP-MCNC: 121 MG/DL (ref 74–99)
GLUCOSE BLD MANUAL STRIP-MCNC: 179 MG/DL (ref 74–99)
GLUCOSE BLD MANUAL STRIP-MCNC: 220 MG/DL (ref 74–99)
GLUCOSE BLD MANUAL STRIP-MCNC: 84 MG/DL (ref 74–99)
GLUCOSE SERPL-MCNC: 150 MG/DL (ref 65–99)
GLUCOSE SERPL-MCNC: 152 MG/DL (ref 65–99)
HCT VFR BLD AUTO: 39.3 % (ref 36–46)
HGB BLD-MCNC: 12.6 G/DL (ref 12–16)
MAGNESIUM SERPL-MCNC: 2.2 MG/DL (ref 1.6–3.1)
MCH RBC QN AUTO: 31.7 PG (ref 26–34)
MCHC RBC AUTO-ENTMCNC: 32.1 G/DL (ref 32–36)
MCV RBC AUTO: 99 FL (ref 80–100)
NRBC BLD-RTO: 0 /100 WBCS (ref 0–0)
PLATELET # BLD AUTO: 260 X10*3/UL (ref 150–450)
POTASSIUM SERPL-SCNC: 4.1 MMOL/L (ref 3.4–5.1)
POTASSIUM SERPL-SCNC: 4.3 MMOL/L (ref 3.4–5.1)
PROT SERPL-MCNC: 7.2 G/DL (ref 5.9–7.9)
RBC # BLD AUTO: 3.98 X10*6/UL (ref 4–5.2)
SODIUM SERPL-SCNC: 139 MMOL/L (ref 133–145)
SODIUM SERPL-SCNC: 141 MMOL/L (ref 133–145)
WBC # BLD AUTO: 6.4 X10*3/UL (ref 4.4–11.3)

## 2024-06-12 PROCEDURE — 2500000002 HC RX 250 W HCPCS SELF ADMINISTERED DRUGS (ALT 637 FOR MEDICARE OP, ALT 636 FOR OP/ED): Performed by: NURSE PRACTITIONER

## 2024-06-12 PROCEDURE — 1100000001 HC PRIVATE ROOM DAILY

## 2024-06-12 PROCEDURE — 82947 ASSAY GLUCOSE BLOOD QUANT: CPT | Mod: 91

## 2024-06-12 PROCEDURE — 2500000001 HC RX 250 WO HCPCS SELF ADMINISTERED DRUGS (ALT 637 FOR MEDICARE OP): Performed by: NURSE PRACTITIONER

## 2024-06-12 PROCEDURE — 2500000004 HC RX 250 GENERAL PHARMACY W/ HCPCS (ALT 636 FOR OP/ED): Performed by: NURSE PRACTITIONER

## 2024-06-12 PROCEDURE — 86140 C-REACTIVE PROTEIN: CPT | Performed by: NURSE PRACTITIONER

## 2024-06-12 PROCEDURE — 85652 RBC SED RATE AUTOMATED: CPT | Performed by: NURSE PRACTITIONER

## 2024-06-12 PROCEDURE — 82374 ASSAY BLOOD CARBON DIOXIDE: CPT | Performed by: NURSE PRACTITIONER

## 2024-06-12 PROCEDURE — 97162 PT EVAL MOD COMPLEX 30 MIN: CPT | Mod: GP

## 2024-06-12 PROCEDURE — 97530 THERAPEUTIC ACTIVITIES: CPT | Mod: GP

## 2024-06-12 PROCEDURE — 97530 THERAPEUTIC ACTIVITIES: CPT | Mod: GO

## 2024-06-12 PROCEDURE — 85027 COMPLETE CBC AUTOMATED: CPT | Performed by: NURSE PRACTITIONER

## 2024-06-12 PROCEDURE — 36415 COLL VENOUS BLD VENIPUNCTURE: CPT | Performed by: NURSE PRACTITIONER

## 2024-06-12 PROCEDURE — 97166 OT EVAL MOD COMPLEX 45 MIN: CPT | Mod: GO

## 2024-06-12 PROCEDURE — 82550 ASSAY OF CK (CPK): CPT | Performed by: NURSE PRACTITIONER

## 2024-06-12 RX ORDER — DAPTOMYCIN IN SODIUM CHLORIDE 350 MG/50ML
350 INJECTION, SOLUTION INTRAVENOUS
Status: DISCONTINUED | OUTPATIENT
Start: 2024-06-12 | End: 2024-06-13

## 2024-06-12 ASSESSMENT — COGNITIVE AND FUNCTIONAL STATUS - GENERAL
EATING MEALS: A LITTLE
PERSONAL GROOMING: A LITTLE
TURNING FROM BACK TO SIDE WHILE IN FLAT BAD: TOTAL
TOILETING: TOTAL
PERSONAL GROOMING: A LOT
TURNING FROM BACK TO SIDE WHILE IN FLAT BAD: A LOT
CLIMB 3 TO 5 STEPS WITH RAILING: TOTAL
CLIMB 3 TO 5 STEPS WITH RAILING: TOTAL
MOBILITY SCORE: 6
MOVING FROM LYING ON BACK TO SITTING ON SIDE OF FLAT BED WITH BEDRAILS: A LOT
HELP NEEDED FOR BATHING: A LOT
PERSONAL GROOMING: A LITTLE
DAILY ACTIVITIY SCORE: 12
DAILY ACTIVITIY SCORE: 13
MOVING TO AND FROM BED TO CHAIR: TOTAL
DAILY ACTIVITIY SCORE: 12
STANDING UP FROM CHAIR USING ARMS: TOTAL
WALKING IN HOSPITAL ROOM: TOTAL
DRESSING REGULAR LOWER BODY CLOTHING: TOTAL
TOILETING: A LOT
WALKING IN HOSPITAL ROOM: A LOT
STANDING UP FROM CHAIR USING ARMS: TOTAL
CLIMB 3 TO 5 STEPS WITH RAILING: TOTAL
MOVING TO AND FROM BED TO CHAIR: TOTAL
DRESSING REGULAR UPPER BODY CLOTHING: A LOT
DRESSING REGULAR UPPER BODY CLOTHING: A LOT
MOBILITY SCORE: 6
DRESSING REGULAR LOWER BODY CLOTHING: A LOT
TOILETING: TOTAL
WALKING IN HOSPITAL ROOM: TOTAL
MOVING TO AND FROM BED TO CHAIR: A LOT
TURNING FROM BACK TO SIDE WHILE IN FLAT BAD: TOTAL
DRESSING REGULAR LOWER BODY CLOTHING: TOTAL
STANDING UP FROM CHAIR USING ARMS: A LOT
HELP NEEDED FOR BATHING: A LOT
HELP NEEDED FOR BATHING: A LOT
MOBILITY SCORE: 11
EATING MEALS: A LITTLE
MOVING FROM LYING ON BACK TO SITTING ON SIDE OF FLAT BED WITH BEDRAILS: TOTAL
DRESSING REGULAR UPPER BODY CLOTHING: A LOT
EATING MEALS: A LITTLE
MOVING FROM LYING ON BACK TO SITTING ON SIDE OF FLAT BED WITH BEDRAILS: TOTAL

## 2024-06-12 ASSESSMENT — PAIN SCALES - GENERAL
PAINLEVEL_OUTOF10: 10 - WORST POSSIBLE PAIN
PAINLEVEL_OUTOF10: 0 - NO PAIN
PAINLEVEL_OUTOF10: 6
PAINLEVEL_OUTOF10: 3
PAINLEVEL_OUTOF10: 4
PAINLEVEL_OUTOF10: 7
PAINLEVEL_OUTOF10: 8
PAINLEVEL_OUTOF10: 0 - NO PAIN
PAINLEVEL_OUTOF10: 3

## 2024-06-12 ASSESSMENT — ENCOUNTER SYMPTOMS
ENDOCRINE NEGATIVE: 1
BACK PAIN: 1
RESPIRATORY NEGATIVE: 1
CONFUSION: 1
NEUROLOGICAL NEGATIVE: 1
CARDIOVASCULAR NEGATIVE: 1
CONSTITUTIONAL NEGATIVE: 1
GASTROINTESTINAL NEGATIVE: 1
EYES NEGATIVE: 1

## 2024-06-12 ASSESSMENT — PAIN - FUNCTIONAL ASSESSMENT
PAIN_FUNCTIONAL_ASSESSMENT: 0-10
PAIN_FUNCTIONAL_ASSESSMENT: UNABLE TO SELF-REPORT
PAIN_FUNCTIONAL_ASSESSMENT: 0-10
PAIN_FUNCTIONAL_ASSESSMENT: UNABLE TO SELF-REPORT

## 2024-06-12 ASSESSMENT — PAIN DESCRIPTION - LOCATION
LOCATION: HIP
LOCATION: BACK
LOCATION: BACK

## 2024-06-12 ASSESSMENT — PAIN DESCRIPTION - ORIENTATION: ORIENTATION: RIGHT;LEFT

## 2024-06-12 ASSESSMENT — ACTIVITIES OF DAILY LIVING (ADL)
ADL_ASSISTANCE: INDEPENDENT
BATHING_ASSISTANCE: MAXIMAL
ADL_ASSISTANCE: INDEPENDENT

## 2024-06-12 NOTE — CONSULTS
Reason For Consult  Reevaluation for osteomyelitis of L1    History Of Present Illness  Carrie Rosario is a 83 y.o. female presenting with recent history that while in skilled nursing for chronic osteomyelitis of L1, she was found on the floor, confused and with back pain complaints.  She was evaluated at [facility] where a CT scan was obtained identifying the bone changes of osteomyelitis of L1 present for 6 months.  The nursing report currently states she is not in back pain.  I had evaluated her in January at [facility] when she first was identified to have osteomyelitis of L1 and discitis L1 to and she has been on appropriate IV antibiotics.  She subsequently had MRI scans of the lumbar spine in February and in May..     Past Medical History  She has a past medical history of Anxiety, Arthritis, ASHD (arteriosclerotic heart disease), At risk for falls, Cardiac arrest (Multi), Compression fracture of L1 lumbar vertebra (Multi), CVA (cerebral vascular accident) (Multi), Depression, Diabetes mellitus (Multi), GERD (gastroesophageal reflux disease), Heart disease, Heel ulcer (Multi), Hypercholesterolemia, Hyperlipidemia, Hypertension, Osteoarthritis, Paraspinal abscess (Multi), Psoas abscess (Multi), Right foot drop, Right foot ulcer (Multi), Spinal stenosis, STEMI (ST elevation myocardial infarction) (Multi), TIA (transient ischemic attack), and Weakness.    Surgical History  She has a past surgical history that includes Other surgical history (04/25/2022); Other surgical history (04/25/2022); Other surgical history (04/25/2022); Other surgical history (04/25/2022); Other surgical history (04/25/2022); MR angio head wo IV contrast (12/28/2021); MR angio neck wo IV contrast (12/28/2021); Coronary angioplasty with stent (12/25/2021); and Foot surgery.     Social History  She reports that she has never smoked. She has never been exposed to tobacco smoke. She has never used smokeless tobacco. She reports that she does  "not currently use alcohol. She reports that she does not currently use drugs.    Family History  Family History   Problem Relation Name Age of Onset    Diabetes Mother      Diabetes Father      No Known Problems Daughter      No Known Problems Daughter      Diabetes Other      Hypertension Other          Allergies  Nickel and Sulfa (sulfonamide antibiotics)    Review of Systems       Physical Exam         Last Recorded Vitals  Blood pressure 136/71, pulse 79, temperature 35.6 °C (96.1 °F), temperature source Temporal, resp. rate 19, height 1.626 m (5' 4\"), weight 53.1 kg (117 lb), SpO2 98%.    Relevant Results  I have reviewed the images of the CT scan of the lumbar spine obtained today at the [facility].  They document the evolution of the bone changes from osteomyelitis of the distal half of the vertebra of L1.  There is subsequent bone consolidation, healing and sclerosis of the mid vertebra of L1 and the bone fragments from the posterior vertebra extending minimally into the spinal canal.  I have also reviewed the MRI scans of the lumbar spine from January, February and May.  These document the progressive changes expected from successful antibiotic management of vertebral osteomyelitis.  She also has unchanged throughout this period, a remote instrumented spine fusion and laminectomy L3 to sacrum and spinal stenosis at L2-3.  This has remained unchanged.  There is no suggestion of ongoing bone destruction from osteomyelitis.  Quite the opposite, she has progressive healing with the anticipated bone changes of osteomyelitis under treatment with bone recovery.        Scheduled medications  [START ON 6/12/2024] amLODIPine, 2.5 mg, oral, Daily  [START ON 6/12/2024] aspirin, 81 mg, oral, Daily  atorvastatin, 20 mg, oral, Nightly  daptomycin, 200 mg, intravenous, q24h TIERNEY  enoxaparin, 40 mg, subcutaneous, q24h  [START ON 6/12/2024] famotidine, 20 mg, oral, Daily  [START ON 6/12/2024] FLUoxetine, 40 mg, oral, Daily  [START " ON 6/12/2024] insulin lispro, 0-5 Units, subcutaneous, TID  meropenem, 1 g, intravenous, q8h  metoprolol tartrate, 50 mg, oral, BID      Continuous medications  sodium chloride 0.9%, 75 mL/hr      PRN medications  PRN medications: acetaminophen **OR** acetaminophen **OR** acetaminophen, dextrose, dextrose, glucagon, glucagon, melatonin, ondansetron ODT **OR** ondansetron, traMADol  No results found for this or any previous visit (from the past 24 hour(s)).     Assessment/Plan     This patient had osteomyelitis of L1 identified in January and appropriately treated.  Of necessity the evolution of bone healing from this process goes on for years.  There is no evidence of failure of treatment, instability, or ongoing bony destruction.  Infectious disease will evaluate for the possibility of sepsis or to metabolic effects of ongoing infection.  There is no indication at this time for repeat biopsy or for any surgical intervention.  I have spent 25 minutes reviewing the patient's extensive records and MRI scans from multiple institutions and preparing this report.    Jaylan Kraus MD

## 2024-06-12 NOTE — PROGRESS NOTES
Occupational Therapy    Evaluation/Treatment    Patient Name: Carrie Rosario  MRN: [id number]  : 1940  Today's Date: 24  Time Calculation  Start Time: 1316  Stop Time: 1339  Time Calculation (min): 23 min       Assessment:  OT Assessment: Patient is an 83 year old female who presented to the ED after being found on the ground. Patient admitted with L1 osteomyelitis. At baseline, patient is able to complete functional transfers with [place] and ADL tasks with [place]. She is now unable to transfer and unable to complete ADL tasks without assist. Patient would benefit from skilled OT to address the above deficits and increase patient's safety and independence with daily tasks.  Prognosis: Good  Barriers to Discharge: Other (Comment) (limited by pain. Max Ax2 for bed mobility. unable to complete ADLs, unable to transfer)  Evaluation/Treatment Tolerance: Patient limited by pain  End of Session Communication: Bedside nurse  End of Session Patient Position: Bed, 3 rail up, Alarm on  OT Assessment Results: Decreased ADL status, Decreased upper extremity strength, Decreased safe judgment during ADL, Decreased endurance, Decreased functional mobility, Decreased gross motor control  Prognosis: Good  Barriers to Discharge: Other (Comment) (limited by pain. Max Ax2 for bed mobility. unable to complete ADLs, unable to transfer)  Evaluation/Treatment Tolerance: Patient limited by pain  Strengths: Support of Caregivers  Barriers to Participation: Comorbidities  Plan:  Treatment Interventions: ADL retraining, Functional transfer training, UE strengthening/ROM, Endurance training, Patient/family training, Compensatory technique education, Neuromuscular reeducation  OT Frequency: 3 times per week  OT Discharge Recommendations: Moderate intensity level of continued care  Equipment Recommended upon Discharge: Wheelchair, Wheeled walker  OT Recommended Transfer Status: Assist of 2  OT - OK to Discharge:  Yes  Treatment Interventions: ADL retraining, Functional transfer training, UE strengthening/ROM, Endurance training, Patient/family training, Compensatory technique education, Neuromuscular reeducation    Subjective   Current Problem:  1. Osteomyelitis (Multi)          General:   OT Received On: 06/12/24  General  Reason for Referral: decline in ADLs, osteomyelitis L1  Referred By: Dr. Clark  Past Medical History Relevant to Rehab: DM, hyperlipidemia, CKD, diastolic heart failure, HTN  Prior to Session Communication: Bedside nurse  Patient Position Received: Bed, 4 rail up, Alarm on  Preferred Learning Style: auditory, verbal  General Comment: 83 yr old found on floor of [facility], confusion, and complaints of LBP. Ortho consult reports no need for surgical intervention. Patient is cleared by nursing for therapy. In bed upon arrival and agreeable to participate  Precautions:  Hearing/Visual Limitations: glasses for reading  Medical Precautions: Fall precautions  Precautions Comment: spinal guarding due to low back pain, utilized logroll technique for safe mobility  Pain:  Pain Assessment  Pain Assessment: 0-10  Pain Score: 8  Pain Type: Acute pain  Pain Location: Back  Pain Orientation: Lower  Pain Interventions: Repositioned, Ambulation/increased activity    Objective   Cognition:  Overall Cognitive Status:  (pleasant, able to follow commands with increased cues, appears slightly confused at times)  Orientation Level:  (knows she is in the hospital, unsure which home)  Insight: Mild  Impulsive: Mildly     Home Living:  Type of Home: Assisted living  Lives With: Alone  Home Adaptive Equipment: Cane, Wheelchair-manual  Home Layout: One level  Bathroom Shower/Tub: Walk-in shower  Bathroom Toilet: Handicapped height  Home Living Comments: assist from care staff  Prior Function:  Level of [place]: Independent with ADLs and functional transfers  Receives Help From: Other (Comment) (care staff for meals, daughter is  "also local)  ADL Assistance: Independent  Homemaking Assistance: Independent  Ambulatory Assistance: Independent (wheelchair level)  Prior Function Comments: reports wheelchair level for most mobility due to weakness, facility prepares meals and provides intermiitent assist. Reports able to shower and dress self \"but it takes a long time\"  IADL History:  Homemaking Responsibilities: No  IADL Comments: care staff complete  ADL:  Eating Assistance: Independent  Eating Deficit: Setup (per clinical judgement)  Grooming Assistance: Minimal  Grooming Deficit: Increased time to complete, Supervision/safety, Steadying, Setup (per clinical judgement)  Bathing Assistance: Maximal  Bathing Deficit: Steadying, Supervision/safety, Increased time to complete , Buttocks, Perineal area, Left lower leg including foot, Left upper leg, Right lower leg including foot, Right upper leg, Use of adaptive equipment (per clinical judgement)  UE Dressing Assistance: Maximal  UE Dressing Deficit: Thread RUE, Thread LUE, Pull over head, Pull around back, Pull down in back (per clinical judgement)  LE Dressing Assistance: Total  LE Dressing Deficit: Don/doff R sock, Don/doff L sock  Toileting Assistance with Device: Total  Toileting Deficit:  (purewick)    Activity Tolerance:  Endurance: Decreased tolerance for upright activites  Activity Tolerance Comments: limited due to pain    Bed Mobility/Transfers: Bed Mobility  Bed Mobility: Yes  Bed Mobility 1  Bed Mobility 1: Supine to sitting  Level of Assistance 1: Maximum assistance, +2  Bed Mobility Comments 1: cues on log roll, assistance for trunk up and to move B LE  Bed Mobility 2  Bed Mobility  2: Sitting to supine  Level of Assistance 2: Maximum assistance, +2  Bed Mobility Comments 2: via log roll. assist to raise B LE and support trunk  Bed Mobility 3  Bed Mobility 3: Scooting  Level of Assistance 3: Dependent, +2  Bed Mobility Comments 3: with use of draw sheet to boost " patient    Transfers  Transfer: No (limited by pain)    Sitting Balance:  Static Sitting Balance  Static Sitting-Balance Support: Feet supported, Bilateral upper extremity supported  Static Sitting-Level of Assistance: Close supervision, Maximum assistance  Static Sitting-Comment/Number of Minutes: patient initially Max A. she progresses to sitting EOB with Close Supervision. does have R sided lean. unable to sit upright due to pain. tolerates ~4 minutes    Therapy/Activity: Therapeutic Activity  Therapeutic Activity Performed: Yes  Therapeutic Activity 1: patient is educated on log roll for bed mobility for pain management. She requires Max Ax2 to txfer from supine to EOB with cues on technique for log roll. She sits EOB ~4 minutes with Fair balance and R sided lean. She initially requires Max A to right self, patient educated on righting reactions and is able to sit with close supervision with right sided lean    Sensation:  Light Touch: No apparent deficits  Sensation Comment: patient denies numbness/tingling  Strength:  Strength Comments: B UE 3/5 grossly  Coordination:  Movements are Fluid and Coordinated: No  Coordination Comment: decreased overall due to pain   Extremities: RUE   RUE : Exceptions to WFL (generalized weakness observed) and LUE   LUE: Exceptions to WFL (generalized weakness observed)    Outcome Measures: [facility] Daily Activity  Putting on and taking off regular lower body clothing: Total  Bathing (including washing, rinsing, drying): A lot  Putting on and taking off regular upper body clothing: A lot  Toileting, which includes using toilet, bedpan or urinal: Total  Taking care of personal grooming such as brushing teeth: A little  Eating Meals: A little  Daily Activity - Total Score: 12    Education Documentation  Body Mechanics, taught by Jaqueline Farris OT at 6/12/2024  2:35 PM.  Learner: Patient  Readiness: Acceptance  Method: Explanation, Demonstration  Response: Needs  Reinforcement    Precautions, taught by Jaqueline Farris OT at 6/12/2024  2:35 PM.  Learner: Patient  Readiness: Acceptance  Method: Explanation, Demonstration  Response: Needs Reinforcement    Education Comments  No comments found.      Goals:  Encounter Problems       Encounter Problems (Active)       OT Goals       ADLs (Progressing)       Start:  06/12/24    Expected End:  07/18/24       Patient will complete ADL tasks with Close Supervision, using AE as needed, in order to increase safety and independence with self-care tasks.         Functional Transfers (Progressing)       Start:  06/12/24    Expected End:  07/18/24       Patient will complete functional transfers with Close Supervision in order to increase safety and independence with daily tasks.         Standing Tolerance (Progressing)       Start:  06/12/24    Expected End:  07/18/24       Patient will demonstrate the ability to stand at least >/= 2 minutes with Fair+ balance in order to increase safety and independence with daily tasks.         Sitting Tolerance (Progressing)       Start:  06/12/24    Expected End:  07/18/24       Patient will demonstrate the ability to sit EOB at least >/= 20 minutes with Fair+ balance in order to increase safety and independence with daily tasks.

## 2024-06-12 NOTE — NURSING NOTE
Agree with previous assessment. Patient turned and repositioned in bed. Call light within reach. Bed alarm on. Will continue plan of care.

## 2024-06-12 NOTE — PROGRESS NOTES
"Carrie Rosario is a 83 y.o. female on day 1 of admission presenting with Osteomyelitis (Multi).    Subjective   Seen and examined.  No documented concerns/events overnight from nursing.  Afebrile overnight.  Denies chills.  Patient is complaining of lower back/pelvic pain.       Objective     Physical Exam  Constitutional:       General: She is not in acute distress.     Appearance: She is not ill-appearing or toxic-appearing.   Cardiovascular:      Rate and Rhythm: Normal rate and regular rhythm.      Pulses: Normal pulses.      Heart sounds: Normal heart sounds.   Pulmonary:      Effort: Pulmonary effort is normal.      Breath sounds: Normal breath sounds.   Abdominal:      General: Bowel sounds are normal.      Palpations: Abdomen is soft.   Skin:     General: Skin is warm and dry.   Neurological:      General: No focal deficit present.      Mental Status: She is alert and oriented to person, place, and time.         Last Recorded Vitals  Blood pressure 132/66, pulse 72, temperature 36.5 °C (97.7 °F), temperature source Temporal, resp. rate 17, height 1.626 m (5' 4\"), weight 53.1 kg (117 lb), SpO2 96%.  Intake/Output last 3 Shifts:  I/O last 3 completed shifts:  In: 430.3 (8.1 mL/kg) [P.O.:150; I.V.:126.3 (2.4 mL/kg); IV Piggyback:154]  Out: 400 (7.5 mL/kg) [Urine:400 (0.2 mL/kg/hr)]  Weight: 53.1 kg     Relevant Results    Scheduled medications  amLODIPine, 2.5 mg, oral, Daily  aspirin, 81 mg, oral, Daily  atorvastatin, 20 mg, oral, Nightly  enoxaparin, 40 mg, subcutaneous, q24h  famotidine, 20 mg, oral, Daily  FLUoxetine, 40 mg, oral, Daily  insulin lispro, 0-5 Units, subcutaneous, TID  meropenem, 1 g, intravenous, q8h  metoprolol tartrate, 50 mg, oral, BID      Continuous medications  sodium chloride 0.9%, 75 mL/hr, Last Rate: 75 mL/hr (06/12/24 0050)      PRN medications  PRN medications: acetaminophen **OR** acetaminophen **OR** acetaminophen, dextrose, dextrose, glucagon, glucagon, melatonin, ondansetron " ODT **OR** ondansetron, traMADol     following data reviewed    WBC-6.4  Hgb-12.6  Hct-39.3  Platelets-260      Na-141  K+-4.3  Cl-108  Bicarb-25  BUN-20  creatinine-1.1    Blood cultures pending  Urine culture pending     CT abd/pelvis  IMPRESSION:     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  No ascites   suspicious fluid collection.       CT head  IMPRESSION:     Age related atrophic changes     No acute intracranial pathology       CT Lumbar spine  IMPRESSION:     Degenerative changes and bony demineralization     Postoperative changes as described     Extensive destructive changes noted involving the inferior body and   endplate L1/clinical correlation and follow-up required       Assessment/Plan   Principal Problem:    Osteomyelitis (Multi)    Back pain with hx osteomyelitis of lumbar spine  -ID consult  -IV ATB for now  -ortho spine reviewed images and does not feel pt has osteomyelitis  -blood cx pending      Hyperlipidemia  -continue statin    HTN  -monitor blood pressure    DM II  -monitor blood glucose  -ISS  -diabetic diet    Plan  Above plan discussed at length with pt and she is in agreement  CHELSEA Vargas-CNP

## 2024-06-12 NOTE — PROGRESS NOTES
06/12/24 1152   Discharge Planning   Living Arrangements Other (Comment)  ([facility])   Support Systems Children   Assistance Needed Yes - WC bound now, she states   Type of Residence Assisted living   Do you have animals or pets at home? Yes   Type of Animals or Pets 1 cat   Care Facility Name [facility]   Who is requesting discharge planning? Patient   Home or Post Acute Services In home services   Type of Home Care Services Home PT   Patient expects to be discharged to: To Be determined   Does the patient need discharge transport arranged? Yes   RoundTrip coordination needed? Yes   Has discharge transport been arranged? No     Patient states, due to weakness, she now uses a wheelchair to get around.  She has done therapy in the past.   RN-TCC following for therapy recommendations.

## 2024-06-12 NOTE — CARE PLAN
Problem: Pain  Goal: My pain/discomfort is manageable  6/12/2024 0707 by Benjamín Gill RN  Outcome: Progressing  6/11/2024 1820 by Benjamín Gill RN  Outcome: Progressing     Problem: Safety  Goal: Patient will be injury free during hospitalization  6/12/2024 0707 by Benjamín Gill RN  Outcome: Progressing  6/11/2024 1820 by Benjamín Gill RN  Outcome: Progressing  Goal: I will remain free of falls  6/12/2024 0707 by Benjamín Gill RN  Outcome: Progressing  6/11/2024 1820 by Benjamín Gill RN  Outcome: Progressing     Problem: Daily Care  Goal: Daily care needs are met  6/12/2024 0707 by Benjamín Gill RN  Outcome: Progressing  6/11/2024 1820 by Benjamín Gill RN  Outcome: Progressing     Problem: Psychosocial Needs  Goal: Demonstrates ability to cope with hospitalization/illness  6/12/2024 0707 by Benjamín Gill RN  Outcome: Progressing  6/11/2024 1820 by Benjamín Gill RN  Outcome: Progressing  Goal: Collaborate with me, my family, and caregiver to identify my specific goals  6/12/2024 0707 by Benjamín Gill RN  Outcome: Progressing  6/11/2024 1820 by Benjamín Gill RN  Outcome: Progressing  Flowsheets (Taken 6/11/2024 1813)  Cultural Requests During Hospitalization: no  Spiritual Requests During Hospitalization: no     Problem: Discharge Barriers  Goal: My discharge needs are met  6/12/2024 0707 by Benjamín Gill RN  Outcome: Progressing  6/11/2024 1820 by Benjamín Gill RN  Outcome: Progressing     Problem: Deep Vein Thrombosis  Goal: I will remain free from complications of deep vein thrombosis and maintain current level of mobility  Outcome: Progressing     Problem: Fall/Injury  Goal: Not fall by end of shift  Outcome: Progressing  Goal: Be free from injury by end of the shift  Outcome: Progressing  Goal: Verbalize understanding of personal risk factors for fall in the hospital  Outcome: Progressing  Goal: Verbalize understanding of risk factor reduction measures to prevent injury from fall in the home  Outcome: Progressing  Goal: Use assistive  devices by end of the shift  Outcome: Progressing  Goal: Pace activities to prevent fatigue by end of the shift  Outcome: Progressing     Problem: Pain  Goal: Takes deep breaths with improved pain control throughout the shift  Outcome: Progressing  Goal: Turns in bed with improved pain control throughout the shift  Outcome: Progressing  Goal: Walks with improved pain control throughout the shift  Outcome: Progressing  Goal: Performs ADL's with improved pain control throughout shift  Outcome: Progressing  Goal: Participates in PT with improved pain control throughout the shift  Outcome: Progressing  Goal: Free from opioid side effects throughout the shift  Outcome: Progressing  Goal: Free from acute confusion related to pain meds throughout the shift  Outcome: Progressing

## 2024-06-12 NOTE — CARE PLAN
The patient's goals for the shift include pain management and comfort.     The clinical goals for the shift include pain management, comfort, maintain safety, encourage mobility, promote rest, Q2H turns, and monitor labs/VS.    Problem: Pain  Goal: My pain/discomfort is manageable  Outcome: Progressing     Problem: Pain  Goal: Takes deep breaths with improved pain control throughout the shift  Outcome: Progressing  Goal: Turns in bed with improved pain control throughout the shift  Outcome: Progressing  Goal: Walks with improved pain control throughout the shift  Outcome: Progressing  Goal: Performs ADL's with improved pain control throughout shift  Outcome: Progressing  Goal: Participates in PT with improved pain control throughout the shift  Outcome: Progressing  Goal: Free from opioid side effects throughout the shift  Outcome: Progressing  Goal: Free from acute confusion related to pain meds throughout the shift  Outcome: Progressing     Problem: Safety  Goal: Patient will be injury free during hospitalization  Outcome: Progressing  Goal: I will remain free of falls  Outcome: Progressing     Problem: Daily Care  Goal: Daily care needs are met  Outcome: Progressing     Problem: Psychosocial Needs  Goal: Demonstrates ability to cope with hospitalization/illness  Outcome: Progressing  Goal: Collaborate with me, my family, and caregiver to identify my specific goals  Outcome: Progressing     Problem: Deep Vein Thrombosis  Goal: I will remain free from complications of deep vein thrombosis and maintain current level of mobility  Outcome: Progressing     Problem: Fall/Injury  Goal: Not fall by end of shift  Outcome: Progressing  Goal: Be free from injury by end of the shift  Outcome: Progressing  Goal: Verbalize understanding of personal risk factors for fall in the hospital  Outcome: Progressing  Goal: Verbalize understanding of risk factor reduction measures to prevent injury from fall in the home  Outcome:  Progressing  Goal: Use assistive devices by end of the shift  Outcome: Progressing  Goal: Pace activities to prevent fatigue by end of the shift  Outcome: Progressing     Problem: Discharge Barriers  Goal: My discharge needs are met  Outcome: Progressing

## 2024-06-12 NOTE — PROGRESS NOTES
Physical Therapy    Physical Therapy Evaluation & Treatment    Patient Name: Carrie Rosario  MRN: [id number]  Today's Date: 6/12/2024   Time Calculation  Start Time: 1039  Stop Time: 1104  Time Calculation (min): 25 min    Assessment/Plan   PT Assessment  PT Assessment Results: Decreased strength, Decreased range of motion, Decreased endurance, Impaired balance, Decreased mobility, Decreased safety awareness, Pain  Rehab Prognosis: Good  Barriers to Discharge: pain level, depx 2 for mobility, unable to transfer on eval  Evaluation/Treatment Tolerance: Patient limited by pain  Medical Staff Made Aware: Yes  Strengths: Ability to acquire knowledge, Attitude of self  Barriers to Participation: Comorbidities  End of Session Communication: Bedside nurse  Assessment Comment: 83 yr old female with osteomyelitis L1 who is dep x 2 at [facility] for edge of bed, limited activity tolerance due to pain. Unable to transfer at [facility]. Patient lives at [facility] and was independent at wheelchair level and for transfers and ADLS at baseline. Patient has suffered a decline in function and requires acute PT--moderate intensity rehab required post acute discharge.  End of Session Patient Position: Bed, 3 rail up, Alarm on   IP OR SWING BED PT PLAN  Inpatient or Swing Bed: Inpatient  PT Plan  Treatment/Interventions: Bed mobility, Transfer training, Gait training, Balance training, Strengthening, Endurance training, Therapeutic exercise, Therapeutic activity, Postural re-education  PT Plan: Ongoing PT  PT Frequency: 4 times per week  PT Discharge Recommendations: Moderate intensity level of continued care  Equipment Recommended upon Discharge: Wheelchair, Wheeled walker  PT Recommended Transfer Status: Total assist  PT - OK to Discharge: Yes      Subjective     General Visit Information:  General  Reason for Referral: impaired mobility, osteomyelitis L1  Referred By: Dr. Clark  Past Medical History Relevant to Rehab: DM, hyperlipidemia, CKD,  "diastolic heart failure, HTN  Prior to Session Communication: Bedside nurse  Patient Position Received: Bed, 4 rail up, Alarm on  Preferred Learning Style: auditory, verbal  General Comment: 83 yr old found on floor of [facility], confusion, and complaints of LBP. Ortho consult reports no need for surgical intervention.Cleared by RN for PT eval and treat and patient is agreeable.  Home Living:  Home Living  Type of Home: Assisted living  Lives With: Alone  Home Adaptive Equipment: Cane, Wheelchair-manual  Home Layout: One level  Bathroom Shower/Tub: Walk-in shower  Bathroom Toilet: Handicapped height  Home Living Comments: assist from care staff  Prior Level of Function:  Prior Function Per Pt/Caregiver Report  Level of Alvin: Independent with ADLs and functional transfers  Receives Help From: Other (Comment) (care staff for meals, daughter is also local)  ADL Assistance: Independent  Homemaking Assistance: Independent  Ambulatory Assistance: Independent (wheelchair level)  Prior Function Comments: reports wheelchair level for most mobility due to weakness, facility prepares meals and provides intermiitent assist. Reports able to shower and dress self \"but it takes a long time\"  Precautions:  Precautions  Hearing/Visual Limitations: glasses for reading  Medical Precautions: Fall precautions  Precautions Comment: spinal guarding due to low back pain, utilized logroll technique for safe mobility  Vital Signs:       Objective   Pain:  Pain Assessment  Pain Assessment: 0-10  Pain Score: 7  Pain Type: Acute pain  Pain Location: Back  Pain Orientation: Lower  Cognition:  Cognition  Orientation Level: Disoriented to place (aware of hospital but not which one)    General Assessments:                  Activity Tolerance  Endurance: Decreased tolerance for upright activites (due to pain)  Activity Tolerance Comments: very limited due to severe back pain with movement, has had allowed pain medication    Sensation  Light Touch: " No apparent deficits  Sensation Comment: denies paresthesias B LE    Strength  Strength Comments: B LE weakness, R foot drop  Coordination  Movements are Fluid and Coordinated: No  Coordination Comment: impaired due to pain which limits function    Postural Control  Postural Control: Impaired  Posture Comment: leans to R and not able to come to full upright sitting due to pain    Static Sitting Balance  Static Sitting-Balance Support: Bilateral upper extremity supported  Static Sitting-Level of Assistance: Minimum assistance  Static Sitting-Comment/Number of Minutes: R lean  Dynamic Sitting Balance  Dynamic Sitting-Comments: poor dynamic sitting balance, limited ability to reach past midline or lateral due to pain    Static Standing Balance  Static Standing-Comment/Number of Minutes: unable to attempt to stand  Dynamic Standing Balance  Dynamic Standing-Comments: unable to attempt due to pain  Functional Assessments:  Bed Mobility 1  Bed Mobility 1: Supine to sitting, Sitting to supine, Side lying left to sit  Level of Assistance 1: Dependent, +2  Bed Mobility Comments 1: limited tolerance due to significant pain. Education re: logroll for improved safety with mobility    Transfers  Transfer: No (not able due to back pain)    Ambulation/Gait Training  Ambulation/Gait Training Performed: No (unable to attempt due to back pain)  Extremity/Trunk Assessments:          RLE   RLE : Exceptions to WFL (hip 2/5, knee, 2+/5, ankle 0/5 dorsiflexion R foot drop)  LLE   LLE :  (L hip 2+/5, knee 2+/5, ankle 2/5 dorsiflexion)  Treatments: sitting activity at edge of bed with min/mod A x 1 to maintain static sitting. Patient leans all weight onto R buttock due to pain. Attempts to rest onto R elbow. Not able to tolerate midline sitting at this time. Unsafe to attempt sit to stand transfers.     Outcome Measures:  [facility] Basic Mobility  Turning from your back to your side while in a flat bed without using bedrails: Total  Moving from  lying on your back to sitting on the side of a flat bed without using bedrails: Total  Moving to and from bed to chair (including a wheelchair): Total  Standing up from a chair using your arms (e.g. wheelchair or bedside chair): Total  To walk in hospital room: Total  Climbing 3-5 steps with railing: Total  Basic Mobility - Total Score: 6    Encounter Problems       Encounter Problems (Active)       PT Goals       Patient will transfer supine to sit and sit to supine with supervision assist to facilitate mobility.  (Progressing)       Start:  06/12/24    Expected End:  06/26/24            Patient will transfer bed to chair and chair to bed with minimal assist to facilitate mobility.  (Progressing)       Start:  06/12/24    Expected End:  06/26/24            Patient will amb 15 feet with rolling walker device including no turns on even surface with minimal x 2 assist to facilitate safe mobility.  (Progressing)       Start:  06/12/24    Expected End:  06/26/24            Patient will improve static standing balance to Fair+ for improved safety during standing  (Progressing)       Start:  06/12/24    Expected End:  06/26/24                   Education Documentation  Body Mechanics, taught by Loretta Joel, PT at 6/12/2024 11:28 AM.  Learner: Patient  Readiness: Acceptance  Method: Explanation  Response: Verbalizes Understanding, Needs Reinforcement  Comment: education for safe mobility, logroll for pain management with bed mobility    Mobility Training, taught by Loretta Joel PT at 6/12/2024 11:28 AM.  Learner: Patient  Readiness: Acceptance  Method: Explanation  Response: Verbalizes Understanding, Needs Reinforcement  Comment: education for safe mobility, logroll for pain management with bed mobility    Education Comments  No comments found.

## 2024-06-12 NOTE — CONSULTS
Inpatient consult to Infectious Diseases  Consult performed by: CHELSEA Vera-CNP  Consult ordered by: CHELSEA Conway-CNP  Reason for consult: osteomyelitis, UTI          Primary MD: Brian Youngblood MD        History Of Present Illness  Carrie Rosario is a 83 y.o. female who presented to the emergency room with confusion and worsening lower back pain.  She has history of L1-L2 disc space infection secondary to bacteremia, left psoas abscess.  Cultures grew MRSA and Citrobacter she was treated with IV daptomycin and meropenem for 6 weeks.  She reports bilateral hip pain and back pain had improved.  She reports approximately 1 week ago bilateral hip returned.  She Reports no pain is in low back mostly.  She reports confusion has resolved.  She is afebrile, denies chills.  She is on room air with normal oxygenation.  She denies shortness of breath cough or chest pain.  She denies nausea vomiting or diarrhea.  She does report intermittent constipation.  Labs with no leukocytosis.  Urinalysis with mild pyuria.  Urine and blood cultures pending.  CT of the head with no acute endings.  She was evaluated by orthopedic spine surgery, note reviewed.  She was started on empiric IV meropenem, daptomycin.         Past Medical History  She has a past medical history of Anxiety, Arthritis, ASHD (arteriosclerotic heart disease), At risk for falls, Cardiac arrest (Multi), Compression fracture of L1 lumbar vertebra (Multi), CVA (cerebral vascular accident) (Multi), Depression, Diabetes mellitus (Multi), GERD (gastroesophageal reflux disease), Heart disease, Heel ulcer (Multi), Hypercholesterolemia, Hyperlipidemia, Hypertension, Osteoarthritis, Paraspinal abscess (Multi), Psoas abscess (Multi), Right foot drop, Right foot ulcer (Multi), Spinal stenosis, STEMI (ST elevation myocardial infarction) (Multi), TIA (transient ischemic attack), and Weakness.    Surgical History  She has a past surgical history that includes  Other surgical history (04/25/2022); Other surgical history (04/25/2022); Other surgical history (04/25/2022); Other surgical history (04/25/2022); Other surgical history (04/25/2022); MR angio head wo IV contrast (12/28/2021); MR angio neck wo IV contrast (12/28/2021); Coronary angioplasty with stent (12/25/2021); and Foot surgery.     Social History     Occupational History    Not on file   Tobacco Use    Smoking status: Never     Passive exposure: Never    Smokeless tobacco: Never   Substance and Sexual Activity    Alcohol use: Not Currently    Drug use: Not Currently    Sexual activity: Not on file     Travel History   Travel since 05/12/24    No documented travel since 05/12/24              Family History  Family History   Problem Relation Name Age of Onset    Diabetes Mother      Diabetes Father      No Known Problems Daughter      No Known Problems Daughter      Diabetes Other      Hypertension Other       Allergies  Nickel and Sulfa (sulfonamide antibiotics)     Immunization History   Administered Date(s) Administered    Flu vaccine, quadrivalent, high-dose, preservative free, age 65y+ (FLUZONE) 10/01/2020    Influenza, High Dose Seasonal, Preservative Free 09/29/2016, 10/04/2017, 09/30/2019    Influenza, Seasonal, Quadrivalent, Adjuvanted 11/04/2021, 10/02/2023    Influenza, Unspecified 10/24/2018, 10/10/2022    Influenza, seasonal, injectable 11/10/2015    Moderna COVID-19 vaccine, Fall 2023, 12 yeasrs and older (50mcg/0.5mL) 10/02/2023    Moderna SARS-CoV-2 Vaccination 02/01/2021, 03/01/2021, 11/04/2021    Pfizer COVID-19 vaccine, bivalent, age 12 years and older (30 mcg/0.3 mL) 10/10/2022    Pneumococcal conjugate vaccine, 13-valent (PREVNAR 13) 11/10/2015    Pneumococcal polysaccharide vaccine, 23-valent, age 2 years and older (PNEUMOVAX 23) 11/14/2006, 11/29/2018    Tdap vaccine, age 7 year and older (BOOSTRIX, ADACEL) 08/02/2013    Zoster, live 09/29/2016     Medications  Home  medications:  Medications Prior to Admission   Medication Sig Dispense Refill Last Dose    acetaminophen (Tylenol) 325 mg tablet Take 2 tablets (650 mg) by mouth every 4 hours if needed for fever (temp greater than 38.0 C) (greater than or equal to 38 C). 30 tablet 0     amLODIPine (Norvasc) 2.5 mg tablet Take 1 tablet (2.5 mg) by mouth once daily.       aspirin 81 mg EC tablet Take 1 tablet (81 mg) by mouth once daily.       atorvastatin (Lipitor) 20 mg tablet Take 1 tablet (20 mg) by mouth.       bisacodyl (Dulcolax) 10 mg suppository Insert 1 suppository (10 mg) into the rectum once daily as needed for constipation.       cholecalciferol, vitamin D3, (VITAMIN D3 ORAL) daily, 0 Refill(s), Type: Maintenance       dapagliflozin propanediol (Farxiga) 10 mg Take 0.5 tablets (5 mg) by mouth once every 24 hours.       docusate sodium (Colace) 100 mg capsule Take 1 capsule (100 mg) by mouth 2 times a day as needed for constipation.       famotidine (Pepcid) 20 mg tablet take 1 tablet by mouth once daily 90 tablet 3     fesoterodine 8 mg tablet extended release 24 hr Take 1 tablet (8 mg) by mouth.       FLUoxetine (PROzac) 40 mg capsule take 1 capsule by mouth once daily 90 capsule 3     FreeStyle Brandon reader (FreeStyle Brandon 2 [place]) misc FREE STYLE BRANDON METER, See Instructions, Instructions: USE AS DIRECTED TO CHECK BLOOD SUGAR  E11.9, Supply, # 1 EA, 0 Refill(s), Type: Maintenance, Pharmacy: [facility] #[id number], USE AS DIRECTED TO CHECK BLOOD SUGAR; E11.9, 61, in, 10/24/22 12:59:00 EDT, Height Measured, 147, lb, 10/24/22 12:59:00 EDT, Weight Measured       FreeStyle Brandon sensor system (FreeStyle Brandon 2 Sensor) kit FREE STYLE BRANDON SENSORS, See Instructions, Instructions: USE AS DIRECTED TO CHECK BLOOD SUGARS RELPACE EVERY 14 DAYS  E11.9, Supply, # 2 EA, 11 Refill(s), Type: Maintenance, Pharmacy: [facility] #[id number], USE AS DIRECTED TO CHECK BLOOD SUGARS RELPACE EVERY 14 DAYS; E11.9, 61, in, 10/24/22 12:59:00 EDT, Height  "Measured, 147, lb, 10/24/22 12:59:00 EDT, Weight Measured       FreeStyle Lite Strips strip free style nile test strips, See Instructions, Instructions: use as directed to check blood sugar.  e11.9, Supply, # 100 EA, 1 Refill(s), Type: Maintenance, Pharmacy: [facility] #[id number], use as directed to check blood sugar. e11.9, 61, in, 10/24/22 12:59:00 EDT, Height Measured, 147, lb, 10/24/22 12:59:00 EDT, Weight Measured       furosemide (Lasix) 20 mg tablet take 1 tablet by mouth once daily 90 tablet 3     hydrOXYzine pamoate (Vistaril) 25 mg capsule Take 1 capsule (25 mg) by mouth every 6 hours if needed.       insulin degludec (Tresiba FlexTouch U-100) 100 unit/mL (3 mL) injection INJECT 20 UNITS SUBCUTANEOUSLY ONE TIME DAILY AS DIRECTED. 15 mL 3     insulin lispro (HumaLOG) 100 unit/mL injection Inject 0-0.15 mL (0-15 Units) under the skin 3 times a day with meals. Take as directed per insulin instructions.       lidocaine 4 % patch Place 2 patches over 12 hours on the skin once daily. Remove & discard patch within 12 hours or as directed by MD.       melatonin 3 mg tablet Take 1 tablet (3 mg) by mouth as needed at bedtime.       menthol-zinc oxide (Calmoseptine) 0.44-20.6 % ointment Calmoseptine 0.44-20.6 % External Ointment   Refills: 0       Active       metoprolol tartrate (Lopressor) 50 mg tablet take 1 tablet by mouth twice a day (Patient taking differently: Take 0.5 mg by mouth 2 times a day.) 180 tablet 1     mirabegron (Myrbetriq) 25 mg tablet extended release 24 hr 24 hr tablet Take 1 tablet (25 mg) by mouth once daily. 90 tablet 1     pen needle, diabetic (Droplet Pen Needle) 31 gauge x 5/16\" needle use 1 PEN NEEDLE to inject MEDICATION subcutaneously three times a day 100 each 3     pilocarpine (Salagen) 5 mg tablet Take 1 tablet (5 mg) by mouth 2 times a day.       polyethylene glycol (Glycolax, Miralax) 17 gram packet Take 17 g by mouth once daily as needed (constipation).       potassium chloride CR 10 " "mEq ER tablet take 1 tablet by mouth once daily 90 tablet 1     traMADol (Ultram) 50 mg tablet TAKE 1 TABLET BY MOUTH EVERY 6 HOURS AS NEEDED FOR PAIN 30 tablet 0      Current medications:  Scheduled medications  amLODIPine, 2.5 mg, oral, Daily  aspirin, 81 mg, oral, Daily  atorvastatin, 20 mg, oral, Nightly  enoxaparin, 40 mg, subcutaneous, q24h  famotidine, 20 mg, oral, Daily  FLUoxetine, 40 mg, oral, Daily  insulin lispro, 0-5 Units, subcutaneous, TID  meropenem, 1 g, intravenous, q8h  metoprolol tartrate, 50 mg, oral, BID      Continuous medications     PRN medications  PRN medications: acetaminophen **OR** acetaminophen **OR** acetaminophen, dextrose, dextrose, glucagon, glucagon, melatonin, ondansetron ODT **OR** ondansetron, traMADol    Review of Systems   Constitutional: Negative.    HENT: Negative.     Eyes: Negative.    Respiratory: Negative.     Cardiovascular: Negative.    Gastrointestinal: Negative.    Endocrine: Negative.    Genitourinary: Negative.    Musculoskeletal:  Positive for back pain.   Skin: Negative.    Neurological: Negative.    Psychiatric/Behavioral:  Positive for confusion.         Objective  Range of Vitals (last 24 hours)  Heart Rate:  [64-81]   Temp:  [35.6 °C (96.1 °F)-36.5 °C (97.7 °F)]   Resp:  [16-22]   BP: (110-148)/(60-75)   Height:  [162.6 cm (5' 4\")]   Weight:  [53.1 kg (117 lb)]   SpO2:  [94 %-100 %]   Daily Weight  06/11/24 : 53.1 kg (117 lb)    Body mass index is 20.08 kg/m².     Physical Exam  Constitutional:       Appearance: Normal appearance.   HENT:      Head: Normocephalic and atraumatic.      Nose: Nose normal.      Mouth/Throat:      Mouth: Mucous membranes are moist.      Pharynx: Oropharynx is clear.   Eyes:      Conjunctiva/sclera: Conjunctivae normal.   Cardiovascular:      Rate and Rhythm: Normal rate and regular rhythm.   Pulmonary:      Effort: Pulmonary effort is normal.      Breath sounds: Normal breath sounds.   Abdominal:      General: Bowel sounds are " "normal.      Palpations: Abdomen is soft.   Musculoskeletal:         General: Normal range of motion.      Cervical back: Normal range of motion and neck supple.   Skin:     General: Skin is warm and dry.   Neurological:      Mental Status: She is alert.      Comments: Awake, alert   Psychiatric:         Mood and Affect: Mood normal.         Behavior: Behavior normal.          Relevant Results  Outside Hospital Results  No  Labs  Results from last 72 hours   Lab Units 06/12/24  0451 06/11/24  2316   WBC AUTO x10*3/uL 6.4 6.9   HEMOGLOBIN g/dL 12.6 12.8   HEMATOCRIT % 39.3 38.7   PLATELETS AUTO x10*3/uL 260 290   NEUTROS PCT AUTO %  --  58.6   LYMPHS PCT AUTO %  --  23.1   MONOS PCT AUTO %  --  12.8   EOS PCT AUTO %  --  4.4     Results from last 72 hours   Lab Units 06/12/24  0451 06/11/24  2316   SODIUM mmol/L 141 139   POTASSIUM mmol/L 4.3 4.1   CHLORIDE mmol/L 108* 102   CO2 mmol/L 25 25   BUN mg/dL 20 22   CREATININE mg/dL 1.10 1.20   GLUCOSE mg/dL 152* 150*   CALCIUM mg/dL 9.1 9.6   ANION GAP mmol/L 8 12   EGFR mL/min/1.73m*2 50* 45*     Results from last 72 hours   Lab Units 06/11/24  2316   ALK PHOS U/L 102   BILIRUBIN TOTAL mg/dL 0.3   PROTEIN TOTAL g/dL 7.2   ALT U/L 5   AST U/L 18   ALBUMIN g/dL 3.4*     Estimated Creatinine Clearance: 33.1 mL/min (by C-G formula based on SCr of 1.1 mg/dL).  C-Reactive Protein   Date Value Ref Range Status   06/11/2024 3.40 (H) 0.00 - 2.00 mg/dL Final   01/13/2024 2.60 (H) 0.00 - 2.00 mg/dL Final   10/11/2023 8.90 (H) 0.00 - 2.00 mg/dL Final     Sedimentation Rate   Date Value Ref Range Status   01/13/2024 79 (H) 0 - 30 mm/h Final   10/11/2023 24 0 - 30 mm/h Final     No results found for: \"HIV1X2\", \"HIVCONF\", \"NBMILX1LT\"  No results found for: \"HEPCABINIT\", \"HEPCAB\", \"HCVPCRQUANT\"  Microbiology  Reviewed         Imaging  CT head wo IV contrast    Result Date: 6/11/2024  * * *Final Report* * * DATE OF EXAM: Jun 11 2024  4:00PM   [facility] [place]   0504  -  CT BRAIN WO IVCON   / " ACCESSION #  [id number] PROCEDURE REASON: Mental status change, unknown cause      * * * * Physician Interpretation * * * *  CT BRAIN WO IVCON Indication:  Mental status change, unknown cause/ / Female/83 years TECHNIQUE: Routine CT of the brain without IV contrast. CT Dose-Length Product (DLP): 866 mGy*cm CT Dose Reduction Employed: Automated exposure control (AEC) CT BRAIN: Acute ischemic change:   None. Hemorrhage:    No evidence of acute intracranial hemorrhage. Mass Effect / Mass Lesion:   No significant mass effect.  There is no evidence of an intracranial mass or extraaxial fluid collection. Chronic ischemic change:  Periventricular hypodensity consistent with small vessel disease.  Possible small area of encephalomalacia lateral to the anterior horn of the left lateral ventricle Parenchyma:  Sulci cisterns and ventricular system reveals some atrophic changes consistent with age-related changes Ventricles: Ventricular dilatation consistent with atrophic changes Other:  The visualized calvarium, skull base, orbits and extracranial soft tissues are normal.    IMPRESSION: Age related atrophic changes No acute intracranial pathology Transcriptionist: KAY   Transcribe Date/Time: Jun 11 2024  4:21P Dictated by : ROBBI PATTERSON MD This examination was interpreted and the report reviewed and electronically signed by: ROBBI PATTERSON MD on Jun 11 2024  4:24PM  EST    CT LUMBAR SPINE W RECON DATA    Result Date: 6/11/2024  * * *Final Report* * * DATE OF EXAM: Jun 11 2024 11:33AM   [facility]   0481  -  CT LUMBAR SPINE W RECON DATA -NB  / ACCESSION #  [id number] PROCEDURE REASON: Low back pain, prior surgery, new symptoms      * * * * Physician Interpretation * * * *    EXAMINATION:  CT LUMBAR SPINE W RECON DATA -NB CLINICAL HISTORY:  Low back pain, prior surgery, new symptoms TECHNIQUE:  Spiral, high resolution axial images were obtained from the thoracolumbar junction to the sacrum with sagittal and coronal planar  reconstructions. MQ:  [id number] CT Radiation dose: Integrated Dose-Length Product (DLP) for this visit = mGy*cm. CT Dose Reduction Employed: Iterative recon and mAs-kVp adjusted using patient size-age COMPARISON: None. RESULT: Anatomic Thoracic/Lumbar Variant: None.  L4-5 is considered the level of the iliac crest and assume there are 5 lumbar-type vertebrae. Alignment:  Grade 1 anterolisthesis L4 on L5, L2-L3 Bone marrow /fracture: There is generalized bony demineralization.   Extensive destructive changes noted involving the inferior body and endplate L1.  Postsurgical changes noted with orthopedic rods and screws projecting from the L3-S1 levels.  Laminectomy lower lumbar spine Paraspinal soft tissues:  The paraspinal soft tissues planes are maintained. Lower thoracic spine:  The visualized lower thoracic bony canal and foramina are patent. Sacrum and iliac wings:  The visualized sacrum and iliac wings are within normal limits.    IMPRESSION: Degenerative changes and bony demineralization Postoperative changes as described Extensive destructive changes noted involving the inferior body and endplate L1/clinical correlation and follow-up required : KAY   Transcribe Date/Time: Jun 11 2024  1:04P Dictated by : ROBBI PATTERSON MD This examination was interpreted and the report reviewed and electronically signed by: ROBBI PATTERSON MD on Jun 11 2024  1:16PM  EST    CT abdomen pelvis w IV contrast    Result Date: 6/11/2024  * * *Final Report* * * DATE OF EXAM: Jun 11 2024 11:33AM   [facility]   0530  -  CT ABD/PEL W IVCON  / ACCESSION #  [id number] PROCEDURE REASON: Abdominal abscess/infection suspected      * * * * Physician Interpretation * * * *  EXAMINATION:  CT ABDOMEN AND PELVIS WITH IV CONTRAST CLINICAL HISTORY: Infection suspected TECHNIQUE: CT of the abdomen and pelvis was performed using standard technique, scanning from just above the dome of the diaphragm to the symphysis pubis. MQ:  CTAP_3    Contrast: IV:  90 ml of Omnipaque 350 : ml of CT Radiation dose: Integrated Dose-length product (DLP) for this visit =   228 mGy*cm. CT Dose Reduction Employed: Iterative recon and mAs-kVp adjusted using patient size-age COMPARISON: None.   RESULT: Liver: No mass. Normal morphology. Biliary: No bile duct dilation.  There is a small amount of sludge and questionable small stones along the dependent portion of the gallbladder. Spleen: No mass. No splenomegaly. Pancreas: Atrophic pancreas. Adrenals: No mass. Kidneys: No mass, calculus or hydronephrosis. GI tract: Normal small bowel loops. The colon walls not thickened.  There is no pericolic stranding.  The appendix is normal.  Small hiatal hernia. Lymph nodes: No abdominal or pelvic lymphadenopathy. Mesentery/Peritoneum: No ascites or mass. Retroperitoneum: No mass. Vasculature:  - Abdominal aorta and iliac arteries: No aneurysm.  - Celiac and SMA: SMA is patent without stenosis.  Patent celiac however there is high-grade stenosis at the origin.  - Portal venous system (SMV, splenic vein, portal vein and branches): Patent.  - Hepatic veins: Incompletely opacified, likely due to early phase of enhancement. Pelvis: No mass, ascites or fluid collection. Urinary bladder wall is smooth.  Uterus is not enlarged. Bones/Soft Tissues: Refer to the CT of the lumbar spine.  Bony pelvis is intact. Lower thorax: Bronchiectasis with mosaic groundglass pattern in the posterior lung bases.  Localizer images: No additional findings.    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  No ascites suspicious fluid collection. : KAY   Transcribe Date/Time: Jun 11 2024 11:35A Dictated by : KAMLESH MCKENNA MD This examination was interpreted and the report reviewed and electronically signed by: KAMLESH MCKENNA MD on Jun 11 2024 11:41AM  EST      Assessment/Plan   Back pain  History of L1/L2 disc  space infection, Left psoas abscess  History of MRSA, Citrobacter  pyuria verse Urinary tract infection      IV meropenem  IV daptomycin   CK level  Monitor temperature and wbc  Follow urine culture  Follow blood culture  CRP, sed rate  Ortho-spine consulted note reviewed  Further Recommendations based on workup    Discussed with Dr. Conway    Total time spent caring for the patient today was 40 minutes. This includes time spent before the visit reviewing the chart, time spent during the visit, and time spent after the visit on documentation.                 Lora Bustillos, APRN-CNP

## 2024-06-13 ENCOUNTER — TELEPHONE (OUTPATIENT)
Dept: PRIMARY CARE | Facility: CLINIC | Age: 84
End: 2024-06-13
Payer: MEDICARE

## 2024-06-13 LAB
ALBUMIN SERPL-MCNC: 3.3 G/DL (ref 3.5–5)
ALP BLD-CCNC: 100 U/L (ref 35–125)
ALT SERPL-CCNC: 7 U/L (ref 5–40)
ANION GAP SERPL CALC-SCNC: 9 MMOL/L
AST SERPL-CCNC: 22 U/L (ref 5–40)
BACTERIA UR CULT: NORMAL
BILIRUB SERPL-MCNC: 0.4 MG/DL (ref 0.1–1.2)
BUN SERPL-MCNC: 18 MG/DL (ref 8–25)
CALCIUM SERPL-MCNC: 9.4 MG/DL (ref 8.5–10.4)
CHLORIDE SERPL-SCNC: 101 MMOL/L (ref 97–107)
CO2 SERPL-SCNC: 25 MMOL/L (ref 24–31)
CREAT SERPL-MCNC: 1.1 MG/DL (ref 0.4–1.6)
EGFRCR SERPLBLD CKD-EPI 2021: 50 ML/MIN/1.73M*2
ERYTHROCYTE [DISTWIDTH] IN BLOOD BY AUTOMATED COUNT: 11.3 % (ref 11.5–14.5)
GLUCOSE BLD MANUAL STRIP-MCNC: 108 MG/DL (ref 74–99)
GLUCOSE BLD MANUAL STRIP-MCNC: 165 MG/DL (ref 74–99)
GLUCOSE BLD MANUAL STRIP-MCNC: 185 MG/DL (ref 74–99)
GLUCOSE BLD MANUAL STRIP-MCNC: 188 MG/DL (ref 74–99)
GLUCOSE SERPL-MCNC: 173 MG/DL (ref 65–99)
HCT VFR BLD AUTO: 41.3 % (ref 36–46)
HGB BLD-MCNC: 13.1 G/DL (ref 12–16)
MCH RBC QN AUTO: 31.5 PG (ref 26–34)
MCHC RBC AUTO-ENTMCNC: 31.7 G/DL (ref 32–36)
MCV RBC AUTO: 99 FL (ref 80–100)
NRBC BLD-RTO: 0 /100 WBCS (ref 0–0)
PLATELET # BLD AUTO: 229 X10*3/UL (ref 150–450)
POTASSIUM SERPL-SCNC: 4.3 MMOL/L (ref 3.4–5.1)
PROT SERPL-MCNC: 6.9 G/DL (ref 5.9–7.9)
RBC # BLD AUTO: 4.16 X10*6/UL (ref 4–5.2)
SODIUM SERPL-SCNC: 135 MMOL/L (ref 133–145)
WBC # BLD AUTO: 6.2 X10*3/UL (ref 4.4–11.3)

## 2024-06-13 PROCEDURE — 2500000001 HC RX 250 WO HCPCS SELF ADMINISTERED DRUGS (ALT 637 FOR MEDICARE OP): Performed by: NURSE PRACTITIONER

## 2024-06-13 PROCEDURE — 82947 ASSAY GLUCOSE BLOOD QUANT: CPT | Mod: 91

## 2024-06-13 PROCEDURE — 36415 COLL VENOUS BLD VENIPUNCTURE: CPT | Performed by: NURSE PRACTITIONER

## 2024-06-13 PROCEDURE — 1100000001 HC PRIVATE ROOM DAILY

## 2024-06-13 PROCEDURE — 2500000004 HC RX 250 GENERAL PHARMACY W/ HCPCS (ALT 636 FOR OP/ED): Performed by: NURSE PRACTITIONER

## 2024-06-13 PROCEDURE — 80053 COMPREHEN METABOLIC PANEL: CPT | Performed by: NURSE PRACTITIONER

## 2024-06-13 PROCEDURE — 85027 COMPLETE CBC AUTOMATED: CPT | Performed by: NURSE PRACTITIONER

## 2024-06-13 RX ORDER — ACETAMINOPHEN 500 MG
1000 TABLET ORAL 3 TIMES DAILY
Start: 2024-06-13 | End: 2024-06-13 | Stop reason: HOSPADM

## 2024-06-13 RX ORDER — ACETAMINOPHEN 325 MG/1
975 TABLET ORAL 3 TIMES DAILY
Status: DISCONTINUED | OUTPATIENT
Start: 2024-06-13 | End: 2024-06-16 | Stop reason: HOSPADM

## 2024-06-13 RX ORDER — ACETAMINOPHEN 325 MG/1
975 TABLET ORAL 3 TIMES DAILY
Start: 2024-06-13

## 2024-06-13 ASSESSMENT — COGNITIVE AND FUNCTIONAL STATUS - GENERAL
MOVING FROM LYING ON BACK TO SITTING ON SIDE OF FLAT BED WITH BEDRAILS: A LOT
STANDING UP FROM CHAIR USING ARMS: A LOT
CLIMB 3 TO 5 STEPS WITH RAILING: A LOT
WALKING IN HOSPITAL ROOM: A LOT
MOBILITY SCORE: 12
STANDING UP FROM CHAIR USING ARMS: A LOT
TURNING FROM BACK TO SIDE WHILE IN FLAT BAD: A LOT
PERSONAL GROOMING: A LITTLE
HELP NEEDED FOR BATHING: A LOT
MOBILITY SCORE: 12
EATING MEALS: A LITTLE
CLIMB 3 TO 5 STEPS WITH RAILING: A LOT
MOVING TO AND FROM BED TO CHAIR: A LOT
TURNING FROM BACK TO SIDE WHILE IN FLAT BAD: A LOT
TOILETING: TOTAL
DAILY ACTIVITIY SCORE: 12
MOVING FROM LYING ON BACK TO SITTING ON SIDE OF FLAT BED WITH BEDRAILS: A LOT
MOVING TO AND FROM BED TO CHAIR: A LOT
DRESSING REGULAR UPPER BODY CLOTHING: A LOT
WALKING IN HOSPITAL ROOM: A LOT
DRESSING REGULAR LOWER BODY CLOTHING: TOTAL

## 2024-06-13 ASSESSMENT — PAIN DESCRIPTION - LOCATION
LOCATION: BUTTOCKS
LOCATION: BUTTOCKS
LOCATION: HIP

## 2024-06-13 ASSESSMENT — PAIN SCALES - GENERAL
PAINLEVEL_OUTOF10: 4
PAINLEVEL_OUTOF10: 6
PAINLEVEL_OUTOF10: 2
PAINLEVEL_OUTOF10: 4
PAINLEVEL_OUTOF10: 6
PAINLEVEL_OUTOF10: 3
PAINLEVEL_OUTOF10: 0 - NO PAIN

## 2024-06-13 ASSESSMENT — PAIN - FUNCTIONAL ASSESSMENT
PAIN_FUNCTIONAL_ASSESSMENT: 0-10
PAIN_FUNCTIONAL_ASSESSMENT: UNABLE TO SELF-REPORT

## 2024-06-13 ASSESSMENT — PAIN DESCRIPTION - ORIENTATION: ORIENTATION: RIGHT;LEFT

## 2024-06-13 NOTE — PROGRESS NOTES
Spiritual Care Visit    Clinical Encounter Type  Visited With: Patient  Routine Visit: Follow-up  Continue Visiting: Yes         Values/Beliefs  Spiritual Requests During Hospitalization: Small piece of Communion today    Sacramental Encounters  Communion: Patient wants communion  Communion Given Indicator: Yes     Scotty Vasquez

## 2024-06-13 NOTE — PROGRESS NOTES
06/13/24 1002   Discharge Planning   Patient expects to be discharged to: Home with C   Does the patient need discharge transport arranged? No     Discussed moderate intensity recommendation from therapy .  Patient declines SNF.  She has been there before and won't go back.  Agreeable to [facility].  Choice placed in [facility] for referral.  ADOD:  1-2 days    1540 - Discharged.  Arranged transport back to assisted living.  5 pm

## 2024-06-13 NOTE — PROGRESS NOTES
Call received from nurse.  Son in law is here  and reporting that th pt is hallucinating and thinks she is on the ceiling and going to fall. He said this happens  when she  takes tramadol.  She was given at 1550 and the nurse reports it started about an hour afterward.  She is alert and oriented x 3 and says she is afraid of falling and she knows she is hallucinating but can not calm down.

## 2024-06-13 NOTE — CARE PLAN
The patient's goals for the shift include  safety, and rest    The clinical goals for the shift include pain managment, comfort, safety, IV antibiotics, and promote rest      06/12/24 at 10:27 PM - Heike Coats RN

## 2024-06-13 NOTE — CARE PLAN
Problem: Pain  Goal: My pain/discomfort is manageable  Outcome: Progressing   The patient's goals for the shift include      The clinical goals for the shift include pain managment, comfort, safety, IV antibiotics, and promote rest

## 2024-06-13 NOTE — TELEPHONE ENCOUNTER
Olga from [facility] [phone] is calling in for a verbal order for skilled nursing and physical therapy for this patient being discharged today from [facility] with a diagnosis of Osteomylitis.   Please advise.

## 2024-06-13 NOTE — DISCHARGE SUMMARY
"Discharge Diagnosis  Osteomyelitis (Multi)    Issues Requiring Follow-Up      Discharge Meds     Your medication list        CONTINUE taking these medications        Instructions Last Dose Given Next Dose Due   acetaminophen 325 mg tablet  Commonly known as: Tylenol      Take 2 tablets (650 mg) by mouth every 4 hours if needed for fever (temp greater than 38.0 C) (greater than or equal to 38 C).       amLODIPine 2.5 mg tablet  Commonly known as: Norvasc           aspirin 81 mg EC tablet           atorvastatin 20 mg tablet  Commonly known as: Lipitor           bisacodyl 10 mg suppository  Commonly known as: Dulcolax      Insert 1 suppository (10 mg) into the rectum once daily as needed for constipation.       Calmoseptine 0.44-20.6 % ointment  Generic drug: menthol-zinc oxide           dapagliflozin propanediol 10 mg  Commonly known as: Farxiga           docusate sodium 100 mg capsule  Commonly known as: Colace      Take 1 capsule (100 mg) by mouth 2 times a day as needed for constipation.       Droplet Pen Needle 31 gauge x 5/16\" needle  Generic drug: pen needle, diabetic      use 1 PEN NEEDLE to inject MEDICATION subcutaneously three times a day       famotidine 20 mg tablet  Commonly known as: Pepcid      take 1 tablet by mouth once daily       fesoterodine 8 mg tablet extended release 24 hr           FLUoxetine 40 mg capsule  Commonly known as: PROzac      take 1 capsule by mouth once daily       FreeStyle Brandon 2 [place] misc  Generic drug: flash glucose scanning reader           FreeStyle Brandon 2 Sensor kit  Generic drug: flash glucose sensor kit           FreeStyle Lite Strips strip  Generic drug: blood sugar diagnostic           furosemide 20 mg tablet  Commonly known as: Lasix      take 1 tablet by mouth once daily       hydrOXYzine pamoate 25 mg capsule  Commonly known as: Vistaril           insulin degludec 100 unit/mL (3 mL) injection  Commonly known as: Tresiba FlexTouch U-100      INJECT 20 UNITS " SUBCUTANEOUSLY ONE TIME DAILY AS DIRECTED.       insulin lispro 100 unit/mL injection  Commonly known as: HumaLOG      Inject 0-0.15 mL (0-15 Units) under the skin 3 times a day with meals. Take as directed per insulin instructions.       lidocaine 4 % patch      Place 2 patches over 12 hours on the skin once daily. Remove & discard patch within 12 hours or as directed by MD.       melatonin 3 mg tablet           metoprolol tartrate 50 mg tablet  Commonly known as: Lopressor      take 1 tablet by mouth twice a day       mirabegron 25 mg tablet extended release 24 hr 24 hr tablet  Commonly known as: Myrbetriq      Take 1 tablet (25 mg) by mouth once daily.       pilocarpine 5 mg tablet  Commonly known as: Salagen           polyethylene glycol 17 gram packet  Commonly known as: Glycolax, Miralax      Take 17 g by mouth once daily as needed (constipation).       potassium chloride CR 10 mEq ER tablet  Commonly known as: Klor-Con      take 1 tablet by mouth once daily       traMADol 50 mg tablet  Commonly known as: Ultram      TAKE 1 TABLET BY MOUTH EVERY 6 HOURS AS NEEDED FOR PAIN       VITAMIN D3 ORAL                    Test Results Pending At Discharge  Pending Labs       Order Current Status    Blood Culture Preliminary result    Blood Culture Preliminary result            Hospital Course  HPI from admission  Carrie Rosario is a 83 y.o. female presenting with osteomyelitis of the spine.  History significant for diabetes, hyperlipidemia, chronic kidney disease, diastolic heart failure and hypertension.  She was here in January 2024 with compression fracture of L1 lumbar vertebrae along with osteomyelitis of the spine and was seen by Dr. Kraus and Dr. Conway.  He currently resides at assisted living and was brought to [facility] because she was found on floor and confused complaining of lower back pain at the ER over there they did a CT and thought that the spine has worsened and she was  brought to [facility] for further evaluation.  Currently she denies any chest pain or shortness of breath she states that her back pain has improved.  She denies any headache, dizziness, nausea/vomiting.     During hospital course patient was seen by infectious disease and chart was reviewed by orthopedic spine surgeon.  It was felt that patient's did not have osteomyelitis.  She had a urine culture that was negative.  Patient is nontoxic-appearing and no evidence of infection.  Patient was seen by PT, OT who recommended moderate intensity rehab.  Patient is not in agreement with skilled nursing facility. patient will be discharged to assisted living with home health care.  Will follow-up with her PCP.    Case and plan discussed collaborating physician      Pertinent Physical Exam At Time of Discharge  Physical Exam  Constitutional:       General: She is not in acute distress.     Appearance: She is not toxic-appearing.   Cardiovascular:      Rate and Rhythm: Normal rate and regular rhythm.      Pulses: Normal pulses.      Heart sounds: Normal heart sounds.   Pulmonary:      Effort: Pulmonary effort is normal.      Breath sounds: Normal breath sounds.   Abdominal:      General: Bowel sounds are normal.      Palpations: Abdomen is soft.   Skin:     General: Skin is warm and dry.   Neurological:      General: No focal deficit present.      Mental Status: She is alert and oriented to person, place, and time.   Psychiatric:         Mood and Affect: Mood normal.         Behavior: Behavior normal.         Outpatient Follow-Up  Future Appointments   Date Time Provider Department Center   7/11/2024  1:30 PM Brian Youngblood MD [id number] [facility]   11/25/2024 10:45 AM Gato Gannon DO [id number] [facility]         Lilia Conner, CHELSEA-CNP

## 2024-06-13 NOTE — PROGRESS NOTES
Carrie Rosario is a 83 y.o. female on day 2 of admission presenting with Osteomyelitis (Multi).    Subjective   Interval History:   Urine culture no growth  Afebrile, no chills  No shortness of breath or chest pain  No nausea, vomiting or diarrhea.  Still with some back discomfort           Objective   Range of Vitals (last 24 hours)  Heart Rate:  [56-90]   Temp:  [36.1 °C (97 °F)-37 °C (98.6 °F)]   Resp:  [15-17]   BP: (109-148)/(51-70)   SpO2:  [94 %-100 %]   Daily Weight  06/11/24 : 53.1 kg (117 lb)    Body mass index is 20.08 kg/m².    Physical Exam  Constitutional:       Appearance: Normal appearance.   HENT:      Head: Normocephalic and atraumatic.      Nose: Nose normal.      Mouth/Throat:      Mouth: Mucous membranes are moist.      Pharynx: Oropharynx is clear.   Eyes:      Conjunctiva/sclera: Conjunctivae normal.   Cardiovascular:      Rate and Rhythm: Normal rate and regular rhythm.   Pulmonary:      Effort: Pulmonary effort is normal.      Breath sounds: Normal breath sounds.   Abdominal:      General: Bowel sounds are normal.      Palpations: Abdomen is soft.   Musculoskeletal:         General: Normal range of motion.      Cervical back: Normal range of motion and neck supple.   Skin:     General: Skin is warm and dry.   Neurological:      Mental Status: She is alert.      Comments: Awake, alert   Psychiatric:         Mood and Affect: Mood normal.         Behavior: Behavior normal.        Antibiotics  amLODIPine (Norvasc) tablet 2.5 mg  aspirin EC tablet 81 mg  atorvastatin (Lipitor) tablet 20 mg  famotidine (Pepcid) tablet 20 mg  FLUoxetine (PROzac) capsule 40 mg  metoprolol tartrate (Lopressor) tablet 50 mg  traMADol (Ultram) tablet 50 mg  enoxaparin (Lovenox) syringe 40 mg  sodium chloride 0.9% infusion  meropenem (Merrem) 1 g IV in sodium chloride 0.9% 50 mL  DAPTOmycin (Cubicin) 200 mg in sodium chloride 0.9% 50 mL IV  acetaminophen (Tylenol) tablet 650 mg  acetaminophen (Tylenol) oral liquid 650  mg  acetaminophen (Tylenol) suppository 650 mg  ondansetron ODT (Zofran-ODT) disintegrating tablet 4 mg  ondansetron (Zofran) injection 4 mg  melatonin tablet 3 mg  glucagon (Glucagen) injection 1 mg  dextrose 50 % injection 25 g  glucagon (Glucagen) injection 1 mg  dextrose 50 % injection 12.5 g  insulin lispro (HumaLOG) injection 0-5 Units  DAPTOmycin (Cubicin) 350 mg/50 mL  mg      Relevant Results  Labs  Results from last 72 hours   Lab Units 06/13/24 0454 06/12/24 0451 06/11/24  2316   WBC AUTO x10*3/uL 6.2 6.4 6.9   HEMOGLOBIN g/dL 13.1 12.6 12.8   HEMATOCRIT % 41.3 39.3 38.7   PLATELETS AUTO x10*3/uL 229 260 290   NEUTROS PCT AUTO %  --   --  58.6   LYMPHS PCT AUTO %  --   --  23.1   MONOS PCT AUTO %  --   --  12.8   EOS PCT AUTO %  --   --  4.4     Results from last 72 hours   Lab Units 06/13/24 0454 06/12/24 0451 06/11/24  2316   SODIUM mmol/L 135 141 139   POTASSIUM mmol/L 4.3 4.3 4.1   CHLORIDE mmol/L 101 108* 102   CO2 mmol/L 25 25 25   BUN mg/dL 18 20 22   CREATININE mg/dL 1.10 1.10 1.20   GLUCOSE mg/dL 173* 152* 150*   CALCIUM mg/dL 9.4 9.1 9.6   ANION GAP mmol/L 9 8 12   EGFR mL/min/1.73m*2 50* 50* 45*     Results from last 72 hours   Lab Units 06/13/24 0454 06/11/24  2316   ALK PHOS U/L 100 102   BILIRUBIN TOTAL mg/dL 0.4 0.3   PROTEIN TOTAL g/dL 6.9 7.2   ALT U/L 7 5   AST U/L 22 18   ALBUMIN g/dL 3.3* 3.4*     Estimated Creatinine Clearance: 33.1 mL/min (by C-G formula based on SCr of 1.1 mg/dL).  C-Reactive Protein   Date Value Ref Range Status   06/12/2024 3.00 (H) 0.00 - 2.00 mg/dL Final   06/11/2024 3.40 (H) 0.00 - 2.00 mg/dL Final   01/13/2024 2.60 (H) 0.00 - 2.00 mg/dL Final     Microbiology  Urine -no growth  Blood culture pending       Imaging  Reviewed      Assessment/Plan   Back pain  History of L1/L2 disc space infection, Left psoas abscess  History of MRSA, Citrobacter  pyuria verse Urinary tract infection-urine culture no growth         Discontinue IV meropenem  Discontinue  IV daptomycin  Monitor off antibiotics    Monitor temperature and wbc  Follow blood culture  Ortho-spine consulted note reviewed          Total time spent caring for the patient today was 20 minutes. This includes time spent before the visit reviewing the chart, time spent during the visit, and time spent after the visit on documentation.     Loar Bustillos, APRN-CNP

## 2024-06-13 NOTE — CARE PLAN
The patient's goals for the shift include  rest and comfort    The clinical goals for the shift include safety, pain control, and promote rest      06/13/24 at 7:58 PM - Heike Coats RN

## 2024-06-14 LAB
ALBUMIN SERPL-MCNC: 3.1 G/DL (ref 3.5–5)
ALP BLD-CCNC: 94 U/L (ref 35–125)
ALT SERPL-CCNC: 7 U/L (ref 5–40)
ANION GAP SERPL CALC-SCNC: 9 MMOL/L
AST SERPL-CCNC: 19 U/L (ref 5–40)
BILIRUB SERPL-MCNC: 0.5 MG/DL (ref 0.1–1.2)
BUN SERPL-MCNC: 17 MG/DL (ref 8–25)
CALCIUM SERPL-MCNC: 9.3 MG/DL (ref 8.5–10.4)
CHLORIDE SERPL-SCNC: 101 MMOL/L (ref 97–107)
CO2 SERPL-SCNC: 27 MMOL/L (ref 24–31)
CREAT SERPL-MCNC: 1.1 MG/DL (ref 0.4–1.6)
EGFRCR SERPLBLD CKD-EPI 2021: 50 ML/MIN/1.73M*2
ERYTHROCYTE [DISTWIDTH] IN BLOOD BY AUTOMATED COUNT: 11.2 % (ref 11.5–14.5)
GLUCOSE BLD MANUAL STRIP-MCNC: 141 MG/DL (ref 74–99)
GLUCOSE BLD MANUAL STRIP-MCNC: 144 MG/DL (ref 74–99)
GLUCOSE BLD MANUAL STRIP-MCNC: 229 MG/DL (ref 74–99)
GLUCOSE BLD MANUAL STRIP-MCNC: 278 MG/DL (ref 74–99)
GLUCOSE SERPL-MCNC: 131 MG/DL (ref 65–99)
HCT VFR BLD AUTO: 39.5 % (ref 36–46)
HGB BLD-MCNC: 13 G/DL (ref 12–16)
MCH RBC QN AUTO: 31.6 PG (ref 26–34)
MCHC RBC AUTO-ENTMCNC: 32.9 G/DL (ref 32–36)
MCV RBC AUTO: 96 FL (ref 80–100)
NRBC BLD-RTO: 0 /100 WBCS (ref 0–0)
PLATELET # BLD AUTO: 264 X10*3/UL (ref 150–450)
POTASSIUM SERPL-SCNC: 4 MMOL/L (ref 3.4–5.1)
PROT SERPL-MCNC: 6.8 G/DL (ref 5.9–7.9)
RBC # BLD AUTO: 4.11 X10*6/UL (ref 4–5.2)
SODIUM SERPL-SCNC: 137 MMOL/L (ref 133–145)
WBC # BLD AUTO: 5.5 X10*3/UL (ref 4.4–11.3)

## 2024-06-14 PROCEDURE — 2500000004 HC RX 250 GENERAL PHARMACY W/ HCPCS (ALT 636 FOR OP/ED): Performed by: NURSE PRACTITIONER

## 2024-06-14 PROCEDURE — 97530 THERAPEUTIC ACTIVITIES: CPT | Mod: GP,CQ

## 2024-06-14 PROCEDURE — 97535 SELF CARE MNGMENT TRAINING: CPT | Mod: GO,CO

## 2024-06-14 PROCEDURE — 2500000001 HC RX 250 WO HCPCS SELF ADMINISTERED DRUGS (ALT 637 FOR MEDICARE OP): Performed by: NURSE PRACTITIONER

## 2024-06-14 PROCEDURE — 84075 ASSAY ALKALINE PHOSPHATASE: CPT | Performed by: NURSE PRACTITIONER

## 2024-06-14 PROCEDURE — 99223 1ST HOSP IP/OBS HIGH 75: CPT

## 2024-06-14 PROCEDURE — 82947 ASSAY GLUCOSE BLOOD QUANT: CPT

## 2024-06-14 PROCEDURE — 2500000005 HC RX 250 GENERAL PHARMACY W/O HCPCS: Performed by: NURSE PRACTITIONER

## 2024-06-14 PROCEDURE — 85027 COMPLETE CBC AUTOMATED: CPT | Performed by: NURSE PRACTITIONER

## 2024-06-14 PROCEDURE — 2500000002 HC RX 250 W HCPCS SELF ADMINISTERED DRUGS (ALT 637 FOR MEDICARE OP, ALT 636 FOR OP/ED): Performed by: NURSE PRACTITIONER

## 2024-06-14 PROCEDURE — 36415 COLL VENOUS BLD VENIPUNCTURE: CPT | Performed by: NURSE PRACTITIONER

## 2024-06-14 PROCEDURE — 97110 THERAPEUTIC EXERCISES: CPT | Mod: GP,CQ

## 2024-06-14 PROCEDURE — 1100000001 HC PRIVATE ROOM DAILY

## 2024-06-14 RX ORDER — HYDROXYZINE PAMOATE 25 MG/1
25 CAPSULE ORAL EVERY 6 HOURS PRN
Status: DISCONTINUED | OUTPATIENT
Start: 2024-06-14 | End: 2024-06-16 | Stop reason: HOSPADM

## 2024-06-14 RX ORDER — LIDOCAINE 560 MG/1
1 PATCH PERCUTANEOUS; TOPICAL; TRANSDERMAL DAILY
Status: DISCONTINUED | OUTPATIENT
Start: 2024-06-14 | End: 2024-06-16 | Stop reason: HOSPADM

## 2024-06-14 ASSESSMENT — COGNITIVE AND FUNCTIONAL STATUS - GENERAL
WALKING IN HOSPITAL ROOM: TOTAL
MOVING TO AND FROM BED TO CHAIR: A LOT
WALKING IN HOSPITAL ROOM: TOTAL
MOVING FROM LYING ON BACK TO SITTING ON SIDE OF FLAT BED WITH BEDRAILS: A LOT
DRESSING REGULAR UPPER BODY CLOTHING: A LITTLE
STANDING UP FROM CHAIR USING ARMS: A LOT
MOBILITY SCORE: 10
HELP NEEDED FOR BATHING: A LOT
DRESSING REGULAR LOWER BODY CLOTHING: A LOT
TOILETING: A LOT
DAILY ACTIVITIY SCORE: 17
CLIMB 3 TO 5 STEPS WITH RAILING: TOTAL
MOBILITY SCORE: 10
TOILETING: A LOT
DRESSING REGULAR UPPER BODY CLOTHING: A LITTLE
TURNING FROM BACK TO SIDE WHILE IN FLAT BAD: A LOT
CLIMB 3 TO 5 STEPS WITH RAILING: TOTAL
STANDING UP FROM CHAIR USING ARMS: A LOT
DAILY ACTIVITIY SCORE: 17
MOVING TO AND FROM BED TO CHAIR: A LOT
HELP NEEDED FOR BATHING: A LOT
DRESSING REGULAR LOWER BODY CLOTHING: A LOT
MOVING FROM LYING ON BACK TO SITTING ON SIDE OF FLAT BED WITH BEDRAILS: A LOT
TURNING FROM BACK TO SIDE WHILE IN FLAT BAD: A LOT

## 2024-06-14 ASSESSMENT — ACTIVITIES OF DAILY LIVING (ADL)
BATHING_WHERE_ASSESSED: OTHER (COMMENT)
BATHING_LEVEL_OF_ASSISTANCE: MAXIMUM ASSISTANCE

## 2024-06-14 ASSESSMENT — PAIN SCALES - GENERAL
PAINLEVEL_OUTOF10: 0 - NO PAIN
PAINLEVEL_OUTOF10: 6
PAINLEVEL_OUTOF10: 2
PAINLEVEL_OUTOF10: 6

## 2024-06-14 ASSESSMENT — PAIN DESCRIPTION - LOCATION
LOCATION: HIP
LOCATION: HIP

## 2024-06-14 ASSESSMENT — PAIN DESCRIPTION - ORIENTATION
ORIENTATION: RIGHT;LEFT
ORIENTATION: RIGHT;LEFT

## 2024-06-14 ASSESSMENT — PAIN - FUNCTIONAL ASSESSMENT
PAIN_FUNCTIONAL_ASSESSMENT: 0-10

## 2024-06-14 ASSESSMENT — PAIN DESCRIPTION - DESCRIPTORS: DESCRIPTORS: SHARP

## 2024-06-14 NOTE — CARE PLAN
The patient's goals for the shift include  safety, and rest    The clinical goals for the shift include pain management, IV antibiotics, safety, and promote rest      06/14/24 at 7:27 PM - Heike Coats RN

## 2024-06-14 NOTE — PROGRESS NOTES
Occupational Therapy    OT Treatment    Patient Name: Carrie Rosario  MRN: [id number]  Today's Date: 6/14/2024  Time Calculation  Start Time: 1306  Stop Time: 1348  Time Calculation (min): 42 min         Assessment:  OT Assessment: Pt self care and mobility significantly limited with increased pain in hips, sacrum, back. continue to recommend OT services this acute stay to work toward POC goals.  Barriers to Discharge:  (high pain level significantly impacting mobility/transfers/self care. MIn-Mod Assist of 2)  Evaluation/Treatment Tolerance: Patient limited by pain  End of Session Patient Position: Up in chair, Alarm on (All needs in reach)  OT Assessment Results: Decreased ADL status, Decreased upper extremity strength, Decreased safe judgment during ADL, Decreased endurance, Decreased functional mobility, Decreased gross motor control  Barriers to Discharge:  (high pain level significantly impacting mobility/transfers/self care. MIn-Mod Assist of 2)  Evaluation/Treatment Tolerance: Patient limited by pain  Plan:  Treatment Interventions: ADL retraining, Functional transfer training, UE strengthening/ROM, Endurance training, Patient/family training, Compensatory technique education, Neuromuscular reeducation  OT Frequency: 3 times per week  OT Discharge Recommendations: Moderate intensity level of continued care  Equipment Recommended upon Discharge: Wheelchair, Wheeled walker  OT Recommended Transfer Status: Assist of 2  OT - OK to Discharge: Yes  Treatment Interventions: ADL retraining, Functional transfer training, UE strengthening/ROM, Endurance training, Patient/family training, Compensatory technique education, Neuromuscular reeducation    Subjective   Previous Visit Info:  OT Last Visit  OT Received On: 06/14/24  General:  General  Reason for Referral: decline in ADLs, osteomyelitis L1  Referred By: Dr. Clark  Past Medical History Relevant to Rehab: DM, hyperlipidemia, CKD, diastolic heart failure,  HTN  Prior to Session Communication: Bedside nurse  Patient Position Received: Bed, 4 rail up, Alarm on  General Comment: Agreeable to treatment.  Precautions:  Hearing/Visual Limitations: glasses for reading     Pain:  Pain Assessment  Pain Assessment: 0-10  Pain Score: 6  Pain Location: Hip (Back, sacrum)  Pain Orientation: Right, Left  Effect of Pain on Daily Activities: Maximum  Pain Interventions: Repositioned (waffle pad on chair, BLE elevation)    Objective       Activities of Daily Living: Grooming  Grooming Level of Assistance: Distant supervision  Grooming Where Assessed: Chair  Grooming Comments: oral care, washing face    UE Bathing  UE Bathing Level of Assistance: Distant supervision  UE Bathing Where Assessed: Other (Comment) (chair)  UE Bathing Comments: sponge bathing    LE Bathing  LE Bathing Level of Assistance: Maximum assistance  LE Bathing Where Assessed: Other (Comment) (chair)  LE Bathing Comments: sponge bathing, assist with lola area in stance, BLE, seated        Bed Mobility/Transfers: Bed Mobility 1  Bed Mobility 1: Supine to sitting  Level of Assistance 1: Maximum assistance  Bed Mobility Comments 1: Max A at trunk, Min A with BLE, cues for log roll. Additional time, effort needed    Transfer 1  Transfer From 1: Bed to  Transfer to 1: Chair with arms  Transfer Device 1: Walker  Transfer Level of Assistance 1: Moderate assistance, +2  Trials/Comments 1: Min A x2 for saety sit to stand, Mod A x2 to take ~ 8 steps from bed>chair, cues to extend knees with steps, additional time, effort needed, unsteady.  Transfers 2  Transfer From 2: Chair with arms to  Transfer to 2: Chair with arms  Technique 2: Sit to stand, Stand to sit  Transfer Device 2: Walker  Transfer Level of Assistance 2: Minimum assistance, +2  Trials/Comments 2: sit><stands x2 trials during self care, cues for safe hand placement.  Outcome Measures:[facility] Daily Activity  Putting on and taking off regular lower body clothing: A  lot  Bathing (including washing, rinsing, drying): A lot  Putting on and taking off regular upper body clothing: A little  Toileting, which includes using toilet, bedpan or urinal: A lot  Taking care of personal grooming such as brushing teeth: None  Eating Meals: None  Daily Activity - Total Score: 17        Education Documentation  Body Mechanics, taught by GIACOMO Lin at 6/14/2024  2:19 PM.  Learner: Patient  Readiness: Acceptance  Method: Explanation  Response: Verbalizes Understanding, Demonstrated Understanding, Needs Reinforcement    Precautions, taught by GIACOMO Lin at 6/14/2024  2:19 PM.  Learner: Patient  Readiness: Acceptance  Method: Explanation  Response: Verbalizes Understanding, Demonstrated Understanding, Needs Reinforcement    ADL Training, taught by GIACOMO Lin at 6/14/2024  2:19 PM.  Learner: Patient  Readiness: Acceptance  Method: Explanation  Response: Verbalizes Understanding, Demonstrated Understanding, Needs Reinforcement    Education Comments  No comments found.    IP EDUCATION:  Education  Individual(s) Educated: Patient  Education Provided: Diagnosis & Precautions, Fall precautons, Risk and benefits of OT discussed with patient or other (safe transfer tech/walker use)    Goals:  Encounter Problems       Encounter Problems (Active)       OT Goals       ADLs (Progressing)       Start:  06/12/24    Expected End:  07/18/24       Patient will complete ADL tasks with Close Supervision, using AE as needed, in order to increase safety and independence with self-care tasks.         Functional Transfers (Progressing)       Start:  06/12/24    Expected End:  07/18/24       Patient will complete functional transfers with Close Supervision in order to increase safety and independence with daily tasks.         Standing Tolerance (Progressing)       Start:  06/12/24    Expected End:  07/18/24       Patient will demonstrate the ability to stand at least >/= 2 minutes with Fair+ balance  in order to increase safety and independence with daily tasks.         Sitting Tolerance (Progressing)       Start:  06/12/24    Expected End:  07/18/24       Patient will demonstrate the ability to sit EOB at least >/= 20 minutes with Fair+ balance in order to increase safety and independence with daily tasks.

## 2024-06-14 NOTE — PROGRESS NOTES
06/14/24 1223   Discharge Planning   Patient expects to be discharged to: Home with [facility]     Patient stayed overnight again due to hallucinations.  Patient refusing to work with therapy.  [facility] score listed as 6 out of 24 would like to see patient move myself.  Not sure she can return home.    Will watch PT at 1 pm work with patient.

## 2024-06-14 NOTE — TELEPHONE ENCOUNTER
Called Samra gomez and gave the verbal order for [facility]AT to follow for skilled nursing and physical therapy per JAT.

## 2024-06-14 NOTE — PROGRESS NOTES
Carrie Rosario is a 83 y.o. female on day 3 of admission presenting with Osteomyelitis (Multi).    Subjective   Interval History:   Afebrile, no chills  No shortness of breath or chest pain  No nausea, vomiting or diarrhea.  Still with some back discomfort  Intermittent hallucinations-may be related to tramadol.            Objective   Range of Vitals (last 24 hours)  Heart Rate:  [66-76]   Temp:  [35.6 °C (96.1 °F)-36.9 °C (98.4 °F)]   Resp:  [14-17]   BP: (119-140)/(57-77)   SpO2:  [94 %-98 %]   Daily Weight  06/11/24 : 53.1 kg (117 lb)    Body mass index is 20.08 kg/m².    Physical Exam  Constitutional:       Appearance: Normal appearance.   HENT:      Head: Normocephalic and atraumatic.      Nose: Nose normal.      Mouth/Throat:      Mouth: Mucous membranes are moist.      Pharynx: Oropharynx is clear.   Eyes:      Conjunctiva/sclera: Conjunctivae normal.   Cardiovascular:      Rate and Rhythm: Normal rate and regular rhythm.   Pulmonary:      Effort: Pulmonary effort is normal.      Breath sounds: Normal breath sounds.   Abdominal:      General: Bowel sounds are normal.      Palpations: Abdomen is soft.   Musculoskeletal:         General: Normal range of motion.      Cervical back: Normal range of motion and neck supple.   Skin:     General: Skin is warm and dry.   Neurological:      Mental Status: She is alert.      Comments: Awake, alert   Psychiatric:         Mood and Affect: Mood normal.         Behavior: Behavior normal.        Antibiotics  amLODIPine (Norvasc) tablet 2.5 mg  aspirin EC tablet 81 mg  atorvastatin (Lipitor) tablet 20 mg  famotidine (Pepcid) tablet 20 mg  FLUoxetine (PROzac) capsule 40 mg  metoprolol tartrate (Lopressor) tablet 50 mg  traMADol (Ultram) tablet 50 mg  enoxaparin (Lovenox) syringe 40 mg  sodium chloride 0.9% infusion  meropenem (Merrem) 1 g IV in sodium chloride 0.9% 50 mL  DAPTOmycin (Cubicin) 200 mg in sodium chloride 0.9% 50 mL IV  acetaminophen (Tylenol) tablet 650  mg  acetaminophen (Tylenol) oral liquid 650 mg  acetaminophen (Tylenol) suppository 650 mg  ondansetron ODT (Zofran-ODT) disintegrating tablet 4 mg  ondansetron (Zofran) injection 4 mg  melatonin tablet 3 mg  glucagon (Glucagen) injection 1 mg  dextrose 50 % injection 25 g  glucagon (Glucagen) injection 1 mg  dextrose 50 % injection 12.5 g  insulin lispro (HumaLOG) injection 0-5 Units  DAPTOmycin (Cubicin) 350 mg/50 mL  mg      Relevant Results  Labs  Results from last 72 hours   Lab Units 06/14/24  0507 06/13/24  0454 06/12/24  0451 06/11/24  2316   WBC AUTO x10*3/uL 5.5 6.2 6.4 6.9   HEMOGLOBIN g/dL 13.0 13.1 12.6 12.8   HEMATOCRIT % 39.5 41.3 39.3 38.7   PLATELETS AUTO x10*3/uL 264 229 260 290   NEUTROS PCT AUTO %  --   --   --  58.6   LYMPHS PCT AUTO %  --   --   --  23.1   MONOS PCT AUTO %  --   --   --  12.8   EOS PCT AUTO %  --   --   --  4.4     Results from last 72 hours   Lab Units 06/14/24  0507 06/13/24 0454 06/12/24  0451   SODIUM mmol/L 137 135 141   POTASSIUM mmol/L 4.0 4.3 4.3   CHLORIDE mmol/L 101 101 108*   CO2 mmol/L 27 25 25   BUN mg/dL 17 18 20   CREATININE mg/dL 1.10 1.10 1.10   GLUCOSE mg/dL 131* 173* 152*   CALCIUM mg/dL 9.3 9.4 9.1   ANION GAP mmol/L 9 9 8   EGFR mL/min/1.73m*2 50* 50* 50*     Results from last 72 hours   Lab Units 06/14/24  0507 06/13/24 0454 06/11/24  2316   ALK PHOS U/L 94 100 102   BILIRUBIN TOTAL mg/dL 0.5 0.4 0.3   PROTEIN TOTAL g/dL 6.8 6.9 7.2   ALT U/L 7 7 5   AST U/L 19 22 18   ALBUMIN g/dL 3.1* 3.3* 3.4*     Estimated Creatinine Clearance: 33.1 mL/min (by C-G formula based on SCr of 1.1 mg/dL).  C-Reactive Protein   Date Value Ref Range Status   06/12/2024 3.00 (H) 0.00 - 2.00 mg/dL Final   06/11/2024 3.40 (H) 0.00 - 2.00 mg/dL Final   01/13/2024 2.60 (H) 0.00 - 2.00 mg/dL Final     Microbiology  Urine -no growth  Blood culture pending       Imaging  Reviewed      Assessment/Plan   Back pain  History of L1/L2 disc space infection, Left psoas  abscess  History of MRSA, Citrobacter  pyuria verse Urinary tract infection-urine culture no growth           Monitor off antibiotics    Monitor temperature and wbc  Follow blood culture  Ortho-spine consulted note reviewed          Total time spent caring for the patient today was 20 minutes. This includes time spent before the visit reviewing the chart, time spent during the visit, and time spent after the visit on documentation.     Lora Bustillos, APRN-CNP

## 2024-06-14 NOTE — CONSULTS
"Inpatient consult to Psychiatry  Consult performed by: RICHAR Leslie  Consult ordered by: RICHAR Vargas  Reason for consult: depression/hallucinations      PSYCHIATRY CONSULT NOTE    Visit type: Face to face evaluation     HISTORY OF PRESENT ILLNESS:     Carrie Rosario is a 83 y.o. female with a past psychiatric history of anxiety and depression and a past medical history of diabetes, chronic kidney disease, arteriosclerotic heart disease, CVA, hypertension, STEMI, and cardiac arrest who was admitted to [facility] on 6/11/24 after she was found on the floor at her assisted living, confused and with complaints of back pain.     On chart review, pt has history of osteomyelitis of the lumbar spine. Pt was treated with antibiotics, seen by ortho and infectious disease teams.  Pt's confusion cleared and on 6/13 she was scheduled to be discharged back to [facility] for rehab, when she reported feeling like she was on the ceiling and was going to fall.  Apparently her son was with her and states this happens when she takes tramadol.  This episode was considered to be a hallucination and psychiatry was consulted.    On interview, pt is awake,  alert, and fully oriented.  She tells me that she is here because of her back pain.  We discussed her mental health concerns, and patient states \"I have been an anxious person my entire life.  I worry about a lot of things.\"  She denies feeling depressed stating \"no not particularly except that I like to exercise and I guess I am going to be doing rehab.\"  She reports her appetite is improving, she usually sleeps very well.  When asked about the \"hallucinations\", patient states that they started to happen approximately 2 years ago she cannot give an exact timeline.  She thinks that it has something to do with being started on pain medication for her back.  She states that periodically she will get the sensation that she is above herself and looking down and this " is terrifying for her because she has a fear of heights. This is accompanied by feeling of dizziness.  She understands in the moment that this is not really happening and is usually able to calm herself after some time.  She has tried closing her eyes and most times this helps, but not always.  She states that she has told other people when this occurs because sometimes just having people talk to her helps distract her and the sensation goes away. She reports that sometimes she feels panicked when this occurs, can feel her heart race and her breathing quicken.  Reports this has happened for the past 2 years and has occurred recently at her assisted living.  She denies auditory or other visual hallucinations.  She is not psychotic or manic.  She denies symptoms of moderate or severe depression.    Past Psychiatric History  Current/Previous Diagnoses:  anxiety, depression  Current Psychiatrist/Provider:  PCP, Dr. Harmon  Current Therapist: Denies  Other Providers / Agencies: Denies  Past Medication Trials:  unsure  Inpatient Hospitalizations: Denies  Suicide Attempts: Denies  Homicide attempts/Violence: Denies  Self Harm/Self Injurious: Denies    Substance Abuse History  Tobacco use history: Denies  Alcohol use history: Denies  Cannabis use history: Denies  Illicit Drug Use History: Denies    Social History  Household: lives in assisted living for approx 3 months.  States she was having frequent falls and decided she needed more help  Occupation: Retired   History of trauma/abuse: Denies  Weapons at home and access to lethal means: Denies    OARRS REVIEW  OARRS checked: yes  OARRS comments: tramadol 50 mg. No red flags    Past Medical History  She has a past medical history of Anxiety, Arthritis, ASHD (arteriosclerotic heart disease), At risk for falls, Cardiac arrest (Multi), Compression fracture of L1 lumbar vertebra (Multi), CVA (cerebral vascular accident) (Multi), Depression, Diabetes mellitus  (Multi), GERD (gastroesophageal reflux disease), Heart disease, Heel ulcer (Multi), Hypercholesterolemia, Hyperlipidemia, Hypertension, Osteoarthritis, Paraspinal abscess (Multi), Psoas abscess (Multi), Right foot drop, Right foot ulcer (Multi), Spinal stenosis, STEMI (ST elevation myocardial infarction) (Multi), TIA (transient ischemic attack), and Weakness.    ALLERGIES  Ultram [tramadol], Nickel, and Sulfa (sulfonamide antibiotics)    Surgical History  She has a past surgical history that includes Other surgical history (04/25/2022); Other surgical history (04/25/2022); Other surgical history (04/25/2022); Other surgical history (04/25/2022); Other surgical history (04/25/2022); MR angio head wo IV contrast (12/28/2021); MR angio neck wo IV contrast (12/28/2021); Coronary angioplasty with stent (12/25/2021); and Foot surgery.    FAMILY HISTORY  Family History   Problem Relation Name Age of Onset    Diabetes Mother      Diabetes Father      No Known Problems Daughter      No Known Problems Daughter      Diabetes Other      Hypertension Other          PSYCHIATRIC REVIEW OF SYSTEMS  Depression: negative  Anxiety: excessive worry that is difficult to control, restlessness or feeling keyed up or on edge, and irrational fears or phobias  Kiki: negative  Psychosis: negative  Delirium: negative   Trauma: negative    OBJECTIVE:     VITALS      6/13/2024     3:14 PM 6/13/2024     7:10 PM 6/13/2024    11:14 PM 6/14/2024     3:17 AM 6/14/2024     7:00 AM 6/14/2024    11:00 AM 6/14/2024     3:00 PM   Vitals   Systolic 126 120 139 130 140 119 130   Diastolic 68 57 70 58 77 58 64   Heart Rate 70 76 69 66 69 74 74   Temp 36.2 °C (97.2 °F) 36.5 °C (97.7 °F) 35.7 °C (96.3 °F) 35.6 °C (96.1 °F) 36.9 °C (98.4 °F) 36.5 °C (97.7 °F) 36.4 °C (97.5 °F)   Resp 17 16 15 14 17 17 17      Body mass index is 20.08 kg/m².  Facility age limit for growth %ry is 20 years.  Wt Readings from Last 4 Encounters:   06/11/24 53.1 kg (117 lb)  "  06/11/24 53.1 kg (117 lb)   05/01/24 58.1 kg (128 lb)   02/26/24 58.1 kg (128 lb)       Mental Status Exam  General: 83 year old female seated comfortably during interview.  Appearance: Appeared as age stated; appropriately dressed/groomed.  Attitude:  pleasant, cooperative,   Behavior: Fair EC; overall responding appropriately  Motor Activity: No notable anitra PMAR  Speech: Clear, with fair phonation, and no lisp nor dysarthria.   Mood: \"pretty good\"  Affect: Euthymic; normal range/intensity; appropriate and congruent  Thought Process: Linear and logical; not perseverating   Thought Content: Denied SI/HI. Not voicing/endorsing delusions.  Thought Perception: Did not appear to be responding to internal stimuli. Not endorsing AVH  Cognition: Grossly intact; A&O x4/4 to self, place, date, and context.  Attention:  Intact.  Pt can recite the months of the year backwards, spell WORLD backwards.  Insight: Good  Judgement: Good    HOME MEDICATIONS  Medication Documentation Review Audit       Reviewed by Benjamín Gill RN (Registered Nurse) on 06/11/24 at 1810      Medication Order Taking? Sig Documenting Provider Last Dose Status   acetaminophen (Tylenol) 325 mg tablet [id number]  Take 2 tablets (650 mg) by mouth every 4 hours if needed for fever (temp greater than 38.0 C) (greater than or equal to 38 C). Georgia Jansen, APRN-CNP  Active   amLODIPine (Norvasc) 2.5 mg tablet [id number]  Take 1 tablet (2.5 mg) by mouth once daily. Historical Provider, MD  Active   aspirin 81 mg EC tablet [id number]  Take 1 tablet (81 mg) by mouth once daily. Historical Provider, MD  Active   atorvastatin (Lipitor) 20 mg tablet [id number]  Take 1 tablet (20 mg) by mouth. Historical Provider, MD  Active   bisacodyl (Dulcolax) 10 mg suppository [id number]  Insert 1 suppository (10 mg) into the rectum once daily as needed for constipation. Lilia Conner, APRN-CNP  Active   cholecalciferol, vitamin D3, (VITAMIN D3 ORAL) [id number]  daily, 0 " Refill(s), Type: Maintenance Historical Provider, MD  Active   dapagliflozin propanediol (Farxiga) 10 mg [id number]  Take 0.5 tablets (5 mg) by mouth once every 24 hours. Historical Provider, MD  Active   docusate sodium (Colace) 100 mg capsule [id number]  Take 1 capsule (100 mg) by mouth 2 times a day as needed for constipation. Lilia Conner, APRN-CNP  Active   famotidine (Pepcid) 20 mg tablet [id number]  take 1 tablet by mouth once daily Brian Youngblood MD  Active   fesoterodine 8 mg tablet extended release 24 hr [id number]  Take 1 tablet (8 mg) by mouth. Historical Provider, MD  Active   FLUoxetine (PROzac) 40 mg capsule [id number]  take 1 capsule by mouth once daily Brian Youngblood MD  Active   FreeStyle Brandon reader (FreeStyle Brandon 2 Jenison) misc [id number]  FREE STYLE BRANDON METER, See Instructions, Instructions: USE AS DIRECTED TO CHECK BLOOD SUGAR  E11.9, Supply, # 1 EA, 0 Refill(s), Type: Maintenance, Pharmacy: [facility] #[id number], USE AS DIRECTED TO CHECK BLOOD SUGAR; E11.9, 61, in, 10/24/22 12:59:00 EDT, Height Measured, 147, lb, 10/24/22 12:59:00 EDT, Weight Measured Historical Provider, MD  Active   FreeStyle Brandon sensor system (FreeStyle Brandon 2 Sensor) kit [id number]  FREE STYLE BRANDON SENSORS, See Instructions, Instructions: USE AS DIRECTED TO CHECK BLOOD SUGARS RELPACE EVERY 14 DAYS  E11.9, Supply, # 2 EA, 11 Refill(s), Type: Maintenance, Pharmacy: [facility] #[id number], USE AS DIRECTED TO CHECK BLOOD SUGARS RELPACE EVERY 14 DAYS; E11.9, 61, in, 10/24/22 12:59:00 EDT, Height Measured, 147, lb, 10/24/22 12:59:00 EDT, Weight Measured Historical Provider, MD  Active   FreeStyle Lite Strips strip [id number]  free style brandon test strips, See Instructions, Instructions: use as directed to check blood sugar.  e11.9, Supply, # 100 EA, 1 Refill(s), Type: Maintenance, Pharmacy: [facility] #[id number], use as directed to check blood sugar. e11.9, 61, in, 10/24/22 12:59:00 EDT, Height Measured, 147, lb, 10/24/22 12:59:00 EDT,  "Weight Measured Historical MD Sandeep  Active   furosemide (Lasix) 20 mg tablet [id number]  take 1 tablet by mouth once daily Brian Youngblood MD  Active   hydrOXYzine pamoate (Vistaril) 25 mg capsule [id number]  Take 1 capsule (25 mg) by mouth every 6 hours if needed. Marc Hopkins MD  Active   insulin degludec (Tresiba FlexTouch U-100) 100 unit/mL (3 mL) injection [id number]  INJECT 20 UNITS SUBCUTANEOUSLY ONE TIME DAILY AS DIRECTED. Brian Youngblood MD  Active   insulin lispro (HumaLOG) 100 unit/mL injection [id number]  Inject 0-0.15 mL (0-15 Units) under the skin 3 times a day with meals. Take as directed per insulin instructions. RICHAR Vargas  Active   lidocaine 4 % patch [id number]  Place 2 patches over 12 hours on the skin once daily. Remove & discard patch within 12 hours or as directed by MD. RICHAR Vargas  Active   melatonin 3 mg tablet [id number]  Take 1 tablet (3 mg) by mouth as needed at bedtime. Marc Hopkins MD  Active   menthol-zinc oxide (Calmoseptine) 0.44-20.6 % ointment [id number]  Calmoseptine 0.44-20.6 % External Ointment   Refills: 0       Active Marc Hopkins MD  Active   metoprolol tartrate (Lopressor) 50 mg tablet [id number]  take 1 tablet by mouth twice a day   Patient taking differently: Take 0.5 mg by mouth 2 times a day.    Brian Youngblood MD  Active   mirabegron (Myrbetriq) 25 mg tablet extended release 24 hr 24 hr tablet [id number]  Take 1 tablet (25 mg) by mouth once daily. Brian Youngblood MD  Active   pen needle, diabetic (Droplet Pen Needle) 31 gauge x 5/16\" needle [id number]  use 1 PEN NEEDLE to inject MEDICATION subcutaneously three times a day Brian Youngblood MD  Active   pilocarpine (Salagen) 5 mg tablet [id number]  Take 1 tablet (5 mg) by mouth 2 times a day. Marc Hopkins MD  Active   polyethylene glycol (Glycolax, Miralax) 17 gram packet [id number]  Take 17 g by mouth once daily as needed (constipation). RICHAR Vargas  " Active   potassium chloride CR 10 mEq ER tablet [id number]  take 1 tablet by mouth once daily Brian Youngblood MD  Active   traMADol (Ultram) 50 mg tablet [id number]  TAKE 1 TABLET BY MOUTH EVERY 6 HOURS AS NEEDED FOR PAIN Brian Youngblood MD  Active                     CURRENT MEDICATIONS  Scheduled medications  acetaminophen, 975 mg, oral, TID  amLODIPine, 2.5 mg, oral, Daily  aspirin, 81 mg, oral, Daily  atorvastatin, 20 mg, oral, Nightly  enoxaparin, 40 mg, subcutaneous, q24h  famotidine, 20 mg, oral, Daily  FLUoxetine, 40 mg, oral, Daily  insulin lispro, 0-5 Units, subcutaneous, TID  lidocaine, 1 patch, transdermal, Daily  metoprolol tartrate, 50 mg, oral, BID        Continuous medications       PRN medications  PRN medications: dextrose, dextrose, glucagon, glucagon, hydrOXYzine pamoate, melatonin, ondansetron ODT **OR** ondansetron     LABS  Admission on 06/11/2024   Component Date Value Ref Range Status    Glucose 06/11/2024 150 (H)  65 - 99 mg/dL Final    Sodium 06/11/2024 139  133 - 145 mmol/L Final    Potassium 06/11/2024 4.1  3.4 - 5.1 mmol/L Final    Chloride 06/11/2024 102  97 - 107 mmol/L Final    Bicarbonate 06/11/2024 25  24 - 31 mmol/L Final    Urea Nitrogen 06/11/2024 22  8 - 25 mg/dL Final    Creatinine 06/11/2024 1.20  0.40 - 1.60 mg/dL Final    eGFR 06/11/2024 45 (L)  >60 mL/min/1.73m*2 Final    Calculations of estimated GFR are performed using the 2021 CKD-EPI Study Refit equation without the race variable for the IDMS-Traceable creatinine methods.  https://jasn.asnjournals.org/content/early/2021/09/22/ASN.0257726847    Calcium 06/11/2024 9.6  8.5 - 10.4 mg/dL Final    Albumin 06/11/2024 3.4 (L)  3.5 - 5.0 g/dL Final    Alkaline Phosphatase 06/11/2024 102  35 - 125 U/L Final    Total Protein 06/11/2024 7.2  5.9 - 7.9 g/dL Final    AST 06/11/2024 18  5 - 40 U/L Final    Sample slightly hemolyzed results may be affected    Bilirubin, Total 06/11/2024 0.3  0.1 - 1.2 mg/dL Final    ALT 06/11/2024 5   5 - 40 U/L Final    Anion Gap 06/11/2024 12  <=19 mmol/L Final    WBC 06/11/2024 6.9  4.4 - 11.3 x10*3/uL Final    nRBC 06/11/2024 0.0  0.0 - 0.0 /100 WBCs Final    RBC 06/11/2024 4.00  4.00 - 5.20 x10*6/uL Final    Hemoglobin 06/11/2024 12.8  12.0 - 16.0 g/dL Final    Hematocrit 06/11/2024 38.7  36.0 - 46.0 % Final    MCV 06/11/2024 97  80 - 100 fL Final    MCH 06/11/2024 32.0  26.0 - 34.0 pg Final    MCHC 06/11/2024 33.1  32.0 - 36.0 g/dL Final    RDW 06/11/2024 11.6  11.5 - 14.5 % Final    Platelets 06/11/2024 290  150 - 450 x10*3/uL Final    Neutrophils % 06/11/2024 58.6  40.0 - 80.0 % Final    Immature Granulocytes %, Automated 06/11/2024 0.1  0.0 - 0.9 % Final    Immature Granulocyte Count (IG) includes promyelocytes, myelocytes and metamyelocytes but does not include bands. Percent differential counts (%) should be interpreted in the context of the absolute cell counts (cells/UL).    Lymphocytes % 06/11/2024 23.1  13.0 - 44.0 % Final    Monocytes % 06/11/2024 12.8  2.0 - 10.0 % Final    Eosinophils % 06/11/2024 4.4  0.0 - 6.0 % Final    Basophils % 06/11/2024 1.0  0.0 - 2.0 % Final    Neutrophils Absolute 06/11/2024 4.02  1.60 - 5.50 x10*3/uL Final    Percent differential counts (%) should be interpreted in the context of the absolute cell counts (cells/uL).    Immature Granulocytes Absolute, Au* 06/11/2024 0.01  0.00 - 0.50 x10*3/uL Final    Lymphocytes Absolute 06/11/2024 1.59  0.80 - 3.00 x10*3/uL Final    Monocytes Absolute 06/11/2024 0.88 (H)  0.05 - 0.80 x10*3/uL Final    Eosinophils Absolute 06/11/2024 0.30  0.00 - 0.40 x10*3/uL Final    Basophils Absolute 06/11/2024 0.07  0.00 - 0.10 x10*3/uL Final    Magnesium 06/11/2024 2.20  1.60 - 3.10 mg/dL Final    C-Reactive Protein 06/11/2024 3.40 (H)  0.00 - 2.00 mg/dL Final    Blood Culture 06/11/2024 No growth at 2 days   Preliminary    Blood Culture 06/11/2024 No growth at 2 days   Preliminary    WBC 06/12/2024 6.4  4.4 - 11.3 x10*3/uL Final    nRBC  06/12/2024 0.0  0.0 - 0.0 /100 WBCs Final    RBC 06/12/2024 3.98 (L)  4.00 - 5.20 x10*6/uL Final    Hemoglobin 06/12/2024 12.6  12.0 - 16.0 g/dL Final    Hematocrit 06/12/2024 39.3  36.0 - 46.0 % Final    MCV 06/12/2024 99  80 - 100 fL Final    MCH 06/12/2024 31.7  26.0 - 34.0 pg Final    MCHC 06/12/2024 32.1  32.0 - 36.0 g/dL Final    RDW 06/12/2024 11.4 (L)  11.5 - 14.5 % Final    Platelets 06/12/2024 260  150 - 450 x10*3/uL Final    Glucose 06/12/2024 152 (H)  65 - 99 mg/dL Final    Sodium 06/12/2024 141  133 - 145 mmol/L Final    Potassium 06/12/2024 4.3  3.4 - 5.1 mmol/L Final    Chloride 06/12/2024 108 (H)  97 - 107 mmol/L Final    Bicarbonate 06/12/2024 25  24 - 31 mmol/L Final    Urea Nitrogen 06/12/2024 20  8 - 25 mg/dL Final    Creatinine 06/12/2024 1.10  0.40 - 1.60 mg/dL Final    eGFR 06/12/2024 50 (L)  >60 mL/min/1.73m*2 Final    Calculations of estimated GFR are performed using the 2021 CKD-EPI Study Refit equation without the race variable for the IDMS-Traceable creatinine methods.  https://jasn.asnjournals.org/content/early/2021/09/22/ASN.5408624215    Calcium 06/12/2024 9.1  8.5 - 10.4 mg/dL Final    Anion Gap 06/12/2024 8  <=19 mmol/L Final    Urine Culture 06/11/2024 No significant growth   Final    POCT Glucose 06/11/2024 157 (H)  74 - 99 mg/dL Final    POCT Glucose 06/12/2024 121 (H)  74 - 99 mg/dL Final    POCT Glucose 06/12/2024 220 (H)  74 - 99 mg/dL Final    C-Reactive Protein 06/12/2024 3.00 (H)  0.00 - 2.00 mg/dL Final    Sedimentation Rate 06/12/2024 42 (H)  0 - 30 mm/h Final    Creatine Kinase 06/12/2024 86  24 - 195 U/L Final    POCT Glucose 06/12/2024 84  74 - 99 mg/dL Final    WBC 06/13/2024 6.2  4.4 - 11.3 x10*3/uL Final    nRBC 06/13/2024 0.0  0.0 - 0.0 /100 WBCs Final    RBC 06/13/2024 4.16  4.00 - 5.20 x10*6/uL Final    Hemoglobin 06/13/2024 13.1  12.0 - 16.0 g/dL Final    Hematocrit 06/13/2024 41.3  36.0 - 46.0 % Final    MCV 06/13/2024 99  80 - 100 fL Final    MCH 06/13/2024  31.5  26.0 - 34.0 pg Final    MCHC 06/13/2024 31.7 (L)  32.0 - 36.0 g/dL Final    RDW 06/13/2024 11.3 (L)  11.5 - 14.5 % Final    Platelets 06/13/2024 229  150 - 450 x10*3/uL Final    Glucose 06/13/2024 173 (H)  65 - 99 mg/dL Final    Sodium 06/13/2024 135  133 - 145 mmol/L Final    Potassium 06/13/2024 4.3  3.4 - 5.1 mmol/L Final    Chloride 06/13/2024 101  97 - 107 mmol/L Final    Bicarbonate 06/13/2024 25  24 - 31 mmol/L Final    Urea Nitrogen 06/13/2024 18  8 - 25 mg/dL Final    Creatinine 06/13/2024 1.10  0.40 - 1.60 mg/dL Final    eGFR 06/13/2024 50 (L)  >60 mL/min/1.73m*2 Final    Calculations of estimated GFR are performed using the 2021 CKD-EPI Study Refit equation without the race variable for the IDMS-Traceable creatinine methods.  https://jasn.asnjournals.org/content/early/2021/09/22/ASN.9766160349    Calcium 06/13/2024 9.4  8.5 - 10.4 mg/dL Final    Albumin 06/13/2024 3.3 (L)  3.5 - 5.0 g/dL Final    Alkaline Phosphatase 06/13/2024 100  35 - 125 U/L Final    Total Protein 06/13/2024 6.9  5.9 - 7.9 g/dL Final    AST 06/13/2024 22  5 - 40 U/L Final    Bilirubin, Total 06/13/2024 0.4  0.1 - 1.2 mg/dL Final    ALT 06/13/2024 7  5 - 40 U/L Final    Anion Gap 06/13/2024 9  <=19 mmol/L Final    POCT Glucose 06/12/2024 179 (H)  74 - 99 mg/dL Final    POCT Glucose 06/13/2024 165 (H)  74 - 99 mg/dL Final    POCT Glucose 06/13/2024 185 (H)  74 - 99 mg/dL Final    POCT Glucose 06/13/2024 108 (H)  74 - 99 mg/dL Final    WBC 06/14/2024 5.5  4.4 - 11.3 x10*3/uL Final    nRBC 06/14/2024 0.0  0.0 - 0.0 /100 WBCs Final    RBC 06/14/2024 4.11  4.00 - 5.20 x10*6/uL Final    Hemoglobin 06/14/2024 13.0  12.0 - 16.0 g/dL Final    Hematocrit 06/14/2024 39.5  36.0 - 46.0 % Final    MCV 06/14/2024 96  80 - 100 fL Final    MCH 06/14/2024 31.6  26.0 - 34.0 pg Final    MCHC 06/14/2024 32.9  32.0 - 36.0 g/dL Final    RDW 06/14/2024 11.2 (L)  11.5 - 14.5 % Final    Platelets 06/14/2024 264  150 - 450 x10*3/uL Final    Glucose  06/14/2024 131 (H)  65 - 99 mg/dL Final    Sodium 06/14/2024 137  133 - 145 mmol/L Final    Potassium 06/14/2024 4.0  3.4 - 5.1 mmol/L Final    Chloride 06/14/2024 101  97 - 107 mmol/L Final    Bicarbonate 06/14/2024 27  24 - 31 mmol/L Final    Urea Nitrogen 06/14/2024 17  8 - 25 mg/dL Final    Creatinine 06/14/2024 1.10  0.40 - 1.60 mg/dL Final    eGFR 06/14/2024 50 (L)  >60 mL/min/1.73m*2 Final    Calculations of estimated GFR are performed using the 2021 CKD-EPI Study Refit equation without the race variable for the IDMS-Traceable creatinine methods.  https://jasn.asnjournals.org/content/early/2021/09/22/ASN.8465507695    Calcium 06/14/2024 9.3  8.5 - 10.4 mg/dL Final    Albumin 06/14/2024 3.1 (L)  3.5 - 5.0 g/dL Final    Alkaline Phosphatase 06/14/2024 94  35 - 125 U/L Final    Total Protein 06/14/2024 6.8  5.9 - 7.9 g/dL Final    AST 06/14/2024 19  5 - 40 U/L Final    Bilirubin, Total 06/14/2024 0.5  0.1 - 1.2 mg/dL Final    ALT 06/14/2024 7  5 - 40 U/L Final    Anion Gap 06/14/2024 9  <=19 mmol/L Final    POCT Glucose 06/13/2024 188 (H)  74 - 99 mg/dL Final    POCT Glucose 06/14/2024 144 (H)  74 - 99 mg/dL Final    POCT Glucose 06/14/2024 278 (H)  74 - 99 mg/dL Final     IMAGING  CT head wo IV contrast    Result Date: 6/11/2024  * * *Final Report* * * DATE OF EXAM: Jun 11 2024  4:00PM   [facility]   [id number]  -  CT BRAIN WO IVCON   / ACCESSION #  [id number] PROCEDURE REASON: Mental status change, unknown cause      * * * * Physician Interpretation * * * *  CT BRAIN WO IVCON Indication:  Mental status change, unknown cause/ / Female/83 years TECHNIQUE: Routine CT of the brain without IV contrast. CT Dose-Length Product (DLP): 866 mGy*cm CT Dose Reduction Employed: Automated exposure control (AEC) CT BRAIN: Acute ischemic change:   None. Hemorrhage:    No evidence of acute intracranial hemorrhage. Mass Effect / Mass Lesion:   No significant mass effect.  There is no evidence of an intracranial mass or extraaxial fluid  collection. Chronic ischemic change:  Periventricular hypodensity consistent with small vessel disease.  Possible small area of encephalomalacia lateral to the anterior horn of the left lateral ventricle Parenchyma:  Sulci cisterns and ventricular system reveals some atrophic changes consistent with age-related changes Ventricles: Ventricular dilatation consistent with atrophic changes Other:  The visualized calvarium, skull base, orbits and extracranial soft tissues are normal.    IMPRESSION: Age related atrophic changes No acute intracranial pathology [facility]   Transcribe Date/Time: Jun 11 2024  4:21P Dictated by : ROBBI PATTERSON MD This examination was interpreted and the report reviewed and electronically signed by: ROBBI PATTERSON MD on Jun 11 2024  4:24PM  EST    CT LUMBAR SPINE W RECON DATA    Result Date: 6/11/2024  * * *Final Report* * * DATE OF EXAM: Jun 11 2024 11:33AM   [facility]   [id number]  -  CT LUMBAR SPINE W RECON DATA -NB  / ACCESSION #  [id number] PROCEDURE REASON: Low back pain, prior surgery, new symptoms      * * * * Physician Interpretation * * * *    EXAMINATION:  CT LUMBAR SPINE W RECON DATA -NB CLINICAL HISTORY:  Low back pain, prior surgery, new symptoms TECHNIQUE:  Spiral, high resolution axial images were obtained from the thoracolumbar junction to the sacrum with sagittal and coronal planar reconstructions. MQ:  [id number] CT Radiation dose: Integrated Dose-Length Product (DLP) for this visit = mGy*cm. CT Dose Reduction Employed: Iterative recon and mAs-kVp adjusted using patient size-age COMPARISON: None. RESULT: Anatomic Thoracic/Lumbar Variant: None.  L4-5 is considered the level of the iliac crest and assume there are 5 lumbar-type vertebrae. Alignment:  Grade 1 anterolisthesis L4 on L5, L2-L3 Bone marrow /fracture: There is generalized bony demineralization.   Extensive destructive changes noted involving the inferior body and endplate L1.  Postsurgical changes noted with  orthopedic rods and screws projecting from the L3-S1 levels.  Laminectomy lower lumbar spine Paraspinal soft tissues:  The paraspinal soft tissues planes are maintained. Lower thoracic spine:  The visualized lower thoracic bony canal and foramina are patent. Sacrum and iliac wings:  The visualized sacrum and iliac wings are within normal limits.    IMPRESSION: Degenerative changes and bony demineralization Postoperative changes as described Extensive destructive changes noted involving the inferior body and endplate L1/clinical correlation and follow-up required : KAY   Transcribe Date/Time: Jun 11 2024  1:04P Dictated by : ROBBI PATTERSON MD This examination was interpreted and the report reviewed and electronically signed by: ROBBI PATTERSON MD on Jun 11 2024  1:16PM  EST    CT abdomen pelvis w IV contrast    Result Date: 6/11/2024  * * *Final Report* * * DATE OF EXAM: Jun 11 2024 11:33AM   MYC   0530  -  CT ABD/PEL W IVCON  / ACCESSION #  [id number] PROCEDURE REASON: Abdominal abscess/infection suspected      * * * * Physician Interpretation * * * *  EXAMINATION:  CT ABDOMEN AND PELVIS WITH IV CONTRAST CLINICAL HISTORY: Infection suspected TECHNIQUE: CT of the abdomen and pelvis was performed using standard technique, scanning from just above the dome of the diaphragm to the symphysis pubis. MQ:  CTAP_3   Contrast: IV:  90 ml of Omnipaque 350 : ml of CT Radiation dose: Integrated Dose-length product (DLP) for this visit =   228 mGy*cm. CT Dose Reduction Employed: Iterative recon and mAs-kVp adjusted using patient size-age COMPARISON: None.   RESULT: Liver: No mass. Normal morphology. Biliary: No bile duct dilation.  There is a small amount of sludge and questionable small stones along the dependent portion of the gallbladder. Spleen: No mass. No splenomegaly. Pancreas: Atrophic pancreas. Adrenals: No mass. Kidneys: No mass, calculus or hydronephrosis. GI tract: Normal small bowel loops. The  colon walls not thickened.  There is no pericolic stranding.  The appendix is normal.  Small hiatal hernia. Lymph nodes: No abdominal or pelvic lymphadenopathy. Mesentery/Peritoneum: No ascites or mass. Retroperitoneum: No mass. Vasculature:  - Abdominal aorta and iliac arteries: No aneurysm.  - Celiac and SMA: SMA is patent without stenosis.  Patent celiac however there is high-grade stenosis at the origin.  - Portal venous system (SMV, splenic vein, portal vein and branches): Patent.  - Hepatic veins: Incompletely opacified, likely due to early phase of enhancement. Pelvis: No mass, ascites or fluid collection. Urinary bladder wall is smooth.  Uterus is not enlarged. Bones/Soft Tissues: Refer to the CT of the lumbar spine.  Bony pelvis is intact. Lower thorax: Bronchiectasis with mosaic groundglass pattern in the posterior lung bases.  Localizer images: No additional findings.    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  No ascites suspicious fluid collection. : [facility] Center[facility]EDRICK   Transcribe Date/Time: Jun 11 2024 11:35A Dictated by : KAMLESH MCKENNA MD This examination was interpreted and the report reviewed and electronically signed by: KAMLESH MCKENNA MD on Jun 11 2024 11:41AM  EST       ASSESSMENT:     PSYCHIATRIC RISK ASSESSMENT  Violence Risk Factors:  none  Acute Risk of Harm to Others is Considered: Low  Suicide Risk Factors: ; /[place] native, age > 65 years old , chronic medical illness, and chronic pain  Protective Factors: social support/connectedness, positive family relationships, hopefulness/future-orientation, and life satisfaction  Acute Risk of Harm to Self is Considered: Low    DIAGNOSIS  Principal Problem:    Osteomyelitis (Multi)  Generalized Anxiety Disorder  R/o panic disorder    IMPRESSION  Carrie is an 83-year-old female with a past medical history of anxiety, depression.  She  reports occasional, sometimes daily occurrence of perceptual disturbance that has been occurring for approx. 2 years.  She reports this as feeling like she is on the ceiling looking down, accompanied by dizziness, which makes her terrified because she has a fear of heights and is afraid she is going to fall.  Patient is able to talk herself through this, understands that this is not really occurring.  This could be a symptom of anxiety such as depersonalization or floating, however could also be caused by some sort of vestibular dysfunction.  I do not believe this is a delirium as patient is fully oriented with a good attention span and is aware during the events and is able to distinguish that is not really on the ceiling.    PLAN/ RECOMMENDATIONS:     RECS:  -Recommend hydroxyzine 25 mg TID PRN for anxiety  -Continue fluoxetine 40 mg daily for anxiety/depression  -Consider outpatient neurology work up to r/o medical cause.   - Patient does not currently meet criteria for inpatient psychiatric admission.   -Avoid tramadol, as there are reports of this causing her adverse reactions in the past.       -Thank you for allowing us to participate in the care of this patient.  Psychiatry will continue to follow while pt remains in house.     I personally spent 80 minutes today providing care for this patient, including preparation, face to face time, documentation and other services such as review of medical records, diagnostic result, patient education, counseling, coordination of care as specified in the encounter.     l

## 2024-06-14 NOTE — PROGRESS NOTES
Physical Therapy    Physical Therapy Treatment    Patient Name: Carrie Rosario  MRN: [id number]  Today's Date: 6/14/2024  Time Calculation  Start Time: 1300  Stop Time: 1345  Time Calculation (min): 45 min    Assessment/Plan   PT Assessment  Rehab Prognosis: Good  Barriers to Discharge: Via log roll Max A for trunk, Min A for B LEs, STS min A x 2 5 small lateral side steps with Mod A x 2  Extra time and effrort with all activities  End of Session Communication: Bedside nurse  Assessment Comment: Did better today . Cooperative/ pleasant. Up in chair  End of Session Patient Position: Up in chair, Alarm on     PT Plan  Treatment/Interventions: Bed mobility, Transfer training, Gait training, Strengthening, Endurance training, Range of motion, Therapeutic exercise, Therapeutic activity  PT Plan: Ongoing PT  PT Frequency: 4 times per week  PT Discharge Recommendations: Moderate intensity level of continued care  Equipment Recommended upon Discharge: Wheeled walker, Wheelchair  PT Recommended Transfer Status: Assist x2, Assistive device  PT - OK to Discharge: Yes      General Visit Information:   PT  Visit  PT Received On: 06/14/24  General  Reason for Referral: impaired mobility  Referred By: Dr. Clark  Past Medical History Relevant to Rehab: DM, hyperlipidemia, CKD, diastolic heart failure, HTN  Co-Treatment: OT  Co-Treatment Reason: Safety with mobility  Prior to Session Communication: Bedside nurse  Patient Position Received: Bed, 3 rail up, Alarm on  Preferred Learning Style: auditory, verbal  General Comment: Clared by nurse to see.Patient agreeable to therapy    Subjective   Precautions:  Precautions  Hearing/Visual Limitations: glasses for reading  LE Weight Bearing Status: Other (Comment) (R foot drop)  Medical Precautions: Fall precautions  Post-Surgical Precautions: Spinal precautions  Precautions Comment: spinal guarding due to low back pain, utilized logroll technique for safe mobility  Vital Signs:        Objective   Pain:  Pain Assessment  Pain Assessment: 0-10  Pain Score: 6  Pain Type: Chronic pain  Pain Location: Hip  Pain Orientation: Right, Left  Pain Descriptors: Sharp  Pain Frequency: Constant/continuous  Cognition:  Cognition  Orientation Level: Disoriented to time, Disoriented to place, Disoriented to situation  Insight: Mild  Impulsive: Mildly  Coordination:  Coordination Comment: decreased overall due to pain  Postural Control:  Postural Control  Postural Control: Impaired  Posture Comment: leans to R and not able to come to full upright sitting due to pain  Static Sitting Balance  Static Sitting-Balance Support: Bilateral upper extremity supported  Static Sitting-Level of Assistance: Minimum assistance  Static Sitting-Comment/Number of Minutes: R lean  Dynamic Sitting Balance  Dynamic Sitting-Comments: positioned with pillows  Static Standing Balance  Static Standing-Balance Support: Bilateral upper extremity supported  Static Standing-Level of Assistance: Minimum assistance, Moderate assistance (x 2)  Static Standing-Comment/Number of Minutes: Min A x 2 to stand  Dynamic Standing Balance  Dynamic Standing-Balance Support: Bilateral upper extremity supported  Dynamic Standing-Comments: Mod A x 2 with mobility  Extremity/Trunk Assessments:    Activity Tolerance:  Activity Tolerance  Activity Tolerance Comments: limited due to pain  Treatments:  Therapeutic Exercise  Therapeutic Exercise Performed: Yes  Therapeutic Exercise Activity 1: B LE seated ther ex: heel raises x 15 B ,toe raises L LE x 15 LAQs,Hip adduction , Hip flexion x 10    Bed Mobility  Bed Mobility: Yes  Bed Mobility 1  Bed Mobility 1: Supine to sitting  Level of Assistance 1: Maximum assistance, Moderate assistance  Bed Mobility Comments 1: Via log roll x 2 Max A for trunk up Min A for B LE    Transfers  Transfer: Yes  Transfer 1  Transfer From 1: Bed to  Transfer to 1: Stand  Technique 1: Sit to stand  Transfer Device 1:  Walker  Transfer Level of Assistance 1: Minimum assistance, +2  Trials/Comments 1: x 1 trail STS from bed to RW with Min A x 2 Slow and effortful  Transfers 2  Transfer From 2: Stand to  Transfer to 2: Chair with arms, Sit  Technique 2: Stand to sit, Sit to stand  Transfer Device 2: Walker  Transfer Level of Assistance 2: Minimum assistance, +2  Trials/Comments 2: x 2 trials STS to bedside chair with Min A x 2 with cues for hand placement Slow and effortful with B knee flexed  Transfers 3  Transfer From 3: Chair with arms to  Transfer to 3: Stand  Technique 3: Sit to stand  Transfer Device 3: Walker  Transfer Level of Assistance 3: Minimum assistance, +2  Trials/Comments 3: x 2 trials STS from bedside chair with Min A x 2 Cues for safe hand placement         Outcome Measures:  [facility] Basic Mobility  Turning from your back to your side while in a flat bed without using bedrails: A lot  Moving from lying on your back to sitting on the side of a flat bed without using bedrails: A lot  Moving to and from bed to chair (including a wheelchair): A lot  Standing up from a chair using your arms (e.g. wheelchair or bedside chair): A lot  To walk in hospital room: Total  Climbing 3-5 steps with railing: Total  Basic Mobility - Total Score: 10    Education Documentation  Precautions, taught by Kenna Wheeler PTA at 6/14/2024  2:13 PM.  Learner: Patient  Readiness: Acceptance  Method: Explanation, Teach-back  Response: Verbalizes Understanding, Needs Reinforcement    Body Mechanics, taught by Kenna Wheeler PTA at 6/14/2024  2:13 PM.  Learner: Patient  Readiness: Acceptance  Method: Explanation, Teach-back  Response: Verbalizes Understanding, Needs Reinforcement    Home Exercise Program, taught by Kenna Wheeler PTA at 6/14/2024  2:13 PM.  Learner: Patient  Readiness: Acceptance  Method: Explanation, Teach-back  Response: Verbalizes Understanding, Needs Reinforcement    Mobility Training, taught by Kenna Wheeler  PTA at 6/14/2024  2:13 PM.  Learner: Patient  Readiness: Acceptance  Method: Explanation, Teach-back  Response: Verbalizes Understanding, Needs Reinforcement    Education Comments  No comments found.        OP EDUCATION:       Encounter Problems       Encounter Problems (Active)       PT Goals       Patient will transfer supine to sit and sit to supine with supervision assist to facilitate mobility.  (Progressing)       Start:  06/12/24    Expected End:  06/26/24            Patient will transfer bed to chair and chair to bed with minimal assist to facilitate mobility.  (Progressing)       Start:  06/12/24    Expected End:  06/26/24            Patient will amb 15 feet with rolling walker device including no turns on even surface with minimal x 2 assist to facilitate safe mobility.  (Progressing)       Start:  06/12/24    Expected End:  06/26/24            Patient will improve static standing balance to Fair+ for improved safety during standing  (Progressing)       Start:  06/12/24    Expected End:  06/26/24

## 2024-06-14 NOTE — DOCUMENTATION CLARIFICATION NOTE
"    PATIENT:               CARRIE BULL  ACCT #:                  [id number]  MRN:                       [id number]  :                       1940  ADMIT DATE:       2024 5:54 PM  DISCH DATE:  RESPONDING PROVIDER #:        [id number]          PROVIDER RESPONSE TEXT:    Neuro Organ dysfunction of Metabolic Encephalopathy 2/2 SIRS without infection    CDI QUERY TEXT:    Clarification        Instruction:    Based on your assessment of the patient and the clinical information, please provide the requested documentation by clicking on the appropriate radio button and enter any additional information if prompted.    Question: Please further clarify the type of Encephalopathy as      When answering this query, please exercise your independent professional judgment. The fact that a question is being asked, does not imply that any particular answer is desired or expected.    The patient's clinical indicators include:  Clinical Information:  Carrie Bull is a 83 y.o. female presenting with osteomyelitis of the spine.  currently resides at assisted living and was brought to [facility] because she was found on floor and confused complaining of lower back pain    Clinical Indicators:   H/P  \"Osteomyelitis of the spine  Urinary tract infection\"  CRP:  3.4/3.00     Med PN:  \"Osteomyelitis (Multi)\"  \"During hospital course patient was seen by infectious disease and chart was reviewed by orthopedic spine surgeon.  It was felt that patient's did not have osteomyelitis.  She had a urine culture that was negative.  Patient is nontoxic-appearing and no evidence of infection.\"    Treatment:  ID Consult; CT; Meropenem IV; Daptomycin IV; CK; CRP; ESR: Ortho Consult    Risk Factors:  Elderly; DM2; HLD; HTN; CKD; H/O MRSA, Citrobactor  Options provided:  -- Neuro Organ dysfunction of Metabolic Encephalopathy 2/2 SIRS without infection  -- Other - I will add my own diagnosis  -- Refer to Clinical " Documentation Reviewer    Query created by: Angela Narayan on 6/14/2024 9:06 AM      Electronically signed by:  SYLVESTER MCQUEEN 6/14/2024 2:00 PM

## 2024-06-14 NOTE — CARE PLAN
Problem: Pain  Goal: My pain/discomfort is manageable  Outcome: Progressing     Problem: Pain  Goal: My pain/discomfort is manageable  Outcome: Progressing   The patient's goals for the shift include      The clinical goals for the shift include safety, pain control, and promote rest

## 2024-06-14 NOTE — PROGRESS NOTES
"Carrie Rosario is a 83 y.o. female on day 3 of admission presenting with Osteomyelitis (Multi).    Subjective   Patient seen and examined.  No documented concerns/events overnight from nursing.  Afebrile overnight.  Denies chills.  Patient says that she had another \"panic attack/hallucination\" morning she was sitting on the edge of the bed and felt like she was going to fall.  She is also complaining of lower back pain.       Objective     Physical Exam  Constitutional:       General: She is not in acute distress.     Appearance: She is not toxic-appearing.   Cardiovascular:      Rate and Rhythm: Normal rate and regular rhythm.      Pulses: Normal pulses.      Heart sounds: Normal heart sounds.   Pulmonary:      Effort: Pulmonary effort is normal.      Breath sounds: Normal breath sounds.   Abdominal:      General: Bowel sounds are normal.      Palpations: Abdomen is soft.   Skin:     General: Skin is warm and dry.   Neurological:      General: No focal deficit present.      Mental Status: She is alert and oriented to person, place, and time.         Last Recorded Vitals  Blood pressure 140/77, pulse 69, temperature 36.9 °C (98.4 °F), temperature source Temporal, resp. rate 17, height 1.626 m (5' 4\"), weight 53.1 kg (117 lb), SpO2 97%.  Intake/Output last 3 Shifts:  I/O last 3 completed shifts:  In: 765 (14.4 mL/kg) [P.O.:615; IV Piggyback:150]  Out: 400 (7.5 mL/kg) [Urine:400 (0.2 mL/kg/hr)]  Weight: 53.1 kg     Relevant Results    Scheduled medications  acetaminophen, 975 mg, oral, TID  amLODIPine, 2.5 mg, oral, Daily  aspirin, 81 mg, oral, Daily  atorvastatin, 20 mg, oral, Nightly  enoxaparin, 40 mg, subcutaneous, q24h  famotidine, 20 mg, oral, Daily  FLUoxetine, 40 mg, oral, Daily  insulin lispro, 0-5 Units, subcutaneous, TID  lidocaine, 1 patch, transdermal, Daily  metoprolol tartrate, 50 mg, oral, BID      Continuous medications     PRN medications  PRN medications: dextrose, dextrose, glucagon, glucagon, " hydrOXYzine pamoate, melatonin, ondansetron ODT **OR** ondansetron     following data reviewed    WBC- 5.5  Hgb-13.0  Hct-39.5  Platelets-264    AST-19  ALT- 7  Alk Phos-94  Total Son-0.5    Na-137  K+-4.0  Cl-101  Bicarb-27  BUN-17  creatinine-1.1    Urine culture no growth  Blood cultures pending no growth                         Assessment/Plan   Principal Problem:    Osteomyelitis (Multi)    Back pain with hx osteomyelitis of lumbar spine  -ID following  -Monitor off ATB  -ortho spine reviewed images and does not feel pt has osteomyelitis  -blood cx pending        Hyperlipidemia  -continue statin     HTN  -monitor blood pressure     DM II  -monitor blood glucose  -ISS  -diabetic diet     Plan  Above plan discussed at length with pt and she is in agreement    CHELSEA Vargas-CNP

## 2024-06-15 LAB
ALBUMIN SERPL-MCNC: 3.1 G/DL (ref 3.5–5)
ALP BLD-CCNC: 97 U/L (ref 35–125)
ALT SERPL-CCNC: 11 U/L (ref 5–40)
ANION GAP SERPL CALC-SCNC: 10 MMOL/L
AST SERPL-CCNC: 29 U/L (ref 5–40)
BILIRUB SERPL-MCNC: 0.4 MG/DL (ref 0.1–1.2)
BUN SERPL-MCNC: 22 MG/DL (ref 8–25)
CALCIUM SERPL-MCNC: 9.2 MG/DL (ref 8.5–10.4)
CHLORIDE SERPL-SCNC: 101 MMOL/L (ref 97–107)
CO2 SERPL-SCNC: 25 MMOL/L (ref 24–31)
CREAT SERPL-MCNC: 1 MG/DL (ref 0.4–1.6)
EGFRCR SERPLBLD CKD-EPI 2021: 56 ML/MIN/1.73M*2
ERYTHROCYTE [DISTWIDTH] IN BLOOD BY AUTOMATED COUNT: 11.2 % (ref 11.5–14.5)
GLUCOSE BLD MANUAL STRIP-MCNC: 180 MG/DL (ref 74–99)
GLUCOSE BLD MANUAL STRIP-MCNC: 186 MG/DL (ref 74–99)
GLUCOSE BLD MANUAL STRIP-MCNC: 237 MG/DL (ref 74–99)
GLUCOSE BLD MANUAL STRIP-MCNC: 255 MG/DL (ref 74–99)
GLUCOSE SERPL-MCNC: 161 MG/DL (ref 65–99)
HCT VFR BLD AUTO: 39.4 % (ref 36–46)
HGB BLD-MCNC: 13 G/DL (ref 12–16)
MCH RBC QN AUTO: 31.6 PG (ref 26–34)
MCHC RBC AUTO-ENTMCNC: 33 G/DL (ref 32–36)
MCV RBC AUTO: 96 FL (ref 80–100)
NRBC BLD-RTO: 0 /100 WBCS (ref 0–0)
PLATELET # BLD AUTO: 281 X10*3/UL (ref 150–450)
POTASSIUM SERPL-SCNC: 4.2 MMOL/L (ref 3.4–5.1)
PROT SERPL-MCNC: 6.8 G/DL (ref 5.9–7.9)
RBC # BLD AUTO: 4.12 X10*6/UL (ref 4–5.2)
SODIUM SERPL-SCNC: 136 MMOL/L (ref 133–145)
WBC # BLD AUTO: 7.6 X10*3/UL (ref 4.4–11.3)

## 2024-06-15 PROCEDURE — 80053 COMPREHEN METABOLIC PANEL: CPT | Performed by: NURSE PRACTITIONER

## 2024-06-15 PROCEDURE — 2500000002 HC RX 250 W HCPCS SELF ADMINISTERED DRUGS (ALT 637 FOR MEDICARE OP, ALT 636 FOR OP/ED): Performed by: NURSE PRACTITIONER

## 2024-06-15 PROCEDURE — 85027 COMPLETE CBC AUTOMATED: CPT | Performed by: NURSE PRACTITIONER

## 2024-06-15 PROCEDURE — 1100000001 HC PRIVATE ROOM DAILY

## 2024-06-15 PROCEDURE — 2500000004 HC RX 250 GENERAL PHARMACY W/ HCPCS (ALT 636 FOR OP/ED): Performed by: NURSE PRACTITIONER

## 2024-06-15 PROCEDURE — 97110 THERAPEUTIC EXERCISES: CPT | Mod: GO,CO

## 2024-06-15 PROCEDURE — 97530 THERAPEUTIC ACTIVITIES: CPT | Mod: GP

## 2024-06-15 PROCEDURE — 97535 SELF CARE MNGMENT TRAINING: CPT | Mod: GO,CO

## 2024-06-15 PROCEDURE — 97110 THERAPEUTIC EXERCISES: CPT | Mod: GP

## 2024-06-15 PROCEDURE — 2500000001 HC RX 250 WO HCPCS SELF ADMINISTERED DRUGS (ALT 637 FOR MEDICARE OP): Performed by: NURSE PRACTITIONER

## 2024-06-15 PROCEDURE — 2500000005 HC RX 250 GENERAL PHARMACY W/O HCPCS: Performed by: NURSE PRACTITIONER

## 2024-06-15 PROCEDURE — 36415 COLL VENOUS BLD VENIPUNCTURE: CPT | Performed by: NURSE PRACTITIONER

## 2024-06-15 PROCEDURE — 82947 ASSAY GLUCOSE BLOOD QUANT: CPT

## 2024-06-15 ASSESSMENT — COGNITIVE AND FUNCTIONAL STATUS - GENERAL
MOBILITY SCORE: 8
TURNING FROM BACK TO SIDE WHILE IN FLAT BAD: A LOT
MOBILITY SCORE: 8
DRESSING REGULAR LOWER BODY CLOTHING: A LOT
DRESSING REGULAR LOWER BODY CLOTHING: A LOT
TOILETING: A LOT
DRESSING REGULAR UPPER BODY CLOTHING: A LOT
CLIMB 3 TO 5 STEPS WITH RAILING: A LOT
MOBILITY SCORE: 13
EATING MEALS: A LITTLE
STANDING UP FROM CHAIR USING ARMS: TOTAL
TOILETING: A LOT
MOVING FROM LYING ON BACK TO SITTING ON SIDE OF FLAT BED WITH BEDRAILS: A LOT
EATING MEALS: A LITTLE
HELP NEEDED FOR BATHING: A LOT
MOVING TO AND FROM BED TO CHAIR: TOTAL
DRESSING REGULAR LOWER BODY CLOTHING: A LOT
MOVING FROM LYING ON BACK TO SITTING ON SIDE OF FLAT BED WITH BEDRAILS: A LITTLE
DAILY ACTIVITIY SCORE: 15
WALKING IN HOSPITAL ROOM: TOTAL
DRESSING REGULAR UPPER BODY CLOTHING: A LOT
DRESSING REGULAR UPPER BODY CLOTHING: A LOT
PERSONAL GROOMING: A LOT
WALKING IN HOSPITAL ROOM: TOTAL
MOVING FROM LYING ON BACK TO SITTING ON SIDE OF FLAT BED WITH BEDRAILS: A LOT
DAILY ACTIVITIY SCORE: 15
CLIMB 3 TO 5 STEPS WITH RAILING: TOTAL
MOVING TO AND FROM BED TO CHAIR: A LOT
CLIMB 3 TO 5 STEPS WITH RAILING: TOTAL
STANDING UP FROM CHAIR USING ARMS: TOTAL
WALKING IN HOSPITAL ROOM: A LOT
HELP NEEDED FOR BATHING: A LOT
TURNING FROM BACK TO SIDE WHILE IN FLAT BAD: A LOT
TOILETING: A LOT
TURNING FROM BACK TO SIDE WHILE IN FLAT BAD: A LOT
STANDING UP FROM CHAIR USING ARMS: A LOT
HELP NEEDED FOR BATHING: A LOT
MOVING TO AND FROM BED TO CHAIR: TOTAL

## 2024-06-15 ASSESSMENT — PAIN DESCRIPTION - ORIENTATION: ORIENTATION: RIGHT;LEFT

## 2024-06-15 ASSESSMENT — PAIN - FUNCTIONAL ASSESSMENT
PAIN_FUNCTIONAL_ASSESSMENT: 0-10

## 2024-06-15 ASSESSMENT — PAIN SCALES - GENERAL
PAINLEVEL_OUTOF10: 7
PAINLEVEL_OUTOF10: 0 - NO PAIN
PAINLEVEL_OUTOF10: 7

## 2024-06-15 ASSESSMENT — PAIN DESCRIPTION - LOCATION: LOCATION: HIP

## 2024-06-15 ASSESSMENT — PAIN DESCRIPTION - DESCRIPTORS: DESCRIPTORS: SHARP;OTHER (COMMENT)

## 2024-06-15 NOTE — PROGRESS NOTES
TCC spoke to pts daughter and POA ( attempted to call pts room with no answer) states ideally dc plan would be to return to [facility] and continue her therapy. States she doesn't know how weak pt has gotten during hospital stay. Prior to admission pt we WC bound but able to get in and out on her own and take her self to the restroom. Updated daughter that per previous TCC notes pt has refused SNF regardless of recommendations from therapy. Daughter states she will discuss with pt and make final decision regarding HHC vs SNF. Also would like verification that if pt returns to [facility] they can manage her. TCC reached out to [facility] nurse [phone] left VM to speak with them.     Airam Palencia RN

## 2024-06-15 NOTE — PROGRESS NOTES
Spiritual Care Visit    Clinical Encounter Type  Visited With: Patient  Routine Visit: Follow-up  Continue Visiting: Yes         Values/Beliefs  Spiritual Requests During Hospitalization: Communion today    Sacramental Encounters  Communion: Patient wants communion  Communion Given Indicator: Yes     Scotty Vasquez

## 2024-06-15 NOTE — CARE PLAN
Problem: Pain  Goal: My pain/discomfort is manageable  Outcome: Progressing     Problem: Safety  Goal: Patient will be injury free during hospitalization  Outcome: Progressing  Goal: I will remain free of falls  Outcome: Progressing     Problem: Daily Care  Goal: Daily care needs are met  Outcome: Progressing     Problem: Psychosocial Needs  Goal: Demonstrates ability to cope with hospitalization/illness  Outcome: Progressing  Goal: Collaborate with me, my family, and caregiver to identify my specific goals  Outcome: Progressing     Problem: Discharge Barriers  Goal: My discharge needs are met  Outcome: Progressing     Problem: Deep Vein Thrombosis  Goal: I will remain free from complications of deep vein thrombosis and maintain current level of mobility  Outcome: Progressing     Problem: Fall/Injury  Goal: Not fall by end of shift  Outcome: Progressing  Goal: Be free from injury by end of the shift  Outcome: Progressing  Goal: Verbalize understanding of personal risk factors for fall in the hospital  Outcome: Progressing  Goal: Verbalize understanding of risk factor reduction measures to prevent injury from fall in the home  Outcome: Progressing  Goal: Use assistive devices by end of the shift  Outcome: Progressing  Goal: Pace activities to prevent fatigue by end of the shift  Outcome: Progressing     Problem: Pain  Goal: Takes deep breaths with improved pain control throughout the shift  Outcome: Progressing  Goal: Turns in bed with improved pain control throughout the shift  Outcome: Progressing  Goal: Walks with improved pain control throughout the shift  Outcome: Progressing  Goal: Performs ADL's with improved pain control throughout shift  Outcome: Progressing  Goal: Participates in PT with improved pain control throughout the shift  Outcome: Progressing  Goal: Free from opioid side effects throughout the shift  Outcome: Progressing  Goal: Free from acute confusion related to pain meds throughout the  shift  Outcome: Progressing   The patient's goals for the shift include      The clinical goals for the shift include pain management, IV antibiotics, safety, and promote rest.

## 2024-06-15 NOTE — PROGRESS NOTES
Occupational Therapy    OT Treatment    Patient Name: Carrie Rosario  MRN: [id number]  Today's Date: 6/15/2024  Time Calculation  Start Time: 1317  Stop Time: 1340  Time Calculation (min): 23 min         Assessment:  OT Assessment: Pt appears to be tolerating sitting in chair with less discomfort, PT staff reporting pt walked ~ 10 feet in room prior to OT session with Mod A x2.  Barriers to Discharge:  (pain significantly impacting pt mobility and ability to care for self.)  Evaluation/Treatment Tolerance: Patient limited by pain  End of Session Patient Position: Up in chair, Alarm on (pt eating lunch with all needs in reach.)  OT Assessment Results: Decreased ADL status, Decreased upper extremity strength, Decreased safe judgment during ADL, Decreased endurance, Decreased functional mobility, Decreased gross motor control  Barriers to Discharge:  (pain significantly impacting pt mobility and ability to care for self.)  Evaluation/Treatment Tolerance: Patient limited by pain  Plan:  Treatment Interventions: UE strengthening/ROM  OT Frequency: 3 times per week  OT Discharge Recommendations: Moderate intensity level of continued care  Equipment Recommended upon Discharge: Wheelchair, Wheeled walker  OT Recommended Transfer Status: Assist of 2  OT - OK to Discharge: Yes  Treatment Interventions: UE strengthening/ROM    Subjective   Previous Visit Info:  OT Last Visit  OT Received On: 06/15/24  General:  General  Reason for Referral: decline in ADLs, osteomyelitis L1  Referred By: Dr. Clark  Past Medical History Relevant to Rehab: DM, hyperlipidemia, CKD, diastolic heart failure, HTN  Prior to Session Communication: Bedside nurse  Patient Position Received: Up in chair, Alarm on  General Comment: Agreeable to treatment.  Precautions:  Hearing/Visual Limitations: glasses for reading  LE Weight Bearing Status:  (Rt foot drop)  Medical Precautions: Fall precautions  Post-Surgical Precautions: Spinal precautions (for  pain management)  Vital Signs:     Pain:  Pain Assessment  Pain Assessment: 0-10  Pain Score: 7  Pain Location: Hip  Pain Orientation: Left, Right (back)  Effect of Pain on Daily Activities: Maximum  Pain Interventions: Repositioned (waffle pad on chair, BLE elevation)    Objective       Activities of Daily Living: Feeding  Feeding Level of Assistance: Setup  Feeding Where Assessed: Chair  Feeding Comments: assist to open containers    Grooming  Grooming Level of Assistance: Distant supervision  Grooming Where Assessed: Chair  Grooming Comments: oral care  Functional Standing Tolerance:     Bed Mobility/Transfers: Transfer 1  Trials/Comments 1: Transfers not attempted due to lunch arriving  Therapy/Activity: Therapeutic Exercise  Therapeutic Exercise Activity 1: scap protraction/retraction  Therapeutic Exercise Activity 2: shoulder flexion to 90 degrees  Therapeutic Exercise Activity 3: ball squeeze  Therapeutic Exercise Activity 4: 1 set x 10 reps, AROM. Pt presents with fair form, limited with back pain with UE movement. Therapist instructs in therex modifications, correct joint alignment, pace  Outcome Measures:[facility] Daily Activity  Putting on and taking off regular lower body clothing: A lot  Bathing (including washing, rinsing, drying): A lot  Putting on and taking off regular upper body clothing: A lot  Toileting, which includes using toilet, bedpan or urinal: A lot  Taking care of personal grooming such as brushing teeth: None  Eating Meals: A little  Daily Activity - Total Score: 15        Education Documentation  Precautions, taught by GIACOMO Lin at 6/15/2024  1:49 PM.  Learner: Patient  Readiness: Acceptance  Method: Explanation, Demonstration  Response: Verbalizes Understanding, Demonstrated Understanding, Needs Reinforcement    Home Exercise Program, taught by GIACOMO Lin at 6/15/2024  1:49 PM.  Learner: Patient  Readiness: Acceptance  Method: Explanation, Demonstration  Response: Verbalizes  Understanding, Demonstrated Understanding, Needs Reinforcement    Body Mechanics, taught by GIACOMO Lin at 6/14/2024  2:19 PM.  Learner: Patient  Readiness: Acceptance  Method: Explanation  Response: Verbalizes Understanding, Demonstrated Understanding, Needs Reinforcement    Precautions, taught by GIACOMO Lin at 6/14/2024  2:19 PM.  Learner: Patient  Readiness: Acceptance  Method: Explanation  Response: Verbalizes Understanding, Demonstrated Understanding, Needs Reinforcement    ADL Training, taught by GIACOMO Lin at 6/14/2024  2:19 PM.  Learner: Patient  Readiness: Acceptance  Method: Explanation  Response: Verbalizes Understanding, Demonstrated Understanding, Needs Reinforcement    Education Comments  No comments found.    IP EDUCATION:  Education  Individual(s) Educated: Patient  Education Provided: Diagnosis & Precautions, Fall precautons, Risk and benefits of OT discussed with patient or other (Therex)  Patient Response to Education: Patient/Caregiver Verbalized Understanding of Information, Patient/Caregiver Performed Return Demonstration of Exercises/Activities    Goals:  Encounter Problems       Encounter Problems (Active)       OT Goals       ADLs (Progressing)       Start:  06/12/24    Expected End:  07/18/24       Patient will complete ADL tasks with Close Supervision, using AE as needed, in order to increase safety and independence with self-care tasks.         Functional Transfers (Progressing)       Start:  06/12/24    Expected End:  07/18/24       Patient will complete functional transfers with Close Supervision in order to increase safety and independence with daily tasks.         Standing Tolerance (Progressing)       Start:  06/12/24    Expected End:  07/18/24       Patient will demonstrate the ability to stand at least >/= 2 minutes with Fair+ balance in order to increase safety and independence with daily tasks.         Sitting Tolerance (Progressing)       Start:  06/12/24     Expected End:  07/18/24       Patient will demonstrate the ability to sit EOB at least >/= 20 minutes with Fair+ balance in order to increase safety and independence with daily tasks.

## 2024-06-15 NOTE — CARE PLAN
The patient's goals for the shift include      The clinical goals for the shift include Assist with pain management      Problem: Skin  Goal: Decreased wound size/increased tissue granulation at next dressing change  6/15/2024 1839 by Jessica Baum RN  Outcome: Progressing  6/15/2024 1837 by Jessica Baum RN  Outcome: Progressing  Flowsheets (Taken 6/15/2024 1837)  Decreased wound size/increased tissue granulation at next dressing change: Promote sleep for wound healing

## 2024-06-15 NOTE — CARE PLAN
Problem: Pain  Goal: My pain/discomfort is manageable  6/15/2024 1711 by Jessica Baum RN  Outcome: Progressing  6/15/2024 0752 by Jessica Baum RN  Outcome: Progressing     Problem: Safety  Goal: Patient will be injury free during hospitalization  6/15/2024 1711 by Jessica Baum RN  Outcome: Progressing  6/15/2024 0752 by Jessica Baum RN  Outcome: Progressing  Goal: I will remain free of falls  6/15/2024 1711 by Jessica Baum RN  Outcome: Progressing  6/15/2024 0752 by Jessica Baum RN  Outcome: Progressing     Problem: Daily Care  Goal: Daily care needs are met  6/15/2024 1711 by Jessica Baum RN  Outcome: Progressing  6/15/2024 0752 by Jessica Baum RN  Outcome: Progressing     Problem: Psychosocial Needs  Goal: Demonstrates ability to cope with hospitalization/illness  6/15/2024 1711 by Jessica Baum RN  Outcome: Progressing  6/15/2024 0752 by Jessica Baum RN  Outcome: Progressing  Goal: Collaborate with me, my family, and caregiver to identify my specific goals  6/15/2024 1711 by Jessica Baum RN  Outcome: Progressing  6/15/2024 0752 by Jessica Baum RN  Outcome: Progressing     Problem: Discharge Barriers  Goal: My discharge needs are met  6/15/2024 1711 by Jessica Baum RN  Outcome: Progressing  6/15/2024 0752 by Jessica Baum RN  Outcome: Progressing     Problem: Deep Vein Thrombosis  Goal: I will remain free from complications of deep vein thrombosis and maintain current level of mobility  6/15/2024 1711 by Jessica Baum RN  Outcome: Progressing  6/15/2024 0752 by Jessica Baum RN  Outcome: Progressing     Problem: Fall/Injury  Goal: Not fall by end of shift  6/15/2024 1711 by Jessica Baum RN  Outcome: Progressing  6/15/2024 0752 by Jessica Baum RN  Outcome: Progressing  Goal: Be free from injury by end of the shift  6/15/2024 1711 by Jessica Baum RN  Outcome: Progressing  6/15/2024 0752 by Jessica Baum RN  Outcome: Progressing  Goal:  Verbalize understanding of personal risk factors for fall in the hospital  6/15/2024 1711 by Jessica Baum RN  Outcome: Progressing  6/15/2024 0752 by Jessica Baum RN  Outcome: Progressing  Goal: Verbalize understanding of risk factor reduction measures to prevent injury from fall in the home  6/15/2024 1711 by Jessica Baum RN  Outcome: Progressing  6/15/2024 0752 by Jessica Baum RN  Outcome: Progressing  Goal: Use assistive devices by end of the shift  6/15/2024 1711 by Jessica Baum RN  Outcome: Progressing  6/15/2024 0752 by Jessica Baum RN  Outcome: Progressing  Goal: Pace activities to prevent fatigue by end of the shift  6/15/2024 1711 by Jessica Baum RN  Outcome: Progressing  6/15/2024 0752 by Jessica Baum RN  Outcome: Progressing     Problem: Pain  Goal: Takes deep breaths with improved pain control throughout the shift  6/15/2024 1711 by Jessica Baum RN  Outcome: Progressing  6/15/2024 0752 by Jessica Baum RN  Outcome: Progressing  Goal: Turns in bed with improved pain control throughout the shift  6/15/2024 1711 by Jessica Baum RN  Outcome: Progressing  6/15/2024 0752 by Jessica Baum RN  Outcome: Progressing  Goal: Walks with improved pain control throughout the shift  6/15/2024 1711 by Jessica Baum RN  Outcome: Progressing  6/15/2024 0752 by Jessica Baum RN  Outcome: Progressing  Goal: Performs ADL's with improved pain control throughout shift  6/15/2024 1711 by Jessica Baum RN  Outcome: Progressing  6/15/2024 0752 by Jessica Baum RN  Outcome: Progressing  Goal: Participates in PT with improved pain control throughout the shift  6/15/2024 1711 by Jessica Baum RN  Outcome: Progressing  6/15/2024 0752 by Jessica Baum RN  Outcome: Progressing  Goal: Free from opioid side effects throughout the shift  6/15/2024 1711 by Jessica Baum RN  Outcome: Progressing  6/15/2024 0752 by Jessica Baum RN  Outcome: Progressing  Goal: Free from  acute confusion related to pain meds throughout the shift  6/15/2024 1711 by Jessica Baum RN  Outcome: Progressing  6/15/2024 0752 by Jessica Baum RN  Outcome: Progressing   The patient's goals for the shift include      The clinical goals for the shift include Assist with pain management

## 2024-06-15 NOTE — PROGRESS NOTES
"Carrie Rosario is a 83 y.o. female on day 4 of admission presenting with Osteomyelitis (Multi).    Subjective   Patient seen and examined.  No documented concerns/events overnight from nursing.  Patient denies any further hallucinations/panic attacks overnight.       Objective     Physical Exam    Last Recorded Vitals  Blood pressure 141/75, pulse 71, temperature 35.9 °C (96.6 °F), temperature source Temporal, resp. rate 17, height 1.626 m (5' 4\"), weight 53.1 kg (117 lb), SpO2 97%.  Intake/Output last 3 Shifts:  I/O last 3 completed shifts:  In: 425 (8 mL/kg) [P.O.:425]  Out: 700 (13.2 mL/kg) [Urine:700 (0.4 mL/kg/hr)]  Weight: 53.1 kg     Relevant Results    Scheduled medications  acetaminophen, 975 mg, oral, TID  amLODIPine, 2.5 mg, oral, Daily  aspirin, 81 mg, oral, Daily  atorvastatin, 20 mg, oral, Nightly  enoxaparin, 40 mg, subcutaneous, q24h  famotidine, 20 mg, oral, Daily  FLUoxetine, 40 mg, oral, Daily  insulin lispro, 0-5 Units, subcutaneous, TID  lidocaine, 1 patch, transdermal, Daily  metoprolol tartrate, 50 mg, oral, BID      Continuous medications     PRN medications  PRN medications: dextrose, dextrose, glucagon, glucagon, hydrOXYzine pamoate, melatonin, ondansetron ODT **OR** ondansetron                                Assessment/Plan   Principal Problem:    Osteomyelitis (Multi)    Back pain with hx osteomyelitis of lumbar spine  -ID following  -Monitor off ATB  -ortho spine reviewed images and does not feel pt has osteomyelitis  -blood cx pending, no growth  -No leukocytosis, no fever        Hyperlipidemia  -continue statin     HTN  -monitor blood pressure     DM II  -monitor blood glucose  -ISS  -diabetic diet     Plan  Above plan discussed at length with pt and she is in agreement  Discharge to skilled living when arrangements made by case management    Lilia Conner, APRN-CNP      "

## 2024-06-16 VITALS
OXYGEN SATURATION: 96 % | HEIGHT: 64 IN | SYSTOLIC BLOOD PRESSURE: 103 MMHG | HEART RATE: 71 BPM | DIASTOLIC BLOOD PRESSURE: 52 MMHG | BODY MASS INDEX: 19.97 KG/M2 | RESPIRATION RATE: 20 BRPM | WEIGHT: 117 LBS | TEMPERATURE: 97.5 F

## 2024-06-16 LAB
ALBUMIN SERPL-MCNC: 3.1 G/DL (ref 3.5–5)
ALP BLD-CCNC: 100 U/L (ref 35–125)
ALT SERPL-CCNC: 18 U/L (ref 5–40)
ANION GAP SERPL CALC-SCNC: 7 MMOL/L
AST SERPL-CCNC: 48 U/L (ref 5–40)
BACTERIA BLD CULT: NORMAL
BACTERIA BLD CULT: NORMAL
BILIRUB SERPL-MCNC: 0.3 MG/DL (ref 0.1–1.2)
BUN SERPL-MCNC: 26 MG/DL (ref 8–25)
CALCIUM SERPL-MCNC: 9.6 MG/DL (ref 8.5–10.4)
CHLORIDE SERPL-SCNC: 103 MMOL/L (ref 97–107)
CO2 SERPL-SCNC: 26 MMOL/L (ref 24–31)
CREAT SERPL-MCNC: 1.4 MG/DL (ref 0.4–1.6)
EGFRCR SERPLBLD CKD-EPI 2021: 38 ML/MIN/1.73M*2
ERYTHROCYTE [DISTWIDTH] IN BLOOD BY AUTOMATED COUNT: 11.3 % (ref 11.5–14.5)
GLUCOSE BLD MANUAL STRIP-MCNC: 184 MG/DL (ref 74–99)
GLUCOSE BLD MANUAL STRIP-MCNC: 304 MG/DL (ref 74–99)
GLUCOSE SERPL-MCNC: 186 MG/DL (ref 65–99)
HCT VFR BLD AUTO: 41.5 % (ref 36–46)
HGB BLD-MCNC: 12.8 G/DL (ref 12–16)
MCH RBC QN AUTO: 31.4 PG (ref 26–34)
MCHC RBC AUTO-ENTMCNC: 30.8 G/DL (ref 32–36)
MCV RBC AUTO: 102 FL (ref 80–100)
NRBC BLD-RTO: 0 /100 WBCS (ref 0–0)
PLATELET # BLD AUTO: 280 X10*3/UL (ref 150–450)
POTASSIUM SERPL-SCNC: 4.4 MMOL/L (ref 3.4–5.1)
PROT SERPL-MCNC: 6.8 G/DL (ref 5.9–7.9)
RBC # BLD AUTO: 4.08 X10*6/UL (ref 4–5.2)
SODIUM SERPL-SCNC: 136 MMOL/L (ref 133–145)
WBC # BLD AUTO: 7 X10*3/UL (ref 4.4–11.3)

## 2024-06-16 PROCEDURE — 2500000005 HC RX 250 GENERAL PHARMACY W/O HCPCS: Performed by: NURSE PRACTITIONER

## 2024-06-16 PROCEDURE — 80053 COMPREHEN METABOLIC PANEL: CPT | Performed by: NURSE PRACTITIONER

## 2024-06-16 PROCEDURE — 85027 COMPLETE CBC AUTOMATED: CPT | Performed by: NURSE PRACTITIONER

## 2024-06-16 PROCEDURE — 36415 COLL VENOUS BLD VENIPUNCTURE: CPT | Performed by: NURSE PRACTITIONER

## 2024-06-16 PROCEDURE — 82947 ASSAY GLUCOSE BLOOD QUANT: CPT | Mod: 91

## 2024-06-16 PROCEDURE — 2500000001 HC RX 250 WO HCPCS SELF ADMINISTERED DRUGS (ALT 637 FOR MEDICARE OP): Performed by: NURSE PRACTITIONER

## 2024-06-16 ASSESSMENT — COGNITIVE AND FUNCTIONAL STATUS - GENERAL
MOVING FROM LYING ON BACK TO SITTING ON SIDE OF FLAT BED WITH BEDRAILS: A LOT
TURNING FROM BACK TO SIDE WHILE IN FLAT BAD: A LOT
HELP NEEDED FOR BATHING: A LOT
DRESSING REGULAR LOWER BODY CLOTHING: A LOT
DRESSING REGULAR UPPER BODY CLOTHING: A LOT
CLIMB 3 TO 5 STEPS WITH RAILING: TOTAL
MOVING TO AND FROM BED TO CHAIR: TOTAL
STANDING UP FROM CHAIR USING ARMS: TOTAL
DAILY ACTIVITIY SCORE: 16
MOBILITY SCORE: 8
TOILETING: A LOT
WALKING IN HOSPITAL ROOM: TOTAL

## 2024-06-16 ASSESSMENT — PAIN SCALES - GENERAL: PAINLEVEL_OUTOF10: 0 - NO PAIN

## 2024-06-16 NOTE — PROGRESS NOTES
"Carrie Rosario is a 83 y.o. female on day 5 of admission presenting with Osteomyelitis (Multi).    Subjective   Patient seen and examined.  No documented concerns/events overnight from nursing.  Afebrile overnight.  Denies chills.  Patient denies any further hallucinations/panic attacks.  Patient says her pain is controlled.       Objective     Physical Exam  Constitutional:       General: She is not in acute distress.     Appearance: She is not toxic-appearing.   Cardiovascular:      Rate and Rhythm: Normal rate and regular rhythm.      Pulses: Normal pulses.      Heart sounds: Normal heart sounds.   Pulmonary:      Effort: Pulmonary effort is normal.      Breath sounds: Normal breath sounds.   Abdominal:      General: Bowel sounds are normal.      Palpations: Abdomen is soft.   Skin:     General: Skin is warm and dry.   Neurological:      General: No focal deficit present.      Mental Status: She is alert and oriented to person, place, and time.        Last Recorded Vitals  Blood pressure 115/58, pulse 66, temperature 36.5 °C (97.7 °F), temperature source Temporal, resp. rate 18, height 1.626 m (5' 4\"), weight 53.1 kg (117 lb), SpO2 96%.  Intake/Output last 3 Shifts:  I/O last 3 completed shifts:  In: 940 (17.7 mL/kg) [P.O.:940]  Out: 1000 (18.8 mL/kg) [Urine:1000 (0.5 mL/kg/hr)]  Weight: 53.1 kg     Relevant Results                             Assessment/Plan   Principal Problem:    Osteomyelitis (Multi)    Back pain with hx osteomyelitis of lumbar spine  -ID following  -Monitor off ATB  -ortho spine reviewed images and does not feel pt has osteomyelitis  -blood cx pending, no growth  -No leukocytosis, no fever        Hyperlipidemia  -continue statin     HTN  -monitor blood pressure     DM II  -monitor blood glucose  -ISS  -diabetic diet    Hallucinations/Panic Attacks/Anxiety  -vistaril PRN  -seen by psych NP  -avoid narcotic pain meds     Plan  Above plan discussed at length with pt and she is in " agreement  Discharge to assisted living when arrangements made by case management    Lilia Conner, APRN-CNP

## 2024-06-16 NOTE — CARE PLAN
The patient's goals for the shift include  rest     The clinical goals for the shift include pain management, safety, encourage Q2 turns, and promote rest      Problem: Pain  Goal: My pain/discomfort is manageable  Outcome: Progressing     Problem: Safety  Goal: Patient will be injury free during hospitalization  Outcome: Progressing  Goal: I will remain free of falls  Outcome: Progressing     Problem: Daily Care  Goal: Daily care needs are met  Outcome: Progressing     Problem: Psychosocial Needs  Goal: Demonstrates ability to cope with hospitalization/illness  Outcome: Progressing  Goal: Collaborate with me, my family, and caregiver to identify my specific goals  Outcome: Progressing     Problem: Discharge Barriers  Goal: My discharge needs are met  Outcome: Progressing     Problem: Deep Vein Thrombosis  Goal: I will remain free from complications of deep vein thrombosis and maintain current level of mobility  Outcome: Progressing     Problem: Fall/Injury  Goal: Not fall by end of shift  Outcome: Progressing  Goal: Be free from injury by end of the shift  Outcome: Progressing  Goal: Verbalize understanding of personal risk factors for fall in the hospital  Outcome: Progressing  Goal: Verbalize understanding of risk factor reduction measures to prevent injury from fall in the home  Outcome: Progressing  Goal: Use assistive devices by end of the shift  Outcome: Progressing  Goal: Pace activities to prevent fatigue by end of the shift  Outcome: Progressing     Problem: Pain  Goal: Takes deep breaths with improved pain control throughout the shift  Outcome: Progressing  Goal: Turns in bed with improved pain control throughout the shift  Outcome: Progressing  Goal: Walks with improved pain control throughout the shift  Outcome: Progressing  Goal: Performs ADL's with improved pain control throughout shift  Outcome: Progressing  Goal: Participates in PT with improved pain control throughout the shift  Outcome:  Progressing  Goal: Free from opioid side effects throughout the shift  Outcome: Progressing  Goal: Free from acute confusion related to pain meds throughout the shift  Outcome: Progressing     Problem: Skin  Goal: Decreased wound size/increased tissue granulation at next dressing change  Outcome: Progressing  Goal: Participates in plan/prevention/treatment measures  Outcome: Progressing  Goal: Prevent/manage excess moisture  Outcome: Progressing  Goal: Prevent/minimize sheer/friction injuries  Outcome: Progressing  Goal: Promote/optimize nutrition  Outcome: Progressing  Goal: Promote skin healing  Outcome: Progressing

## 2024-06-16 NOTE — PROGRESS NOTES
06/16/24 0901   Discharge Planning   Care Facility Name [facility]     Spoke with nurse at [facility] this morning to confirm patient can return to assisted living in current weakened condition.  Nurse stated the Director of the AL would need to make that decision.  Director to call this [facility] this morning to discuss.  Patient has been accepted by [facility].  [facility] at [facility] if AL accepts her back.      UPDATE:  Confirmed with [facility] that they are able to manage patient in her current condition.  [facility] will start care in 24 - 72 hours. [employer] stretcher transportation arranged for 1500 .

## 2024-06-16 NOTE — CARE PLAN
The patient's goals for the shift include  pain control and rest    The clinical goals for the shift include pain management, safety, encourage Q2 turns, and promote rest      06/15/24 at 9:38 PM - Heike Coats RN

## 2024-06-16 NOTE — NURSING NOTE
Called and spoke with Jesus VILLAGOMEZ nurse [phone] looking for an update. Nurse said she will call her SALVATORE and contact Pattie Hurley with any updates of acceptance from AL.

## 2024-06-17 ENCOUNTER — PATIENT OUTREACH (OUTPATIENT)
Dept: PRIMARY CARE | Facility: CLINIC | Age: 84
End: 2024-06-17
Payer: MEDICARE

## 2024-06-17 ENCOUNTER — DOCUMENTATION (OUTPATIENT)
Dept: PRIMARY CARE | Facility: CLINIC | Age: 84
End: 2024-06-17
Payer: MEDICARE

## 2024-06-17 NOTE — PROGRESS NOTES
Discharge Facility: Located within [facility]     Discharge Diagnosis:    Osteomyelitis       Admission Date: 6/11/2024   Discharge Date:  6/16/2024     PCP Appointment Date: Tasked to office     Specialist Appointment Date:     Home Health referral by Hospital     Hospital Encounter and Summary: Linked     See discharge assessment below for further details     Two attempts were made to reach patient within two business days after discharge. Voicemail left with contact information for patient to call back with any non-emergent questions or concerns.

## 2024-06-20 ENCOUNTER — TELEPHONE (OUTPATIENT)
Dept: PRIMARY CARE | Facility: CLINIC | Age: 84
End: 2024-06-20
Payer: MEDICARE

## 2024-06-20 DIAGNOSIS — S32.010A COMPRESSION FRACTURE OF L1 VERTEBRA, INITIAL ENCOUNTER (MULTI): Primary | ICD-10-CM

## 2024-06-20 DIAGNOSIS — F41.9 ANXIETY: ICD-10-CM

## 2024-06-20 DIAGNOSIS — S32.010A COMPRESSION FRACTURE OF L1 LUMBAR VERTEBRA, CLOSED, INITIAL ENCOUNTER (MULTI): Primary | ICD-10-CM

## 2024-06-20 RX ORDER — LISINOPRIL 5 MG/1
TABLET ORAL
COMMUNITY

## 2024-06-20 RX ORDER — OXYCODONE AND ACETAMINOPHEN 5; 325 MG/1; MG/1
1 TABLET ORAL EVERY 8 HOURS PRN
Qty: 30 TABLET | Refills: 0 | Status: SHIPPED | OUTPATIENT
Start: 2024-06-20

## 2024-06-20 NOTE — TELEPHONE ENCOUNTER
I spoke with Nadja the nurse as well as Carrie's daughter Karoline.  Will call in a prescription for pain medication.

## 2024-06-20 NOTE — TELEPHONE ENCOUNTER
ANDRES FROM [facility] , [phone]. PT IS IN A LOT OF PAIN AND THEY WOULD LIKE SOMETHING CALLED IN FOR THAT .  [facility] . NOT TRAMADOL .  PT IS ON ROBAXIN  500 3X A DAY TILL 6/25. NOT HELPING . PAIN IS 8/10 . PLEASE ADVISE

## 2024-06-20 NOTE — PROGRESS NOTES
Phone call: Received message from Nadja who is a nurse at the assisted living where Mrs. Babcock is staying.  She has had an 8 out of 10 back and hip pain over the past couple weeks.  She was taken to the emergency room on 6/7 and x-rays of her lower back and hip were negative.  She was given tramadol but it caused her to hallucinate so this was stopped.  She was recently given muscle relaxer but this did not help at all.  I spoke with the patient's daughter and after talking with her and the nurse we decided to give a trial of oxycodone/acetaminophen to try and give her some pain relief.  If her pain persist beyond this would consider referral to pain management.

## 2024-06-25 NOTE — DOCUMENTATION CLARIFICATION NOTE
"    PATIENT:               CARRIE BULL  ACCT #:                  [id number]  MRN:                       [id number]  :                       1940  ADMIT DATE:       2024 5:54 PM  DISCH DATE:        2024 3:17 PM  RESPONDING PROVIDER #:        [id number]          PROVIDER RESPONSE TEXT:    Toxic Metabolic Encephalopathy  as a reason for confusion related to Ultram/Tramadol Use    CDI QUERY TEXT:    Clarification        Instruction:    Based on your assessment of the patient and the clinical information, please provide the requested documentation by clicking on the appropriate radio button and enter any additional information if prompted.    Question: Please clarify if a relationship exists      When answering this query, please exercise your independent professional judgment. The fact that a question is being asked, does not imply that any particular answer is desired or expected.    The patient's clinical indicators include:  Clinical Information:  Carrie Bull is a 83 y.o. female presenting with osteomyelitis of the spine.  currently resides at assisted living and was brought to [facility] because she was found on floor and confused complaining of lower back pain    Clinical Indicators:   H/P  \"Osteomyelitis of the spine  Urinary tract infection\"  CRP:  3.4/3.00     Med PN:  \"Osteomyelitis (Multi)\"  \"During hospital course patient was seen by infectious disease and chart was reviewed by orthopedic spine surgeon.  It was felt that patient's did not have osteomyelitis.  She had a urine culture that was negative.  Patient is nontoxic-appearing and no evidence of infection.\"     Psych consult:  \"On chart review, pt has history of osteomyelitis of the lumbar spine. Pt was treated with antibiotics, seen by ortho and infectious disease teams.  Pt's confusion cleared and on  she was scheduled to be discharged back to [facility] for rehab, when she reported feeling like she " "was on the ceiling and was going to fall.  Apparently her son was with her and states this happens when she takes tramadol.  This episode was considered to be a hallucination and psychiatry was consulted.\"      Treatment:  ID Consult; CT; Meropenem IV; Daptomycin IV; CK; CRP; ESR: Ortho Consult, Ultram Held    Risk Factors:  Elderly; DM2; HLD; HTN; CKD; H/O MRSA, Citrobactor  Options provided:  -- Toxic Metabolic Encephalopathy as a reason for confusion related to Ultram/Tramadol Use  -- Other - I will add my own diagnosis  -- Refer to Clinical Documentation Reviewer    Query created by: Angela Narayan on 6/17/2024 10:08 AM      Electronically signed by:  BRUCE MCQUEEN 6/25/2024 11:09 AM          "

## 2024-06-27 ENCOUNTER — APPOINTMENT (OUTPATIENT)
Dept: PRIMARY CARE | Facility: CLINIC | Age: 84
End: 2024-06-27
Payer: MEDICARE

## 2024-07-01 ENCOUNTER — TELEPHONE (OUTPATIENT)
Dept: PRIMARY CARE | Facility: CLINIC | Age: 84
End: 2024-07-01
Payer: MEDICARE

## 2024-07-01 NOTE — TELEPHONE ENCOUNTER
Patients daughter called Karoline [phone] she has been in/ out of the hospital for pain, is being released today and going to skilled nursing tomorrow, would like Dr Youngblood to call in a RX  for pain said spoke to you last week about this

## 2024-07-01 NOTE — TELEPHONE ENCOUNTER
Nadja Hill, from [facility], called because patient's family is saying Murbetriq was discontinued at her last visit with JAAIRAM, but the staff at [facility] has been administering it. They have no documentation that says it was discontinued.  Should she still be taking the Murbetriq?  Oswaldo (Nadja Hill): [phone]

## 2024-07-11 ENCOUNTER — APPOINTMENT (OUTPATIENT)
Dept: PRIMARY CARE | Facility: CLINIC | Age: 84
End: 2024-07-11
Payer: MEDICARE

## 2024-08-06 ENCOUNTER — TELEPHONE (OUTPATIENT)
Dept: PRIMARY CARE | Facility: CLINIC | Age: 84
End: 2024-08-06
Payer: MEDICARE

## 2024-08-06 NOTE — TELEPHONE ENCOUNTER
SALVADOR called from [facility] [phone] is requesting home care services for physical and occupational therapy

## 2024-08-14 DIAGNOSIS — S32.010A COMPRESSION FRACTURE OF L1 LUMBAR VERTEBRA, CLOSED, INITIAL ENCOUNTER (MULTI): ICD-10-CM

## 2024-08-14 RX ORDER — OXYCODONE AND ACETAMINOPHEN 5; 325 MG/1; MG/1
1 TABLET ORAL 2 TIMES DAILY
Qty: 60 TABLET | Refills: 0 | Status: SHIPPED | OUTPATIENT
Start: 2024-08-14

## 2024-08-14 NOTE — TELEPHONE ENCOUNTER
Jane from [facility] called and stated Patient asked for Oxycodone-acetaminophen Medication and the facility doesn't have the medication to give Patient she needs a new prescription. It was asked that a rush be placed on Prescription.

## 2024-08-26 ENCOUNTER — APPOINTMENT (OUTPATIENT)
Dept: PRIMARY CARE | Facility: CLINIC | Age: 84
End: 2024-08-26
Payer: MEDICARE

## 2024-08-26 VITALS
SYSTOLIC BLOOD PRESSURE: 102 MMHG | BODY MASS INDEX: 18.54 KG/M2 | OXYGEN SATURATION: 98 % | WEIGHT: 108 LBS | DIASTOLIC BLOOD PRESSURE: 64 MMHG | HEART RATE: 72 BPM | RESPIRATION RATE: 18 BRPM

## 2024-08-26 DIAGNOSIS — Z79.4 TYPE 2 DIABETES MELLITUS WITH HYPOGLYCEMIA WITHOUT COMA, WITH LONG-TERM CURRENT USE OF INSULIN (MULTI): Primary | ICD-10-CM

## 2024-08-26 DIAGNOSIS — E11.649 TYPE 2 DIABETES MELLITUS WITH HYPOGLYCEMIA WITHOUT COMA, WITH LONG-TERM CURRENT USE OF INSULIN (MULTI): Primary | ICD-10-CM

## 2024-08-26 DIAGNOSIS — E78.00 HYPERCHOLESTEROLEMIA: ICD-10-CM

## 2024-08-26 DIAGNOSIS — S32.010A COMPRESSION FRACTURE OF L1 LUMBAR VERTEBRA, CLOSED, INITIAL ENCOUNTER (MULTI): ICD-10-CM

## 2024-08-26 DIAGNOSIS — I10 HTN (HYPERTENSION), BENIGN: ICD-10-CM

## 2024-08-26 PROCEDURE — 3074F SYST BP LT 130 MM HG: CPT | Performed by: FAMILY MEDICINE

## 2024-08-26 PROCEDURE — 1036F TOBACCO NON-USER: CPT | Performed by: FAMILY MEDICINE

## 2024-08-26 PROCEDURE — 99214 OFFICE O/P EST MOD 30 MIN: CPT | Performed by: FAMILY MEDICINE

## 2024-08-26 PROCEDURE — 3078F DIAST BP <80 MM HG: CPT | Performed by: FAMILY MEDICINE

## 2024-08-26 PROCEDURE — 1126F AMNT PAIN NOTED NONE PRSNT: CPT | Performed by: FAMILY MEDICINE

## 2024-08-26 PROCEDURE — 1157F ADVNC CARE PLAN IN RCRD: CPT | Performed by: FAMILY MEDICINE

## 2024-08-26 PROCEDURE — 1159F MED LIST DOCD IN RCRD: CPT | Performed by: FAMILY MEDICINE

## 2024-08-26 RX ORDER — DIMETHICONE/COLLOIDAL OATMEAL 1.25 %
LOTION (ML) TOPICAL
COMMUNITY
Start: 2024-08-07

## 2024-08-26 RX ORDER — ZINC OXIDE/COD LIVER OIL 40 %
PASTE (GRAM) TOPICAL
COMMUNITY
Start: 2024-08-07

## 2024-08-26 RX ORDER — SODIUM CHLORIDE 0.9 G/100ML
IRRIGANT IRRIGATION
COMMUNITY
Start: 2024-08-07

## 2024-08-26 RX ORDER — POVIDONE-IODINE 10 MG/ML
SOLUTION TOPICAL
COMMUNITY
Start: 2024-08-07

## 2024-08-26 ASSESSMENT — PAIN SCALES - GENERAL: PAINLEVEL: 0-NO PAIN

## 2024-08-26 ASSESSMENT — PATIENT HEALTH QUESTIONNAIRE - PHQ9
1. LITTLE INTEREST OR PLEASURE IN DOING THINGS: NOT AT ALL
SUM OF ALL RESPONSES TO PHQ9 QUESTIONS 1 AND 2: 0
2. FEELING DOWN, DEPRESSED OR HOPELESS: NOT AT ALL

## 2024-08-26 ASSESSMENT — ENCOUNTER SYMPTOMS
OCCASIONAL FEELINGS OF UNSTEADINESS: 1
LOSS OF SENSATION IN FEET: 0
DEPRESSION: 0

## 2024-08-26 NOTE — ASSESSMENT & PLAN NOTE
Will contact her assisted living by [facility] and talk to the medical director to see if we can adjust her insulin.  Also will arrange for blood work to get A1c, BMP and lipid panel.

## 2024-08-26 NOTE — PROGRESS NOTES
Subjective   Patient ID: Carrie Rosario is a 83 y.o. female who presents for Follow-up (Patient is here for a hospital follow up for hip pain ).    HPI   1. She is here for follow-up of type 2 diabetes.    She is on Tresiba units and rapid acting insulin in the form of lispro on a sliding scale throughout the day.  She states that her sugars been running a little on the low side in the morning and sometimes she needs some apple juice to correct the low sugar.     2. CRARIE is seen today also for follow up of High cholesterol.  She is on   Atorvastatin     3. CARRIE is here also for follow up of benign essential hypertension.   she is followed by Dr. Gannon.      4. CARRIE is seen today also for follow up of depression.   She is on fluoxetine.      5.   She is also here for follow-up of atherosclerotic cardiovascular disease and slight congestive heart failure.  She is being followed by Dr. Gannon.     6. She is also here for follow-up of back pain from vertebral fracture in 1/24.  She has been in assisted living and does not get physical therapy.  She also has chronic hip pain.  She has been getting regular scheduled pain medication which seems to have controlled the pain over the past several months.  Review of Systems  Denies headache, blurred vision, chest pain, shortness of breath, nausea or vomiting, change in bowel habits or leg pain or swelling.    Objective   /64 (BP Location: Right arm, Patient Position: Sitting, BP Cuff Size: Adult)   Pulse 72   Resp 18   Wt 49 kg (108 lb)   SpO2 98%   BMI 18.54 kg/m²     Physical Exam  Vitals and nurse's notes reviewed  General: no acute distress. She is in a wheelchair accompanied by her daughter.  HEENT: Normal  Neck: Supple  Lungs: Clear  Cardio: RRR w/o murmur  Extremities: No edema, no calf tenderness  Neuro: Alert and oriented with no focal deficits    Assessment/Plan   Problem List Items Addressed This Visit             ICD-10-CM       High     Hypercholesterolemia E78.00     Will get fasting lipid panel and notify results.  If stable continue atorvastatin and recheck in 6 months at [facility].         Type 2 diabetes mellitus (Multi) - Primary E11.9     Will contact her assisted living by [facility] and talk to the medical director to see if we can adjust her insulin.  Also will arrange for blood work to get A1c, BMP and lipid panel.         HTN (hypertension), benign I10     Continue amlodipine and following Dr. Gannon.         Compression fracture of L1 lumbar vertebra, closed, initial encounter (Multi) S32.010A     Continue intermittent oxycodone for pain relief.

## 2024-08-26 NOTE — ASSESSMENT & PLAN NOTE
Will get fasting lipid panel and notify results.  If stable continue atorvastatin and recheck in 6 months at CPE.   Admission

## 2024-08-28 ENCOUNTER — DOCUMENTATION (OUTPATIENT)
Dept: PRIMARY CARE | Facility: CLINIC | Age: 84
End: 2024-08-28
Payer: MEDICARE

## 2024-08-28 NOTE — PROGRESS NOTES
I spoke to Nadja at [facility] and had her decrease her Tresiba from 20 units to 18 units and ordered a BMP, A1c and lipid panel.

## 2024-11-25 ENCOUNTER — OFFICE VISIT (OUTPATIENT)
Dept: CARDIOLOGY | Facility: CLINIC | Age: 84
End: 2024-11-25
Payer: MEDICARE

## 2024-11-25 VITALS — BODY MASS INDEX: 19.57 KG/M2 | WEIGHT: 114 LBS | SYSTOLIC BLOOD PRESSURE: 100 MMHG | DIASTOLIC BLOOD PRESSURE: 60 MMHG

## 2024-11-25 DIAGNOSIS — I10 HTN (HYPERTENSION), BENIGN: ICD-10-CM

## 2024-11-25 PROCEDURE — 1159F MED LIST DOCD IN RCRD: CPT | Performed by: INTERNAL MEDICINE

## 2024-11-25 PROCEDURE — 1126F AMNT PAIN NOTED NONE PRSNT: CPT | Performed by: INTERNAL MEDICINE

## 2024-11-25 PROCEDURE — 3078F DIAST BP <80 MM HG: CPT | Performed by: INTERNAL MEDICINE

## 2024-11-25 PROCEDURE — 99214 OFFICE O/P EST MOD 30 MIN: CPT | Performed by: INTERNAL MEDICINE

## 2024-11-25 PROCEDURE — 1036F TOBACCO NON-USER: CPT | Performed by: INTERNAL MEDICINE

## 2024-11-25 PROCEDURE — 3074F SYST BP LT 130 MM HG: CPT | Performed by: INTERNAL MEDICINE

## 2024-11-25 PROCEDURE — 99417 PROLNG OP E/M EACH 15 MIN: CPT | Performed by: INTERNAL MEDICINE

## 2024-11-25 PROCEDURE — 1157F ADVNC CARE PLAN IN RCRD: CPT | Performed by: INTERNAL MEDICINE

## 2024-11-25 RX ORDER — METOPROLOL TARTRATE 25 MG/1
25 TABLET, FILM COATED ORAL 2 TIMES DAILY
Qty: 180 TABLET | Refills: 3 | Status: SHIPPED | OUTPATIENT
Start: 2024-11-25 | End: 2025-11-25

## 2024-11-25 ASSESSMENT — PAIN SCALES - GENERAL: PAINLEVEL_OUTOF10: 0-NO PAIN

## 2024-11-25 ASSESSMENT — ENCOUNTER SYMPTOMS
LOSS OF SENSATION IN FEET: 0
OCCASIONAL FEELINGS OF UNSTEADINESS: 1
DEPRESSION: 0

## 2024-11-25 ASSESSMENT — PATIENT HEALTH QUESTIONNAIRE - PHQ9
2. FEELING DOWN, DEPRESSED OR HOPELESS: NOT AT ALL
SUM OF ALL RESPONSES TO PHQ9 QUESTIONS 1 AND 2: 0
1. LITTLE INTEREST OR PLEASURE IN DOING THINGS: NOT AT ALL

## 2024-11-25 NOTE — ASSESSMENT & PLAN NOTE
Guideline directed medical therapy with good management on low-dose metoprolol, lisinopril, Farxiga and furosemide

## 2024-11-25 NOTE — PROGRESS NOTES
Subjective      Chief Complaint   Patient presents with    6 month f/u          Here to reassess her known coronary artery disease and she remains angina free with no signs of volume overload or congestive heart failure.  She remains on moderate dose statin therapy and low-dose aspirin therapy.     Previous: Successful right coronary artery drug-eluting stent deployment December 2021 in the setting where she had ventricular fibrillation arrest with recurrent defibrillation shocks in the setting of an inferior wall myocardial infarction cardiac catheterization showed mild left main disease mild LAD disease and mild circumflex disease with total occlusion of the proximal right coronary stented with a 3/20 mm drug-eluting stent proximally and she had clot migration distally at that time.  Left ventricular ejection fraction 55% on cardiac catheterization and subsequent echocardiogram showed normalization of LV function with left ventricular action fraction 55 to 59% and she had modest pulmonary hypertension with PA pressure 49 mmHg.  She is on modest dose statin therapy because of her older age group.              Review of Systems   All other systems reviewed and are negative.       Objective   Physical Exam  Constitutional:       Appearance: Normal appearance.   HENT:      Head: Normocephalic and atraumatic.   Eyes:      Pupils: Pupils are equal, round, and reactive to light.   Cardiovascular:      Rate and Rhythm: Normal rate and regular rhythm.      Pulses: Normal pulses.      Heart sounds: Normal heart sounds.   Pulmonary:      Effort: Pulmonary effort is normal.      Breath sounds: Normal breath sounds.   Abdominal:      General: Abdomen is flat. Bowel sounds are normal.      Palpations: Abdomen is soft.   Musculoskeletal:         General: Normal range of motion.      Cervical back: Normal range of motion.   Skin:     General: Skin is warm and dry.   Neurological:      General: No focal deficit present.    Psychiatric:         Mood and Affect: Mood normal.         Judgment: Judgment normal.          Lab Review:   Not applicable    Coronary arteriosclerosis  History of right coronary artery stenting in the setting of ventricular tachycardia, with normalization of LV systolic function    Congestive heart failure (Multi)  Guideline directed medical therapy with good management on low-dose metoprolol, lisinopril, Farxiga and furosemide

## 2024-11-25 NOTE — ASSESSMENT & PLAN NOTE
History of right coronary artery stenting in the setting of ventricular tachycardia, with normalization of LV systolic function

## 2024-12-17 ENCOUNTER — TELEPHONE (OUTPATIENT)
Dept: PRIMARY CARE | Facility: CLINIC | Age: 84
End: 2024-12-17
Payer: MEDICARE

## 2024-12-17 DIAGNOSIS — S32.010A COMPRESSION FRACTURE OF L1 LUMBAR VERTEBRA, CLOSED, INITIAL ENCOUNTER (MULTI): ICD-10-CM

## 2024-12-30 RX ORDER — OXYCODONE AND ACETAMINOPHEN 5; 325 MG/1; MG/1
1 TABLET ORAL 2 TIMES DAILY
Qty: 60 TABLET | Refills: 0 | Status: SHIPPED | OUTPATIENT
Start: 2024-12-30

## 2025-02-12 ENCOUNTER — TELEPHONE (OUTPATIENT)
Dept: PRIMARY CARE | Facility: CLINIC | Age: 85
End: 2025-02-12
Payer: MEDICARE

## 2025-02-12 DIAGNOSIS — S32.010A COMPRESSION FRACTURE OF L1 LUMBAR VERTEBRA, CLOSED, INITIAL ENCOUNTER (MULTI): ICD-10-CM

## 2025-02-12 RX ORDER — OXYCODONE AND ACETAMINOPHEN 5; 325 MG/1; MG/1
1 TABLET ORAL 2 TIMES DAILY
Qty: 60 TABLET | Refills: 0 | Status: SHIPPED | OUTPATIENT
Start: 2025-02-12

## 2025-02-12 NOTE — TELEPHONE ENCOUNTER
Swati from [facility] in [place] calling [phone] to get a refill of Oxycodone 5-325. She takes 1 tab twice daily and has an order for PRN as needed for back pain. This needs sent to [facility] [phone] or fax # [phone].

## 2025-02-13 ENCOUNTER — APPOINTMENT (OUTPATIENT)
Dept: PRIMARY CARE | Facility: CLINIC | Age: 85
End: 2025-02-13
Payer: MEDICARE

## 2025-02-13 ENCOUNTER — TELEPHONE (OUTPATIENT)
Dept: PRIMARY CARE | Facility: CLINIC | Age: 85
End: 2025-02-13

## 2025-02-13 VITALS
DIASTOLIC BLOOD PRESSURE: 70 MMHG | HEART RATE: 68 BPM | WEIGHT: 117 LBS | OXYGEN SATURATION: 99 % | HEIGHT: 62 IN | BODY MASS INDEX: 21.53 KG/M2 | RESPIRATION RATE: 16 BRPM | SYSTOLIC BLOOD PRESSURE: 98 MMHG

## 2025-02-13 DIAGNOSIS — E78.00 HYPERCHOLESTEROLEMIA: Primary | ICD-10-CM

## 2025-02-13 DIAGNOSIS — I10 HTN (HYPERTENSION), BENIGN: ICD-10-CM

## 2025-02-13 DIAGNOSIS — E11.649 TYPE 2 DIABETES MELLITUS WITH HYPOGLYCEMIA WITHOUT COMA, WITH LONG-TERM CURRENT USE OF INSULIN: ICD-10-CM

## 2025-02-13 DIAGNOSIS — E78.00 HYPERCHOLESTEROLEMIA: ICD-10-CM

## 2025-02-13 DIAGNOSIS — Z79.4 TYPE 2 DIABETES MELLITUS WITH HYPOGLYCEMIA WITHOUT COMA, WITH LONG-TERM CURRENT USE OF INSULIN: ICD-10-CM

## 2025-02-13 DIAGNOSIS — Z00.00 WELL ADULT EXAM: ICD-10-CM

## 2025-02-13 DIAGNOSIS — S32.010A COMPRESSION FRACTURE OF L1 LUMBAR VERTEBRA, CLOSED, INITIAL ENCOUNTER (MULTI): ICD-10-CM

## 2025-02-13 PROBLEM — R40.1 CLOUDED CONSCIOUSNESS: Status: ACTIVE | Noted: 2025-02-13

## 2025-02-13 PROBLEM — M21.371 FOOT DROP, RIGHT FOOT: Status: ACTIVE | Noted: 2023-10-16

## 2025-02-13 PROBLEM — R07.89 CHEST DISCOMFORT: Status: ACTIVE | Noted: 2023-02-06

## 2025-02-13 PROBLEM — I13.10 HYPERTENSIVE HEART AND KIDNEY DISEASE: Status: ACTIVE | Noted: 2022-09-19

## 2025-02-13 PROBLEM — Z98.49 CATARACT EXTRACTION STATUS, UNSPECIFIED EYE: Status: ACTIVE | Noted: 2023-10-16

## 2025-02-13 PROBLEM — E66.9 OBESITY WITH BODY MASS INDEX 30 OR GREATER: Status: ACTIVE | Noted: 2021-02-22

## 2025-02-13 PROBLEM — M54.50 LUMBAR BACK PAIN: Status: ACTIVE | Noted: 2025-02-13

## 2025-02-13 PROBLEM — T38.3X1A: Status: ACTIVE | Noted: 2024-04-09

## 2025-02-13 PROBLEM — K59.03 DRUG-INDUCED CONSTIPATION: Status: ACTIVE | Noted: 2025-02-13

## 2025-02-13 PROBLEM — R57.0 CARDIOGENIC SHOCK (MULTI): Status: ACTIVE | Noted: 2025-02-13

## 2025-02-13 PROBLEM — Z20.822 CONTACT WITH AND (SUSPECTED) EXPOSURE TO COVID-19: Status: ACTIVE | Noted: 2023-02-06

## 2025-02-13 PROBLEM — T14.8XXA LOCAL INFECTION OF WOUND: Status: ACTIVE | Noted: 2025-02-13

## 2025-02-13 PROBLEM — J96.00 ACUTE RESPIRATORY FAILURE: Status: ACTIVE | Noted: 2025-02-13

## 2025-02-13 PROBLEM — L08.9 LOCAL INFECTION OF WOUND: Status: ACTIVE | Noted: 2025-02-13

## 2025-02-13 PROBLEM — R53.1 WEAKNESS: Status: ACTIVE | Noted: 2023-10-16

## 2025-02-13 PROBLEM — L03.115 CELLULITIS OF RIGHT LOWER LIMB: Status: ACTIVE | Noted: 2023-10-16

## 2025-02-13 PROBLEM — Z95.5 PRESENCE OF CORONARY ANGIOPLASTY IMPLANT AND GRAFT: Status: ACTIVE | Noted: 2023-08-24

## 2025-02-13 PROBLEM — N89.8 PRURITUS OF VAGINA: Status: ACTIVE | Noted: 2023-03-16

## 2025-02-13 PROBLEM — L89.309 PRESSURE INJURY OF SKIN OF BUTTOCK: Status: ACTIVE | Noted: 2023-02-06

## 2025-02-13 PROBLEM — F32.A DEPRESSION, UNSPECIFIED: Status: ACTIVE | Noted: 2023-10-19

## 2025-02-13 PROBLEM — Z86.73 PERSONAL HISTORY OF TRANSIENT ISCHEMIC ATTACK (TIA), AND CEREBRAL INFARCTION WITHOUT RESIDUAL DEFICITS: Status: ACTIVE | Noted: 2023-10-16

## 2025-02-13 PROBLEM — F41.9 ANXIETY DISORDER: Status: ACTIVE | Noted: 2021-02-22

## 2025-02-13 PROBLEM — Z11.52 ENCOUNTER FOR SCREENING FOR COVID-19: Status: ACTIVE | Noted: 2022-09-16

## 2025-02-13 PROBLEM — E11.621 DIABETIC FOOT ULCER (MULTI): Status: ACTIVE | Noted: 2022-09-19

## 2025-02-13 PROBLEM — M86.679 CHRONIC OSTEOMYELITIS INVOLVING ANKLE AND FOOT (MULTI): Status: ACTIVE | Noted: 2025-02-13

## 2025-02-13 PROBLEM — K30 FUNCTIONAL DYSPEPSIA: Status: ACTIVE | Noted: 2023-02-06

## 2025-02-13 PROBLEM — L97.509 DIABETIC FOOT ULCER (MULTI): Status: ACTIVE | Noted: 2022-09-19

## 2025-02-13 PROBLEM — M19.90 UNSPECIFIED OSTEOARTHRITIS, UNSPECIFIED SITE: Status: ACTIVE | Noted: 2023-10-16

## 2025-02-13 PROBLEM — R82.81 PYURIA: Status: ACTIVE | Noted: 2025-02-13

## 2025-02-13 PROBLEM — N39.0 URINARY TRACT INFECTION, SITE NOT SPECIFIED: Status: ACTIVE | Noted: 2023-10-16

## 2025-02-13 PROBLEM — G93.1: Status: ACTIVE | Noted: 2025-02-13

## 2025-02-13 PROCEDURE — 1159F MED LIST DOCD IN RCRD: CPT | Performed by: FAMILY MEDICINE

## 2025-02-13 PROCEDURE — 1170F FXNL STATUS ASSESSED: CPT | Performed by: FAMILY MEDICINE

## 2025-02-13 PROCEDURE — 1157F ADVNC CARE PLAN IN RCRD: CPT | Performed by: FAMILY MEDICINE

## 2025-02-13 PROCEDURE — 99212 OFFICE O/P EST SF 10 MIN: CPT | Performed by: FAMILY MEDICINE

## 2025-02-13 PROCEDURE — 1123F ACP DISCUSS/DSCN MKR DOCD: CPT | Performed by: FAMILY MEDICINE

## 2025-02-13 PROCEDURE — 3078F DIAST BP <80 MM HG: CPT | Performed by: FAMILY MEDICINE

## 2025-02-13 PROCEDURE — 1126F AMNT PAIN NOTED NONE PRSNT: CPT | Performed by: FAMILY MEDICINE

## 2025-02-13 PROCEDURE — 1036F TOBACCO NON-USER: CPT | Performed by: FAMILY MEDICINE

## 2025-02-13 PROCEDURE — G0439 PPPS, SUBSEQ VISIT: HCPCS | Performed by: FAMILY MEDICINE

## 2025-02-13 PROCEDURE — 3074F SYST BP LT 130 MM HG: CPT | Performed by: FAMILY MEDICINE

## 2025-02-13 PROCEDURE — 1158F ADVNC CARE PLAN TLK DOCD: CPT | Performed by: FAMILY MEDICINE

## 2025-02-13 ASSESSMENT — ACTIVITIES OF DAILY LIVING (ADL)
DOING_HOUSEWORK: NEEDS ASSISTANCE
MANAGING_FINANCES: NEEDS ASSISTANCE
BATHING: INDEPENDENT
DRESSING: INDEPENDENT
TAKING_MEDICATION: NEEDS ASSISTANCE
GROCERY_SHOPPING: NEEDS ASSISTANCE

## 2025-02-13 ASSESSMENT — ENCOUNTER SYMPTOMS
OCCASIONAL FEELINGS OF UNSTEADINESS: 1
DEPRESSION: 0
LOSS OF SENSATION IN FEET: 1

## 2025-02-13 ASSESSMENT — PAIN SCALES - GENERAL: PAINLEVEL_OUTOF10: 0-NO PAIN

## 2025-02-13 NOTE — PROGRESS NOTES
Subjective   Reason for Visit: Carrie Rosario is an 84 y.o. female here for a Medicare Wellness visit.     Past Medical, Surgical, and Family History reviewed and updated in chart.    Reviewed all medications by prescribing practitioner or clinical pharmacist (such as prescriptions, OTCs, herbal therapies and supplements) and documented in the medical record.    HPI    Patient Care Team:  Brian Youngblood MD as PCP - General (Family Medicine)  Brian Youngblood MD as Primary Care Provider  Marilu Borjas [facility] as Pharmacist (Pharmacy)   She is here for annual physical.  She is currently resides in a long-term care facility [facility].     2. She is here for follow-up of type 2 diabetes.    She is on Tresiba units and rapid acting insulin in the form of lispro on a sliding scale throughout the day.  Her sugars have been running low in the morning and she regulating his orange juice to get the back up.  I changed her long-term insulin from 18 to 15 units a few days ago but her daughter states that they have made that change.  Recent A1c is 7.2.     3. CARRIE is seen today also for follow up of High cholesterol.  She is on   Atorvastatin.  Daughter brought in recent blood work and show of the cholesterol to be stable.  She is going to make a copy of the report and fax it here with the next few days so I can put into her chart.     4. CARRIE is here also for follow up of benign essential hypertension.   she is followed by Dr. Gannon.      5. CARRIE is seen today also for follow up of depression.   She is on fluoxetine.      6.   She is also here for follow-up of atherosclerotic cardiovascular disease and slight congestive heart failure.  She is being followed by Dr. Gannon.     7. She is also here for follow-up of back pain from vertebral fracture in 1/24.  She has been in assisted living and does not get physical therapy.  She also has chronic hip pain.  She has been getting regular scheduled pain medication which seems to  "have controlled the pain.    Review of Systems  Denies headache, blurred vision, chest pain, shortness of breath, nausea or vomiting, change in bowel habits or leg pain or swelling.    Objective   Vitals:  BP 98/70 (BP Location: Right arm, Patient Position: Sitting, BP Cuff Size: Adult)   Pulse 68   Resp 16   Ht 1.575 m (5' 2\")   Wt 53.1 kg (117 lb)   SpO2 99%   BMI 21.40 kg/m²       Physical Exam  Vitals and nurse's notes reviewed  General: no acute distress.  She is in wheelchair and has a hard time getting up out of the chair thus her exam was done in the sitting position.  HEENT: Normal  Neck: Supple  Lungs: Clear  Cardio: RRR w/o murmur  Abdomen: Soft, nontender, no hepatosplenomegaly  Extremities: No edema, no calf tenderness  Neuro: Alert and oriented with no focal deficits    Assessment & Plan  Hypercholesterolemia  Continue atorvastatin.  Recheck in 6 months.         Type 2 diabetes mellitus with hypoglycemia without coma, with long-term current use of insulin  Will speak to the nurses at [facility] to make sure that the Farxiga has been stopped due to expense and that her Tresiba has been cut back appropriately to avoid hypoglycemic episodes in the morning.    Addendum: I spoke with Nadja Hill from [facility] at [phone] and confirmed the order to cut back the Tresiba from 15 to 10 units nightly.  Will also stop the Farxiga which the daughter and patient thought was stopped a long time ago due to cost.  Will continue monitoring sugars.  Daughter is going to drop off her Dexcom scan to be scanned so we can review sugars at that time.  Also daughter is going to fax me a copy of the lab work that was done.         HTN (hypertension), benign  Continue current medication and following with her cardiologist.  She is scheduled to see him the next few weeks.         Compression fracture of L1 lumbar vertebra, closed, initial encounter (Multi)  Continue pain medication which she has been controlling her " symptoms.         Well adult exam  Anticipatory guidance given.

## 2025-02-13 NOTE — ASSESSMENT & PLAN NOTE
Will speak to the nurses at [facility] to make sure that the Farxiga has been stopped due to expense and that her Tresiba has been cut back appropriately to avoid hypoglycemic episodes in the morning.    Addendum: I spoke with Nadja Hill from [facility] at [phone] and confirmed the order to cut back the Tresiba from 15 to 10 units nightly.  Will also stop the Farxiga which the daughter and patient thought was stopped a long time ago due to cost.  Will continue monitoring sugars.  Daughter is going to drop off her Dexcom scan to be scanned so we can review sugars at that time.  Also daughter is going to fax me a copy of the lab work that was done.

## 2025-02-13 NOTE — ASSESSMENT & PLAN NOTE
Continue current medication and following with her cardiologist.  She is scheduled to see him the next few weeks.

## 2025-02-25 DIAGNOSIS — Z79.4 TYPE 2 DIABETES MELLITUS WITH HYPOGLYCEMIA WITHOUT COMA, WITH LONG-TERM CURRENT USE OF INSULIN: ICD-10-CM

## 2025-02-25 DIAGNOSIS — E11.649 TYPE 2 DIABETES MELLITUS WITH HYPOGLYCEMIA WITHOUT COMA, WITH LONG-TERM CURRENT USE OF INSULIN: ICD-10-CM

## 2025-02-25 RX ORDER — DEXTROSE 4 G
TABLET,CHEWABLE ORAL
Qty: 1 EACH | Refills: 1 | Status: SHIPPED | OUTPATIENT
Start: 2025-02-25

## 2025-02-25 RX ORDER — LANCETS 28 GAUGE
EACH MISCELLANEOUS
Qty: 100 EACH | Refills: 12 | Status: SHIPPED | OUTPATIENT
Start: 2025-02-25 | End: 2026-02-25

## 2025-02-25 RX ORDER — PEN NEEDLE, DIABETIC, SAFETY 30 GX3/16"
1 NEEDLE, DISPOSABLE MISCELLANEOUS 3 TIMES DAILY
Qty: 200 EACH | Refills: 3 | Status: SHIPPED | OUTPATIENT
Start: 2025-02-25

## 2025-02-25 RX ORDER — IBUPROFEN 200 MG
1 CAPSULE ORAL AS NEEDED
Qty: 200 STRIP | Refills: 3 | Status: SHIPPED | OUTPATIENT
Start: 2025-02-25

## 2025-03-27 ENCOUNTER — TELEPHONE (OUTPATIENT)
Dept: PRIMARY CARE | Facility: CLINIC | Age: 85
End: 2025-03-27
Payer: MEDICARE

## 2025-03-27 NOTE — TELEPHONE ENCOUNTER
I spoke to Swati regarding the patient.  It seems that her low sugar readings are usually in the afternoon after lunch.  I told them to continue the Tresiba 10 units at night use a sliding scale for breakfast in the morning and for dinner and evening but no sliding scale for lunch.  They also are to check any low readings 70 or below with a glucometer to confirm that the CGM is accurate.  She is to let me know how the sugar readings are after about 2 weeks but if she still having any problems she is to call back sooner.

## 2025-03-27 NOTE — TELEPHONE ENCOUNTER
Swati from [facility] called as Patients blood sugar has been running low as night. At 6:49 pm yesterday patient's blood sugar was 102, at 9:59 am today it was 324 after she ate breakfast and was given 9 units of Humalog, at 2:38 pm patient's daughter informed then her monitor said blood sugar was 48 and patient was given 2 pop tarts and juice. Swati checked it again about 5-6 minutes after and it was 54. Patient also gets 10 units of Tresiba once daily at bedtime, which is between 8 pm and 10 pm. She was thinking that one of the insulins should be lowered but she was unsure. Patient feels fine. Please Advise, as Swati would like to know what to do before patient's bedtime, she can fax over blood sugar log if needed.     She stated the Humalog is on a scale:  0-199: 0 units  200-250: 3 units  251-300: 6 units  301-350: 9 units  351-400: 12  401 or more: Call Office    Contact: [phone]- Swati is there until 9

## 2025-03-30 ENCOUNTER — TELEPHONE (OUTPATIENT)
Dept: PRIMARY CARE | Facility: CLINIC | Age: 85
End: 2025-03-30
Payer: MEDICARE

## 2025-03-30 NOTE — TELEPHONE ENCOUNTER
On-call note: Smooth from [facility] called saying that her afternoon sugar was 450.  She states it was in the mid 100s this morning and yesterday sugars were all in the mid 100s.  She gets 10 units of long-acting insulin day and has insulin coverage sliding scale for breakfast and dinner but recently her sliding scale for lunch was stopped because she had Been having low afternoon sugar readings.  She states she did not eat any lunch.  Will give her a one-time dose of 8 units of Humalog right now.  Continue monitoring sugar and call back if sugars do not fall or she has any more high readings.

## 2025-04-01 ENCOUNTER — TELEPHONE (OUTPATIENT)
Dept: PRIMARY CARE | Facility: CLINIC | Age: 85
End: 2025-04-01
Payer: MEDICARE

## 2025-04-01 DIAGNOSIS — S32.010A COMPRESSION FRACTURE OF L1 LUMBAR VERTEBRA, CLOSED, INITIAL ENCOUNTER (MULTI): ICD-10-CM

## 2025-04-01 RX ORDER — OXYCODONE AND ACETAMINOPHEN 5; 325 MG/1; MG/1
1 TABLET ORAL 2 TIMES DAILY
Qty: 60 TABLET | Refills: 0 | Status: SHIPPED | OUTPATIENT
Start: 2025-04-01

## 2025-04-01 NOTE — TELEPHONE ENCOUNTER
Swati from [facility] pharmacy called to get refill of Oxycodone 5-325. She takes 1 tablet twice a day for a compression fracture (DX S32.010D).  She is running low.   Last seen on 2-13-25 and upcoming appointment on 8-14-25.

## 2025-04-28 ENCOUNTER — TELEPHONE (OUTPATIENT)
Dept: PRIMARY CARE | Facility: CLINIC | Age: 85
End: 2025-04-28
Payer: MEDICARE

## 2025-04-28 NOTE — TELEPHONE ENCOUNTER
Smooth/ [facility] called answering service 4-26 at 422 pm pts blood sugar is 415 and sliding skills goes to 400 and 12 units only wants to know how to precede  Per Dr Rios turner with syrup and cereal for breakfast didn't eat lunch not hungry for dinner give 12 units short acting insulin now check sugar before bed give normal long acting dose call if sugar above (rest of message cut off)

## 2025-05-13 ENCOUNTER — TELEPHONE (OUTPATIENT)
Dept: PRIMARY CARE | Facility: CLINIC | Age: 85
End: 2025-05-13
Payer: MEDICARE

## 2025-05-13 DIAGNOSIS — S32.010A COMPRESSION FRACTURE OF L1 LUMBAR VERTEBRA, CLOSED, INITIAL ENCOUNTER (MULTI): ICD-10-CM

## 2025-05-13 RX ORDER — OXYCODONE AND ACETAMINOPHEN 5; 325 MG/1; MG/1
1 TABLET ORAL 2 TIMES DAILY
Qty: 60 TABLET | Refills: 0 | Status: SHIPPED | OUTPATIENT
Start: 2025-05-13

## 2025-05-13 NOTE — TELEPHONE ENCOUNTER
Swati nurse from [facility] called.  Another patient there with a similar name  and when they disposed of her medication they accidentally disposed of Carrie's oxy 5-325.  She had 16 left but now has none.  Could you please send refill to [facility].  [facility] phone is [phone] if any questions.

## 2025-05-20 ENCOUNTER — TELEPHONE (OUTPATIENT)
Dept: PRIMARY CARE | Facility: CLINIC | Age: 85
End: 2025-05-20
Payer: MEDICARE

## 2025-05-20 DIAGNOSIS — S32.010A COMPRESSION FRACTURE OF L1 LUMBAR VERTEBRA, CLOSED, INITIAL ENCOUNTER (MULTI): ICD-10-CM

## 2025-05-20 RX ORDER — OXYCODONE AND ACETAMINOPHEN 5; 325 MG/1; MG/1
1 TABLET ORAL 2 TIMES DAILY
Qty: 60 TABLET | Refills: 0 | OUTPATIENT
Start: 2025-05-20

## 2025-05-20 NOTE — TELEPHONE ENCOUNTER
Rx Refill Request Telephone Encounter    Name:  Carrie Rosario  :  [id number]  Medication Name:   Oxycodone-APAP 5-325 mg            Specific Pharmacy location:   [facility] ([place], [place])[phone]   Date of last appointment:   25  Date of next appointment:  25  Best number to reach patient:

## 2025-05-22 ENCOUNTER — TELEPHONE (OUTPATIENT)
Dept: PRIMARY CARE | Facility: CLINIC | Age: 85
End: 2025-05-22
Payer: MEDICARE

## 2025-05-22 DIAGNOSIS — E11.649 TYPE 2 DIABETES MELLITUS WITH HYPOGLYCEMIA WITHOUT COMA, WITH LONG-TERM CURRENT USE OF INSULIN: ICD-10-CM

## 2025-05-22 DIAGNOSIS — Z79.4 TYPE 2 DIABETES MELLITUS WITH HYPOGLYCEMIA WITHOUT COMA, WITH LONG-TERM CURRENT USE OF INSULIN: ICD-10-CM

## 2025-05-22 NOTE — TELEPHONE ENCOUNTER
[facility] calling humalog  is not covered  by insurance they prefer   novalog. Can this be changed? [phone]

## 2025-05-23 RX ORDER — INSULIN ASPART 100 [IU]/ML
0-15 INJECTION, SOLUTION INTRAVENOUS; SUBCUTANEOUS
Qty: 10 ML | Refills: 12 | Status: SHIPPED | OUTPATIENT
Start: 2025-05-23 | End: 2026-05-23

## 2025-05-27 ENCOUNTER — TELEPHONE (OUTPATIENT)
Dept: PRIMARY CARE | Facility: CLINIC | Age: 85
End: 2025-05-27
Payer: MEDICARE

## 2025-05-27 DIAGNOSIS — L97.509 TYPE 2 DIABETES MELLITUS WITH FOOT ULCER, WITHOUT LONG-TERM CURRENT USE OF INSULIN: ICD-10-CM

## 2025-05-27 DIAGNOSIS — E11.621 TYPE 2 DIABETES MELLITUS WITH FOOT ULCER, WITHOUT LONG-TERM CURRENT USE OF INSULIN: ICD-10-CM

## 2025-05-27 RX ORDER — INSULIN LISPRO 100 [IU]/ML
0-15 INJECTION, SOLUTION INTRAVENOUS; SUBCUTANEOUS
Qty: 10 ML | Refills: 11 | Status: SHIPPED | OUTPATIENT
Start: 2025-05-27

## 2025-05-27 NOTE — TELEPHONE ENCOUNTER
[facility] called because they need a new sliding scale for patient's insulin, patient gets insulin 3 times a day before meals. Please send a new order with a sliding scale. They are unable to give patient her insulin until they get this.    Pharmacy: [facility]  F: [phone]  P: [phone]

## 2025-05-27 NOTE — TELEPHONE ENCOUNTER
I spoke with the pharmacist.  Will go with the insulin aspart.  Also will continue with the current sliding scale which is under 200 no units, 200 to 250 3 units, 251 to 300 6 units, 301 to 350 9 units, 351 to 400 12 units, greater than 400 give 12 units and call MD.

## 2025-05-27 NOTE — TELEPHONE ENCOUNTER
[facility] called and wanted to know which Insulin Patient is switching to? Insulin Lispro or Insulin Aspart? Pharmacy stated one was called in on 05-23-25 and the other called in today, 05-27-25.    [facility] can be reached at [phone] Ask for Bharath

## 2025-05-28 NOTE — CARE PLAN
The patient's goals for the shift include  OOB to chair for meals    The clinical goals for the shift include safety    Over the shift, the patient did not make progress toward the following goals. Barriers to progression include . Recommendations to address these barriers include   Problem: Diabetes  Goal: Achieve decreasing blood glucose levels by end of shift  10/14/2023 1046 by Roxi Carlin RN  Outcome: Progressing  10/14/2023 1045 by Roxi Carlin RN  Outcome: Progressing  10/14/2023 1045 by Roxi Carlin RN  Outcome: Progressing     Problem: Diabetes  Goal: Achieve decreasing blood glucose levels by end of shift  10/14/2023 1046 by Roxi Carlin RN  Outcome: Progressing  10/14/2023 1045 by Roxi Carlin RN  Outcome: Progressing  10/14/2023 1045 by Roxi Carlin RN  Outcome: Progressing  Goal: Increase stability of blood glucose readings by end of shift  10/14/2023 1046 by Roxi Carlin RN  Outcome: Progressing  10/14/2023 1045 by Roxi Carlin RN  Outcome: Progressing  10/14/2023 1045 by Roxi Carlin RN  Outcome: Progressing  Goal: Decrease in ketones present in urine by end of shift  10/14/2023 1046 by Roxi Carlin RN  Outcome: Progressing  10/14/2023 1045 by Roxi Carlin RN  Outcome: Progressing  10/14/2023 1045 by Roxi Carlin RN  Outcome: Progressing  Goal: Maintain electrolyte levels within acceptable range throughout shift  10/14/2023 1046 by Roxi Carlin RN  Outcome: Progressing  10/14/2023 1045 by Roxi Carlin RN  Outcome: Progressing  10/14/2023 1045 by Roxi Carlin RN  Outcome: Progressing  Goal: Maintain glucose levels >70mg/dl to <250mg/dl throughout shift  10/14/2023 1046 by Roxi Carlin RN  Outcome: Progressing  10/14/2023 1045 by Roxi Carlin RN  Outcome: Progressing  10/14/2023 1045 by Roxi Carlin RN  Outcome: Progressing  Goal: No changes in neurological exam by end of  shift  10/14/2023 1046 by Roxi Carlin RN  Outcome: Progressing  10/14/2023 1045 by Roxi Carlin RN  Outcome: Progressing  10/14/2023 1045 by Roxi Carlin RN  Outcome: Progressing  Goal: Learn about and adhere to nutrition recommendations by end of shift  10/14/2023 1046 by Roxi Carlin RN  Outcome: Progressing  10/14/2023 1045 by Roxi Carlin RN  Outcome: Progressing  10/14/2023 1045 by Roxi Carlin RN  Outcome: Progressing  Goal: Vital signs within normal range for age by end of shift  10/14/2023 1046 by Roxi Cariln RN  Outcome: Progressing  10/14/2023 1045 by Roxi Carlin RN  Outcome: Progressing  10/14/2023 1045 by Roxi Carlin RN  Outcome: Progressing  Goal: Increase self care and/or family involovement by end of shift  10/14/2023 1046 by Roxi Carlin RN  Outcome: Progressing  10/14/2023 1045 by Roxi Carlin RN  Outcome: Progressing  10/14/2023 1045 by Roxi Carlin RN  Outcome: Progressing  Goal: Receive DSME education by end of shift  10/14/2023 1046 by Roxi Carlin RN  Outcome: Progressing  10/14/2023 1045 by Roxi Carlin RN  Outcome: Progressing  10/14/2023 1045 by Roxi Carlin RN  Outcome: Progressing     Problem: Skin  Goal: Decreased wound size/increased tissue granulation at next dressing change  10/14/2023 1046 by Roxi Carlin RN  Outcome: Progressing  10/14/2023 1045 by Roxi Carlin RN  Outcome: Progressing  10/14/2023 1045 by Roxi Carlin RN  Outcome: Progressing  Goal: Participates in plan/prevention/treatment measures  10/14/2023 1046 by Roxi Carlin RN  Outcome: Progressing  10/14/2023 1045 by Roxi Carlin RN  Outcome: Progressing  10/14/2023 1045 by Roxi Carlin RN  Outcome: Progressing  Goal: Prevent/manage excess moisture  10/14/2023 1046 by Roxi Carlin RN  Outcome: Progressing  10/14/2023 1045 by Roxi Carlin RN  Outcome:  Progressing  10/14/2023 1045 by Roxi Carlin RN  Outcome: Progressing  Goal: Prevent/minimize sheer/friction injuries  10/14/2023 1046 by Roxi Carlin, RN  Outcome: Progressing  10/14/2023 1045 by Roxi Carlin, RN  Outcome: Progressing  10/14/2023 1045 by Roxi Carlin, RN  Outcome: Progressing  Goal: Promote/optimize nutrition  10/14/2023 1046 by Roxi Carlin, RN  Outcome: Progressing  10/14/2023 1045 by Roxi Carlin, RN  Outcome: Progressing  10/14/2023 1045 by Roxi Carlin, RN  Outcome: Progressing  Goal: Promote skin healing  10/14/2023 1046 by Roxi Carlin, RN  Outcome: Progressing  10/14/2023 1045 by Roxi Carlin, RN  Outcome: Progressing  10/14/2023 1045 by Roxi Carlin, RN  Outcome: Progressing     Problem: Fall/Injury  Goal: Not fall by end of shift  10/14/2023 1046 by Roxi Carlin, RN  Outcome: Progressing  10/14/2023 1045 by Roxi Carlin, RN  Outcome: Progressing  10/14/2023 1045 by Roxi Carlin, RN  Outcome: Progressing  Goal: Be free from injury by end of the shift  10/14/2023 1046 by Roxi Carlin, RN  Outcome: Progressing  10/14/2023 1045 by Roxi Carlin, RN  Outcome: Progressing  10/14/2023 1045 by Roxi Carlin, RN  Outcome: Progressing  Goal: Verbalize understanding of personal risk factors for fall in the hospital  10/14/2023 1046 by Roxi Carlin, RN  Outcome: Progressing  10/14/2023 1045 by Roxi Carlin, RN  Outcome: Progressing  10/14/2023 1045 by Roxi Carlin, RN  Outcome: Progressing  Goal: Verbalize understanding of risk factor reduction measures to prevent injury from fall in the home  10/14/2023 1046 by Roxi Carlin, RN  Outcome: Progressing  10/14/2023 1045 by Roxi Carlin, RN  Outcome: Progressing  10/14/2023 1045 by Roxi Carlin, RN  Outcome: Progressing  Goal: Use assistive devices by end of the shift  10/14/2023 1046 by Roxi Carlin RN  Outcome:  Progressing  10/14/2023 1045 by Roxi Carlin RN  Outcome: Progressing  10/14/2023 1045 by Roxi Carlin RN  Outcome: Progressing  Goal: Pace activities to prevent fatigue by end of the shift  10/14/2023 1046 by Roxi Carlin RN  Outcome: Progressing  10/14/2023 1045 by Roxi Carlin RN  Outcome: Progressing  10/14/2023 1045 by Roxi Carlin RN  Outcome: Progressing   .     No known pertinent  family history related to current admission

## 2025-05-29 ENCOUNTER — TELEPHONE (OUTPATIENT)
Dept: PRIMARY CARE | Facility: CLINIC | Age: 85
End: 2025-05-29

## 2025-05-29 ENCOUNTER — OFFICE VISIT (OUTPATIENT)
Facility: CLINIC | Age: 85
End: 2025-05-29
Payer: MEDICARE

## 2025-05-29 VITALS — DIASTOLIC BLOOD PRESSURE: 60 MMHG | SYSTOLIC BLOOD PRESSURE: 98 MMHG

## 2025-05-29 DIAGNOSIS — I25.10 CORONARY ARTERIOSCLEROSIS: Primary | ICD-10-CM

## 2025-05-29 DIAGNOSIS — E78.5 DYSLIPIDEMIA: ICD-10-CM

## 2025-05-29 DIAGNOSIS — I10 HTN (HYPERTENSION), BENIGN: ICD-10-CM

## 2025-05-29 PROCEDURE — 1126F AMNT PAIN NOTED NONE PRSNT: CPT | Performed by: INTERNAL MEDICINE

## 2025-05-29 PROCEDURE — 3078F DIAST BP <80 MM HG: CPT | Performed by: INTERNAL MEDICINE

## 2025-05-29 PROCEDURE — 1036F TOBACCO NON-USER: CPT | Performed by: INTERNAL MEDICINE

## 2025-05-29 PROCEDURE — G2211 COMPLEX E/M VISIT ADD ON: HCPCS | Performed by: INTERNAL MEDICINE

## 2025-05-29 PROCEDURE — 99214 OFFICE O/P EST MOD 30 MIN: CPT | Performed by: INTERNAL MEDICINE

## 2025-05-29 PROCEDURE — 3074F SYST BP LT 130 MM HG: CPT | Performed by: INTERNAL MEDICINE

## 2025-05-29 PROCEDURE — 1159F MED LIST DOCD IN RCRD: CPT | Performed by: INTERNAL MEDICINE

## 2025-05-29 ASSESSMENT — LIFESTYLE VARIABLES: TOTAL SCORE: 0

## 2025-05-29 ASSESSMENT — PATIENT HEALTH QUESTIONNAIRE - PHQ9
2. FEELING DOWN, DEPRESSED OR HOPELESS: NOT AT ALL
1. LITTLE INTEREST OR PLEASURE IN DOING THINGS: NOT AT ALL
SUM OF ALL RESPONSES TO PHQ9 QUESTIONS 1 AND 2: 0

## 2025-05-29 ASSESSMENT — ENCOUNTER SYMPTOMS
DEPRESSION: 0
OCCASIONAL FEELINGS OF UNSTEADINESS: 1
LOSS OF SENSATION IN FEET: 1

## 2025-05-29 ASSESSMENT — PAIN SCALES - GENERAL: PAINLEVEL_OUTOF10: 0-NO PAIN

## 2025-05-29 NOTE — TELEPHONE ENCOUNTER
CARIE FLUID DRAINING FROM THORACENTESIS SITE.   Nadja from [facility] , [phone]. She states that they were never made aware of the change to the sliding scale.  I did give her the information , but she wanted you to know that pt has not been getting it that way.

## 2025-05-29 NOTE — TELEPHONE ENCOUNTER
I spoke with the nurse bronson Cummings and confirmed that the sliding scale should be 0-200 0 units, 201-250 3 units, 251-300 6 units, 301-350 9 units, 351-400 12 units, greater than 400 12 units and call MD.

## 2025-05-29 NOTE — PROGRESS NOTES
[facility] [place]        Subjective   Chief Complaint   Patient presents with    6 month f/u      To reassess her coronary artery disease and heart failure management, angina free with no signs of volume overload    Previous: Successful right coronary artery drug-eluting stent deployment December 2021 in the setting where she had ventricular fibrillation arrest with recurrent defibrillation shocks in the setting of an inferior wall myocardial infarction cardiac catheterization showed mild left main disease mild LAD disease and mild circumflex disease with total occlusion of the proximal right coronary stented with a 3/20 mm drug-eluting stent proximally and she had clot migration distally at that time.  Left ventricular ejection fraction 55% on cardiac catheterization and subsequent echocardiogram showed normalization of LV function with left ventricular action fraction 55 to 59% and she had modest pulmonary hypertension with PA pressure 49 mmHg.  She is on modest dose statin therapy because of her older age group.       She has a past medical history of Anxiety, Arthritis, ASHD (arteriosclerotic heart disease), At risk for falls, Cardiac arrest, Compression fracture of L1 lumbar vertebra (Multi), CVA (cerebral vascular accident) (Multi), Depression, Diabetes mellitus (Multi), GERD (gastroesophageal reflux disease), Heart disease, Heel ulcer (Multi), Hypercholesterolemia, Hyperlipidemia, Hypertension, Osteoarthritis, Paraspinal abscess (Multi), Psoas abscess (Multi), Right foot drop, Right foot ulcer (Multi), Spinal stenosis, STEMI (ST elevation myocardial infarction) (Multi), TIA (transient ischemic attack), and Weakness.  She has a past surgical history that includes Other surgical history (04/25/2022); Other surgical history (04/25/2022); Other surgical history (04/25/2022); Other surgical history (04/25/2022); Other surgical history (04/25/2022); MR angio head wo IV  contrast (12/28/2021); MR angio neck wo IV contrast (12/28/2021); Coronary angioplasty with stent (12/25/2021); and Foot surgery.   No relevant family history has been documented for this patient.  Current Outpatient Medications   Medication Sig Dispense Refill    acetaminophen (Tylenol) 325 mg tablet Take 3 tablets (975 mg) by mouth 3 times a day.      aspirin 81 mg EC tablet Take 1 tablet (81 mg) by mouth once daily.      atorvastatin (Lipitor) 20 mg tablet Take 1 tablet (20 mg) by mouth.      bisacodyl (Dulcolax) 10 mg suppository Insert 1 suppository (10 mg) into the rectum once daily as needed for constipation.      blood sugar diagnostic (Blood Glucose Test) strip 1 strip if needed (use to check blood sugar when the sensor is not working). 200 strip 3    blood-glucose meter misc Use to check sugar when the sensor is not working 1 each 1    cholecalciferol, vitamin D3, (VITAMIN D3 ORAL)       dapagliflozin propanediol (Farxiga) 10 mg Take 0.5 tablets (5 mg) by mouth once every 24 hours.      Desitin Daily Defense 13 % cream cream       docusate sodium (Colace) 100 mg capsule Take 1 capsule (100 mg) by mouth 2 times a day as needed for constipation.      famotidine (Pepcid) 20 mg tablet take 1 tablet by mouth once daily 90 tablet 3    fesoterodine 8 mg tablet extended release 24 hr Take 1 tablet (8 mg) by mouth.      FLUoxetine (PROzac) 40 mg capsule take 1 capsule by mouth once daily 90 capsule 3    FreeStyle lancets (Safety Lancets) 28 gauge Use to check blood sugar when the sensor is not working 100 each 12    FreeStyle Brandon reader (FreeStyle Brandon 2 Saline) [facility] FREE STYLE BRANDON METER, See Instructions, Instructions: USE AS DIRECTED TO CHECK BLOOD SUGAR  E11.9, Supply, # 1 EA, 0 Refill(s), Type: Maintenance, Pharmacy: [id number], USE AS DIRECTED TO CHECK BLOOD SUGAR; E11.9, 61, in, 10/24/22 12:59:00 EDT, Height Measured, 147, lb, 10/24/22 12:59:00 EDT, Weight Measured      FreeStyle Brandon sensor system  "(FreeStyle Brandon 2 Sensor) kit FREE STYLE BRANDON SENSORS, See Instructions, Instructions: USE AS DIRECTED TO CHECK BLOOD SUGARS RELPACE EVERY 14 DAYS  E11.9, Supply, # 2 EA, 11 Refill(s), Type: Maintenance, Pharmacy: [facility] #[id number], USE AS DIRECTED TO CHECK BLOOD SUGARS RELPACE EVERY 14 DAYS; E11.9, 61, in, 10/24/22 12:59:00 EDT, Height Measured, 147, lb, 10/24/22 12:59:00 EDT, Weight Measured      furosemide (Lasix) 20 mg tablet take 1 tablet by mouth once daily 90 tablet 3    insulin aspart (NovoLOG U-100 Insulin aspart) 100 unit/mL injection Inject 0-15 Units under the skin 3 times a day before meals. Take as directed per insulin instructions. 10 mL 12    insulin degludec (Tresiba FlexTouch U-100) 100 unit/mL (3 mL) injection INJECT 20 UNITS SUBCUTANEOUSLY ONE TIME DAILY AS DIRECTED. 15 mL 3    insulin lispro 100 unit/mL injection Inject 0-15 Units under the skin 3 times daily (morning, midday, late afternoon). Take as directed per insulin instructions. 10 mL 11    melatonin 3 mg tablet Take 1 tablet (3 mg) by mouth as needed at bedtime.      metoprolol tartrate (Lopressor) 25 mg tablet Take 1 tablet by mouth 2 times a day. 180 tablet 3    oxyCODONE-acetaminophen (Percocet) 5-325 mg tablet Take 1 tablet by mouth 2 times a day. 60 tablet 0    pen needle,diabetic dual safty (BD AutoShield Duo Pen Needle) 30 gauge x 3/16\" needle 1 each 3 times a day. 200 each 3    pilocarpine (Salagen) 5 mg tablet Take 1 tablet (5 mg) by mouth 2 times a day.      polyethylene glycol (Glycolax, Miralax) 17 gram packet Take 17 g by mouth once daily as needed (constipation).      potassium chloride CR 10 mEq ER tablet take 1 tablet by mouth once daily 90 tablet 1    sodium chloride 0.9 % irrigation solution        No current facility-administered medications for this visit.      reports that she has never smoked. She has never been exposed to tobacco smoke. She has never used smokeless tobacco. She reports that she does not currently " use alcohol. She reports that she does not use drugs.  Allergies:  Ultram [tramadol], Nickel, and Sulfa (sulfonamide antibiotics)    ROS: See HPI  CONSTITUTIONAL: Chills- none. Fever- none. Weight change appropriate for age.  HEENT: Headache- Negative.  Change in vision- none.  Ear pain- none. Nasal congestion- none. Post-nasal drip-none.  Sore throat-none.  CARDIOLOGY: Chest pain- none.  Leg edema-trace.  Murmurs-soft systolic.  Palpitation- none.  RESPIRATORY: Denies any shortness of breath.  GI: Abdominal pain- none.  Change in bowel habits- none.  Constipation- none.  Diarrhea- none.  Nausea- none.  Vomiting- none.  MUSCULOSKELETAL: Joint pain- none.  Muscle aches- none.  DERMATOLOGY: Rash- none.  NEUROLOGY: Dizziness- none.   Headache- none.  PSYCHIATRY: Denies any depression or anxiety     Vitals:    05/29/25 1058   BP: 98/60   PainSc: 0-No pain      BMI:There is no height or weight on file to calculate BMI.   General Cardiology:  General Appearan neck:  Normal jugular venous pressure, carotid upstrokes brisk with no bruits  Lungs:  Clear to auscultation without wheezing or rhonchi or rales  Heart:  Regular rate and rhythm normal S1 and S2, no murmurs gallops rubs or clicks, PMI normal, no RV lift  Abdomen:  Soft, good bowel sounds, nontender, no hepatosplenomegaly, no pulsatile mass or bruits.  Extremities:  Good radial, femoral, pedal pulses without pretibial edema  Neurological:  No focal sensory or motor deficits, pupils equal and reactive    Last Labs:  CMP:  Recent Labs     06/16/24  0510 06/15/24  0458 06/14/24  0507    136 137   K 4.4 4.2 4.0    101 101   CO2 26 25 27   ANIONGAP 7 10 9   BUN 26* 22 17   CREATININE 1.40 1.00 1.10   EGFR 38* 56* 50*   GLUCOSE 186* 161* 131*     Recent Labs     06/16/24  0510 06/15/24  0458 06/14/24  0507   ALBUMIN 3.1* 3.1* 3.1*   ALKPHOS 100 97 94   ALT 18 11 7   AST 48* 29 19   BILITOT 0.3 0.4 0.5     CBC:  Recent Labs     06/16/24  0510 06/15/24  0458  "06/14/24  0507   WBC 7.0 7.6 5.5   HGB 12.8 13.0 13.0   HCT 41.5 39.4 39.5    281 264   * 96 96     COAG:   Recent Labs     01/16/24  1516 12/25/21  0144   INR 1.1 1.1     HEME/ENDO:  Recent Labs     01/11/24  1907 10/11/23  1652 08/02/23  0945 05/23/22  1016 04/21/22  1225 02/10/22  1300 12/27/21  1534 08/18/21  1032 02/18/21  1021   TSH  --   --   --   --  0.33  --  1.01  --  0.94   HGBA1C 8.9* 9.7* 9.7*   < >  --    < >  --    < > 9.9*    < > = values in this interval not displayed.      CARDIAC: No results for input(s): \"LDH\", \"CKMB\", \"TROPHS\", \"BNP\" in the last 84398 hours.    No lab exists for component: \"CK\", \"CKMBP\"  Recent Labs     04/27/23  1016 10/17/22  1010 05/23/22  1016   CHOL 153 152  152 140   LDLCALC 78 78  78 67   HDL 57 55  55 49*   TRIG 90 97  97 121       Last Cardiology Tests:  Echo:  Echo Results:  No results found for this or any previous visit from the past 3650 days.     Cath:  Stress Test:  Stress Results:  No results found for this or any previous visit from the past 365 days.     Cardiac Imaging:    Problem List Items Addressed This Visit       Dyslipidemia    Coronary arteriosclerosis - Primary    HTN (hypertension), benign      She remains on chronic statin therapy with excellent LDL reduction    Chest pain-free on chronic statins and chronic low-dose aspirin therapy    Blood pressure well-controlled            Pt. care time is spent includes review of diagnostic tests, labs, radiographs, EKGs, old echoes, cardiac work-up and coordination of care. Assessment, impression and plans are reflected in the note above as well as the orders.    Gato Gannon, DO  [facility] [place]  [facility]  "

## 2025-05-30 ENCOUNTER — TELEPHONE (OUTPATIENT)
Dept: PRIMARY CARE | Facility: CLINIC | Age: 85
End: 2025-05-30
Payer: MEDICARE

## 2025-05-30 NOTE — TELEPHONE ENCOUNTER
Jayshree from [employer] is calling in for clarification on Oxycodone 5-325 mg tabs.  She stated there are two directions.  Please call [phone].

## 2025-06-02 NOTE — TELEPHONE ENCOUNTER
I spoke to the nurse at the nursing home and confirmed that her prescription should be oxycodone 1 every 6 hours as needed #90 no refills

## 2025-06-09 ENCOUNTER — TELEPHONE (OUTPATIENT)
Dept: PRIMARY CARE | Facility: CLINIC | Age: 85
End: 2025-06-09
Payer: MEDICARE

## 2025-06-09 NOTE — TELEPHONE ENCOUNTER
Jane from [facility] called and stated she has questions regarding Prescriptions    Vitamin D- (Prescription doesn't have strength)    Fesoterodine 8 mg, (Directions doen't say how often a day)    Sodium Chloride 0.9 irrigation solution (no directions, does Patient still use)    Troy Daily defense cream (no directions on prescription)    Jane can be reached at [phone]

## 2025-06-09 NOTE — TELEPHONE ENCOUNTER
I called Jane at that number and left a message saying that her inactivating should be 8 mg daily her vitamin D should be 400 IUs/day in the dentist and should be twice daily as needed rash.  I told her I did not know how she was using the sodium chloride or if she still needs it.

## 2025-06-16 DIAGNOSIS — S32.010A COMPRESSION FRACTURE OF L1 LUMBAR VERTEBRA, CLOSED, INITIAL ENCOUNTER (MULTI): ICD-10-CM

## 2025-06-16 RX ORDER — OXYCODONE AND ACETAMINOPHEN 5; 325 MG/1; MG/1
1 TABLET ORAL 2 TIMES DAILY
Qty: 60 TABLET | Refills: 0 | Status: SHIPPED | OUTPATIENT
Start: 2025-06-16

## 2025-06-20 ENCOUNTER — TELEPHONE (OUTPATIENT)
Dept: PRIMARY CARE | Facility: CLINIC | Age: 85
End: 2025-06-20
Payer: MEDICARE

## 2025-06-20 NOTE — TELEPHONE ENCOUNTER
Anjelica [facility] [phone] called answering service 6/19 at 10;11 pm her blood sugar is 446, needs urgent orders for insulin    Per Lynda told answering service to forward to  on call since it is an assisted living facility

## 2025-07-09 ENCOUNTER — TELEPHONE (OUTPATIENT)
Dept: PRIMARY CARE | Facility: CLINIC | Age: 85
End: 2025-07-09
Payer: MEDICARE

## 2025-07-09 NOTE — TELEPHONE ENCOUNTER
Swati from [facility] calling because they need an order to discontinue the tylenol.      [facility]   Phone [phone]  Fax      [phone]

## 2025-07-14 ENCOUNTER — TELEPHONE (OUTPATIENT)
Dept: PRIMARY CARE | Facility: CLINIC | Age: 85
End: 2025-07-14
Payer: MEDICARE

## 2025-07-14 DIAGNOSIS — I10 HTN (HYPERTENSION), BENIGN: ICD-10-CM

## 2025-07-14 DIAGNOSIS — Z79.4 TYPE 2 DIABETES MELLITUS WITH HYPOGLYCEMIA WITHOUT COMA, WITH LONG-TERM CURRENT USE OF INSULIN: ICD-10-CM

## 2025-07-14 DIAGNOSIS — E11.649 TYPE 2 DIABETES MELLITUS WITH HYPOGLYCEMIA WITHOUT COMA, WITH LONG-TERM CURRENT USE OF INSULIN: ICD-10-CM

## 2025-07-14 DIAGNOSIS — E78.00 HYPERCHOLESTEROLEMIA: ICD-10-CM

## 2025-07-14 NOTE — TELEPHONE ENCOUNTER
Patient's nurse form [facility] which is where she lives is faxing over test results from a urine test that they did there and it was positive.    I spoke with the nurse that by [employer].  I reviewed her urinalysis and the culture and sensitivity.  He grew out E. coli and Klebsiella.  It showed resistance to multiple categories of antibiotics.  She is allergic to sulfa.  For these reasons I have prescribed fosfomycin 3 g times once a day and repeat in 2 days.  Recommended a urinalysis in 1 week for recheck.

## 2025-07-15 ENCOUNTER — TELEPHONE (OUTPATIENT)
Dept: PRIMARY CARE | Facility: CLINIC | Age: 85
End: 2025-07-15
Payer: MEDICARE

## 2025-07-15 NOTE — TELEPHONE ENCOUNTER
I spoke to the nurse bronson Cummings and they said they were able to get prior authorization for the medication so no change was necessary.

## 2025-07-15 NOTE — TELEPHONE ENCOUNTER
The script that you put in for the patient's UTI is a high copay and she wants to know if you can call something else in please? Send to [facility].

## 2025-07-16 DIAGNOSIS — S32.010A COMPRESSION FRACTURE OF L1 LUMBAR VERTEBRA, CLOSED, INITIAL ENCOUNTER (MULTI): ICD-10-CM

## 2025-07-16 RX ORDER — OXYCODONE AND ACETAMINOPHEN 5; 325 MG/1; MG/1
1 TABLET ORAL
Qty: 60 TABLET | Refills: 0 | Status: SHIPPED | OUTPATIENT
Start: 2025-07-16

## 2025-07-18 ENCOUNTER — TELEPHONE (OUTPATIENT)
Dept: PRIMARY CARE | Facility: CLINIC | Age: 85
End: 2025-07-18
Payer: MEDICARE

## 2025-07-18 NOTE — TELEPHONE ENCOUNTER
Swati from [facility] in [place].  Needs clarification before submitting the order.    Patient has an RX for oxyCODONE-acetaminophen (Percocet) 5-325 mg tablet and Tylenol 325 (2 tablets every 8 hours PRN).  They want to know if she is to take both as it would put her over the 3 gram limit.  Pharmacy is [facility].    Pharmacy fax [phone]  Pharmacy ph [phone]    [facility] nurse ph: [phone]

## 2025-07-18 NOTE — TELEPHONE ENCOUNTER
Please call the pharmacy and make the Tylenol prescription 325 mg 2 tablets every 12 hours as needed.  Thanks.

## 2025-07-24 ENCOUNTER — TELEPHONE (OUTPATIENT)
Dept: PRIMARY CARE | Facility: CLINIC | Age: 85
End: 2025-07-24
Payer: MEDICARE

## 2025-07-24 NOTE — TELEPHONE ENCOUNTER
Patient's Daughter Karoline called and stated Patient's G7 Sensor Debord isn't working.    Karoline can be reached at [phone]

## 2025-08-04 ENCOUNTER — TELEPHONE (OUTPATIENT)
Dept: PRIMARY CARE | Facility: CLINIC | Age: 85
End: 2025-08-04
Payer: MEDICARE

## 2025-08-04 DIAGNOSIS — N39.3 SUI (STRESS URINARY INCONTINENCE, FEMALE): ICD-10-CM

## 2025-08-04 RX ORDER — GRANULES FOR ORAL 3 G/1
3 POWDER ORAL ONCE
COMMUNITY

## 2025-08-04 RX ORDER — GRANULES FOR ORAL 3 G/1
3 POWDER ORAL ONCE
Qty: 3 G | Refills: 0 | Status: CANCELLED | OUTPATIENT
Start: 2025-08-04 | End: 2025-08-04

## 2025-08-04 NOTE — TELEPHONE ENCOUNTER
Tequila, from [facility] pharmacy, called to see if a fax was received for a prior authorization for Fosfomycin 3 gm for this patient. The fax was sent on 7/28/2025 and is needing to be filled out and backdated for 7/30/2025 for the medication to be covered. Tequila faxed it again today, 8/4/2025. This is through Cover My Meds.  Pharmacy phone #: [phone]  Pharmacy fax #: [phone]

## 2025-08-05 NOTE — TELEPHONE ENCOUNTER
Spoke with [facility], and they are going to re-fax the PA to our office.

## 2025-08-07 ENCOUNTER — TELEPHONE (OUTPATIENT)
Dept: PRIMARY CARE | Facility: CLINIC | Age: 85
End: 2025-08-07
Payer: MEDICARE

## 2025-08-07 DIAGNOSIS — Z79.4 TYPE 2 DIABETES MELLITUS WITH HYPOGLYCEMIA WITHOUT COMA, WITH LONG-TERM CURRENT USE OF INSULIN: ICD-10-CM

## 2025-08-07 DIAGNOSIS — E78.00 HYPERCHOLESTEROLEMIA: ICD-10-CM

## 2025-08-07 DIAGNOSIS — E11.649 TYPE 2 DIABETES MELLITUS WITH HYPOGLYCEMIA WITHOUT COMA, WITH LONG-TERM CURRENT USE OF INSULIN: ICD-10-CM

## 2025-08-07 DIAGNOSIS — I10 HTN (HYPERTENSION), BENIGN: ICD-10-CM

## 2025-08-11 ENCOUNTER — TELEPHONE (OUTPATIENT)
Dept: PRIMARY CARE | Facility: CLINIC | Age: 85
End: 2025-08-11
Payer: MEDICARE

## 2025-08-11 DIAGNOSIS — I10 HTN (HYPERTENSION), BENIGN: ICD-10-CM

## 2025-08-11 DIAGNOSIS — E11.649 TYPE 2 DIABETES MELLITUS WITH HYPOGLYCEMIA WITHOUT COMA, WITH LONG-TERM CURRENT USE OF INSULIN: ICD-10-CM

## 2025-08-11 DIAGNOSIS — E78.00 HYPERCHOLESTEROLEMIA: ICD-10-CM

## 2025-08-11 DIAGNOSIS — Z79.4 TYPE 2 DIABETES MELLITUS WITH HYPOGLYCEMIA WITHOUT COMA, WITH LONG-TERM CURRENT USE OF INSULIN: ICD-10-CM

## 2025-08-13 DIAGNOSIS — S32.010A COMPRESSION FRACTURE OF L1 LUMBAR VERTEBRA, CLOSED, INITIAL ENCOUNTER (MULTI): ICD-10-CM

## 2025-08-13 RX ORDER — OXYCODONE AND ACETAMINOPHEN 5; 325 MG/1; MG/1
1 TABLET ORAL
Qty: 60 TABLET | Refills: 0 | OUTPATIENT
Start: 2025-08-13

## 2025-08-13 RX ORDER — OXYCODONE AND ACETAMINOPHEN 5; 325 MG/1; MG/1
1 TABLET ORAL
Qty: 60 TABLET | Refills: 0 | Status: SHIPPED | OUTPATIENT
Start: 2025-08-13

## 2025-08-14 ENCOUNTER — APPOINTMENT (OUTPATIENT)
Dept: PRIMARY CARE | Facility: CLINIC | Age: 85
End: 2025-08-14
Payer: MEDICARE

## 2025-08-14 ENCOUNTER — TELEPHONE (OUTPATIENT)
Dept: PRIMARY CARE | Facility: CLINIC | Age: 85
End: 2025-08-14

## 2025-08-14 VITALS
WEIGHT: 110 LBS | TEMPERATURE: 97.2 F | OXYGEN SATURATION: 99 % | HEIGHT: 62 IN | HEART RATE: 71 BPM | BODY MASS INDEX: 20.24 KG/M2 | DIASTOLIC BLOOD PRESSURE: 70 MMHG | SYSTOLIC BLOOD PRESSURE: 112 MMHG

## 2025-08-14 DIAGNOSIS — E11.649 TYPE 2 DIABETES MELLITUS WITH HYPOGLYCEMIA WITHOUT COMA, WITH LONG-TERM CURRENT USE OF INSULIN: ICD-10-CM

## 2025-08-14 DIAGNOSIS — Z79.4 TYPE 2 DIABETES MELLITUS WITH HYPOGLYCEMIA WITHOUT COMA, WITH LONG-TERM CURRENT USE OF INSULIN: ICD-10-CM

## 2025-08-14 DIAGNOSIS — R30.0 BURNING WITH URINATION: ICD-10-CM

## 2025-08-14 DIAGNOSIS — I10 HTN (HYPERTENSION), BENIGN: ICD-10-CM

## 2025-08-14 DIAGNOSIS — N30.00 ACUTE CYSTITIS WITHOUT HEMATURIA: Primary | ICD-10-CM

## 2025-08-14 DIAGNOSIS — E78.00 HYPERCHOLESTEROLEMIA: ICD-10-CM

## 2025-08-14 DIAGNOSIS — S32.010A COMPRESSION FRACTURE OF L1 LUMBAR VERTEBRA, CLOSED, INITIAL ENCOUNTER (MULTI): ICD-10-CM

## 2025-08-14 PROBLEM — I25.10 ATHEROSCLEROSIS OF CORONARY ARTERY WITHOUT ANGINA PECTORIS: Status: ACTIVE | Noted: 2023-08-24

## 2025-08-14 PROBLEM — Z95.5 CORONARY ARTERY GRAFT PRESENT: Status: ACTIVE | Noted: 2023-08-24

## 2025-08-14 LAB
POC APPEARANCE, URINE: CLEAR
POC BILIRUBIN, URINE: NEGATIVE
POC BLOOD, URINE: ABNORMAL
POC COLOR, URINE: YELLOW
POC GLUCOSE, URINE: ABNORMAL MG/DL
POC KETONES, URINE: NEGATIVE MG/DL
POC LEUKOCYTES, URINE: ABNORMAL
POC NITRITE,URINE: NEGATIVE
POC PH, URINE: 5.5 PH
POC PROTEIN, URINE: NEGATIVE MG/DL
POC SPECIFIC GRAVITY, URINE: 1.01
POC UROBILINOGEN, URINE: 0.2 EU/DL

## 2025-08-14 PROCEDURE — 1159F MED LIST DOCD IN RCRD: CPT | Performed by: FAMILY MEDICINE

## 2025-08-14 PROCEDURE — 3074F SYST BP LT 130 MM HG: CPT | Performed by: FAMILY MEDICINE

## 2025-08-14 PROCEDURE — 99214 OFFICE O/P EST MOD 30 MIN: CPT | Performed by: FAMILY MEDICINE

## 2025-08-14 PROCEDURE — 1036F TOBACCO NON-USER: CPT | Performed by: FAMILY MEDICINE

## 2025-08-14 PROCEDURE — 81003 URINALYSIS AUTO W/O SCOPE: CPT | Performed by: FAMILY MEDICINE

## 2025-08-14 PROCEDURE — 3078F DIAST BP <80 MM HG: CPT | Performed by: FAMILY MEDICINE

## 2025-08-14 RX ORDER — OXYCODONE AND ACETAMINOPHEN 5; 325 MG/1; MG/1
1 TABLET ORAL
Qty: 60 TABLET | Refills: 0 | Status: CANCELLED | OUTPATIENT
Start: 2025-08-14

## 2025-08-14 RX ORDER — FLUCONAZOLE 150 MG/1
TABLET ORAL
Qty: 2 TABLET | Refills: 0 | Status: SHIPPED | OUTPATIENT
Start: 2025-08-14

## 2025-08-14 RX ORDER — POVIDONE-IODINE 10 %
SOLUTION, NON-ORAL TOPICAL
COMMUNITY
Start: 2025-07-12

## 2025-08-14 ASSESSMENT — PATIENT HEALTH QUESTIONNAIRE - PHQ9
1. LITTLE INTEREST OR PLEASURE IN DOING THINGS: NOT AT ALL
2. FEELING DOWN, DEPRESSED OR HOPELESS: SEVERAL DAYS
SUM OF ALL RESPONSES TO PHQ9 QUESTIONS 1 AND 2: 1

## 2025-08-19 ENCOUNTER — TELEPHONE (OUTPATIENT)
Dept: PRIMARY CARE | Facility: CLINIC | Age: 85
End: 2025-08-19
Payer: MEDICARE

## 2025-08-25 ENCOUNTER — HOSPITAL ENCOUNTER (INPATIENT)
Facility: HOSPITAL | Age: 85
End: 2025-08-25
Attending: INTERNAL MEDICINE | Admitting: INTERNAL MEDICINE
Payer: MEDICARE

## 2025-09-02 ENCOUNTER — PATIENT OUTREACH (OUTPATIENT)
Dept: PRIMARY CARE | Facility: CLINIC | Age: 85
End: 2025-09-02
Payer: MEDICARE

## 2025-09-03 ENCOUNTER — TELEPHONE (OUTPATIENT)
Dept: PRIMARY CARE | Facility: CLINIC | Age: 85
End: 2025-09-03
Payer: MEDICARE

## 2026-02-19 ENCOUNTER — APPOINTMENT (OUTPATIENT)
Dept: PRIMARY CARE | Facility: CLINIC | Age: 86
End: 2026-02-19
Payer: MEDICARE

## (undated) DEVICE — DRESSING, ADAPTIC, NON-ADHERENT, 3 X 8 IN

## (undated) DEVICE — APPLICATOR, CHLORAPREP, W/ORANGE TINT, 26ML

## (undated) DEVICE — Device

## (undated) DEVICE — BANDAGE, ELASTIC, ACE, W/CLIP, 3 IN X 5 YD, NS

## (undated) DEVICE — GLOVE, SURGICAL, PROTEXIS PI BLUE W/NEUTHERA, 7.0, PF, LF

## (undated) DEVICE — DEPRESSOR, TONGUE, 6 IN, WOOD, STERILE, LF

## (undated) DEVICE — GLOVE, SURGICAL, PROTEXIS PI ORTHO, 7.5, PF, LF

## (undated) DEVICE — SYRINGE, 10 CC, LUER LOCK

## (undated) DEVICE — GLOVE, SURGICAL, PROTEXIS PI BLUE W/NEUTHERA, 8.5, PF, LF

## (undated) DEVICE — GLOVE, SURGICAL, PROTEXIS PI , 7.5, PF, LF

## (undated) DEVICE — SOLUTION, IRRIGATION, STERILE WATER, 1000 ML, POUR BOTTLE

## (undated) DEVICE — SOLUTION, IRRIGATION, SODIUM CHLORIDE 0.9%, 1000 ML, POUR BOTTLE

## (undated) DEVICE — NEEDLE, HYPODERMIC, 25 G X 1.5 IN, A BEVEL, STERILE

## (undated) DEVICE — SUTURE, PROLENE, 3-0, 18 IN, FS-1, BLUE

## (undated) DEVICE — DRESSING, GAUZE, PAD, 4 X 4 IN, STERILE

## (undated) DEVICE — SUTURE, PROLENE 4-0, PS2, 18 IN, BLUE MONO

## (undated) DEVICE — GLOVE, SURGICAL, PROTEXIS PI , 7.0, PF, LF

## (undated) DEVICE — GLOVE, SURGICAL, PROTEXIS PI ORTHO, 8.0, PF, LF

## (undated) DEVICE — SPONGE, GAUZE, AVANT, STERILE, NONWOVEN, 4PLY, 4 X 4, STANDARD

## (undated) DEVICE — SUTURE, MONOCRYL, 3-0, 27 IN, PS-2, UNDYED

## (undated) DEVICE — BANDAGE, GAUZE, 6 PLY, KERLIX, 2.25 IN X 3 YD, STERILE

## (undated) DEVICE — DRAPE, SHEET, 17 X 23 IN